# Patient Record
Sex: FEMALE | Race: OTHER | HISPANIC OR LATINO | URBAN - METROPOLITAN AREA
[De-identification: names, ages, dates, MRNs, and addresses within clinical notes are randomized per-mention and may not be internally consistent; named-entity substitution may affect disease eponyms.]

---

## 2023-11-04 ENCOUNTER — EMERGENCY (EMERGENCY)
Facility: HOSPITAL | Age: 78
LOS: 1 days | Discharge: HOME | End: 2023-11-04
Attending: EMERGENCY MEDICINE
Payer: SELF-PAY

## 2023-11-04 VITALS
SYSTOLIC BLOOD PRESSURE: 133 MMHG | OXYGEN SATURATION: 99 % | HEART RATE: 93 BPM | DIASTOLIC BLOOD PRESSURE: 81 MMHG | TEMPERATURE: 98 F | RESPIRATION RATE: 18 BRPM

## 2023-11-04 VITALS
HEIGHT: 67 IN | WEIGHT: 154.1 LBS | HEART RATE: 83 BPM | RESPIRATION RATE: 16 BRPM | DIASTOLIC BLOOD PRESSURE: 74 MMHG | SYSTOLIC BLOOD PRESSURE: 148 MMHG | OXYGEN SATURATION: 96 % | TEMPERATURE: 98 F

## 2023-11-04 LAB
ALBUMIN SERPL ELPH-MCNC: 3.9 G/DL — SIGNIFICANT CHANGE UP (ref 3.3–5.2)
ALBUMIN SERPL ELPH-MCNC: 3.9 G/DL — SIGNIFICANT CHANGE UP (ref 3.3–5.2)
ALP SERPL-CCNC: 104 U/L — SIGNIFICANT CHANGE UP (ref 40–120)
ALP SERPL-CCNC: 104 U/L — SIGNIFICANT CHANGE UP (ref 40–120)
ALT FLD-CCNC: 15 U/L — SIGNIFICANT CHANGE UP
ALT FLD-CCNC: 15 U/L — SIGNIFICANT CHANGE UP
ANION GAP SERPL CALC-SCNC: 13 MMOL/L — SIGNIFICANT CHANGE UP (ref 5–17)
ANION GAP SERPL CALC-SCNC: 13 MMOL/L — SIGNIFICANT CHANGE UP (ref 5–17)
APPEARANCE UR: ABNORMAL
APPEARANCE UR: ABNORMAL
AST SERPL-CCNC: 16 U/L — SIGNIFICANT CHANGE UP
AST SERPL-CCNC: 16 U/L — SIGNIFICANT CHANGE UP
BACTERIA # UR AUTO: ABNORMAL /HPF
BACTERIA # UR AUTO: ABNORMAL /HPF
BASOPHILS # BLD AUTO: 0.05 K/UL — SIGNIFICANT CHANGE UP (ref 0–0.2)
BASOPHILS # BLD AUTO: 0.05 K/UL — SIGNIFICANT CHANGE UP (ref 0–0.2)
BASOPHILS NFR BLD AUTO: 0.7 % — SIGNIFICANT CHANGE UP (ref 0–2)
BASOPHILS NFR BLD AUTO: 0.7 % — SIGNIFICANT CHANGE UP (ref 0–2)
BILIRUB SERPL-MCNC: 0.4 MG/DL — SIGNIFICANT CHANGE UP (ref 0.4–2)
BILIRUB SERPL-MCNC: 0.4 MG/DL — SIGNIFICANT CHANGE UP (ref 0.4–2)
BILIRUB UR-MCNC: NEGATIVE — SIGNIFICANT CHANGE UP
BILIRUB UR-MCNC: NEGATIVE — SIGNIFICANT CHANGE UP
BUN SERPL-MCNC: 27.5 MG/DL — HIGH (ref 8–20)
BUN SERPL-MCNC: 27.5 MG/DL — HIGH (ref 8–20)
CALCIUM SERPL-MCNC: 9.6 MG/DL — SIGNIFICANT CHANGE UP (ref 8.4–10.5)
CALCIUM SERPL-MCNC: 9.6 MG/DL — SIGNIFICANT CHANGE UP (ref 8.4–10.5)
CAST: 2 /LPF — SIGNIFICANT CHANGE UP (ref 0–4)
CAST: 2 /LPF — SIGNIFICANT CHANGE UP (ref 0–4)
CHLORIDE SERPL-SCNC: 98 MMOL/L — SIGNIFICANT CHANGE UP (ref 96–108)
CHLORIDE SERPL-SCNC: 98 MMOL/L — SIGNIFICANT CHANGE UP (ref 96–108)
CO2 SERPL-SCNC: 25 MMOL/L — SIGNIFICANT CHANGE UP (ref 22–29)
CO2 SERPL-SCNC: 25 MMOL/L — SIGNIFICANT CHANGE UP (ref 22–29)
COLOR SPEC: YELLOW — SIGNIFICANT CHANGE UP
COLOR SPEC: YELLOW — SIGNIFICANT CHANGE UP
CREAT SERPL-MCNC: 1.37 MG/DL — HIGH (ref 0.5–1.3)
CREAT SERPL-MCNC: 1.37 MG/DL — HIGH (ref 0.5–1.3)
DIFF PNL FLD: ABNORMAL
DIFF PNL FLD: ABNORMAL
EGFR: 39 ML/MIN/1.73M2 — LOW
EGFR: 39 ML/MIN/1.73M2 — LOW
EOSINOPHIL # BLD AUTO: 0.34 K/UL — SIGNIFICANT CHANGE UP (ref 0–0.5)
EOSINOPHIL # BLD AUTO: 0.34 K/UL — SIGNIFICANT CHANGE UP (ref 0–0.5)
EOSINOPHIL NFR BLD AUTO: 4.6 % — SIGNIFICANT CHANGE UP (ref 0–6)
EOSINOPHIL NFR BLD AUTO: 4.6 % — SIGNIFICANT CHANGE UP (ref 0–6)
GLUCOSE SERPL-MCNC: 424 MG/DL — HIGH (ref 70–99)
GLUCOSE SERPL-MCNC: 424 MG/DL — HIGH (ref 70–99)
GLUCOSE UR QL: >=1000 MG/DL
GLUCOSE UR QL: >=1000 MG/DL
HCT VFR BLD CALC: 42.1 % — SIGNIFICANT CHANGE UP (ref 34.5–45)
HCT VFR BLD CALC: 42.1 % — SIGNIFICANT CHANGE UP (ref 34.5–45)
HGB BLD-MCNC: 14.6 G/DL — SIGNIFICANT CHANGE UP (ref 11.5–15.5)
HGB BLD-MCNC: 14.6 G/DL — SIGNIFICANT CHANGE UP (ref 11.5–15.5)
IMM GRANULOCYTES NFR BLD AUTO: 0.3 % — SIGNIFICANT CHANGE UP (ref 0–0.9)
IMM GRANULOCYTES NFR BLD AUTO: 0.3 % — SIGNIFICANT CHANGE UP (ref 0–0.9)
KETONES UR-MCNC: NEGATIVE MG/DL — SIGNIFICANT CHANGE UP
KETONES UR-MCNC: NEGATIVE MG/DL — SIGNIFICANT CHANGE UP
LEUKOCYTE ESTERASE UR-ACNC: ABNORMAL
LEUKOCYTE ESTERASE UR-ACNC: ABNORMAL
LYMPHOCYTES # BLD AUTO: 3.11 K/UL — SIGNIFICANT CHANGE UP (ref 1–3.3)
LYMPHOCYTES # BLD AUTO: 3.11 K/UL — SIGNIFICANT CHANGE UP (ref 1–3.3)
LYMPHOCYTES # BLD AUTO: 42.2 % — SIGNIFICANT CHANGE UP (ref 13–44)
LYMPHOCYTES # BLD AUTO: 42.2 % — SIGNIFICANT CHANGE UP (ref 13–44)
MCHC RBC-ENTMCNC: 27.2 PG — SIGNIFICANT CHANGE UP (ref 27–34)
MCHC RBC-ENTMCNC: 27.2 PG — SIGNIFICANT CHANGE UP (ref 27–34)
MCHC RBC-ENTMCNC: 34.7 GM/DL — SIGNIFICANT CHANGE UP (ref 32–36)
MCHC RBC-ENTMCNC: 34.7 GM/DL — SIGNIFICANT CHANGE UP (ref 32–36)
MCV RBC AUTO: 78.4 FL — LOW (ref 80–100)
MCV RBC AUTO: 78.4 FL — LOW (ref 80–100)
MONOCYTES # BLD AUTO: 0.53 K/UL — SIGNIFICANT CHANGE UP (ref 0–0.9)
MONOCYTES # BLD AUTO: 0.53 K/UL — SIGNIFICANT CHANGE UP (ref 0–0.9)
MONOCYTES NFR BLD AUTO: 7.2 % — SIGNIFICANT CHANGE UP (ref 2–14)
MONOCYTES NFR BLD AUTO: 7.2 % — SIGNIFICANT CHANGE UP (ref 2–14)
NEUTROPHILS # BLD AUTO: 3.32 K/UL — SIGNIFICANT CHANGE UP (ref 1.8–7.4)
NEUTROPHILS # BLD AUTO: 3.32 K/UL — SIGNIFICANT CHANGE UP (ref 1.8–7.4)
NEUTROPHILS NFR BLD AUTO: 45 % — SIGNIFICANT CHANGE UP (ref 43–77)
NEUTROPHILS NFR BLD AUTO: 45 % — SIGNIFICANT CHANGE UP (ref 43–77)
NITRITE UR-MCNC: POSITIVE
NITRITE UR-MCNC: POSITIVE
PH UR: 6 — SIGNIFICANT CHANGE UP (ref 5–8)
PH UR: 6 — SIGNIFICANT CHANGE UP (ref 5–8)
PLATELET # BLD AUTO: 122 K/UL — LOW (ref 150–400)
PLATELET # BLD AUTO: 122 K/UL — LOW (ref 150–400)
POTASSIUM SERPL-MCNC: 4.1 MMOL/L — SIGNIFICANT CHANGE UP (ref 3.5–5.3)
POTASSIUM SERPL-MCNC: 4.1 MMOL/L — SIGNIFICANT CHANGE UP (ref 3.5–5.3)
POTASSIUM SERPL-SCNC: 4.1 MMOL/L — SIGNIFICANT CHANGE UP (ref 3.5–5.3)
POTASSIUM SERPL-SCNC: 4.1 MMOL/L — SIGNIFICANT CHANGE UP (ref 3.5–5.3)
PROT SERPL-MCNC: 7.6 G/DL — SIGNIFICANT CHANGE UP (ref 6.6–8.7)
PROT SERPL-MCNC: 7.6 G/DL — SIGNIFICANT CHANGE UP (ref 6.6–8.7)
PROT UR-MCNC: SIGNIFICANT CHANGE UP MG/DL
PROT UR-MCNC: SIGNIFICANT CHANGE UP MG/DL
RBC # BLD: 5.37 M/UL — HIGH (ref 3.8–5.2)
RBC # BLD: 5.37 M/UL — HIGH (ref 3.8–5.2)
RBC # FLD: 13 % — SIGNIFICANT CHANGE UP (ref 10.3–14.5)
RBC # FLD: 13 % — SIGNIFICANT CHANGE UP (ref 10.3–14.5)
RBC CASTS # UR COMP ASSIST: 1 /HPF — SIGNIFICANT CHANGE UP (ref 0–4)
RBC CASTS # UR COMP ASSIST: 1 /HPF — SIGNIFICANT CHANGE UP (ref 0–4)
SODIUM SERPL-SCNC: 136 MMOL/L — SIGNIFICANT CHANGE UP (ref 135–145)
SODIUM SERPL-SCNC: 136 MMOL/L — SIGNIFICANT CHANGE UP (ref 135–145)
SP GR SPEC: 1.02 — SIGNIFICANT CHANGE UP (ref 1–1.03)
SP GR SPEC: 1.02 — SIGNIFICANT CHANGE UP (ref 1–1.03)
SQUAMOUS # UR AUTO: 1 /HPF — SIGNIFICANT CHANGE UP (ref 0–5)
SQUAMOUS # UR AUTO: 1 /HPF — SIGNIFICANT CHANGE UP (ref 0–5)
TROPONIN T SERPL-MCNC: <0.01 NG/ML — SIGNIFICANT CHANGE UP (ref 0–0.06)
TROPONIN T SERPL-MCNC: <0.01 NG/ML — SIGNIFICANT CHANGE UP (ref 0–0.06)
UROBILINOGEN FLD QL: 0.2 MG/DL — SIGNIFICANT CHANGE UP (ref 0.2–1)
UROBILINOGEN FLD QL: 0.2 MG/DL — SIGNIFICANT CHANGE UP (ref 0.2–1)
WBC # BLD: 7.37 K/UL — SIGNIFICANT CHANGE UP (ref 3.8–10.5)
WBC # BLD: 7.37 K/UL — SIGNIFICANT CHANGE UP (ref 3.8–10.5)
WBC # FLD AUTO: 7.37 K/UL — SIGNIFICANT CHANGE UP (ref 3.8–10.5)
WBC # FLD AUTO: 7.37 K/UL — SIGNIFICANT CHANGE UP (ref 3.8–10.5)
WBC UR QL: 249 /HPF — HIGH (ref 0–5)
WBC UR QL: 249 /HPF — HIGH (ref 0–5)

## 2023-11-04 PROCEDURE — 87077 CULTURE AEROBIC IDENTIFY: CPT

## 2023-11-04 PROCEDURE — 96375 TX/PRO/DX INJ NEW DRUG ADDON: CPT

## 2023-11-04 PROCEDURE — 84484 ASSAY OF TROPONIN QUANT: CPT

## 2023-11-04 PROCEDURE — 87086 URINE CULTURE/COLONY COUNT: CPT

## 2023-11-04 PROCEDURE — 93005 ELECTROCARDIOGRAM TRACING: CPT

## 2023-11-04 PROCEDURE — 93010 ELECTROCARDIOGRAM REPORT: CPT

## 2023-11-04 PROCEDURE — 36415 COLL VENOUS BLD VENIPUNCTURE: CPT

## 2023-11-04 PROCEDURE — 87186 SC STD MICRODIL/AGAR DIL: CPT

## 2023-11-04 PROCEDURE — 85025 COMPLETE CBC W/AUTO DIFF WBC: CPT

## 2023-11-04 PROCEDURE — 99285 EMERGENCY DEPT VISIT HI MDM: CPT | Mod: 25

## 2023-11-04 PROCEDURE — 71045 X-RAY EXAM CHEST 1 VIEW: CPT

## 2023-11-04 PROCEDURE — 80053 COMPREHEN METABOLIC PANEL: CPT

## 2023-11-04 PROCEDURE — 99285 EMERGENCY DEPT VISIT HI MDM: CPT

## 2023-11-04 PROCEDURE — 71045 X-RAY EXAM CHEST 1 VIEW: CPT | Mod: 26

## 2023-11-04 PROCEDURE — T1013: CPT

## 2023-11-04 PROCEDURE — 96374 THER/PROPH/DIAG INJ IV PUSH: CPT

## 2023-11-04 PROCEDURE — 81001 URINALYSIS AUTO W/SCOPE: CPT

## 2023-11-04 RX ORDER — INSULIN HUMAN 100 [IU]/ML
8 INJECTION, SOLUTION SUBCUTANEOUS ONCE
Refills: 0 | Status: COMPLETED | OUTPATIENT
Start: 2023-11-04 | End: 2023-11-04

## 2023-11-04 RX ORDER — CEFTRIAXONE 500 MG/1
1000 INJECTION, POWDER, FOR SOLUTION INTRAMUSCULAR; INTRAVENOUS ONCE
Refills: 0 | Status: DISCONTINUED | OUTPATIENT
Start: 2023-11-04 | End: 2023-11-04

## 2023-11-04 RX ORDER — HUMAN INSULIN 100 [IU]/ML
30 INJECTION, SUSPENSION SUBCUTANEOUS
Qty: 1 | Refills: 0
Start: 2023-11-04 | End: 2023-11-08

## 2023-11-04 RX ORDER — CEFPODOXIME PROXETIL 100 MG
1 TABLET ORAL
Qty: 14 | Refills: 0
Start: 2023-11-04 | End: 2023-11-10

## 2023-11-04 RX ORDER — ACETAMINOPHEN 500 MG
1000 TABLET ORAL ONCE
Refills: 0 | Status: COMPLETED | OUTPATIENT
Start: 2023-11-04 | End: 2023-11-04

## 2023-11-04 RX ORDER — LIDOCAINE 4 G/100G
1 CREAM TOPICAL ONCE
Refills: 0 | Status: COMPLETED | OUTPATIENT
Start: 2023-11-04 | End: 2023-11-04

## 2023-11-04 RX ORDER — CEFTRIAXONE 500 MG/1
1000 INJECTION, POWDER, FOR SOLUTION INTRAMUSCULAR; INTRAVENOUS ONCE
Refills: 0 | Status: COMPLETED | OUTPATIENT
Start: 2023-11-04 | End: 2023-11-04

## 2023-11-04 RX ORDER — SODIUM CHLORIDE 9 MG/ML
500 INJECTION INTRAMUSCULAR; INTRAVENOUS; SUBCUTANEOUS ONCE
Refills: 0 | Status: COMPLETED | OUTPATIENT
Start: 2023-11-04 | End: 2023-11-04

## 2023-11-04 RX ADMIN — INSULIN HUMAN 8 UNIT(S): 100 INJECTION, SOLUTION SUBCUTANEOUS at 17:52

## 2023-11-04 RX ADMIN — SODIUM CHLORIDE 500 MILLILITER(S): 9 INJECTION INTRAMUSCULAR; INTRAVENOUS; SUBCUTANEOUS at 17:52

## 2023-11-04 RX ADMIN — Medication 1000 MILLIGRAM(S): at 17:37

## 2023-11-04 RX ADMIN — Medication 400 MILLIGRAM(S): at 16:44

## 2023-11-04 RX ADMIN — CEFTRIAXONE 1000 MILLIGRAM(S): 500 INJECTION, POWDER, FOR SOLUTION INTRAMUSCULAR; INTRAVENOUS at 17:12

## 2023-11-04 RX ADMIN — SODIUM CHLORIDE 500 MILLILITER(S): 9 INJECTION INTRAMUSCULAR; INTRAVENOUS; SUBCUTANEOUS at 16:44

## 2023-11-04 RX ADMIN — LIDOCAINE 1 PATCH: 4 CREAM TOPICAL at 17:12

## 2023-11-04 NOTE — ED ADULT NURSE NOTE - OBJECTIVE STATEMENT
pt reports o the ED s/p near syncopal episode. Pt daughter at bedside states she became dizzy while walking PTA and was helped back to bed by daughter.   In ED pt c/o of HA, and chest discomfort. daughter reports she just moved here from [facility] one month ago. Since last week has been more weak, poor PO intake. Denies any other c/o including SOB, CP, abd pain, back pain.

## 2023-11-04 NOTE — ED PROVIDER NOTE - CLINICAL SUMMARY MEDICAL DECISION MAKING FREE TEXT BOX
78y female w/ pmh of DM, HTN, CVA presenting after a near syncope that occurred PTA. patient became dizzy walking to the restroom and was helped to her bed. She did not fall. She states she has felt weak, tired, low appetite for the past week with associated dysuria. She also complains of a headache, cough and chest tightness. Denies fever, abdominal pain, N/V/D, leg pain or swelling, black or bloody stool. Patient recently moved from [facility] one months ago. She has been out of her Irbecard and insulin for the past 3 days, she is currently working on obtaining insurance for health care. 78y female w/ pmh of DM, HTN, CVA presenting after a near syncope and dysuria. Patient well appearing, nontoxic, stable vitals. workup significant for UTI and elevated blood sugar. Patient moved here from [facility] a month ago and has been out of her medications for the past 3 days. EKG, CXR, troponin all negative. Patient not currently in DKA. Ceftriaxone given in ED, prescriptions for cefpodoxime and NPH insulin sent. Clinical care coordinator consulted for expedited PCP follow up. [facility] clinic information provided. return precautions for UTI and DM given. Patient stable for discharge. 78y female w/ pmh of DM, HTN, CVA presenting after a near syncope and dysuria. Patient well appearing, nontoxic, stable vitals. workup significant for UTI and elevated blood sugar. Patient moved here from [facility] [place] a month ago and has been out of her medications for the past 3 days. EKG, CXR, troponin all negative. Patient not currently in DKA. Ceftriaxone given in ED, prescriptions for cefpodoxime and NPH insulin sent. Instructions for insulin use and blood sugar monitoring provided. Clinical care coordinator consulted for expedited PCP follow up. [facility] clinic information provided. return precautions for UTI and DM given. Patient stable for discharge.

## 2023-11-04 NOTE — ED PROVIDER NOTE - PHYSICAL EXAMINATION

## 2023-11-04 NOTE — ED PROVIDER NOTE - PROGRESS NOTE DETAILS
Jer: pt takes 30 units nph intermediate insulin once daily (has picture of meds), ran out 3 days ago and is working on f/u. will send short course of pt's medications and arrange close f/u.

## 2023-11-04 NOTE — ED PROVIDER NOTE - OBJECTIVE STATEMENT
78y female w/ pmh of DM, HTN, CVA presenting after a near syncope that occurred PTA. patient became dizzy walking to the restroom and was helped to her bed. She did not fall. She states she has felt weak, tired, low appetite for the past week with associated dysuria. She also complains of a headache, cough and chest tightness. Denies fever, abdominal pain, N/V/D, leg pain or swelling, black or bloody stool. Patient recently moved from [facility] one months ago. She has been out of her Irbecard and insulin for the past 3 days, she is currently working on obtaining insurance for health care.

## 2023-11-04 NOTE — ED PROVIDER NOTE - NSFOLLOWUPINSTRUCTIONS_ED_ALL_ED_FT
Infección urinaria en los adultos  Urinary Tract Infection, Adult    Shahrzad infección urinaria (IU) puede ocurrir en cualquier lugar de las vías urinarias. Las vías urinarias incluyen a los riñones, los uréteres, la vejiga y la uretra. Estos órganos fabrican, almacenan y eliminan la orina del organismo.    La IU mirtha afecta los uréteres y los riñones. La IU baja afecta la vejiga y la uretra.    ¿Cuáles son las causas?  La mayoría de las infecciones de las vías urinarias es causada por la presencia de bacterias en la alisia genital, alrededor de la uretra, por donde sale la orina del cuerpo. Estas bacterias proliferan y causan inflamación en las vías urinarias.    ¿Qué incrementa el riesgo?  Es más probable que sufra esta afección si:  Tiene colocado un catéter urinario permanente.  No puede controlar cuándo orinar o defecar (incontinencia).  Es sunshine y usted:  Utiliza espermicida o diafragma bay método anticonceptivo.  Tiene niveles bajos de estrógenos.  Está embarazada.  Tiene ciertos genes que aumentan crawford riesgo.  Es sexualmente activa.  Adalgisa antibióticos.  Tiene shahrzad afección que causa que el flujo de orina sea lento, bay:  Próstata agrandada, si usted es hombre.  Obstrucción de la uretra.  Cálculo renal.  Shahrzad afección nerviosa que afecta el control de la vejiga (vejiga neurógena).  No brianna lo suficiente o no orina con frecuencia.  Tiene ciertas enfermedades crónicas, bay:  Diabetes.  Un sistema que combate las enfermedades (sistemainmunitario) debilitado.  Anemia drepanocítica.  Gota.  Lesión en la médula diaz.  ¿Cuáles son los signos o síntomas?  Los síntomas de esta afección incluyen:  Necesidad inmediata (urgencia) de orinar.  Micción frecuente. Trilla puede incluir pequeñas cantidades de orina cada vez que orina.  Ardor o dolor al orinar.  Presencia de michoacano en la orina.  Orina con mal olor u olor atípico.  Dificultad para orinar.  Orina turbia.  Secreción vaginal, si es sunshine.  Dolor en el abdomen o en la parte inferior de la espalda.  Es posible que también tenga:  Vómitos o disminución del apetito.  Confusión.  Irritabilidad o cansancio.  Fiebre o escalofríos.  Diarrea.  El primer síntoma en los adultos mayores puede ser la confusión. En algunos casos, es posible que no tengan síntomas hasta que la infección empeore.    ¿Cómo se diagnostica?  Esta afección se diagnostica en función de benny antecedentes médicos y de un examen físico. También pueden hacerle otras pruebas, incluidas las siguientes:  Análisis de orina.  Análisis de michoacano.  Pruebas de infecciones de transmisión sexual (ITS).  Si ha tenido más de shahrzad infección urinaria (IU), se pueden hacer estudios de diagnóstico por imágenes o shahrzad cistoscopia para determinar la causa de las infecciones.    ¿Cómo se trata?  El tratamiento de esta afección incluye lo siguiente:  Antibióticos.  Medicamentos de venta meli para aliviar las molestias.  Beber shahrzad cantidad suficiente agua para mantenerse hidratado.  Si tiene infecciones con frecuencia o tiene otras afecciones, bay un cálculo renal, es posible que deba clara a un médico especialista en las vías urinarias (urólogo).    En casos poco frecuentes, las infecciones urinarias pueden provocar sepsis. La sepsis es shahrzad afección potencialmente mortal que se produce cuando el cuerpo responde a shahrzad infección. La sepsis se trata en el hospital con antibióticos, líquidos y otros medicamentos que se administran por vía intravenosa.    Siga estas instrucciones en crawford casa:    Medicamentos    Use los medicamentos de venta meli y los recetados solamente bay se lo haya indicado el médico.  Si le recetaron un antibiótico, tómelo bay se lo haya indicado el médico. No deje de usar el antibiótico aunque comience a sentirse mejor.  Instrucciones generales    Asegúrese de hacer lo siguiente:  Vaciar la vejiga con frecuencia y en crawford totalidad. No contener la orina mariella largos períodos.  Vaciar la vejiga después de tener sexo.  Limpiarse de atrás hacia adelante después de orinar o defecar, si es sunshine. Usar cada trozo de papel higiénico solo shahrzad vez cuando se limpie.  Beber suficiente líquido bay para mantener la orina de color amarillo pálido.  Cumpla con todas las visitas de seguimiento. Trilla es importante.  Comuníquese con un médico si:  Los síntomas no mejoran después de 1 o 2 días de tratamiento.  Los síntomas desaparecen y luego vuelven a aparecer.  Solicite ayuda de inmediato si:  Siente dolor intenso en la espalda o en la parte inferior del abdomen.  Tiene fiebre o escalofríos.  Tiene náuseas o vómitos.  Resumen  Shahrzad infección urinaria (IU) es shahrzad infección en cualquier parte de las vías urinarias, que incluyen los riñones, los uréteres, la vejiga y la uretra.  La mayoría de las infecciones de las vías urinarias es causada por bacterias en la alisia genital.  El tratamiento de esta afección suele incluir antibióticos.  Si le recetaron un antibiótico, tómelo bay se lo haya indicado el médico. No deje de usar el antibiótico aunque comience a sentirse mejor.  Cumpla con todas las visitas de seguimiento. Trilla es importante.  Esta información no tiene bay fin reemplazar el consejo del médico. Asegúrese de hacerle al médico cualquier pregunta que tenga. Urinary Tract Infection, Adult    A urinary tract infection (UTI) is an infection of any part of the urinary tract. The urinary tract includes the kidneys, ureters, bladder, and urethra. These organs make, store, and get rid of urine in the body.    An upper UTI affects the ureters and kidneys. A lower UTI affects the bladder and urethra.    What are the causes?  Most urinary tract infections are caused by bacteria in your genital area around your urethra, where urine leaves your body. These bacteria grow and cause inflammation of your urinary tract.    What increases the risk?  You are more likely to develop this condition if:  You have a urinary catheter that stays in place.  You are not able to control when you urinate or have a bowel movement (incontinence).  You are female and you:  Use a spermicide or diaphragm for birth control.  Have low estrogen levels.  Are pregnant.  You have certain genes that increase your risk.  You are sexually active.  You take antibiotic medicines.  You have a condition that causes your flow of urine to slow down, such as:  An enlarged prostate, if you are male.  Blockage in your urethra.  A kidney stone.  A nerve condition that affects your bladder control (neurogenic bladder).  Not getting enough to drink, or not urinating often.  You have certain medical conditions, such as:  Diabetes.  A weak disease-fighting system (immunesystem).  Sickle cell disease.  Gout.  Spinal cord injury.  What are the signs or symptoms?  Symptoms of this condition include:  Needing to urinate right away (urgency).  Frequent urination. This may include small amounts of urine each time you urinate.  Pain or burning with urination.  Blood in the urine.  Urine that smells bad or unusual.  Trouble urinating.  Cloudy urine.  Vaginal discharge, if you are female.  Pain in the abdomen or the lower back.  You may also have:  Vomiting or a decreased appetite.  Confusion.  Irritability or tiredness.  A fever or chills.  Diarrhea.  The first symptom in older adults may be confusion. In some cases, they may not have any symptoms until the infection has worsened.    How is this diagnosed?  This condition is diagnosed based on your medical history and a physical exam. You may also have other tests, including:  Urine tests.  Blood tests.  Tests for STIs (sexually transmitted infections).  If you have had more than one UTI, a cystoscopy or imaging studies may be done to determine the cause of the infections.    How is this treated?  Treatment for this condition includes:  Antibiotic medicine.  Over-the-counter medicines to treat discomfort.  Drinking enough water to stay hydrated.  If you have frequent infections or have other conditions such as a kidney stone, you may need to see a health care provider who specializes in the urinary tract (urologist).    In rare cases, urinary tract infections can cause sepsis. Sepsis is a life-threatening condition that occurs when the body responds to an infection. Sepsis is treated in the hospital with IV antibiotics, fluids, and other medicines.    Follow these instructions at home:    Medicines    Take over-the-counter and prescription medicines only as told by your health care provider.  If you were prescribed an antibiotic medicine, take it as told by your health care provider. Do not stop using the antibiotic even if you start to feel better.  General instructions    Make sure you:  Empty your bladder often and completely. Do not hold urine for long periods of time.  Empty your bladder after sex.  Wipe from front to back after urinating or having a bowel movement if you are female. Use each tissue only one time when you wipe.  Drink enough fluid to keep your urine pale yellow.  Keep all follow-up visits. This is important.  Contact a health care provider if:  Your symptoms do not get better after 1–2 days.  Your symptoms go away and then return.  Get help right away if:  You have severe pain in your back or your lower abdomen.  You have a fever or chills.  You have nausea or vomiting.  Summary  A urinary tract infection (UTI) is an infection of any part of the urinary tract, which includes the kidneys, ureters, bladder, and urethra.  Most urinary tract infections are caused by bacteria in your genital area.  Treatment for this condition often includes antibiotic medicines.  If you were prescribed an antibiotic medicine, take it as told by your health care provider. Do not stop using the antibiotic even if you start to feel better.  Keep all follow-up visits. This is important.  This information is not intended to replace advice given to you by your health care provider. Make sure you discuss any questions you have with your health care provider.

## 2023-11-04 NOTE — ED ADULT NURSE NOTE - CHIEF COMPLAINT QUOTE
witnessed syncope today. came few days ago from [facility]. "hasn't been eating. very weak."

## 2023-11-04 NOTE — ED ADULT TRIAGE NOTE - CHIEF COMPLAINT QUOTE
witnessed syncope today. came few days ago from [facility]. "hasn't been eating. very weak."

## 2023-11-04 NOTE — ED PROVIDER NOTE - NS ED ROS FT
General: Denies fever, chills  HEENT: Denies sore throat  Neck: Denies neck pain  Resp: cough, SOB  Cardiovascular: Denies palpitations, LE edema. CP  GI: Denies nausea, vomiting, abdominal pain, diarrhea, constipation, blood in stool  : dysuria   MSK: Denies back pain. left trapezius pain.  Neuro: Denies HA, numbness, weakness. dizziness  Skin: Denies rashes.

## 2023-11-04 NOTE — ED PROVIDER NOTE - PATIENT PORTAL LINK FT
You can access the FollowMyHealth Patient Portal offered by [facility] by registering at the following website: [email or web address]. By joining [facility]’s FollowMyHealth portal, you will also be able to view your health information using other applications (apps) compatible with our system.

## 2023-11-04 NOTE — ED PROVIDER NOTE - ATTENDING CONTRIBUTION TO CARE
Jer: I performed a face to face evaluation of this patient and performed a full history and physical examination on the patient.  I agree with the resident's history, physical examination, and plan of the patient unless otherwise noted. My brief assessment is as follows: hx htn, dm, recently from [facility] present with 1 week of weakness, decreased po intake, with dysuria and had near syncope today. denies cp/sob/fever/abd pain. no headache or neuro symptoms. has had uti in past. non toxic, dry mucus membranes, ctab, rrr, abd benign, neuro intact. ncat. ekg non ischemic. will check urine, labs, hydrate. observe on monitor. reassess. suspect component of dehydration from possible uti.

## 2023-11-07 LAB
-  AMOXICILLIN/CLAVULANIC ACID: SIGNIFICANT CHANGE UP
-  AMOXICILLIN/CLAVULANIC ACID: SIGNIFICANT CHANGE UP
-  AMPICILLIN/SULBACTAM: SIGNIFICANT CHANGE UP
-  AMPICILLIN/SULBACTAM: SIGNIFICANT CHANGE UP
-  AMPICILLIN: SIGNIFICANT CHANGE UP
-  AMPICILLIN: SIGNIFICANT CHANGE UP
-  AZTREONAM: SIGNIFICANT CHANGE UP
-  AZTREONAM: SIGNIFICANT CHANGE UP
-  CEFAZOLIN: SIGNIFICANT CHANGE UP
-  CEFAZOLIN: SIGNIFICANT CHANGE UP
-  CEFEPIME: SIGNIFICANT CHANGE UP
-  CEFEPIME: SIGNIFICANT CHANGE UP
-  CEFOXITIN: SIGNIFICANT CHANGE UP
-  CEFOXITIN: SIGNIFICANT CHANGE UP
-  CEFTRIAXONE: SIGNIFICANT CHANGE UP
-  CEFTRIAXONE: SIGNIFICANT CHANGE UP
-  CEFUROXIME: SIGNIFICANT CHANGE UP
-  CEFUROXIME: SIGNIFICANT CHANGE UP
-  CIPROFLOXACIN: SIGNIFICANT CHANGE UP
-  CIPROFLOXACIN: SIGNIFICANT CHANGE UP
-  ERTAPENEM: SIGNIFICANT CHANGE UP
-  ERTAPENEM: SIGNIFICANT CHANGE UP
-  GENTAMICIN: SIGNIFICANT CHANGE UP
-  GENTAMICIN: SIGNIFICANT CHANGE UP
-  IMIPENEM: SIGNIFICANT CHANGE UP
-  IMIPENEM: SIGNIFICANT CHANGE UP
-  LEVOFLOXACIN: SIGNIFICANT CHANGE UP
-  LEVOFLOXACIN: SIGNIFICANT CHANGE UP
-  MEROPENEM: SIGNIFICANT CHANGE UP
-  MEROPENEM: SIGNIFICANT CHANGE UP
-  NITROFURANTOIN: SIGNIFICANT CHANGE UP
-  NITROFURANTOIN: SIGNIFICANT CHANGE UP
-  PIPERACILLIN/TAZOBACTAM: SIGNIFICANT CHANGE UP
-  PIPERACILLIN/TAZOBACTAM: SIGNIFICANT CHANGE UP
-  TOBRAMYCIN: SIGNIFICANT CHANGE UP
-  TOBRAMYCIN: SIGNIFICANT CHANGE UP
-  TRIMETHOPRIM/SULFAMETHOXAZOLE: SIGNIFICANT CHANGE UP
-  TRIMETHOPRIM/SULFAMETHOXAZOLE: SIGNIFICANT CHANGE UP
CULTURE RESULTS: ABNORMAL
CULTURE RESULTS: ABNORMAL
METHOD TYPE: SIGNIFICANT CHANGE UP
METHOD TYPE: SIGNIFICANT CHANGE UP
ORGANISM # SPEC MICROSCOPIC CNT: ABNORMAL
ORGANISM # SPEC MICROSCOPIC CNT: ABNORMAL
ORGANISM # SPEC MICROSCOPIC CNT: SIGNIFICANT CHANGE UP
ORGANISM # SPEC MICROSCOPIC CNT: SIGNIFICANT CHANGE UP
SPECIMEN SOURCE: SIGNIFICANT CHANGE UP
SPECIMEN SOURCE: SIGNIFICANT CHANGE UP

## 2023-11-09 DIAGNOSIS — I10 ESSENTIAL (PRIMARY) HYPERTENSION: ICD-10-CM

## 2023-11-09 DIAGNOSIS — N39.0 URINARY TRACT INFECTION, SITE NOT SPECIFIED: ICD-10-CM

## 2023-11-09 DIAGNOSIS — E11.65 TYPE 2 DIABETES MELLITUS WITH HYPERGLYCEMIA: ICD-10-CM

## 2023-11-09 DIAGNOSIS — Z86.73 PERSONAL HISTORY OF TRANSIENT ISCHEMIC ATTACK (TIA), AND CEREBRAL INFARCTION WITHOUT RESIDUAL DEFICITS: ICD-10-CM

## 2024-06-06 ENCOUNTER — OFFICE (OUTPATIENT)
Dept: URBAN - METROPOLITAN AREA CLINIC 94 | Facility: CLINIC | Age: 79
Setting detail: OPHTHALMOLOGY
End: 2024-06-06
Payer: COMMERCIAL

## 2024-06-06 DIAGNOSIS — E11.3533: ICD-10-CM

## 2024-06-06 DIAGNOSIS — H25.11: ICD-10-CM

## 2024-06-06 PROCEDURE — 92134 CPTRZ OPH DX IMG PST SGM RTA: CPT | Performed by: OPHTHALMOLOGY

## 2024-06-06 PROCEDURE — 92014 COMPRE OPH EXAM EST PT 1/>: CPT | Performed by: OPHTHALMOLOGY

## 2024-06-06 ASSESSMENT — CONFRONTATIONAL VISUAL FIELD TEST (CVF)
OD_FINDINGS: FULL
OS_FINDINGS: FULL

## 2024-06-09 ENCOUNTER — EMERGENCY (EMERGENCY)
Facility: HOSPITAL | Age: 79
LOS: 1 days | Discharge: HOME | End: 2024-06-09
Attending: EMERGENCY MEDICINE
Payer: SELF-PAY

## 2024-06-09 VITALS
TEMPERATURE: 98 F | SYSTOLIC BLOOD PRESSURE: 171 MMHG | DIASTOLIC BLOOD PRESSURE: 96 MMHG | HEART RATE: 99 BPM | OXYGEN SATURATION: 99 % | RESPIRATION RATE: 18 BRPM | HEIGHT: 69.69 IN

## 2024-06-09 PROCEDURE — 99284 EMERGENCY DEPT VISIT MOD MDM: CPT | Mod: 25

## 2024-06-09 PROCEDURE — 73590 X-RAY EXAM OF LOWER LEG: CPT

## 2024-06-09 PROCEDURE — T1013: CPT

## 2024-06-09 PROCEDURE — 12001 RPR S/N/AX/GEN/TRNK 2.5CM/<: CPT

## 2024-06-09 PROCEDURE — 73590 X-RAY EXAM OF LOWER LEG: CPT | Mod: 26,LT

## 2024-06-09 PROCEDURE — 90715 TDAP VACCINE 7 YRS/> IM: CPT

## 2024-06-09 PROCEDURE — 90471 IMMUNIZATION ADMIN: CPT

## 2024-06-09 PROCEDURE — 99283 EMERGENCY DEPT VISIT LOW MDM: CPT | Mod: 25

## 2024-06-09 PROCEDURE — 82962 GLUCOSE BLOOD TEST: CPT

## 2024-06-09 RX ORDER — LIDOCAINE HCL 20 MG/ML
10 VIAL (ML) INJECTION ONCE
Refills: 0 | Status: COMPLETED | OUTPATIENT
Start: 2024-06-09 | End: 2024-06-09

## 2024-06-09 RX ORDER — TETANUS TOXOID, REDUCED DIPHTHERIA TOXOID AND ACELLULAR PERTUSSIS VACCINE, ADSORBED 5; 2.5; 8; 8; 2.5 [IU]/.5ML; [IU]/.5ML; UG/.5ML; UG/.5ML; UG/.5ML
0.5 SUSPENSION INTRAMUSCULAR ONCE
Refills: 0 | Status: COMPLETED | OUTPATIENT
Start: 2024-06-09 | End: 2024-06-09

## 2024-06-09 RX ORDER — CEPHALEXIN 500 MG
500 CAPSULE ORAL ONCE
Refills: 0 | Status: COMPLETED | OUTPATIENT
Start: 2024-06-09 | End: 2024-06-09

## 2024-06-09 RX ORDER — ACETAMINOPHEN 500 MG
650 TABLET ORAL ONCE
Refills: 0 | Status: COMPLETED | OUTPATIENT
Start: 2024-06-09 | End: 2024-06-09

## 2024-06-09 RX ADMIN — TETANUS TOXOID, REDUCED DIPHTHERIA TOXOID AND ACELLULAR PERTUSSIS VACCINE, ADSORBED 0.5 MILLILITER(S): 5; 2.5; 8; 8; 2.5 SUSPENSION INTRAMUSCULAR at 18:10

## 2024-06-09 RX ADMIN — Medication 650 MILLIGRAM(S): at 18:08

## 2024-06-09 RX ADMIN — Medication 500 MILLIGRAM(S): at 18:08

## 2024-06-09 NOTE — ED ADULT NURSE NOTE - CHIEF COMPLAINT QUOTE
Pt BIBA from home, broken vase cut back of left ankle, slipped in bathroom, bleeding controlled, denies blood thinners

## 2024-06-09 NOTE — ED PROVIDER NOTE - NSFOLLOWUPINSTRUCTIONS_ED_ALL_ED_FT

## 2024-06-09 NOTE — ED ADULT NURSE NOTE - NSFALLUNIVINTERV_ED_ALL_ED
Bed/Stretcher in lowest position, wheels locked, appropriate side rails in place/Call bell, personal items and telephone in reach/Instruct patient to call for assistance before getting out of bed/chair/stretcher/Non-slip footwear applied when patient is off stretcher/[place] to call system/Physically safe environment - no spills, clutter or unnecessary equipment/Purposeful proactive rounding/Room/bathroom lighting operational, light cord in reach

## 2024-06-09 NOTE — ED PROVIDER NOTE - NS ED ROS FT
No fever/chills, No photophobia/eye pain/changes in vision, No ear pain/sore throat/dysphagia, No chest pain/palpitations, no SOB/cough/wheeze/stridor, No abdominal pain, No N/V/D, no dysuria/frequency/discharge, No neck/back pain, +left leg laceration, no changes in neurological status/function.
(1) Other Diagnosis

## 2024-06-09 NOTE — ED PROVIDER NOTE - OBJECTIVE STATEMENT
This is a 79 year old female with left leg laceration x 1 hour.  As per daughter mother lacerated leg on glass vase.  She is unsure of last tetanus.  She reports h/o DM.  She denies any numbness or tingly sensation.

## 2024-06-09 NOTE — ED PROVIDER NOTE - CARE PROVIDER_API CALL
Bhargavi Montilla [facility]  Plastic Surgery  [address] [place] [id number]  Phone: [phone]  Fax: [phone]  Follow Up Time:

## 2024-06-09 NOTE — ED PROVIDER NOTE - CLINICAL SUMMARY MEDICAL DECISION MAKING FREE TEXT BOX
left leg laceration with glass vase  check XR to r/o FB  update tetanus  treat with abx: Keflex to prevent infection as has h/o DM, check FS  local wound care  lidocaine  suture repair  return in 7-10 days for suture removal

## 2024-06-09 NOTE — ED PROVIDER NOTE - PHYSICAL EXAMINATION
Constitutional - well-developed; well nourished. Head - NCAT. Airway patent. Eyes - PERRL. CV - RRR. no murmur. no edema. Pulm - CTAB. Abd - soft, nt. no rebound. no guarding. Neuro - A&Ox3. strength 5/5 x4. sensation intact x4. normal gait. Skin - +left leg posterior aspect of calf +1cm laceration, no active bleeding, no FB noted, compartments soft NT MSK - normal ROM. Constitutional - well-developed; well nourished. Head - NCAT. Airway patent. Eyes - PERRL. CV - RRR. no murmur. no edema. Pulm - CTAB. Abd - soft, nt. no rebound. no guarding. Neuro - A&Ox3. strength 5/5 x4. sensation intact x4. normal gait. Skin - +left leg posterior aspect of calf +2.5cm laceration, no active bleeding, no FB noted, compartments soft NT MSK - normal ROM. Constitutional - well-developed; well nourished. Head - NCAT. Airway patent.  Neuro - A&Ox3. strength 5/5 x4. sensation intact x4. normal gait. Skin - +left leg posterior aspect of distal calf +2.5cm laceration, no active bleeding, no FB noted, compartments soft NT, matthews test intact. MSK - normal ROM.

## 2024-06-09 NOTE — ED PROVIDER NOTE - WR ORDER STATUS 1
Performed Resulted Consent (Near Eyelid Margin)/Introductory Paragraph: EYELID REGION: The rationale for Mohs was explained to the patient and consent was obtained. The risks and benefits of Mohs surgery were discussed in detail. Specifically, the risks of swelling/bruising, scar, infection, bleeding, cutting through eyelid margin, possible damage to eye/eyelid/surrounding structures, risks of ectropion and/or eyelid deformity, possible damage to lacrimal (drainage) system, prolonged wound healing, incomplete removal/recurrence, allergy to anesthesia, nerve injury, numbness, contour/shape change, black/swollen eye, preplanned repair later today with Dr. Avery Brink at different location, discussed cosmesis but also function of eyelid hence repair set up with Dr. Brink in order to optimize preservation of both function and cosmesis. Prior to the procedure, the treatment site was clearly identified and confirmed by the patient with either a mirror or photograph. All questions answered. All components of Universal Protocol/PAUSE Rule completed.

## 2024-06-09 NOTE — ED ADULT NURSE NOTE - OBJECTIVE STATEMENT
Patient presents to ED c/o left ankle laceration.  Patient states she dropped a glass vase at home which cut the back of her left ankle.  Denies fall, denies blood thinners.  Bleeding controlled.  No further complaints at this time.

## 2024-06-09 NOTE — ED PROVIDER NOTE - ATTENDING APP SHARED VISIT CONTRIBUTION OF CARE
indep eval  lac to posterior distal L leg, cut accidentally on a decorative floor vase  leg from nvi 5-6 cm lac  needs stitches /wound care

## 2024-06-09 NOTE — ED ADULT TRIAGE NOTE - CHIEF COMPLAINT QUOTE
Pt BIBA from home, broken vase cut back of ankle, bleeding controlled, denies blood thinners Pt BIBA from home, broken vase cut back of ankle, slipped in bathroom, bleeding controlled, denies blood thinners Pt BIBA from home, broken vase cut back of left ankle, slipped in bathroom, bleeding controlled, denies blood thinners

## 2024-06-09 NOTE — ED PROVIDER NOTE - PROGRESS NOTE DETAILS
wound cleaned with saline and betadine and peroxide and 1% lido used to anethesize cut cavity, 10 simple interrupted nylon sutures used to close wound, XR reviewed no FB.  ABX sent to pharmacy.  Advised to f/u with PCP, return in 10-14 days for suture removal.  Local wound care given.  Instructed to return if develops fevers, redness, swelling, coldness to extremity or numbness or paresthesia.  Daughter understood and agrees to proceed.  Patient ambulated with walker into wheelchair.  Daughter took her home.

## 2024-06-10 PROBLEM — E11.9 TYPE 2 DIABETES MELLITUS WITHOUT COMPLICATIONS: Chronic | Status: ACTIVE | Noted: 2023-11-04

## 2024-06-10 PROBLEM — I63.9 CEREBRAL INFARCTION, UNSPECIFIED: Chronic | Status: ACTIVE | Noted: 2023-11-04

## 2024-06-10 PROBLEM — I10 ESSENTIAL (PRIMARY) HYPERTENSION: Chronic | Status: ACTIVE | Noted: 2023-11-04

## 2024-06-17 ENCOUNTER — OFFICE (OUTPATIENT)
Dept: URBAN - METROPOLITAN AREA CLINIC 112 | Facility: CLINIC | Age: 79
Setting detail: OPHTHALMOLOGY
End: 2024-06-17
Payer: COMMERCIAL

## 2024-06-17 DIAGNOSIS — H25.11: ICD-10-CM

## 2024-06-17 DIAGNOSIS — H40.031: ICD-10-CM

## 2024-06-17 PROCEDURE — 92020 GONIOSCOPY: CPT | Performed by: OPHTHALMOLOGY

## 2024-06-17 PROCEDURE — 92012 INTRM OPH EXAM EST PATIENT: CPT | Performed by: OPHTHALMOLOGY

## 2024-06-17 ASSESSMENT — CONFRONTATIONAL VISUAL FIELD TEST (CVF)
OS_FINDINGS: FULL
OD_FINDINGS: FULL

## 2024-06-18 ENCOUNTER — OFFICE (OUTPATIENT)
Dept: URBAN - METROPOLITAN AREA CLINIC 94 | Facility: CLINIC | Age: 79
Setting detail: OPHTHALMOLOGY
End: 2024-06-18
Payer: COMMERCIAL

## 2024-06-18 DIAGNOSIS — H25.13: ICD-10-CM

## 2024-06-18 DIAGNOSIS — E11.3413: ICD-10-CM

## 2024-06-18 PROCEDURE — 92014 COMPRE OPH EXAM EST PT 1/>: CPT | Performed by: OPHTHALMOLOGY

## 2024-06-18 PROCEDURE — 92134 CPTRZ OPH DX IMG PST SGM RTA: CPT | Performed by: OPHTHALMOLOGY

## 2024-06-18 PROCEDURE — 92235 FLUORESCEIN ANGRPH MLTIFRAME: CPT | Performed by: OPHTHALMOLOGY

## 2024-06-18 ASSESSMENT — CONFRONTATIONAL VISUAL FIELD TEST (CVF)
OS_FINDINGS: FULL
OD_FINDINGS: FULL

## 2024-07-02 ENCOUNTER — OFFICE (OUTPATIENT)
Dept: URBAN - METROPOLITAN AREA CLINIC 94 | Facility: CLINIC | Age: 79
Setting detail: OPHTHALMOLOGY
End: 2024-07-02
Payer: COMMERCIAL

## 2024-07-02 DIAGNOSIS — E11.3513: ICD-10-CM

## 2024-07-02 PROBLEM — H25.11 AGE RELATED NUCLEAR CATARACT; RIGHT EYE: Status: ACTIVE | Noted: 2024-06-06

## 2024-07-02 PROBLEM — H25.13 CATARACT SENILE NUCLEAR SCLEROSIS; BOTH EYES: Status: ACTIVE | Noted: 2024-06-17

## 2024-07-02 PROBLEM — H25.041 POSTERIOR SUBCAPSULAR CATARACT 366.14; RIGHT EYE: Status: ACTIVE | Noted: 2024-06-17

## 2024-07-02 PROBLEM — H40.031 NARROW ANGLE GLAUCOMA SUSPECT; RIGHT EYE: Status: ACTIVE | Noted: 2024-06-17

## 2024-07-02 PROCEDURE — 67028 INJECTION EYE DRUG: CPT | Mod: 50 | Performed by: OPHTHALMOLOGY

## 2024-07-02 PROCEDURE — 92134 CPTRZ OPH DX IMG PST SGM RTA: CPT | Performed by: OPHTHALMOLOGY

## 2024-07-06 ENCOUNTER — ASC (OUTPATIENT)
Dept: URBAN - METROPOLITAN AREA SURGERY 8 | Facility: SURGERY | Age: 79
Setting detail: OPHTHALMOLOGY
End: 2024-07-06
Payer: COMMERCIAL

## 2024-07-06 DIAGNOSIS — H40.031: ICD-10-CM

## 2024-07-06 PROCEDURE — 66761 REVISION OF IRIS: CPT | Mod: RT | Performed by: OPHTHALMOLOGY

## 2024-07-27 ENCOUNTER — OFFICE (OUTPATIENT)
Dept: URBAN - METROPOLITAN AREA CLINIC 94 | Facility: CLINIC | Age: 79
Setting detail: OPHTHALMOLOGY
End: 2024-07-27
Payer: COMMERCIAL

## 2024-07-27 DIAGNOSIS — H25.11: ICD-10-CM

## 2024-07-27 DIAGNOSIS — H25.13: ICD-10-CM

## 2024-07-27 PROBLEM — E11.3513 DM TYPE 2; BOTH PROLIFERATIVE WITH ME: Status: ACTIVE | Noted: 2024-07-02

## 2024-07-27 PROCEDURE — 92136 OPHTHALMIC BIOMETRY: CPT | Mod: TC | Performed by: OPHTHALMOLOGY

## 2024-07-27 PROCEDURE — 92136 OPHTHALMIC BIOMETRY: CPT | Mod: RT | Performed by: OPHTHALMOLOGY

## 2024-07-27 PROCEDURE — 99214 OFFICE O/P EST MOD 30 MIN: CPT | Performed by: OPHTHALMOLOGY

## 2024-07-27 ASSESSMENT — CONFRONTATIONAL VISUAL FIELD TEST (CVF)
OS_FINDINGS: FULL
OD_FINDINGS: FULL

## 2024-08-13 ENCOUNTER — OFFICE (OUTPATIENT)
Dept: URBAN - METROPOLITAN AREA CLINIC 94 | Facility: CLINIC | Age: 79
Setting detail: OPHTHALMOLOGY
End: 2024-08-13
Payer: COMMERCIAL

## 2024-08-13 DIAGNOSIS — E11.3513: ICD-10-CM

## 2024-08-13 PROCEDURE — 92134 CPTRZ OPH DX IMG PST SGM RTA: CPT | Performed by: OPHTHALMOLOGY

## 2024-08-13 PROCEDURE — 67028 INJECTION EYE DRUG: CPT | Mod: 50 | Performed by: OPHTHALMOLOGY

## 2024-08-17 ENCOUNTER — EMERGENCY (EMERGENCY)
Facility: HOSPITAL | Age: 79
LOS: 1 days | Discharge: HOME | End: 2024-08-17
Attending: STUDENT IN AN ORGANIZED HEALTH CARE EDUCATION/TRAINING PROGRAM
Payer: COMMERCIAL

## 2024-08-17 VITALS
RESPIRATION RATE: 18 BRPM | OXYGEN SATURATION: 98 % | DIASTOLIC BLOOD PRESSURE: 82 MMHG | SYSTOLIC BLOOD PRESSURE: 142 MMHG | HEART RATE: 91 BPM | TEMPERATURE: 98 F

## 2024-08-17 PROCEDURE — 99285 EMERGENCY DEPT VISIT HI MDM: CPT | Mod: 25

## 2024-08-17 RX ADMIN — Medication 1000 MILLILITER(S): at 23:55

## 2024-08-18 VITALS
OXYGEN SATURATION: 98 % | SYSTOLIC BLOOD PRESSURE: 143 MMHG | DIASTOLIC BLOOD PRESSURE: 77 MMHG | RESPIRATION RATE: 18 BRPM | HEART RATE: 93 BPM | TEMPERATURE: 98 F

## 2024-08-18 LAB
ALBUMIN SERPL ELPH-MCNC: 3.6 G/DL — SIGNIFICANT CHANGE UP (ref 3.3–5.2)
ALP SERPL-CCNC: 114 U/L — SIGNIFICANT CHANGE UP (ref 40–120)
ALT FLD-CCNC: 25 U/L — SIGNIFICANT CHANGE UP
ANION GAP SERPL CALC-SCNC: 15 MMOL/L — SIGNIFICANT CHANGE UP (ref 5–17)
APPEARANCE UR: ABNORMAL
AST SERPL-CCNC: 38 U/L — HIGH
BACTERIA # UR AUTO: ABNORMAL /HPF
BASOPHILS # BLD AUTO: 0.07 K/UL — SIGNIFICANT CHANGE UP (ref 0–0.2)
BASOPHILS NFR BLD AUTO: 0.9 % — SIGNIFICANT CHANGE UP (ref 0–2)
BILIRUB SERPL-MCNC: 0.4 MG/DL — SIGNIFICANT CHANGE UP (ref 0.4–2)
BILIRUB UR-MCNC: NEGATIVE — SIGNIFICANT CHANGE UP
BUN SERPL-MCNC: 29.3 MG/DL — HIGH (ref 8–20)
CALCIUM SERPL-MCNC: 9.2 MG/DL — SIGNIFICANT CHANGE UP (ref 8.4–10.5)
CHLORIDE SERPL-SCNC: 99 MMOL/L — SIGNIFICANT CHANGE UP (ref 96–108)
CO2 SERPL-SCNC: 21 MMOL/L — LOW (ref 22–29)
COLOR SPEC: YELLOW — SIGNIFICANT CHANGE UP
CREAT SERPL-MCNC: 1.05 MG/DL — SIGNIFICANT CHANGE UP (ref 0.5–1.3)
DIFF PNL FLD: ABNORMAL
EGFR: 53 ML/MIN/1.73M2 — LOW
EGFR: 53 ML/MIN/1.73M2 — LOW
EOSINOPHIL # BLD AUTO: 0.21 K/UL — SIGNIFICANT CHANGE UP (ref 0–0.5)
EOSINOPHIL NFR BLD AUTO: 2.6 % — SIGNIFICANT CHANGE UP (ref 0–6)
GAS PNL BLDV: SIGNIFICANT CHANGE UP
GIANT PLATELETS BLD QL SMEAR: PRESENT — SIGNIFICANT CHANGE UP
GLUCOSE SERPL-MCNC: 229 MG/DL — HIGH (ref 70–99)
GLUCOSE UR QL: >=1000 MG/DL
HCT VFR BLD CALC: 43.2 % — SIGNIFICANT CHANGE UP (ref 34.5–45)
HGB BLD-MCNC: 14.3 G/DL — SIGNIFICANT CHANGE UP (ref 11.5–15.5)
KETONES UR-MCNC: NEGATIVE MG/DL — SIGNIFICANT CHANGE UP
LEUKOCYTE ESTERASE UR-ACNC: ABNORMAL
LIDOCAIN IGE QN: 74 U/L — HIGH (ref 22–51)
LYMPHOCYTES # BLD AUTO: 2.02 K/UL — SIGNIFICANT CHANGE UP (ref 1–3.3)
LYMPHOCYTES # BLD AUTO: 24.6 % — SIGNIFICANT CHANGE UP (ref 13–44)
MANUAL SMEAR VERIFICATION: SIGNIFICANT CHANGE UP
MCHC RBC-ENTMCNC: 26.4 PG — LOW (ref 27–34)
MCHC RBC-ENTMCNC: 33.1 GM/DL — SIGNIFICANT CHANGE UP (ref 32–36)
MCV RBC AUTO: 79.9 FL — LOW (ref 80–100)
MONOCYTES # BLD AUTO: 0.72 K/UL — SIGNIFICANT CHANGE UP (ref 0–0.9)
MONOCYTES NFR BLD AUTO: 8.8 % — SIGNIFICANT CHANGE UP (ref 2–14)
NEUTROPHILS # BLD AUTO: 5.18 K/UL — SIGNIFICANT CHANGE UP (ref 1.8–7.4)
NEUTROPHILS NFR BLD AUTO: 63.1 % — SIGNIFICANT CHANGE UP (ref 43–77)
NITRITE UR-MCNC: POSITIVE
NRBC # BLD: 2 /100 WBCS — HIGH (ref 0–0)
NRBC BLD-RTO: 2 /100 WBCS — HIGH (ref 0–0)
PH UR: 6 — SIGNIFICANT CHANGE UP (ref 5–8)
PLAT MORPH BLD: NORMAL — SIGNIFICANT CHANGE UP
PLATELET # BLD AUTO: 147 K/UL — LOW (ref 150–400)
POTASSIUM SERPL-MCNC: 5.3 MMOL/L — SIGNIFICANT CHANGE UP (ref 3.5–5.3)
POTASSIUM SERPL-SCNC: 5.3 MMOL/L — SIGNIFICANT CHANGE UP (ref 3.5–5.3)
PROT SERPL-MCNC: 7.8 G/DL — SIGNIFICANT CHANGE UP (ref 6.6–8.7)
PROT UR-MCNC: SIGNIFICANT CHANGE UP MG/DL
RBC # BLD: 5.41 M/UL — HIGH (ref 3.8–5.2)
RBC # FLD: 14.1 % — SIGNIFICANT CHANGE UP (ref 10.3–14.5)
RBC BLD AUTO: NORMAL — SIGNIFICANT CHANGE UP
RBC CASTS # UR COMP ASSIST: 3 /HPF — SIGNIFICANT CHANGE UP (ref 0–4)
SODIUM SERPL-SCNC: 135 MMOL/L — SIGNIFICANT CHANGE UP (ref 135–145)
SP GR SPEC: 1.01 — SIGNIFICANT CHANGE UP (ref 1–1.03)
SQUAMOUS # UR AUTO: 1 /HPF — SIGNIFICANT CHANGE UP (ref 0–5)
UROBILINOGEN FLD QL: 0.2 MG/DL — SIGNIFICANT CHANGE UP (ref 0.2–1)
WBC # BLD: 8.21 K/UL — SIGNIFICANT CHANGE UP (ref 3.8–10.5)
WBC # FLD AUTO: 8.21 K/UL — SIGNIFICANT CHANGE UP (ref 3.8–10.5)
WBC UR QL: 275 /HPF — HIGH (ref 0–5)
YEAST-LIKE CELLS: PRESENT

## 2024-08-18 PROCEDURE — 71045 X-RAY EXAM CHEST 1 VIEW: CPT

## 2024-08-18 PROCEDURE — 82803 BLOOD GASES ANY COMBINATION: CPT

## 2024-08-18 PROCEDURE — 93005 ELECTROCARDIOGRAM TRACING: CPT

## 2024-08-18 PROCEDURE — 36415 COLL VENOUS BLD VENIPUNCTURE: CPT

## 2024-08-18 PROCEDURE — 84132 ASSAY OF SERUM POTASSIUM: CPT

## 2024-08-18 PROCEDURE — T1013: CPT

## 2024-08-18 PROCEDURE — 87186 SC STD MICRODIL/AGAR DIL: CPT

## 2024-08-18 PROCEDURE — 85018 HEMOGLOBIN: CPT

## 2024-08-18 PROCEDURE — 82947 ASSAY GLUCOSE BLOOD QUANT: CPT

## 2024-08-18 PROCEDURE — 71045 X-RAY EXAM CHEST 1 VIEW: CPT | Mod: 26

## 2024-08-18 PROCEDURE — 93010 ELECTROCARDIOGRAM REPORT: CPT

## 2024-08-18 PROCEDURE — 81001 URINALYSIS AUTO W/SCOPE: CPT

## 2024-08-18 PROCEDURE — 82435 ASSAY OF BLOOD CHLORIDE: CPT

## 2024-08-18 PROCEDURE — 85014 HEMATOCRIT: CPT

## 2024-08-18 PROCEDURE — 80053 COMPREHEN METABOLIC PANEL: CPT

## 2024-08-18 PROCEDURE — 87086 URINE CULTURE/COLONY COUNT: CPT

## 2024-08-18 PROCEDURE — 74177 CT ABD & PELVIS W/CONTRAST: CPT | Mod: MC

## 2024-08-18 PROCEDURE — 83690 ASSAY OF LIPASE: CPT

## 2024-08-18 PROCEDURE — 83605 ASSAY OF LACTIC ACID: CPT

## 2024-08-18 PROCEDURE — 82330 ASSAY OF CALCIUM: CPT

## 2024-08-18 PROCEDURE — 84295 ASSAY OF SERUM SODIUM: CPT

## 2024-08-18 PROCEDURE — 99285 EMERGENCY DEPT VISIT HI MDM: CPT | Mod: 25

## 2024-08-18 PROCEDURE — 74177 CT ABD & PELVIS W/CONTRAST: CPT | Mod: 26,MC

## 2024-08-18 PROCEDURE — 85025 COMPLETE CBC W/AUTO DIFF WBC: CPT

## 2024-08-18 RX ORDER — CEFPODOXIME PROXETIL 200 MG/1
200 TABLET, FILM COATED ORAL ONCE
Refills: 0 | Status: COMPLETED | OUTPATIENT
Start: 2024-08-18 | End: 2024-08-18

## 2024-08-18 RX ORDER — CEFPODOXIME PROXETIL 200 MG/1
1 TABLET, FILM COATED ORAL
Qty: 20 | Refills: 0
Start: 2024-08-18 | End: 2024-08-27

## 2024-08-18 RX ADMIN — CEFPODOXIME PROXETIL 200 MILLIGRAM(S): 200 TABLET, FILM COATED ORAL at 08:47

## 2024-08-21 LAB
-  AMPICILLIN/SULBACTAM: SIGNIFICANT CHANGE UP
-  AMPICILLIN: SIGNIFICANT CHANGE UP
-  AZTREONAM: SIGNIFICANT CHANGE UP
-  CEFAZOLIN: SIGNIFICANT CHANGE UP
-  CEFEPIME: SIGNIFICANT CHANGE UP
-  CEFTRIAXONE: SIGNIFICANT CHANGE UP
-  CEFUROXIME: SIGNIFICANT CHANGE UP
-  CIPROFLOXACIN: SIGNIFICANT CHANGE UP
-  ERTAPENEM: SIGNIFICANT CHANGE UP
-  GENTAMICIN: SIGNIFICANT CHANGE UP
-  IMIPENEM: SIGNIFICANT CHANGE UP
-  LEVOFLOXACIN: SIGNIFICANT CHANGE UP
-  MEROPENEM: SIGNIFICANT CHANGE UP
-  NITROFURANTOIN: SIGNIFICANT CHANGE UP
-  PIPERACILLIN/TAZOBACTAM: SIGNIFICANT CHANGE UP
-  TOBRAMYCIN: SIGNIFICANT CHANGE UP
-  TRIMETHOPRIM/SULFAMETHOXAZOLE: SIGNIFICANT CHANGE UP
CULTURE RESULTS: ABNORMAL
METHOD TYPE: SIGNIFICANT CHANGE UP
ORGANISM # SPEC MICROSCOPIC CNT: ABNORMAL
ORGANISM # SPEC MICROSCOPIC CNT: SIGNIFICANT CHANGE UP
SPECIMEN SOURCE: SIGNIFICANT CHANGE UP

## 2024-08-30 ENCOUNTER — RX ONLY (RX ONLY)
Age: 79
End: 2024-08-30

## 2024-08-30 PROBLEM — H25.12 CATARACT SENILE NUCLEAR SCLEROSIS;  , LEFT EYE: Status: ACTIVE | Noted: 2024-08-30

## 2024-08-30 PROBLEM — H40.033 NARROW ANGLE GLAUCOMA SUSPECT; BOTH EYES: Status: ACTIVE | Noted: 2024-08-30

## 2024-08-30 PROBLEM — Z96.1 PSEUDOPHAKIA ; BOTH EYES: Status: ACTIVE | Noted: 2024-08-30

## 2024-09-06 ENCOUNTER — OFFICE (OUTPATIENT)
Dept: URBAN - METROPOLITAN AREA CLINIC 94 | Facility: CLINIC | Age: 79
Setting detail: OPHTHALMOLOGY
End: 2024-09-06
Payer: COMMERCIAL

## 2024-09-06 DIAGNOSIS — Z96.1: ICD-10-CM

## 2024-09-06 PROCEDURE — 99024 POSTOP FOLLOW-UP VISIT: CPT | Performed by: PHYSICIAN ASSISTANT

## 2024-09-06 ASSESSMENT — CONFRONTATIONAL VISUAL FIELD TEST (CVF)
OD_FINDINGS: FULL
OS_FINDINGS: FULL

## 2024-09-27 ENCOUNTER — EMERGENCY (EMERGENCY)
Facility: HOSPITAL | Age: 79
LOS: 1 days | Discharge: HOME | End: 2024-09-27
Attending: EMERGENCY MEDICINE
Payer: COMMERCIAL

## 2024-09-27 VITALS — HEIGHT: 60 IN | WEIGHT: 160.06 LBS

## 2024-09-27 DIAGNOSIS — I48.91 UNSPECIFIED ATRIAL FIBRILLATION: ICD-10-CM

## 2024-09-27 DIAGNOSIS — I10 ESSENTIAL (PRIMARY) HYPERTENSION: ICD-10-CM

## 2024-09-27 LAB
ALBUMIN SERPL ELPH-MCNC: 3.9 G/DL — SIGNIFICANT CHANGE UP (ref 3.3–5.2)
ALP SERPL-CCNC: 120 U/L — SIGNIFICANT CHANGE UP (ref 40–120)
ALT FLD-CCNC: 24 U/L — SIGNIFICANT CHANGE UP
ANION GAP SERPL CALC-SCNC: 12 MMOL/L — SIGNIFICANT CHANGE UP (ref 5–17)
APTT BLD: 25.8 SEC — SIGNIFICANT CHANGE UP (ref 24.5–35.6)
AST SERPL-CCNC: 19 U/L — SIGNIFICANT CHANGE UP
BASOPHILS # BLD AUTO: 0.03 K/UL — SIGNIFICANT CHANGE UP (ref 0–0.2)
BASOPHILS NFR BLD AUTO: 0.5 % — SIGNIFICANT CHANGE UP (ref 0–2)
BILIRUB SERPL-MCNC: 0.4 MG/DL — SIGNIFICANT CHANGE UP (ref 0.4–2)
BUN SERPL-MCNC: 26.3 MG/DL — HIGH (ref 8–20)
CALCIUM SERPL-MCNC: 9.5 MG/DL — SIGNIFICANT CHANGE UP (ref 8.4–10.5)
CHLORIDE SERPL-SCNC: 99 MMOL/L — SIGNIFICANT CHANGE UP (ref 96–108)
CK SERPL-CCNC: 37 U/L — SIGNIFICANT CHANGE UP (ref 25–170)
CO2 SERPL-SCNC: 24 MMOL/L — SIGNIFICANT CHANGE UP (ref 22–29)
CREAT SERPL-MCNC: 1.13 MG/DL — SIGNIFICANT CHANGE UP (ref 0.5–1.3)
EGFR: 49 ML/MIN/1.73M2 — LOW
EOSINOPHIL # BLD AUTO: 0.18 K/UL — SIGNIFICANT CHANGE UP (ref 0–0.5)
EOSINOPHIL NFR BLD AUTO: 3.2 % — SIGNIFICANT CHANGE UP (ref 0–6)
GLUCOSE BLDC GLUCOMTR-MCNC: 407 MG/DL — HIGH (ref 70–99)
GLUCOSE SERPL-MCNC: 444 MG/DL — HIGH (ref 70–99)
HCT VFR BLD CALC: 46.9 % — HIGH (ref 34.5–45)
HGB BLD-MCNC: 15.3 G/DL — SIGNIFICANT CHANGE UP (ref 11.5–15.5)
IMM GRANULOCYTES NFR BLD AUTO: 0.2 % — SIGNIFICANT CHANGE UP (ref 0–0.9)
INR BLD: 0.97 RATIO — SIGNIFICANT CHANGE UP (ref 0.85–1.16)
LYMPHOCYTES # BLD AUTO: 1.96 K/UL — SIGNIFICANT CHANGE UP (ref 1–3.3)
LYMPHOCYTES # BLD AUTO: 34.8 % — SIGNIFICANT CHANGE UP (ref 13–44)
MAGNESIUM SERPL-MCNC: 1.9 MG/DL — SIGNIFICANT CHANGE UP (ref 1.6–2.6)
MCHC RBC-ENTMCNC: 25.8 PG — LOW (ref 27–34)
MCHC RBC-ENTMCNC: 32.6 GM/DL — SIGNIFICANT CHANGE UP (ref 32–36)
MCV RBC AUTO: 79.2 FL — LOW (ref 80–100)
MONOCYTES # BLD AUTO: 0.42 K/UL — SIGNIFICANT CHANGE UP (ref 0–0.9)
MONOCYTES NFR BLD AUTO: 7.5 % — SIGNIFICANT CHANGE UP (ref 2–14)
NEUTROPHILS # BLD AUTO: 3.03 K/UL — SIGNIFICANT CHANGE UP (ref 1.8–7.4)
NEUTROPHILS NFR BLD AUTO: 53.8 % — SIGNIFICANT CHANGE UP (ref 43–77)
NT-PROBNP SERPL-SCNC: 1071 PG/ML — HIGH (ref 0–300)
PLATELET # BLD AUTO: 112 K/UL — LOW (ref 150–400)
POTASSIUM SERPL-MCNC: 4.9 MMOL/L — SIGNIFICANT CHANGE UP (ref 3.5–5.3)
POTASSIUM SERPL-SCNC: 4.9 MMOL/L — SIGNIFICANT CHANGE UP (ref 3.5–5.3)
PROT SERPL-MCNC: 7.5 G/DL — SIGNIFICANT CHANGE UP (ref 6.6–8.7)
PROTHROM AB SERPL-ACNC: 11.2 SEC — SIGNIFICANT CHANGE UP (ref 9.9–13.4)
RBC # BLD: 5.92 M/UL — HIGH (ref 3.8–5.2)
RBC # FLD: 14.2 % — SIGNIFICANT CHANGE UP (ref 10.3–14.5)
SODIUM SERPL-SCNC: 135 MMOL/L — SIGNIFICANT CHANGE UP (ref 135–145)
TROPONIN T, HIGH SENSITIVITY RESULT: 11 NG/L — SIGNIFICANT CHANGE UP (ref 0–51)
TROPONIN T, HIGH SENSITIVITY RESULT: 12 NG/L — SIGNIFICANT CHANGE UP (ref 0–51)
TSH SERPL-MCNC: 1.42 UIU/ML — SIGNIFICANT CHANGE UP (ref 0.27–4.2)
WBC # BLD: 5.63 K/UL — SIGNIFICANT CHANGE UP (ref 3.8–10.5)
WBC # FLD AUTO: 5.63 K/UL — SIGNIFICANT CHANGE UP (ref 3.8–10.5)

## 2024-09-27 PROCEDURE — 71045 X-RAY EXAM CHEST 1 VIEW: CPT | Mod: 26

## 2024-09-27 PROCEDURE — 93010 ELECTROCARDIOGRAM REPORT: CPT

## 2024-09-27 PROCEDURE — 99284 EMERGENCY DEPT VISIT MOD MDM: CPT

## 2024-09-27 PROCEDURE — 93306 TTE W/DOPPLER COMPLETE: CPT | Mod: 26

## 2024-09-27 PROCEDURE — 99223 1ST HOSP IP/OBS HIGH 75: CPT

## 2024-09-27 RX ORDER — LOSARTAN POTASSIUM 50 MG/1
50 TABLET ORAL DAILY
Refills: 0 | Status: ACTIVE | OUTPATIENT
Start: 2024-09-27 | End: 2025-08-26

## 2024-09-27 RX ORDER — GLIPIZIDE 10 MG
5 TABLET ORAL
Refills: 0 | Status: ACTIVE | OUTPATIENT
Start: 2024-09-27 | End: 2025-08-26

## 2024-09-27 RX ORDER — GLUCAGON INJECTION, SOLUTION 1 MG/.2ML
1 INJECTION, SOLUTION SUBCUTANEOUS ONCE
Refills: 0 | Status: ACTIVE | OUTPATIENT
Start: 2024-09-27 | End: 2025-08-26

## 2024-09-27 RX ORDER — SODIUM CHLORIDE 9 MG/ML
250 INJECTION INTRAMUSCULAR; INTRAVENOUS; SUBCUTANEOUS ONCE
Refills: 0 | Status: COMPLETED | OUTPATIENT
Start: 2024-09-27 | End: 2024-09-27

## 2024-09-27 RX ORDER — METOPROLOL TARTRATE 100 MG/1
25 TABLET ORAL DAILY
Refills: 0 | Status: ACTIVE | OUTPATIENT
Start: 2024-09-27 | End: 2025-08-26

## 2024-09-27 RX ORDER — APIXABAN 5 MG/1
5 TABLET, FILM COATED ORAL EVERY 12 HOURS
Refills: 0 | Status: ACTIVE | OUTPATIENT
Start: 2024-09-27 | End: 2025-08-26

## 2024-09-27 RX ORDER — DEXTROSE 15 G/33 G
25 GEL IN PACKET (GRAM) ORAL ONCE
Refills: 0 | Status: ACTIVE | OUTPATIENT
Start: 2024-09-27

## 2024-09-27 RX ORDER — DEXTROSE 15 G/33 G
15 GEL IN PACKET (GRAM) ORAL ONCE
Refills: 0 | Status: ACTIVE | OUTPATIENT
Start: 2024-09-27 | End: 2025-08-26

## 2024-09-27 RX ORDER — ACETAMINOPHEN 325 MG/1
1000 TABLET ORAL ONCE
Refills: 0 | Status: COMPLETED | OUTPATIENT
Start: 2024-09-27 | End: 2024-09-27

## 2024-09-27 RX ORDER — ASPIRIN 81 MG
324 TABLET, DELAYED RELEASE (ENTERIC COATED) ORAL ONCE
Refills: 0 | Status: DISCONTINUED | OUTPATIENT
Start: 2024-09-27 | End: 2024-09-27

## 2024-09-27 RX ORDER — DAPAGLIFLOZIN 10 MG/1
5 TABLET, FILM COATED ORAL DAILY
Refills: 0 | Status: ACTIVE | OUTPATIENT
Start: 2024-09-27 | End: 2025-08-26

## 2024-09-27 RX ORDER — DEXTROSE 15 G/33 G
12.5 GEL IN PACKET (GRAM) ORAL ONCE
Refills: 0 | Status: ACTIVE | OUTPATIENT
Start: 2024-09-27

## 2024-09-27 RX ADMIN — LOSARTAN POTASSIUM 50 MILLIGRAM(S): 50 TABLET ORAL at 18:14

## 2024-09-27 RX ADMIN — METOPROLOL TARTRATE 25 MILLIGRAM(S): 100 TABLET ORAL at 18:16

## 2024-09-27 RX ADMIN — Medication 12: at 18:13

## 2024-09-27 RX ADMIN — APIXABAN 5 MILLIGRAM(S): 5 TABLET, FILM COATED ORAL at 18:14

## 2024-09-27 RX ADMIN — ACETAMINOPHEN 400 MILLIGRAM(S): 325 TABLET ORAL at 13:53

## 2024-09-27 RX ADMIN — Medication 5 MILLIGRAM(S): at 18:14

## 2024-09-27 RX ADMIN — SODIUM CHLORIDE 250 MILLILITER(S): 9 INJECTION INTRAMUSCULAR; INTRAVENOUS; SUBCUTANEOUS at 13:53

## 2024-09-27 RX ADMIN — DAPAGLIFLOZIN 5 MILLIGRAM(S): 10 TABLET, FILM COATED ORAL at 18:14

## 2024-09-27 NOTE — CONSULT NOTE ADULT - PROBLEM SELECTOR RECOMMENDATION 2
Continue macho pro 150mg qd  Low na diet  add metoprolol x. 25mg qd  if b/p improved later and above criteria met she may be discharged home CHF, left ventricular

## 2024-09-27 NOTE — ED PROVIDER NOTE - PROGRESS NOTE DETAILS
Seen by cardiology and recommend echo starting on Eliquis.  Patient placed in observation unit for telemetry monitoring and echocardiogram

## 2024-09-27 NOTE — CONSULT NOTE ADULT - SUBJECTIVE AND OBJECTIVE BOX
[facility]                                              CARDIOLOGY AT [place] [address]                                             Telephone: [phone]. Fax:[phone]                                                         CARDIOLOGY CONSULTATION NOTE                                                                                             Consult requested by:  Dr Ga  History obtained by: Patient and medical record  Community Cardiologist: None   obtained: Yes [x  ] No [  ]  Reason for Consultation:  New onset of atrial fib  Avialable out pt records reviewed: Yes [  ] No [x  ]    This is a 80 y/o female with PMH of HTN, Dm and CVA who went to her PMD office for routine visit and was found to be in atrial fib and referred to hospital.  Heart rate has been in the 90- low 100 since being here  Denies any chest pain sob or palpitations  Lives a sedentary life style.  No falls      PAST MEDICAL HISTORY  DM (diabetes mellitus)  HTN (hypertension)  ? CVA (cerebrovascular accident)    PAST SURGICAL HISTORY  Eye surgery    SOCIAL HISTORY:  Denies smoking/alcohol/drugs  Family History of Cardiovascular Disease:  Yes [  ] No [x  ]  Coronary Artery Disease in first degree relative: Yes [  ] No [  x]  Sudden Cardiac Death in First degree relative: Yes [  ] No [ x ]    HOME MEDICATIONS:  Asa 81mg qd  farxiga 10mg qd  avapro 150mg qd  Glipizide 5mg qd    CURRENT MEDICATIONS:  aspirin  chewable    ALLERGIES: NKDA    REVIEW OF SYMPTOMS:   CONSTITUTIONAL: No fever, no chills, no weight loss, no weight gain, no fatigue   hENMT:  No vertigo; No sinus or throat pain  NECK: No pain or stiffness  CARDIOVASCULAR: No chest pain, no dyspnea, no syncope/presyncope, no palpitations, no dizziness, no Orthopnea, no Paroxsymal nocturnal dyspnea  RESPIRATORY: no Shortness of breath, no cough, no wheezing  : No dysuria, no hematuria   GI: No dark color stool, no nausea, no diarrhea, no constipation, no abdominal pain   NEURO: No headache, no slurred speech   MUSCULOSKELETAL: No joint pain or swelling; No muscle, back, or extremity pain  PSYCH: No agitation, no anxiety.    ALL OTHER REVIEW OF SYSTEMS ARE NEGATIVE.      Vital Signs Last 24 Hrs  T(C): 36.9 (27 Sep 2024 11:49), Max: 36.9 (27 Sep 2024 11:49)  T(F): 98.5 (27 Sep 2024 11:49), Max: 98.5 (27 Sep 2024 11:49)  HR: 86 (27 Sep 2024 11:49) (86 - 86)  BP: 172/96 (27 Sep 2024 11:49) (172/96 - 172/96)  BP(mean): --  RR: 18 (27 Sep 2024 11:49) (18 - 18)  SpO2: 94% (27 Sep 2024 11:49) (94% - 94%)    Parameters below as of 27 Sep 2024 11:49  Patient On (Oxygen Delivery Method): room air    INTAKE AND OUTPUT:   PHYSICAL EXAM:  Constitutional: Comfortable . No acute distress.   HEENT: Atraumatic and normocephalic , neck is supple . no JVD. No carotid bruit.  CNS: A&Ox3. No focal deficits.   Respiratory: CTAB, unlabored   Cardiovascular: RRR normal s1 s2.irregular no murmur  Gastrointestinal: Soft, non-tender. +Bowel sounds.   MSK: full ROM extremities x 4  Extremities: No edema. No cyanosis   Psychiatric: Calm . no agitation.   Skin: Warm and dry, no ulcers on extremities       LABS:                        15.3   5.63  )-----------( 112      ( 27 Sep 2024 14:11 )             46.9     09-27    135  |  99  |  26.3[H]  ----------------------------<  444[H]  4.9   |  24.0  |  1.13    Ca    9.5      27 Sep 2024 14:11  Mg     1.9     09-27    TPro  7.5  /  Alb  3.9  /  TBili  0.4  /  DBili  x   /  AST  19  /  ALT  24  /  AlkPhos  120  09-27      ;p-BNP=  PT/INR - ( 27 Sep 2024 14:11 )   PT: 11.2 sec;   INR: 0.97 ratio         PTT - ( 27 Sep 2024 14:11 )  PTT:25.8 sec  Urinalysis Basic - ( 27 Sep 2024 14:11 )    Color: x / Appearance: x / SG: x / pH: x  Gluc: 444 mg/dL / Ketone: x  / Bili: x / Urobili: x   Blood: x / Protein: x / Nitrite: x   Leuk Esterase: x / RBC: x / WBC x   Sq Epi: x / Non Sq Epi: x / Bacteria: x    INTERPRETATION OF TELEMETRY: ATrial fib    ECG: Atrial fib rate 100  Prwp v1-v4  Prior ECG: Yes [  ] No [  ]      RADIOLOGY & ADDITIONAL STUDIES:    X-ray:  reviewed by me.  NAPD

## 2024-09-27 NOTE — CONSULT NOTE ADULT - NS ATTEND AMEND GEN_ALL_CORE FT
78 y/o female with PMH of HTN, Dm and CVA who went to her PMD office for routine visit and was found to be in atrial fib and referred to hospital.  Heart rate has been in the 90- low 100 since being here.  Denies any chest pain sob or palpitations  Lives a sedentary life style.  No falls  EKG Atrial fib Prwp v1-v4. New onset Afib, agree with AC for elevated CHADs-VASc score 7 (no contraindications to AC). TTE pending, if stable, then no further cardiac workup needed. Agree with ARB unless contraindication exists. Agree with metoprolol. Follow up with your primary care clinician within one week post discharge.  Follow up with our [facility] cardiologist in 7-14 days post discharge. Return to the emergency department for alarm symptoms which have been described to you in detail.

## 2024-09-27 NOTE — ED ADULT NURSE REASSESSMENT NOTE - NSFALLHARMRISKINTERV_ED_ALL_ED

## 2024-09-27 NOTE — ED PROVIDER NOTE - CLINICAL SUMMARY MEDICAL DECISION MAKING FREE TEXT BOX
Patient most likely had an incidental finding of A-fib and needs outpatient cardiology follow-up.  Will check 1 set of labs including troponin proBNP TSH and do a chest x-ray to rule out any acute reasons to see a cardiologist.  Patient is asymptomatic and we will discharge her with baby aspirin with outpatient cardiology follow-up

## 2024-09-27 NOTE — CONSULT NOTE ADULT - ASSESSMENT
78 y/o female with PMH of HTN, Dm and CVA who went to her PMD office for routine visit and was found to be in atrial fib and referred to hospital.  Heart rate has been in the 90- low 100 since being here.  Denies any chest pain sob or palpitations  Lives a sedentary life style.  No falls  EKG Atrial fib Prwp v1-v4

## 2024-09-27 NOTE — ED CDU PROVIDER INITIAL DAY NOTE - ATTENDING APP SHARED VISIT CONTRIBUTION OF CARE
79-year-old female past medical history of CVA, hypertension, diabetes presents with A-fib found on routine EKG. Patient asymptomatic except for headache which began after finding out, patient states she has had similar headaches in the past. ED record reviewed.  Mild thrombocytopenia, troponin 11 and then 12, proBNP elevated at 1000.  Chest x-ray without acute findings or change.  On exam patient still in A-fib but rate controlled in the 80s, lungs clear to auscultation throughout, no lower extremity edema or calf tenderness.  Cardiology recommendations appreciated recommending echo.

## 2024-09-27 NOTE — ED PROVIDER NOTE - IV ALTEPLASE EXCL REL HIDDEN
Nutrition Assessment   Reason for Consult/Assessment: Follow up      Diagnosis and Hx: Reviewed    Pertinent Nutrition History: Patient is a 67-year-old female with past medical history of hypertension, incarcerated ventral hernia status post repair, small bowel obstruction status post ex laparotomy with ostomy, CKD stage III    Pertinent Nutrition Information: (10/24/23) Patient seen up, using the bathroom, stated \" I am walking on my own\". Patient s/p ex lap with ileostomy reversal, small bowel resection and extensive lysis of adhesions  (10/19). Patient now NPO, awaiting return of bowel function. Pt receiving  TPN, at goal rate. Labs and medications reviewed, noted elevated potassium. Patient receiving folic acid and thiamine. Noted non-pitting bilateral upper and lower extremity edema. No new weight.                                 Diet Order: TPN/PPN   TPN/PPN: 300gm dextrose, 100gm amino acid, 250ml lipid daily              Diet tolerance: Tolerating nutrition support-  Food Allergies: Yes (pork)    Demographic/Anthropometrics Information  Gender: female  Patient Age: 67 year old  Height:   Ht Readings from Last 1 Encounters:   10/11/23 5' 9\" (1.753 m)      Weight:   Wt Readings from Last 1 Encounters:   10/16/23 91.4 kg      BMI:   BMI Readings from Last 1 Encounters:   10/16/23 29.76 kg/m²       Usual Weight: 120 kg  % Weight Change: 25% of body weight 3 months  Weight change significant: Yes  Reason for weight change: Decreased intake, Fluid loss     Estimated Needs:  Calculated Energy Needs: 5964-8228  kcal               Calculated protein needs:   g    Calculated Fluid Needs: Per Provider              NFPE  Nutrition Focused Physical Exam  Physical Exam Completed: No  Reason Not Completed: Patient unavailable (pt sitting on the toliet)                      TREATMENT PLAN: Monitoring & Interventions   Intervention: Parenteral nutrition/IV fluids   Coordination of nutrition care: 1. Start and advance  diet per General Surgery. 2. Continue TPN, until able to meet needs with po diet. 3. When able to take oral diet offer oral supplements to aid in meeting needs.     Meals & snacks: If oral diet to resume, follow general surgery recimmendations for initiation and advancement.                                        Access Site: PICC   Lipids Provided by Parenteral Nutrition: 250ml lipid daily  Protein Provided by Parenteral Nutrition: 100gm amino acid (% of protein needs)  Dextrose Provided by Parenteral Nutrition: 300gm dextrose  Calories Provided by Parenteral Nutrition: 1920 kcals (% of kcal needs)             Goal: Meet >/equal 75% estimated needs   Intervention goal status: Goal achieved  Time frame to achieve goal: Ongoing     Dietitian will monitor: Biochemical data, medical tests, procedures, Parenteral nutrition intake, Diet advancement, Food, beverage, and nutrient intake            Nutrition Diagnosis / PES  Nutrition Diagnosis: Malnutrition  Malnutrition in the context of acute illness or injury: Severe  Related to: Altered GI function/GI disorder       Malnutrition diagnosis acute illness/injury; severe: Weight loss of >7.5%/3 months, Reduced functional capacity (Not recommended in ICU), Energy intake of <50% of estimated energy requirement for >/equals 5 days  Primary Nutrition Diagnosis status: Active nutrition diagnosis                  show

## 2024-09-27 NOTE — ED CDU PROVIDER INITIAL DAY NOTE - OBJECTIVE STATEMENT
79 -year-old female with history of CVA, hypertension, diabetes brought in by family from PCP office where they did a routine EKG and patient was found to be in A-fib.  Patient had no complaints before going further evaluation and has no known history of A-fib.  Patient denies any chest pain shortness of breath syncope fall or trauma.  Per granddaughter at the bedside patient got a slight headache after discussion about going to the ED and besides that she has no complaints.    While in ED pt has a rate controlled afib, mild headache while in ED that she notes she developed after being sent to the ED from PCP. Pt seen by cardio team, mild elevated BNP of 1,000, no hx of chf, start eliquis, echo, placed in observation

## 2024-09-27 NOTE — ED PROVIDER NOTE - OBJECTIVE STATEMENT
79 -year-old female with history of hypertension diabetes brought in by family from PCP office where they did a routine EKG and patient was found to be in A-fib.  Patient had no complaints before going further evaluation and has no known history of A-fib.  Patient denies any chest pain shortness of breath syncope fall or trauma.  Per granddaughter at the bedside patient got a slight headache after discussion about going to the ED and besides that she has no complaints.    ED  Ace

## 2024-09-27 NOTE — CONSULT NOTE ADULT - PROBLEM SELECTOR RECOMMENDATION 9
Rate in   would  get Echo  start metoprolol xl 25mg qd and Eliquis 5mg bid if echo ok and b/p has improved patient may be discharged to follow up in our office for NST

## 2024-09-27 NOTE — ED CDU PROVIDER INITIAL DAY NOTE - CROS ED CONS ALL NEG
Scheduled Follow Up Call: Attempt 2 Community Health Worker called and left a message for the patient. If the patient is returning my call, please transfer the patient to Nancy at ext. [phone].   I have called the patient 2 times I the past month and have been unsuccessful in reaching him/her. The patient has not returned any of my messages. I have mailed a letter to the patient requesting a return call and will continue attempting to reach out to the patient in one month. I will also check the patient's chart for upcoming appointments, ER reports that may contain a new phone number, or any other recent activity.    Next outreach due: 11/18/19   
negative...

## 2024-09-27 NOTE — ED CDU PROVIDER INITIAL DAY NOTE - CLINICAL SUMMARY MEDICAL DECISION MAKING FREE TEXT BOX
79 -year-old female with history of CVA, hypertension, diabetes brought in by family from PCP office where they did a routine EKG and patient was found to be in A-fib.     Afib, rate controlled  - [id number] score of 7, start eliquis   - start metoprolol 25 mg  - Echo  - monitor on tele    HTN  - on irbesartan, therapeutic interchange to losartan    DM  - Continue on farxiga and glipize, elevated bs while in ED  - will start on ISS while in ED for control of sugars    DVT ppx- on eliquis

## 2024-09-28 VITALS
OXYGEN SATURATION: 97 % | SYSTOLIC BLOOD PRESSURE: 158 MMHG | DIASTOLIC BLOOD PRESSURE: 87 MMHG | RESPIRATION RATE: 18 BRPM | HEART RATE: 89 BPM

## 2024-09-28 LAB
GLUCOSE BLDC GLUCOMTR-MCNC: 212 MG/DL — HIGH (ref 70–99)
GLUCOSE BLDC GLUCOMTR-MCNC: 292 MG/DL — HIGH (ref 70–99)

## 2024-09-28 PROCEDURE — 99285 EMERGENCY DEPT VISIT HI MDM: CPT | Mod: 25

## 2024-09-28 PROCEDURE — 99239 HOSP IP/OBS DSCHRG MGMT >30: CPT

## 2024-09-28 PROCEDURE — 83735 ASSAY OF MAGNESIUM: CPT

## 2024-09-28 PROCEDURE — 83880 ASSAY OF NATRIURETIC PEPTIDE: CPT

## 2024-09-28 PROCEDURE — 85730 THROMBOPLASTIN TIME PARTIAL: CPT

## 2024-09-28 PROCEDURE — 84484 ASSAY OF TROPONIN QUANT: CPT

## 2024-09-28 PROCEDURE — T1013: CPT

## 2024-09-28 PROCEDURE — 84443 ASSAY THYROID STIM HORMONE: CPT

## 2024-09-28 PROCEDURE — 85610 PROTHROMBIN TIME: CPT

## 2024-09-28 PROCEDURE — 93005 ELECTROCARDIOGRAM TRACING: CPT

## 2024-09-28 PROCEDURE — 85025 COMPLETE CBC W/AUTO DIFF WBC: CPT

## 2024-09-28 PROCEDURE — 36415 COLL VENOUS BLD VENIPUNCTURE: CPT

## 2024-09-28 PROCEDURE — 82962 GLUCOSE BLOOD TEST: CPT

## 2024-09-28 PROCEDURE — 71045 X-RAY EXAM CHEST 1 VIEW: CPT

## 2024-09-28 PROCEDURE — G0378: CPT

## 2024-09-28 PROCEDURE — C8929: CPT

## 2024-09-28 PROCEDURE — 96374 THER/PROPH/DIAG INJ IV PUSH: CPT | Mod: XU

## 2024-09-28 PROCEDURE — 82550 ASSAY OF CK (CPK): CPT

## 2024-09-28 PROCEDURE — 80053 COMPREHEN METABOLIC PANEL: CPT

## 2024-09-28 RX ORDER — APIXABAN 5 MG/1
1 TABLET, FILM COATED ORAL
Qty: 60 | Refills: 0
Start: 2024-09-28 | End: 2024-10-27

## 2024-09-28 RX ORDER — METOPROLOL TARTRATE 100 MG/1
1 TABLET ORAL
Qty: 30 | Refills: 0
Start: 2024-09-28 | End: 2024-10-27

## 2024-09-28 RX ADMIN — APIXABAN 5 MILLIGRAM(S): 5 TABLET, FILM COATED ORAL at 06:25

## 2024-09-28 RX ADMIN — Medication 5 MILLIGRAM(S): at 09:44

## 2024-09-28 RX ADMIN — Medication 4: at 08:50

## 2024-09-28 RX ADMIN — Medication 6: at 12:17

## 2024-09-28 RX ADMIN — DAPAGLIFLOZIN 5 MILLIGRAM(S): 10 TABLET, FILM COATED ORAL at 12:17

## 2024-09-28 RX ADMIN — METOPROLOL TARTRATE 25 MILLIGRAM(S): 100 TABLET ORAL at 06:27

## 2024-09-28 RX ADMIN — LOSARTAN POTASSIUM 50 MILLIGRAM(S): 50 TABLET ORAL at 06:27

## 2024-09-28 NOTE — ED CDU PROVIDER SUBSEQUENT DAY NOTE - ATTENDING APP SHARED VISIT CONTRIBUTION OF CARE
I, Haroldo De Santiago MD, performed the initial face to face bedside interview with this patient regarding history of present illness, review of symptoms and relevant past medical, social and family history.  I completed an independent physical examination.  I was the initial provider who evaluated this patient. I have signed out the follow up of any pending tests (i.e. labs, radiological studies) to the ACP.  I have communicated the patient’s plan of care and disposition with the ACP.

## 2024-09-28 NOTE — ED CDU PROVIDER DISPOSITION NOTE - PATIENT PORTAL LINK FT
You can access the FollowMyHealth Patient Portal offered by [facility] by registering at the following website: [email or web address]. By joining "[employer]"’s FollowMyHealth portal, you will also be able to view your health information using other applications (apps) compatible with our system.

## 2024-09-28 NOTE — ED CDU PROVIDER DISPOSITION NOTE - NSFOLLOWUPCLINICS_GEN_ALL_ED_FT
Cardiology at [place] ([facility])  Cardiology  [address], [address]  [place] [address]  Phone: [phone]  Fax:

## 2024-09-28 NOTE — ED CDU PROVIDER SUBSEQUENT DAY NOTE - CLINICAL SUMMARY MEDICAL DECISION MAKING FREE TEXT BOX
79 year old female PMhx CVA, HTN, DM, sent to ED by PCP after EKG with A-fib. Pt asymptomatic. Started on metoprolol , rate controlled.    Pt seen by cardio team, mild elevated BNP of 1,000, no hx of chf, start eliquis, echo, placed in observation

## 2024-09-28 NOTE — ED CDU PROVIDER DISPOSITION NOTE - CLINICAL COURSE
78yo F PMHx CVA, HTN, DM sent to ED by PCP after EKG showed A-fib. Pt asymptomatic. Rate controlled afib in ED. Evaluated by cardiology who recommended TTE for evaluation of new onset afib and starting eliquis and metoprolol. Pt comfortable throughout ED/obs course. TTE unremarkable, EF 55-60%, no RWMAs. Sent rx for eliquis and metoprolol. provided copy of all results and f/u information for cardiology. return precautions discussed  discharge discussion conducted via ED  Bharati

## 2024-09-28 NOTE — ED CDU PROVIDER DISPOSITION NOTE - NSFOLLOWUPINSTRUCTIONS_ED_ALL_ED_FT
- Return to the ED for any new or worsening symptoms.   - Follow-up primary doctor within 1 week  - Follow-up with cardiologist provided within 1-2 weeks  - Take medications as directed    Atrial Fibrillation    Atrial fibrillation is a type of irregular heartbeat (arrhythmia) where the heart quivers continuously in a chaotic pattern that makes the heart unable to pump blood normally. This can increase the risk for stroke, heart failure, and other heart-related conditions. Atrial fibrillation can be caused by a variety of conditions and may be temporary, intermittent, or permanent. Symptoms include feeling that your heart is beating rapidly or irregularly, chest discomfort, shortness of breath, or dizziness/lightheadedness that may be worse with exertion. Treatment is varied but may involve medication or electrical shock (cardioversion).    SEEK IMMEDIATE MEDICAL CARE IF YOU HAVE ANY OF THE FOLLOWING SYMPTOMS: chest pain, shortness of breath, abdominal pain, sweating, vomiting, blood in vomit/bowel movements/urine, dizziness/lightheadedness, weakness or numbness to face/arm/leg, trouble speaking or understanding, facial droop.    - Regrese al servicio de urgencias ante cualquier síntoma nuevo o que empeore.   - Médico de cabecera de seguimiento dentro de 1 semana.  - Seguimiento con cardiólogo dentro de 1-2 semanas.  - [place] los medicamentos según las indicaciones.    Fibrilación auricular    La fibrilación auricular es un tipo de latido cardíaco irregular (arritmia) en el que el corazón tiembla continuamente en un patrón caótico que le impide bombear michoacano con normalidad. Sanctuary puede aumentar el riesgo de sufrir un derrame cerebral, insuficiencia cardíaca y otras afecciones relacionadas con el corazón. La fibrilación auricular puede ser causada por soha variedad de condiciones y puede ser temporal, intermitente o permanente. Los síntomas incluyen sensación de que el corazón late rápida o irregularmente, malestar en el pecho, dificultad para respirar o mareos/aturdimiento que pueden empeorar con el esfuerzo. El tratamiento es variado oscar puede implicar medicación o descarga eléctrica (cardioversión).    BUSQUE ATENCIÓN MÉDICA INMEDIATA SI TIENE ALGUNO DE LOS SIGUIENTES SÍNTOMAS: dolor en el pecho, dificultad para respirar, dolor abdominal, sudoración, vómitos, michoacano en el vómito/deposiciones/orina, mareos/aturdimiento, debilidad o entumecimiento en la nhung/brazo/pierna, dificultad para hablar o comprender, caída facial.

## 2024-09-28 NOTE — ED ADULT NURSE REASSESSMENT NOTE - NS ED NURSE REASSESS COMMENT FT1
used by COLLIN Olivares, all d/c instructions explained to patient and Son by PA, verbalized understanding. All questions answered. Pt directed to [facility] to  medications.
Assumed care of patient at 18:00 from [facility] [facility]. Charting as noted. Patient AA&Ox4, resp even/unlabored, presents to ED with family from PCP's office after being found to be in new onset AFIB on routine EKG. Pt asymptomatic at this time. At time of assessment, patient denies pain/discomfort, denies CP/SOB. Patient updated on the plan of care, awaiting TTE. Stretcher locked in lowest position, IV site flushed w/ NS. No redness, swelling or pain noted to site. No signs of acute distress noted, safety maintained, family at bed side. Pt remains on CM in AFIB, controlled rate of 88, SpO2 96% on room air.
Pt alert and oriented, no apparent distress noted at this time. Pt handed off to RN in stable condition.
Received patient from zeke RN.  Pt AxO3 VSS.  Pt denies chest pain/SOB at this time.  Cardio NSR on cardiac monitor.   IV insertion site intact -- anxious @ time po fluid encouraged tolerated well Continue on CM with rapid Afib PA informed of pt statue and continue to be asymptomatic Denies  headache dizziness .Safety maintained will continue to monitor closely  Safety measures taken, bed in low position, call bell within reach, side rails up x2.  Plan of care explained.  Pt verbalized understanding.  Will continue to monitor.
Assumed care of patient at 730, alert and oriented x3, confused about year. Resting in bed. AFIB noted to CM at 81. Denies any pain. Assisted with breakfast. ED  at bedside. Currently eating breakfast. Safety maintained.

## 2024-09-28 NOTE — ED CDU PROVIDER DISPOSITION NOTE - ATTENDING CONTRIBUTION TO CARE
patient feels comfortable with discharge and medical plan; PMD or clinic follow up recommended for reassessment. Patient is aware of signs/symptoms to return to the emergency department.

## 2024-10-10 ENCOUNTER — RX ONLY (RX ONLY)
Age: 79
End: 2024-10-10

## 2024-10-10 ENCOUNTER — OFFICE (OUTPATIENT)
Dept: URBAN - METROPOLITAN AREA CLINIC 94 | Facility: CLINIC | Age: 79
Setting detail: OPHTHALMOLOGY
End: 2024-10-10
Payer: COMMERCIAL

## 2024-10-10 DIAGNOSIS — Z96.1: ICD-10-CM

## 2024-10-10 PROBLEM — H52.4 PRESBYOPIA: Status: ACTIVE | Noted: 2024-10-10

## 2024-10-10 PROCEDURE — 99024 POSTOP FOLLOW-UP VISIT: CPT | Performed by: OPTOMETRIST

## 2024-10-10 ASSESSMENT — CONFRONTATIONAL VISUAL FIELD TEST (CVF)
OS_FINDINGS: FULL
OD_FINDINGS: FULL

## 2024-10-10 ASSESSMENT — KERATOMETRY
OD_K1POWER_DIOPTERS: 44.00
OS_K2POWER_DIOPTERS: 45.00
OD_K2POWER_DIOPTERS: 44.25
OS_AXISANGLE_DEGREES: 010
OS_K1POWER_DIOPTERS: 43.75
OD_AXISANGLE_DEGREES: 175

## 2024-10-10 ASSESSMENT — VISUAL ACUITY
OS_BCVA: 20/80
OD_BCVA: LP

## 2024-10-10 ASSESSMENT — REFRACTION_MANIFEST
OD_VA1: 20/60
OD_ADD: +2.00
OS_SPHERE: BALANCE
OS_ADD: +2.00
OS_VA1: 20/LP
OD_SPHERE: PLANO
OD_CYLINDER: -1.00
OD_AXIS: 100

## 2024-10-10 ASSESSMENT — REFRACTION_AUTOREFRACTION
OS_SPHERE: UTP
OD_SPHERE: +0.50
OD_AXIS: 100
OD_CYLINDER: -1.50

## 2024-10-10 ASSESSMENT — TONOMETRY
OD_IOP_MMHG: 20
OS_IOP_MMHG: 20

## 2024-10-10 ASSESSMENT — CORNEAL EDEMA - FOLDS/STRIAE: OD_FOLDSSTRIAE: T

## 2024-12-16 ENCOUNTER — TRANSCRIPTION ENCOUNTER (OUTPATIENT)
Age: 79
End: 2024-12-16

## 2024-12-16 ENCOUNTER — INPATIENT (INPATIENT)
Facility: HOSPITAL | Age: 79
LOS: 9 days | Discharge: NURSING, REHAB OR LONG-TERM CARE | DRG: 270 | End: 2024-12-26
Attending: SURGERY | Admitting: SURGERY
Payer: COMMERCIAL

## 2024-12-16 VITALS
TEMPERATURE: 98 F | SYSTOLIC BLOOD PRESSURE: 114 MMHG | HEART RATE: 75 BPM | WEIGHT: 149.91 LBS | HEIGHT: 65 IN | OXYGEN SATURATION: 98 % | RESPIRATION RATE: 20 BRPM | DIASTOLIC BLOOD PRESSURE: 62 MMHG

## 2024-12-16 DIAGNOSIS — I99.8 OTHER DISORDER OF CIRCULATORY SYSTEM: ICD-10-CM

## 2024-12-16 LAB
ALBUMIN SERPL ELPH-MCNC: 3.7 G/DL — SIGNIFICANT CHANGE UP (ref 3.3–5.2)
ALP SERPL-CCNC: 160 U/L — HIGH (ref 40–120)
ALT FLD-CCNC: 27 U/L — SIGNIFICANT CHANGE UP
ANION GAP SERPL CALC-SCNC: 16 MMOL/L — SIGNIFICANT CHANGE UP (ref 5–17)
APTT BLD: 28.3 SEC — SIGNIFICANT CHANGE UP (ref 24.5–35.6)
AST SERPL-CCNC: 45 U/L — HIGH
BASOPHILS # BLD AUTO: 0.04 K/UL — SIGNIFICANT CHANGE UP (ref 0–0.2)
BASOPHILS NFR BLD AUTO: 0.3 % — SIGNIFICANT CHANGE UP (ref 0–2)
BILIRUB SERPL-MCNC: 0.8 MG/DL — SIGNIFICANT CHANGE UP (ref 0.4–2)
BLD GP AB SCN SERPL QL: SIGNIFICANT CHANGE UP
BUN SERPL-MCNC: 28.9 MG/DL — HIGH (ref 8–20)
CALCIUM SERPL-MCNC: 9.2 MG/DL — SIGNIFICANT CHANGE UP (ref 8.4–10.5)
CHLORIDE SERPL-SCNC: 96 MMOL/L — SIGNIFICANT CHANGE UP (ref 96–108)
CO2 SERPL-SCNC: 21 MMOL/L — LOW (ref 22–29)
CREAT SERPL-MCNC: 1.44 MG/DL — HIGH (ref 0.5–1.3)
EGFR: 37 ML/MIN/1.73M2 — LOW
EOSINOPHIL # BLD AUTO: 0 K/UL — SIGNIFICANT CHANGE UP (ref 0–0.5)
EOSINOPHIL NFR BLD AUTO: 0 % — SIGNIFICANT CHANGE UP (ref 0–6)
FLUAV AG NPH QL: SIGNIFICANT CHANGE UP
FLUBV AG NPH QL: SIGNIFICANT CHANGE UP
GLUCOSE BLDC GLUCOMTR-MCNC: 148 MG/DL — HIGH (ref 70–99)
GLUCOSE BLDC GLUCOMTR-MCNC: 193 MG/DL — HIGH (ref 70–99)
GLUCOSE BLDC GLUCOMTR-MCNC: 232 MG/DL — HIGH (ref 70–99)
GLUCOSE SERPL-MCNC: 501 MG/DL — CRITICAL HIGH (ref 70–99)
HCT VFR BLD CALC: 47 % — HIGH (ref 34.5–45)
HGB BLD-MCNC: 15.5 G/DL — SIGNIFICANT CHANGE UP (ref 11.5–15.5)
IMM GRANULOCYTES NFR BLD AUTO: 0.6 % — SIGNIFICANT CHANGE UP (ref 0–0.9)
INR BLD: 1.14 RATIO — SIGNIFICANT CHANGE UP (ref 0.85–1.16)
LYMPHOCYTES # BLD AUTO: 1.23 K/UL — SIGNIFICANT CHANGE UP (ref 1–3.3)
LYMPHOCYTES # BLD AUTO: 10.1 % — LOW (ref 13–44)
MAGNESIUM SERPL-MCNC: 1.9 MG/DL — SIGNIFICANT CHANGE UP (ref 1.6–2.6)
MCHC RBC-ENTMCNC: 25.9 PG — LOW (ref 27–34)
MCHC RBC-ENTMCNC: 33 G/DL — SIGNIFICANT CHANGE UP (ref 32–36)
MCV RBC AUTO: 78.6 FL — LOW (ref 80–100)
MONOCYTES # BLD AUTO: 0.56 K/UL — SIGNIFICANT CHANGE UP (ref 0–0.9)
MONOCYTES NFR BLD AUTO: 4.6 % — SIGNIFICANT CHANGE UP (ref 2–14)
NEUTROPHILS # BLD AUTO: 10.27 K/UL — HIGH (ref 1.8–7.4)
NEUTROPHILS NFR BLD AUTO: 84.4 % — HIGH (ref 43–77)
NT-PROBNP SERPL-SCNC: 3856 PG/ML — HIGH (ref 0–300)
PLATELET # BLD AUTO: 145 K/UL — LOW (ref 150–400)
POTASSIUM SERPL-MCNC: 4.4 MMOL/L — SIGNIFICANT CHANGE UP (ref 3.5–5.3)
POTASSIUM SERPL-SCNC: 4.4 MMOL/L — SIGNIFICANT CHANGE UP (ref 3.5–5.3)
PROT SERPL-MCNC: 7.5 G/DL — SIGNIFICANT CHANGE UP (ref 6.6–8.7)
PROTHROM AB SERPL-ACNC: 12.9 SEC — SIGNIFICANT CHANGE UP (ref 9.9–13.4)
RBC # BLD: 5.98 M/UL — HIGH (ref 3.8–5.2)
RBC # FLD: 13.8 % — SIGNIFICANT CHANGE UP (ref 10.3–14.5)
RSV RNA NPH QL NAA+NON-PROBE: SIGNIFICANT CHANGE UP
SARS-COV-2 RNA SPEC QL NAA+PROBE: SIGNIFICANT CHANGE UP
SODIUM SERPL-SCNC: 133 MMOL/L — LOW (ref 135–145)
TROPONIN T, HIGH SENSITIVITY RESULT: 31 NG/L — SIGNIFICANT CHANGE UP (ref 0–51)
TROPONIN T, HIGH SENSITIVITY RESULT: 32 NG/L — SIGNIFICANT CHANGE UP (ref 0–51)
WBC # BLD: 12.17 K/UL — HIGH (ref 3.8–10.5)
WBC # FLD AUTO: 12.17 K/UL — HIGH (ref 3.8–10.5)

## 2024-12-16 PROCEDURE — 99285 EMERGENCY DEPT VISIT HI MDM: CPT

## 2024-12-16 PROCEDURE — 88311 DECALCIFY TISSUE: CPT | Mod: 26

## 2024-12-16 PROCEDURE — 88307 TISSUE EXAM BY PATHOLOGIST: CPT | Mod: 26

## 2024-12-16 PROCEDURE — 73630 X-RAY EXAM OF FOOT: CPT | Mod: 26,LT

## 2024-12-16 PROCEDURE — 27590 AMPUTATE LEG AT THIGH: CPT | Mod: GC,LT

## 2024-12-16 PROCEDURE — 88304 TISSUE EXAM BY PATHOLOGIST: CPT | Mod: 26

## 2024-12-16 PROCEDURE — 75635 CT ANGIO ABDOMINAL ARTERIES: CPT | Mod: 26,MC

## 2024-12-16 PROCEDURE — 93010 ELECTROCARDIOGRAM REPORT: CPT

## 2024-12-16 PROCEDURE — 73600 X-RAY EXAM OF ANKLE: CPT | Mod: 26,LT

## 2024-12-16 PROCEDURE — 71045 X-RAY EXAM CHEST 1 VIEW: CPT | Mod: 26

## 2024-12-16 PROCEDURE — 34201 REMOVAL OF ARTERY CLOT: CPT | Mod: 50,GC

## 2024-12-16 PROCEDURE — 73590 X-RAY EXAM OF LOWER LEG: CPT | Mod: 26,LT

## 2024-12-16 DEVICE — CATH FOGARTY 3FR X 80CM: Type: IMPLANTABLE DEVICE | Site: LEFT | Status: FUNCTIONAL

## 2024-12-16 DEVICE — CLIP APPLIER ETHICON LIGACLIP 11.5" MEDIUM: Type: IMPLANTABLE DEVICE | Site: LEFT | Status: FUNCTIONAL

## 2024-12-16 DEVICE — CLIP APPLIER COVIDIEN SURGICLIP III 9" SM: Type: IMPLANTABLE DEVICE | Site: LEFT | Status: FUNCTIONAL

## 2024-12-16 DEVICE — BONE WAX 2.5GM: Type: IMPLANTABLE DEVICE | Site: LEFT | Status: FUNCTIONAL

## 2024-12-16 DEVICE — CATH FOGARTY 4FR X 80CM: Type: IMPLANTABLE DEVICE | Site: LEFT | Status: FUNCTIONAL

## 2024-12-16 DEVICE — FLOSEAL WITH RECOTHROM THROMBIN 10ML: Type: IMPLANTABLE DEVICE | Site: LEFT | Status: FUNCTIONAL

## 2024-12-16 RX ORDER — METOPROLOL TARTRATE 50 MG
25 TABLET ORAL DAILY
Refills: 0 | Status: DISCONTINUED | OUTPATIENT
Start: 2024-12-16 | End: 2024-12-16

## 2024-12-16 RX ORDER — SODIUM CHLORIDE 9 MG/ML
1000 INJECTION, SOLUTION INTRAVENOUS
Refills: 0 | Status: DISCONTINUED | OUTPATIENT
Start: 2024-12-16 | End: 2024-12-16

## 2024-12-16 RX ORDER — ROSUVASTATIN 40 MG/1
1 TABLET, FILM COATED ORAL
Refills: 0 | DISCHARGE

## 2024-12-16 RX ORDER — INSULIN LISPRO 100/ML
VIAL (ML) SUBCUTANEOUS AT BEDTIME
Refills: 0 | Status: DISCONTINUED | OUTPATIENT
Start: 2024-12-16 | End: 2024-12-16

## 2024-12-16 RX ORDER — CEFAZOLIN SODIUM 1 G
2000 VIAL (EA) INJECTION ONCE
Refills: 0 | Status: DISCONTINUED | OUTPATIENT
Start: 2024-12-16 | End: 2024-12-16

## 2024-12-16 RX ORDER — HEPARIN SODIUM 1000 [USP'U]/ML
5500 INJECTION, SOLUTION INTRAVENOUS; SUBCUTANEOUS ONCE
Refills: 0 | Status: DISCONTINUED | OUTPATIENT
Start: 2024-12-16 | End: 2024-12-16

## 2024-12-16 RX ORDER — DEXTROSE MONOHYDRATE 25 G/50ML
25 INJECTION, SOLUTION INTRAVENOUS ONCE
Refills: 0 | Status: DISCONTINUED | OUTPATIENT
Start: 2024-12-16 | End: 2024-12-16

## 2024-12-16 RX ORDER — HEPARIN SODIUM 1000 [USP'U]/ML
INJECTION, SOLUTION INTRAVENOUS; SUBCUTANEOUS
Qty: 25000 | Refills: 0 | Status: DISCONTINUED | OUTPATIENT
Start: 2024-12-16 | End: 2024-12-16

## 2024-12-16 RX ORDER — ACETAMINOPHEN 80 MG/.8ML
1000 SOLUTION/ DROPS ORAL ONCE
Refills: 0 | Status: COMPLETED | OUTPATIENT
Start: 2024-12-16 | End: 2024-12-16

## 2024-12-16 RX ORDER — IRBESARTAN 150 MG/1
1 TABLET ORAL
Refills: 0 | DISCHARGE

## 2024-12-16 RX ORDER — INSULIN LISPRO 100/ML
VIAL (ML) SUBCUTANEOUS
Refills: 0 | Status: DISCONTINUED | OUTPATIENT
Start: 2024-12-16 | End: 2024-12-16

## 2024-12-16 RX ORDER — ONDANSETRON 4 MG/1
4 TABLET ORAL EVERY 6 HOURS
Refills: 0 | Status: DISCONTINUED | OUTPATIENT
Start: 2024-12-16 | End: 2024-12-16

## 2024-12-16 RX ORDER — ACETAMINOPHEN 80 MG/.8ML
650 SOLUTION/ DROPS ORAL EVERY 6 HOURS
Refills: 0 | Status: DISCONTINUED | OUTPATIENT
Start: 2024-12-16 | End: 2024-12-16

## 2024-12-16 RX ORDER — ASPIRIN 81 MG
324 TABLET, DELAYED RELEASE (ENTERIC COATED) ORAL ONCE
Refills: 0 | Status: COMPLETED | OUTPATIENT
Start: 2024-12-16 | End: 2024-12-16

## 2024-12-16 RX ORDER — SODIUM CHLORIDE 9 MG/ML
1000 INJECTION, SOLUTION INTRAVENOUS ONCE
Refills: 0 | Status: COMPLETED | OUTPATIENT
Start: 2024-12-16 | End: 2024-12-16

## 2024-12-16 RX ORDER — INSULIN LISPRO 100/ML
5 VIAL (ML) SUBCUTANEOUS ONCE
Refills: 0 | Status: COMPLETED | OUTPATIENT
Start: 2024-12-16 | End: 2024-12-16

## 2024-12-16 RX ORDER — ERGOCALCIFEROL 1.25 MG/1
1250 CAPSULE ORAL
Refills: 0 | DISCHARGE

## 2024-12-16 RX ORDER — HEPARIN SODIUM 1000 [USP'U]/ML
6500 INJECTION, SOLUTION INTRAVENOUS; SUBCUTANEOUS ONCE
Refills: 0 | Status: COMPLETED | OUTPATIENT
Start: 2024-12-16 | End: 2024-12-16

## 2024-12-16 RX ORDER — FENTANYL 75 UG/H
50 PATCH, EXTENDED RELEASE TRANSDERMAL
Refills: 0 | Status: DISCONTINUED | OUTPATIENT
Start: 2024-12-16 | End: 2024-12-17

## 2024-12-16 RX ORDER — IBUPROFEN 200 MG
600 TABLET ORAL EVERY 6 HOURS
Refills: 0 | Status: DISCONTINUED | OUTPATIENT
Start: 2024-12-16 | End: 2024-12-16

## 2024-12-16 RX ORDER — HEPARIN SODIUM 1000 [USP'U]/ML
5500 INJECTION, SOLUTION INTRAVENOUS; SUBCUTANEOUS EVERY 6 HOURS
Refills: 0 | Status: DISCONTINUED | OUTPATIENT
Start: 2024-12-16 | End: 2024-12-16

## 2024-12-16 RX ORDER — DEXTROSE MONOHYDRATE 25 G/50ML
15 INJECTION, SOLUTION INTRAVENOUS ONCE
Refills: 0 | Status: DISCONTINUED | OUTPATIENT
Start: 2024-12-16 | End: 2024-12-16

## 2024-12-16 RX ORDER — HEPARIN SODIUM 1000 [USP'U]/ML
2500 INJECTION, SOLUTION INTRAVENOUS; SUBCUTANEOUS EVERY 6 HOURS
Refills: 0 | Status: DISCONTINUED | OUTPATIENT
Start: 2024-12-16 | End: 2024-12-16

## 2024-12-16 RX ORDER — SODIUM CHLORIDE 9 MG/ML
500 INJECTION, SOLUTION INTRAMUSCULAR; INTRAVENOUS; SUBCUTANEOUS ONCE
Refills: 0 | Status: COMPLETED | OUTPATIENT
Start: 2024-12-16 | End: 2024-12-16

## 2024-12-16 RX ORDER — HEPARIN SODIUM 1000 [USP'U]/ML
3000 INJECTION, SOLUTION INTRAVENOUS; SUBCUTANEOUS EVERY 6 HOURS
Refills: 0 | Status: DISCONTINUED | OUTPATIENT
Start: 2024-12-16 | End: 2024-12-16

## 2024-12-16 RX ORDER — GLUCAGON INJECTION, SOLUTION 0.5 MG/.1ML
1 INJECTION, SOLUTION SUBCUTANEOUS ONCE
Refills: 0 | Status: DISCONTINUED | OUTPATIENT
Start: 2024-12-16 | End: 2024-12-16

## 2024-12-16 RX ORDER — HEPARIN SODIUM 1000 [USP'U]/ML
6500 INJECTION, SOLUTION INTRAVENOUS; SUBCUTANEOUS EVERY 6 HOURS
Refills: 0 | Status: DISCONTINUED | OUTPATIENT
Start: 2024-12-16 | End: 2024-12-16

## 2024-12-16 RX ORDER — DEXTROSE MONOHYDRATE 25 G/50ML
12.5 INJECTION, SOLUTION INTRAVENOUS ONCE
Refills: 0 | Status: DISCONTINUED | OUTPATIENT
Start: 2024-12-16 | End: 2024-12-16

## 2024-12-16 RX ORDER — SODIUM CHLORIDE 9 MG/ML
1000 INJECTION, SOLUTION INTRAMUSCULAR; INTRAVENOUS; SUBCUTANEOUS
Refills: 0 | Status: DISCONTINUED | OUTPATIENT
Start: 2024-12-16 | End: 2024-12-16

## 2024-12-16 RX ORDER — FENTANYL 75 UG/H
25 PATCH, EXTENDED RELEASE TRANSDERMAL
Refills: 0 | Status: DISCONTINUED | OUTPATIENT
Start: 2024-12-16 | End: 2024-12-17

## 2024-12-16 RX ADMIN — ACETAMINOPHEN 400 MILLIGRAM(S): 80 SOLUTION/ DROPS ORAL at 13:28

## 2024-12-16 RX ADMIN — Medication 324 MILLIGRAM(S): at 13:27

## 2024-12-16 RX ADMIN — SODIUM CHLORIDE 500 MILLILITER(S): 9 INJECTION, SOLUTION INTRAMUSCULAR; INTRAVENOUS; SUBCUTANEOUS at 16:19

## 2024-12-16 RX ADMIN — Medication 5 UNIT(S): at 16:20

## 2024-12-16 RX ADMIN — HEPARIN SODIUM 1400 UNIT(S)/HR: 1000 INJECTION, SOLUTION INTRAVENOUS; SUBCUTANEOUS at 14:51

## 2024-12-16 RX ADMIN — HEPARIN SODIUM 6500 UNIT(S): 1000 INJECTION, SOLUTION INTRAVENOUS; SUBCUTANEOUS at 14:50

## 2024-12-16 NOTE — ASU PREOP CHECKLIST - PATIENT'S PERSONAL PROPERTY GIVEN TO
Detail Level: Zone Quality 226: Preventive Care And Screening: Tobacco Use: Screening And Cessation Intervention: Patient screened for tobacco use and is an ex/non-smoker Quality 111:Pneumonia Vaccination Status For Older Adults: Pneumococcal Vaccination Previously Received Quality 110: Preventive Care And Screening: Influenza Immunization: Influenza Immunization previously received during influenza season on unit

## 2024-12-16 NOTE — ED PROVIDER NOTE - PROGRESS NOTE DETAILS
Rama Gu DO (Attending): On my evaluation patient left lower extremity cold and no palpable pulses, no dopplerable pulses until popliteal region.  Patient's left foot and lower calf appear mottled.  Concern for acute arterial ischemia of the limb. Vascular consulted, currently at bedside evaluated pt. CTA expedited. Will give ASA and obtaining weight for heparin initiation.

## 2024-12-16 NOTE — H&P ADULT - ATTENDING COMMENTS
use of Emergency room  Mayur Geneva 78 y/o female  presents with her daughter and grandson , presents with 1  week to maybe 3 days of complete weakness of the left foot , she has not really walked since thanksgiving according to both family members but previously walking with assist only to bathroom  no complaints on right . diagnosed with atrial fibrillation in september currently not on eliquis . there was some question on her needing to be on eliquis since september but she has not filled any scripts at her pharmacy . on exam she has not distal pulses in either foot , diminished palpable right femoral pulse non palpable left femoral pulse . she has no obvious motor or sensory deficits on the right  and right leg is warm from the ankle up . left foot is cyanotic , cold to the proximal calf ,  she is completely insensate below the knee , I checked this multiple times , she has zero motor function below the  knee on the left and has difficulty even bending at the knee .   CTA shows thrombus in both common femoral arteries on the right there is reconstitution of sfa and profunda  on the left the profunda reconstitutes but that's about it .   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  would hold off on fasciotomy given no symoptoms and + distal reconstitution but this may be necessary as well .  daughter is refusing along with patient to make any decision , has walked away frequently . we are awaiting arrival of other siblings

## 2024-12-16 NOTE — ED ADULT TRIAGE NOTE - CHIEF COMPLAINT QUOTE
Pt brought on by family c/o non traumatic  L leg pain from hip down  started 3 days ago - unable to walk

## 2024-12-16 NOTE — ED PROVIDER NOTE - CLINICAL SUMMARY MEDICAL DECISION MAKING FREE TEXT BOX
79F PMH Hypertension, hyperlipidemia, diabetes presenting to emergency department with a few days of worsening left lower extremity pain starting at the foot radiating up towards the calf, associated back pain.  No chest pain, SOB.  Denies recent fever, chills.  Reports dry cough, no nausea, vomiting, abdominal pain.  No urinary symptoms. On my assessment, pt tachycardic afib -130s, normotensive, on RA. Pt with severe pain. L foot noted to be mottled and cold with decreased sensation and strength, no palpable pulses or dopplerable pulses. No midline spinal ttp. Tachycardic irregular rhythm. Lungs cta. abd soft, nontender, no rebound or guarding. Given hx and physical, ddx includes but is not limited to   Acute arterial occlusion, lumbar radiculopathy, A-fib RVR, viral syndrome, flu, COVID.  Plan for urgent vascular consult, aspirin, heparin, CTA, labs, admission.

## 2024-12-16 NOTE — H&P ADULT - ASSESSMENT
79y Female with PMH of afib on eliquis (stopped last week 2/2 to concerns for frequent falls), HTN, DM, h/o stroke presents to the ED for 1 week history of worsening left leg pain and discoloration. Patient accompanied by family who state that she has never had any leg pain or vascular concerns in the past but began to complain of left hip pain last week that radiated down the left leg. In the ED, provider unable to obtain pulses or doppler signals bilaterally and called emergent vascular surgery consult.     Plan:  - Admit to Vascular surgery service  - Emergent CTA with f/u  - Hep gtt  - Emergent labs with f/u  - Further recs to follow    Patient discussed with Dr. Tubbs 79y Female with PMH of afib on eliquis (stopped last week 2/2 to concerns for frequent falls), HTN, DM, h/o stroke presents to the ED for 1 week history of worsening left leg pain and discoloration. Patient accompanied by family who state that she has never had any leg pain or vascular concerns in the past but began to complain of left hip pain last week that radiated down the left leg. In the ED, provider unable to obtain pulses or doppler signals bilaterally and called emergent vascular surgery consult.     Plan:  - Please obtain medicine and cardiology consultation  - Emergent CTA with f/u  - Hep gtt with bolus  - Emergent labs with f/u  - Patient will likely need amputation with thrombectomy; family discuss to same  - Further recs to follow    Patient discussed with Dr. Tubbs 79y Female with PMH of afib on eliquis (stopped last week 2/2 to concerns for frequent falls), HTN, DM, h/o stroke presents to the ED for 1 week history of worsening left leg pain and discoloration. Patient accompanied by family who state that she has never had any leg pain or vascular concerns in the past but began to complain of left hip pain last week that radiated down the left leg. In the ED, provider unable to obtain pulses or doppler signals bilaterally and called emergent vascular surgery consult.     Plan:  - Please obtain medicine and cardiology consultation  - Emergent CTA with f/u  - Hep gtt with bolus  - Emergent labs with f/u  - Patient will likely need amputation with thrombectomy; family discuss to same  - Further recs to follow    Patient discussed with Dr. Tubbs    Addendum:  - Discussion held with family and decision made to proceed with operative intervention. Patient booked for bilateral mechanical thrombectomy and left AKA  - Further recs to follow post operatively

## 2024-12-16 NOTE — H&P ADULT - HISTORY OF PRESENT ILLNESS
HPI: 79y Female with PMH of afib on eliquis (stopped last week 2/2 to concerns for frequent falls), HTN, DM, h/o stroke presents to the ED for 1 week history of worsening left leg pain and discoloration. Patient accompanied by family who state that she has never had any leg pain or vascular concerns in the past but began to complain of left hip pain last week that radiated down the left leg. In the ED, provider unable to obtain pulses or doppler signals bilaterally and called emergent vascular surgery consult.       PAST MEDICAL & SURGICAL HISTORY:  DM (diabetes mellitus)      HTN (hypertension)      CVA (cerebrovascular accident)        Home Meds: Home Medications:    Allergies: Allergies    No Known Allergies    Intolerances      Soc:   Advanced Directives: Presumed Full Code     CURRENT MEDICATIONS:   --------------------------------------------------------------------------------------  Neurologic Medications  acetaminophen   IVPB .. 1000 milliGRAM(s) IV Intermittent Once    Respiratory Medications    Cardiovascular Medications    Gastrointestinal Medications    Genitourinary Medications    Hematologic/Oncologic Medications  aspirin  chewable 324 milliGRAM(s) Oral once    Antimicrobial/Immunologic Medications    Endocrine/Metabolic Medications    Topical/Other Medications    --------------------------------------------------------------------------------------    VITAL SIGNS, INS/OUTS (last 24 hours):  --------------------------------------------------------------------------------------  ICU Vital Signs Last 24 Hrs  T(C): 36.7 (16 Dec 2024 11:39), Max: 36.7 (16 Dec 2024 11:39)  T(F): 98 (16 Dec 2024 11:39), Max: 98 (16 Dec 2024 11:39)  HR: 130 (16 Dec 2024 12:45) (75 - 130)  BP: 114/62 (16 Dec 2024 11:39) (114/62 - 114/62)  BP(mean): --  ABP: --  ABP(mean): --  RR: 22 (16 Dec 2024 12:45) (20 - 22)  SpO2: 97% (16 Dec 2024 12:45) (97% - 98%)    O2 Parameters below as of 16 Dec 2024 12:45  Patient On (Oxygen Delivery Method): room air          I&O's Summary    --------------------------------------------------------------------------------------    EXAM:    Constitutional: patient appears comfortable resting in bed  HEENT: head normocephalic and atraumatic  Respiratory: respirations are unlabored, no accessory muscle use, no conversational dyspnea  Gastrointestinal: abdomen is soft & non-distended, non-tender, no rebound tenderness / guarding  Neurological: GCS15, A&O x 3  Vascular: cold legs bilaterally L>R, mottled appearing to left foot up to level of mid shin. not sensory intact bilaterally, decreased motor function to left. Doppler signals of left femoral only, palpable right femoral pulse. No obvious wounds or ulceration.  Skin: mucous membranes moist, no diaphoresis, pallor, cyanosis or jaundice             HPI: 79y Female with PMH of afib on eliquis (stopped last week 2/2 to concerns for frequent falls), HTN, DM, h/o stroke presents to the ED for 1 week history of worsening left leg pain and discoloration. Patient accompanied by family who state that she has never had any leg pain or vascular concerns in the past but began to complain of left hip pain last week that radiated down the left leg. The pain was constant and there were no aggrevating or alleviating factors with the pain. Family attempted to relieve pain with topical creams but when no relief they brought her in for further management. In the ED, provider unable to obtain pulses or doppler signals bilaterally and called emergent vascular surgery consult.       PAST MEDICAL & SURGICAL HISTORY:  DM (diabetes mellitus)      HTN (hypertension)      CVA (cerebrovascular accident)        Home Meds: Home Medications:    Allergies: Allergies    No Known Allergies    Intolerances      Soc:   Advanced Directives: Presumed Full Code     CURRENT MEDICATIONS:   --------------------------------------------------------------------------------------  Neurologic Medications  acetaminophen   IVPB .. 1000 milliGRAM(s) IV Intermittent Once    Respiratory Medications    Cardiovascular Medications    Gastrointestinal Medications    Genitourinary Medications    Hematologic/Oncologic Medications  aspirin  chewable 324 milliGRAM(s) Oral once    Antimicrobial/Immunologic Medications    Endocrine/Metabolic Medications    Topical/Other Medications    --------------------------------------------------------------------------------------    VITAL SIGNS, INS/OUTS (last 24 hours):  --------------------------------------------------------------------------------------  ICU Vital Signs Last 24 Hrs  T(C): 36.7 (16 Dec 2024 11:39), Max: 36.7 (16 Dec 2024 11:39)  T(F): 98 (16 Dec 2024 11:39), Max: 98 (16 Dec 2024 11:39)  HR: 130 (16 Dec 2024 12:45) (75 - 130)  BP: 114/62 (16 Dec 2024 11:39) (114/62 - 114/62)  BP(mean): --  ABP: --  ABP(mean): --  RR: 22 (16 Dec 2024 12:45) (20 - 22)  SpO2: 97% (16 Dec 2024 12:45) (97% - 98%)    O2 Parameters below as of 16 Dec 2024 12:45  Patient On (Oxygen Delivery Method): room air          I&O's Summary    --------------------------------------------------------------------------------------    EXAM:    Constitutional: patient appears comfortable resting in bed  HEENT: head normocephalic and atraumatic  Respiratory: respirations are unlabored, no accessory muscle use, no conversational dyspnea  Gastrointestinal: abdomen is soft & non-distended, non-tender, no rebound tenderness / guarding  Neurological: GCS15, A&O x 3  Vascular: cold legs bilaterally L>R, mottled appearing to left foot up to level of mid shin. not sensory intact bilaterally, decreased motor function to left. Doppler signals of left femoral only, palpable right femoral pulse. No obvious wounds or ulceration.  Skin: mucous membranes moist, no diaphoresis, pallor, cyanosis or jaundice             HPI: 79y Female with PMH of afib on eliquis (patient and family unable to recall when she last took it or if she even takes it at all), HTN, DM, h/o stroke presents to the ED for 1 week history of worsening left leg pain and discoloration. Patient accompanied by family who state that she has never had any leg pain or vascular concerns in the past but began to complain of left hip pain last week that radiated down the left leg. The pain was constant and there were no aggrevating or alleviating factors with the pain. Family attempted to relieve pain with topical creams but when no relief they brought her in for further management. Of note, patient is unable to ambulate on her own and can only ambulate as far as to the bathroom with assistance. In the ED, provider unable to obtain pulses or doppler signals bilaterally and called emergent vascular surgery consult.       PAST MEDICAL & SURGICAL HISTORY:  DM (diabetes mellitus)      HTN (hypertension)      CVA (cerebrovascular accident)        Home Meds: Home Medications:    Allergies: Allergies    No Known Allergies    Intolerances      Soc:   Advanced Directives: Presumed Full Code     CURRENT MEDICATIONS:   --------------------------------------------------------------------------------------  Neurologic Medications  acetaminophen   IVPB .. 1000 milliGRAM(s) IV Intermittent Once    Respiratory Medications    Cardiovascular Medications    Gastrointestinal Medications    Genitourinary Medications    Hematologic/Oncologic Medications  aspirin  chewable 324 milliGRAM(s) Oral once    Antimicrobial/Immunologic Medications    Endocrine/Metabolic Medications    Topical/Other Medications    --------------------------------------------------------------------------------------    VITAL SIGNS, INS/OUTS (last 24 hours):  --------------------------------------------------------------------------------------  ICU Vital Signs Last 24 Hrs  T(C): 36.7 (16 Dec 2024 11:39), Max: 36.7 (16 Dec 2024 11:39)  T(F): 98 (16 Dec 2024 11:39), Max: 98 (16 Dec 2024 11:39)  HR: 130 (16 Dec 2024 12:45) (75 - 130)  BP: 114/62 (16 Dec 2024 11:39) (114/62 - 114/62)  BP(mean): --  ABP: --  ABP(mean): --  RR: 22 (16 Dec 2024 12:45) (20 - 22)  SpO2: 97% (16 Dec 2024 12:45) (97% - 98%)    O2 Parameters below as of 16 Dec 2024 12:45  Patient On (Oxygen Delivery Method): room air          I&O's Summary    --------------------------------------------------------------------------------------    EXAM:    Constitutional: patient appears comfortable resting in bed  HEENT: head normocephalic and atraumatic  Respiratory: respirations are unlabored, no accessory muscle use, no conversational dyspnea  Gastrointestinal: abdomen is soft & non-distended, non-tender, no rebound tenderness / guarding  Neurological: GCS15, A&O x 3  Vascular: cold legs bilaterally L>R, mottled appearing to left foot up to level of mid shin. not sensory intact bilaterally, decreased motor function to left. Doppler signals of left femoral only, palpable right femoral pulse. No obvious wounds or ulceration.  Skin: mucous membranes moist, no diaphoresis, pallor, cyanosis or jaundice

## 2024-12-16 NOTE — H&P ADULT - NSICDXPASTMEDICALHX_GEN_ALL_CORE_FT
PAST MEDICAL HISTORY:  Afib     CVA (cerebrovascular accident)     DM (diabetes mellitus)     HTN (hypertension)

## 2024-12-16 NOTE — BRIEF OPERATIVE NOTE - NSICDXBRIEFPROCEDURE_GEN_ALL_CORE_FT
PROCEDURES:  Thromboendarterectomy, common femoral, bilateral 17-Dec-2024 06:48:26  Lida Reis  Thromboendarterectomy, profunda femoral, bilateral 17-Dec-2024 06:48:35  Lida Reis  Left above knee amputation 17-Dec-2024 06:49:22  Lida Reis

## 2024-12-16 NOTE — BRIEF OPERATIVE NOTE - OPERATION/FINDINGS
bilateral femoral cutdowns with renu balloon thrombectomy with thrombus removed from common femoral arteries and profunda arteries b/l. No thrombus in SFA with good backbleeding b/l. Closure of arteriotomies with 6-0 prolene, closure of groins in layers.  L AKA performed

## 2024-12-16 NOTE — CHART NOTE - NSCHARTNOTEFT_GEN_A_CORE
spoke with patient , and a multitude of her family members with same  from before , Again , her left foot is sadly not salvageable, she doesn't move her foot , no sensation to proximal calf , she is extremely tender when you squeeze her calf , and she was barely ambulatory prior to this event . patient requires left CFA profunda thrombectomy to allow for AKA healing , right femoral cutdown and thrombectomy  given right leg is threatened as previously mentioned . other option is to do nothing , after long conversation patient wishes to proceed . she is high risk for complications including bleeding at the stump given she requires AC , pulmonary issues given she has active bronchitis or pneumonia and is on abx. but if she is left ischemic she will get sicker over time . patient and family understand all of this . awaiting OR , patient is on heparin

## 2024-12-16 NOTE — CHART NOTE - NSCHARTNOTEFT_GEN_A_CORE
Attempted to confirm patient's home medications - both patient and daughter unsure of medication names or dosages.  States they fill prescriptions at [facility] - called pharmacy and they stated no eliquis script sent to that pharmacy  Unclear if patient had been taking eliquis or sent to another pharmacy, however both patient/daughter and pharmacy stating patient has been using [facility] as their only pharmacy for many years

## 2024-12-17 ENCOUNTER — RESULT REVIEW (OUTPATIENT)
Age: 79
End: 2024-12-17

## 2024-12-17 DIAGNOSIS — I48.91 UNSPECIFIED ATRIAL FIBRILLATION: ICD-10-CM

## 2024-12-17 LAB
A1C WITH ESTIMATED AVERAGE GLUCOSE RESULT: 11.5 % — HIGH (ref 4–5.6)
ACETONE SERPL-MCNC: NEGATIVE — SIGNIFICANT CHANGE UP
ALBUMIN SERPL ELPH-MCNC: 2.6 G/DL — LOW (ref 3.3–5.2)
ALBUMIN SERPL ELPH-MCNC: 2.7 G/DL — LOW (ref 3.3–5.2)
ALBUMIN SERPL ELPH-MCNC: 3 G/DL — LOW (ref 3.3–5.2)
ALP SERPL-CCNC: 107 U/L — SIGNIFICANT CHANGE UP (ref 40–120)
ALP SERPL-CCNC: 117 U/L — SIGNIFICANT CHANGE UP (ref 40–120)
ALP SERPL-CCNC: 96 U/L — SIGNIFICANT CHANGE UP (ref 40–120)
ALT FLD-CCNC: 13 U/L — SIGNIFICANT CHANGE UP
ALT FLD-CCNC: 16 U/L — SIGNIFICANT CHANGE UP
ALT FLD-CCNC: 22 U/L — SIGNIFICANT CHANGE UP
ANION GAP SERPL CALC-SCNC: 11 MMOL/L — SIGNIFICANT CHANGE UP (ref 5–17)
ANION GAP SERPL CALC-SCNC: 13 MMOL/L — SIGNIFICANT CHANGE UP (ref 5–17)
ANION GAP SERPL CALC-SCNC: 15 MMOL/L — SIGNIFICANT CHANGE UP (ref 5–17)
APTT BLD: 106.3 SEC — HIGH (ref 24.5–35.6)
APTT BLD: 77.1 SEC — HIGH (ref 24.5–35.6)
APTT BLD: 87.1 SEC — HIGH (ref 24.5–35.6)
APTT BLD: > 200 (ref 24.5–35.6)
AST SERPL-CCNC: 24 U/L — SIGNIFICANT CHANGE UP
AST SERPL-CCNC: 27 U/L — SIGNIFICANT CHANGE UP
AST SERPL-CCNC: 35 U/L — HIGH
BASOPHILS # BLD AUTO: 0.04 K/UL — SIGNIFICANT CHANGE UP (ref 0–0.2)
BASOPHILS # BLD AUTO: 0.04 K/UL — SIGNIFICANT CHANGE UP (ref 0–0.2)
BASOPHILS # BLD AUTO: 0.05 K/UL — SIGNIFICANT CHANGE UP (ref 0–0.2)
BASOPHILS NFR BLD AUTO: 0.3 % — SIGNIFICANT CHANGE UP (ref 0–2)
BASOPHILS NFR BLD AUTO: 0.3 % — SIGNIFICANT CHANGE UP (ref 0–2)
BASOPHILS NFR BLD AUTO: 0.4 % — SIGNIFICANT CHANGE UP (ref 0–2)
BILIRUB DIRECT SERPL-MCNC: 0.2 MG/DL — SIGNIFICANT CHANGE UP (ref 0–0.3)
BILIRUB INDIRECT FLD-MCNC: 0.3 MG/DL — SIGNIFICANT CHANGE UP (ref 0.2–1)
BILIRUB SERPL-MCNC: 0.3 MG/DL — LOW (ref 0.4–2)
BILIRUB SERPL-MCNC: 0.5 MG/DL — SIGNIFICANT CHANGE UP (ref 0.4–2)
BILIRUB SERPL-MCNC: 0.5 MG/DL — SIGNIFICANT CHANGE UP (ref 0.4–2)
BUN SERPL-MCNC: 23.1 MG/DL — HIGH (ref 8–20)
BUN SERPL-MCNC: 24 MG/DL — HIGH (ref 8–20)
BUN SERPL-MCNC: 30.6 MG/DL — HIGH (ref 8–20)
CALCIUM SERPL-MCNC: 7.9 MG/DL — LOW (ref 8.4–10.5)
CALCIUM SERPL-MCNC: 8.1 MG/DL — LOW (ref 8.4–10.5)
CALCIUM SERPL-MCNC: 8.2 MG/DL — LOW (ref 8.4–10.5)
CHLORIDE SERPL-SCNC: 102 MMOL/L — SIGNIFICANT CHANGE UP (ref 96–108)
CHLORIDE SERPL-SCNC: 103 MMOL/L — SIGNIFICANT CHANGE UP (ref 96–108)
CHLORIDE SERPL-SCNC: 96 MMOL/L — SIGNIFICANT CHANGE UP (ref 96–108)
CO2 SERPL-SCNC: 19 MMOL/L — LOW (ref 22–29)
CO2 SERPL-SCNC: 20 MMOL/L — LOW (ref 22–29)
CO2 SERPL-SCNC: 21 MMOL/L — LOW (ref 22–29)
CREAT SERPL-MCNC: 1.02 MG/DL — SIGNIFICANT CHANGE UP (ref 0.5–1.3)
CREAT SERPL-MCNC: 1.09 MG/DL — SIGNIFICANT CHANGE UP (ref 0.5–1.3)
CREAT SERPL-MCNC: 1.45 MG/DL — HIGH (ref 0.5–1.3)
EGFR: 36 ML/MIN/1.73M2 — LOW
EGFR: 51 ML/MIN/1.73M2 — LOW
EGFR: 55 ML/MIN/1.73M2 — LOW
EOSINOPHIL # BLD AUTO: 0 K/UL — SIGNIFICANT CHANGE UP (ref 0–0.5)
EOSINOPHIL # BLD AUTO: 0 K/UL — SIGNIFICANT CHANGE UP (ref 0–0.5)
EOSINOPHIL # BLD AUTO: 0.02 K/UL — SIGNIFICANT CHANGE UP (ref 0–0.5)
EOSINOPHIL NFR BLD AUTO: 0 % — SIGNIFICANT CHANGE UP (ref 0–6)
EOSINOPHIL NFR BLD AUTO: 0 % — SIGNIFICANT CHANGE UP (ref 0–6)
EOSINOPHIL NFR BLD AUTO: 0.2 % — SIGNIFICANT CHANGE UP (ref 0–6)
ESTIMATED AVERAGE GLUCOSE: 283 MG/DL — HIGH (ref 68–114)
GLUCOSE BLDC GLUCOMTR-MCNC: 151 MG/DL — HIGH (ref 70–99)
GLUCOSE BLDC GLUCOMTR-MCNC: 212 MG/DL — HIGH (ref 70–99)
GLUCOSE BLDC GLUCOMTR-MCNC: 225 MG/DL — HIGH (ref 70–99)
GLUCOSE BLDC GLUCOMTR-MCNC: 294 MG/DL — HIGH (ref 70–99)
GLUCOSE BLDC GLUCOMTR-MCNC: 351 MG/DL — HIGH (ref 70–99)
GLUCOSE BLDC GLUCOMTR-MCNC: 365 MG/DL — HIGH (ref 70–99)
GLUCOSE BLDC GLUCOMTR-MCNC: 383 MG/DL — HIGH (ref 70–99)
GLUCOSE BLDC GLUCOMTR-MCNC: 399 MG/DL — HIGH (ref 70–99)
GLUCOSE BLDC GLUCOMTR-MCNC: 414 MG/DL — HIGH (ref 70–99)
GLUCOSE SERPL-MCNC: 175 MG/DL — HIGH (ref 70–99)
GLUCOSE SERPL-MCNC: 241 MG/DL — HIGH (ref 70–99)
GLUCOSE SERPL-MCNC: 346 MG/DL — HIGH (ref 70–99)
HCT VFR BLD CALC: 27.9 % — LOW (ref 34.5–45)
HCT VFR BLD CALC: 32.6 % — LOW (ref 34.5–45)
HCT VFR BLD CALC: 34.8 % — SIGNIFICANT CHANGE UP (ref 34.5–45)
HGB BLD-MCNC: 11.3 G/DL — LOW (ref 11.5–15.5)
HGB BLD-MCNC: 11.8 G/DL — SIGNIFICANT CHANGE UP (ref 11.5–15.5)
HGB BLD-MCNC: 9.4 G/DL — LOW (ref 11.5–15.5)
IMM GRANULOCYTES NFR BLD AUTO: 0.6 % — SIGNIFICANT CHANGE UP (ref 0–0.9)
IMM GRANULOCYTES NFR BLD AUTO: 0.7 % — SIGNIFICANT CHANGE UP (ref 0–0.9)
IMM GRANULOCYTES NFR BLD AUTO: 0.8 % — SIGNIFICANT CHANGE UP (ref 0–0.9)
INR BLD: 1.15 RATIO — SIGNIFICANT CHANGE UP (ref 0.85–1.16)
LACTATE SERPL-SCNC: 1.8 MMOL/L — SIGNIFICANT CHANGE UP (ref 0.5–2)
LYMPHOCYTES # BLD AUTO: 0.92 K/UL — LOW (ref 1–3.3)
LYMPHOCYTES # BLD AUTO: 1.75 K/UL — SIGNIFICANT CHANGE UP (ref 1–3.3)
LYMPHOCYTES # BLD AUTO: 11.8 % — LOW (ref 13–44)
LYMPHOCYTES # BLD AUTO: 18.8 % — SIGNIFICANT CHANGE UP (ref 13–44)
LYMPHOCYTES # BLD AUTO: 2.06 K/UL — SIGNIFICANT CHANGE UP (ref 1–3.3)
LYMPHOCYTES # BLD AUTO: 6.2 % — LOW (ref 13–44)
MAGNESIUM SERPL-MCNC: 1.5 MG/DL — LOW (ref 1.6–2.6)
MAGNESIUM SERPL-MCNC: 1.7 MG/DL — LOW (ref 1.8–2.6)
MAGNESIUM SERPL-MCNC: 1.7 MG/DL — SIGNIFICANT CHANGE UP (ref 1.6–2.6)
MCHC RBC-ENTMCNC: 26 PG — LOW (ref 27–34)
MCHC RBC-ENTMCNC: 26.2 PG — LOW (ref 27–34)
MCHC RBC-ENTMCNC: 26.3 PG — LOW (ref 27–34)
MCHC RBC-ENTMCNC: 33.7 G/DL — SIGNIFICANT CHANGE UP (ref 32–36)
MCHC RBC-ENTMCNC: 33.9 G/DL — SIGNIFICANT CHANGE UP (ref 32–36)
MCHC RBC-ENTMCNC: 34.7 G/DL — SIGNIFICANT CHANGE UP (ref 32–36)
MCV RBC AUTO: 76 FL — LOW (ref 80–100)
MCV RBC AUTO: 77.3 FL — LOW (ref 80–100)
MCV RBC AUTO: 77.3 FL — LOW (ref 80–100)
MONOCYTES # BLD AUTO: 0.65 K/UL — SIGNIFICANT CHANGE UP (ref 0–0.9)
MONOCYTES # BLD AUTO: 0.65 K/UL — SIGNIFICANT CHANGE UP (ref 0–0.9)
MONOCYTES # BLD AUTO: 1.01 K/UL — HIGH (ref 0–0.9)
MONOCYTES NFR BLD AUTO: 4.4 % — SIGNIFICANT CHANGE UP (ref 2–14)
MONOCYTES NFR BLD AUTO: 5.9 % — SIGNIFICANT CHANGE UP (ref 2–14)
MONOCYTES NFR BLD AUTO: 6.8 % — SIGNIFICANT CHANGE UP (ref 2–14)
NEUTROPHILS # BLD AUTO: 11.9 K/UL — HIGH (ref 1.8–7.4)
NEUTROPHILS # BLD AUTO: 13.16 K/UL — HIGH (ref 1.8–7.4)
NEUTROPHILS # BLD AUTO: 8.1 K/UL — HIGH (ref 1.8–7.4)
NEUTROPHILS NFR BLD AUTO: 74.1 % — SIGNIFICANT CHANGE UP (ref 43–77)
NEUTROPHILS NFR BLD AUTO: 80.3 % — HIGH (ref 43–77)
NEUTROPHILS NFR BLD AUTO: 88.4 % — HIGH (ref 43–77)
NT-PROBNP SERPL-SCNC: 3030 PG/ML — HIGH (ref 0–300)
PHOSPHATE SERPL-MCNC: 3.4 MG/DL — SIGNIFICANT CHANGE UP (ref 2.4–4.7)
PHOSPHATE SERPL-MCNC: 3.6 MG/DL — SIGNIFICANT CHANGE UP (ref 2.4–4.7)
PHOSPHATE SERPL-MCNC: 4.1 MG/DL — SIGNIFICANT CHANGE UP (ref 2.4–4.7)
PLATELET # BLD AUTO: 116 K/UL — LOW (ref 150–400)
PLATELET # BLD AUTO: 131 K/UL — LOW (ref 150–400)
PLATELET # BLD AUTO: 135 K/UL — LOW (ref 150–400)
POTASSIUM SERPL-MCNC: 3.9 MMOL/L — SIGNIFICANT CHANGE UP (ref 3.5–5.3)
POTASSIUM SERPL-MCNC: 4.4 MMOL/L — SIGNIFICANT CHANGE UP (ref 3.5–5.3)
POTASSIUM SERPL-MCNC: 4.5 MMOL/L — SIGNIFICANT CHANGE UP (ref 3.5–5.3)
POTASSIUM SERPL-SCNC: 3.9 MMOL/L — SIGNIFICANT CHANGE UP (ref 3.5–5.3)
POTASSIUM SERPL-SCNC: 4.4 MMOL/L — SIGNIFICANT CHANGE UP (ref 3.5–5.3)
POTASSIUM SERPL-SCNC: 4.5 MMOL/L — SIGNIFICANT CHANGE UP (ref 3.5–5.3)
PROT SERPL-MCNC: 5.5 G/DL — LOW (ref 6.6–8.7)
PROT SERPL-MCNC: 5.6 G/DL — LOW (ref 6.6–8.7)
PROT SERPL-MCNC: 5.9 G/DL — LOW (ref 6.6–8.7)
PROTHROM AB SERPL-ACNC: 13.3 SEC — SIGNIFICANT CHANGE UP (ref 9.9–13.4)
RBC # BLD: 3.61 M/UL — LOW (ref 3.8–5.2)
RBC # BLD: 4.29 M/UL — SIGNIFICANT CHANGE UP (ref 3.8–5.2)
RBC # BLD: 4.5 M/UL — SIGNIFICANT CHANGE UP (ref 3.8–5.2)
RBC # FLD: 13.9 % — SIGNIFICANT CHANGE UP (ref 10.3–14.5)
RBC # FLD: 14 % — SIGNIFICANT CHANGE UP (ref 10.3–14.5)
RBC # FLD: 14 % — SIGNIFICANT CHANGE UP (ref 10.3–14.5)
SODIUM SERPL-SCNC: 129 MMOL/L — LOW (ref 135–145)
SODIUM SERPL-SCNC: 135 MMOL/L — SIGNIFICANT CHANGE UP (ref 135–145)
SODIUM SERPL-SCNC: 136 MMOL/L — SIGNIFICANT CHANGE UP (ref 135–145)
TROPONIN T, HIGH SENSITIVITY RESULT: 29 NG/L — SIGNIFICANT CHANGE UP (ref 0–51)
WBC # BLD: 10.94 K/UL — HIGH (ref 3.8–10.5)
WBC # BLD: 14.82 K/UL — HIGH (ref 3.8–10.5)
WBC # BLD: 14.88 K/UL — HIGH (ref 3.8–10.5)
WBC # FLD AUTO: 10.94 K/UL — HIGH (ref 3.8–10.5)
WBC # FLD AUTO: 14.82 K/UL — HIGH (ref 3.8–10.5)
WBC # FLD AUTO: 14.88 K/UL — HIGH (ref 3.8–10.5)

## 2024-12-17 PROCEDURE — 99222 1ST HOSP IP/OBS MODERATE 55: CPT

## 2024-12-17 PROCEDURE — 93321 DOPPLER ECHO F-UP/LMTD STD: CPT | Mod: 26

## 2024-12-17 PROCEDURE — 93325 DOPPLER ECHO COLOR FLOW MAPG: CPT | Mod: 26

## 2024-12-17 PROCEDURE — 93308 TTE F-UP OR LMTD: CPT | Mod: 26

## 2024-12-17 RX ORDER — DEXTROSE MONOHYDRATE 25 G/50ML
12.5 INJECTION, SOLUTION INTRAVENOUS ONCE
Refills: 0 | Status: DISCONTINUED | OUTPATIENT
Start: 2024-12-17 | End: 2024-12-17

## 2024-12-17 RX ORDER — ONDANSETRON 4 MG/1
4 TABLET ORAL EVERY 4 HOURS
Refills: 0 | Status: DISCONTINUED | OUTPATIENT
Start: 2024-12-17 | End: 2024-12-23

## 2024-12-17 RX ORDER — DEXTROSE MONOHYDRATE 25 G/50ML
25 INJECTION, SOLUTION INTRAVENOUS ONCE
Refills: 0 | Status: DISCONTINUED | OUTPATIENT
Start: 2024-12-17 | End: 2024-12-17

## 2024-12-17 RX ORDER — DEXTROSE MONOHYDRATE 25 G/50ML
15 INJECTION, SOLUTION INTRAVENOUS ONCE
Refills: 0 | Status: DISCONTINUED | OUTPATIENT
Start: 2024-12-17 | End: 2024-12-17

## 2024-12-17 RX ORDER — HEPARIN SODIUM 1000 [USP'U]/ML
3000 INJECTION, SOLUTION INTRAVENOUS; SUBCUTANEOUS EVERY 6 HOURS
Refills: 0 | Status: DISCONTINUED | OUTPATIENT
Start: 2024-12-17 | End: 2024-12-18

## 2024-12-17 RX ORDER — INSULIN LISPRO 100/ML
VIAL (ML) SUBCUTANEOUS AT BEDTIME
Refills: 0 | Status: DISCONTINUED | OUTPATIENT
Start: 2024-12-17 | End: 2024-12-17

## 2024-12-17 RX ORDER — TRAMADOL HYDROCHLORIDE 50 MG/1
25 TABLET ORAL EVERY 4 HOURS
Refills: 0 | Status: DISCONTINUED | OUTPATIENT
Start: 2024-12-17 | End: 2024-12-17

## 2024-12-17 RX ORDER — METOPROLOL TARTRATE 50 MG
5 TABLET ORAL ONCE
Refills: 0 | Status: COMPLETED | OUTPATIENT
Start: 2024-12-17 | End: 2024-12-17

## 2024-12-17 RX ORDER — ACETAMINOPHEN 80 MG/.8ML
650 SOLUTION/ DROPS ORAL EVERY 6 HOURS
Refills: 0 | Status: DISCONTINUED | OUTPATIENT
Start: 2024-12-17 | End: 2024-12-17

## 2024-12-17 RX ORDER — METOPROLOL TARTRATE 50 MG
5 TABLET ORAL EVERY 6 HOURS
Refills: 0 | Status: DISCONTINUED | OUTPATIENT
Start: 2024-12-17 | End: 2024-12-17

## 2024-12-17 RX ORDER — SODIUM CHLORIDE 9 MG/ML
1000 INJECTION, SOLUTION INTRAVENOUS ONCE
Refills: 0 | Status: COMPLETED | OUTPATIENT
Start: 2024-12-17 | End: 2024-12-17

## 2024-12-17 RX ORDER — SODIUM CHLORIDE 9 MG/ML
1000 INJECTION, SOLUTION INTRAVENOUS
Refills: 0 | Status: DISCONTINUED | OUTPATIENT
Start: 2024-12-17 | End: 2024-12-17

## 2024-12-17 RX ORDER — DEXTROSE MONOHYDRATE 25 G/50ML
25 INJECTION, SOLUTION INTRAVENOUS
Refills: 0 | Status: DISCONTINUED | OUTPATIENT
Start: 2024-12-17 | End: 2024-12-17

## 2024-12-17 RX ORDER — ACETAMINOPHEN 80 MG/.8ML
975 SOLUTION/ DROPS ORAL EVERY 6 HOURS
Refills: 0 | Status: DISCONTINUED | OUTPATIENT
Start: 2024-12-17 | End: 2024-12-26

## 2024-12-17 RX ORDER — INSULIN LISPRO 100/ML
6 VIAL (ML) SUBCUTANEOUS
Refills: 0 | Status: DISCONTINUED | OUTPATIENT
Start: 2024-12-17 | End: 2024-12-17

## 2024-12-17 RX ORDER — METOPROLOL TARTRATE 50 MG
25 TABLET ORAL
Refills: 0 | Status: DISCONTINUED | OUTPATIENT
Start: 2024-12-17 | End: 2024-12-26

## 2024-12-17 RX ORDER — HYDROMORPHONE HCL 4 MG
0.5 TABLET ORAL ONCE
Refills: 0 | Status: DISCONTINUED | OUTPATIENT
Start: 2024-12-17 | End: 2024-12-17

## 2024-12-17 RX ORDER — TRAMADOL HYDROCHLORIDE 50 MG/1
50 TABLET ORAL EVERY 6 HOURS
Refills: 0 | Status: DISCONTINUED | OUTPATIENT
Start: 2024-12-17 | End: 2024-12-17

## 2024-12-17 RX ORDER — OXYCODONE HCL 15 MG
5 TABLET ORAL EVERY 6 HOURS
Refills: 0 | Status: DISCONTINUED | OUTPATIENT
Start: 2024-12-17 | End: 2024-12-22

## 2024-12-17 RX ORDER — INSULIN GLARGINE-YFGN 100 [IU]/ML
25 INJECTION, SOLUTION SUBCUTANEOUS AT BEDTIME
Refills: 0 | Status: DISCONTINUED | OUTPATIENT
Start: 2024-12-17 | End: 2024-12-17

## 2024-12-17 RX ORDER — CHLORHEXIDINE GLUCONATE 1.2 MG/ML
1 RINSE ORAL DAILY
Refills: 0 | Status: DISCONTINUED | OUTPATIENT
Start: 2024-12-17 | End: 2024-12-26

## 2024-12-17 RX ORDER — SENNOSIDES 8.6 MG/1
2 TABLET, FILM COATED ORAL AT BEDTIME
Refills: 0 | Status: DISCONTINUED | OUTPATIENT
Start: 2024-12-17 | End: 2024-12-26

## 2024-12-17 RX ORDER — HYDROMORPHONE HCL 4 MG
0.5 TABLET ORAL EVERY 6 HOURS
Refills: 0 | Status: DISCONTINUED | OUTPATIENT
Start: 2024-12-17 | End: 2024-12-17

## 2024-12-17 RX ORDER — METOPROLOL TARTRATE 50 MG
25 TABLET ORAL
Refills: 0 | Status: DISCONTINUED | OUTPATIENT
Start: 2024-12-17 | End: 2024-12-17

## 2024-12-17 RX ORDER — SODIUM CHLORIDE 9 MG/ML
1000 INJECTION, SOLUTION INTRAVENOUS
Refills: 0 | Status: DISCONTINUED | OUTPATIENT
Start: 2024-12-17 | End: 2024-12-20

## 2024-12-17 RX ORDER — INSULIN HUMAN 100 [IU]/ML
2 INJECTION, SOLUTION SUBCUTANEOUS
Qty: 100 | Refills: 0 | Status: DISCONTINUED | OUTPATIENT
Start: 2024-12-17 | End: 2024-12-18

## 2024-12-17 RX ORDER — OXYCODONE HCL 15 MG
10 TABLET ORAL EVERY 6 HOURS
Refills: 0 | Status: DISCONTINUED | OUTPATIENT
Start: 2024-12-17 | End: 2024-12-24

## 2024-12-17 RX ORDER — HYDROMORPHONE HCL 4 MG
0.5 TABLET ORAL EVERY 4 HOURS
Refills: 0 | Status: DISCONTINUED | OUTPATIENT
Start: 2024-12-17 | End: 2024-12-23

## 2024-12-17 RX ORDER — ALBUTEROL SULFATE 90 UG/1
2 INHALANT RESPIRATORY (INHALATION) EVERY 6 HOURS
Refills: 0 | Status: DISCONTINUED | OUTPATIENT
Start: 2024-12-17 | End: 2024-12-26

## 2024-12-17 RX ORDER — POLYETHYLENE GLYCOL 3350 17 G/DOSE
17 POWDER (GRAM) ORAL DAILY
Refills: 0 | Status: DISCONTINUED | OUTPATIENT
Start: 2024-12-17 | End: 2024-12-26

## 2024-12-17 RX ORDER — INSULIN LISPRO 100/ML
VIAL (ML) SUBCUTANEOUS
Refills: 0 | Status: DISCONTINUED | OUTPATIENT
Start: 2024-12-17 | End: 2024-12-17

## 2024-12-17 RX ORDER — METOPROLOL TARTRATE 50 MG
5 TABLET ORAL EVERY 6 HOURS
Refills: 0 | Status: DISCONTINUED | OUTPATIENT
Start: 2024-12-17 | End: 2024-12-26

## 2024-12-17 RX ORDER — HEPARIN SODIUM 1000 [USP'U]/ML
1100 INJECTION, SOLUTION INTRAVENOUS; SUBCUTANEOUS
Qty: 25000 | Refills: 0 | Status: DISCONTINUED | OUTPATIENT
Start: 2024-12-17 | End: 2024-12-18

## 2024-12-17 RX ORDER — DEXTROSE MONOHYDRATE 25 G/50ML
50 INJECTION, SOLUTION INTRAVENOUS
Refills: 0 | Status: DISCONTINUED | OUTPATIENT
Start: 2024-12-17 | End: 2024-12-17

## 2024-12-17 RX ORDER — HEPARIN SODIUM 1000 [USP'U]/ML
6500 INJECTION, SOLUTION INTRAVENOUS; SUBCUTANEOUS EVERY 6 HOURS
Refills: 0 | Status: DISCONTINUED | OUTPATIENT
Start: 2024-12-17 | End: 2024-12-18

## 2024-12-17 RX ADMIN — SODIUM CHLORIDE 1000 MILLILITER(S): 9 INJECTION, SOLUTION INTRAVENOUS at 18:04

## 2024-12-17 RX ADMIN — FENTANYL 50 MICROGRAM(S): 75 PATCH, EXTENDED RELEASE TRANSDERMAL at 03:43

## 2024-12-17 RX ADMIN — FENTANYL 25 MICROGRAM(S): 75 PATCH, EXTENDED RELEASE TRANSDERMAL at 06:00

## 2024-12-17 RX ADMIN — FENTANYL 50 MICROGRAM(S): 75 PATCH, EXTENDED RELEASE TRANSDERMAL at 09:22

## 2024-12-17 RX ADMIN — INSULIN HUMAN 4 UNIT(S)/HR: 100 INJECTION, SOLUTION SUBCUTANEOUS at 18:04

## 2024-12-17 RX ADMIN — FENTANYL 25 MICROGRAM(S): 75 PATCH, EXTENDED RELEASE TRANSDERMAL at 05:15

## 2024-12-17 RX ADMIN — FENTANYL 25 MICROGRAM(S): 75 PATCH, EXTENDED RELEASE TRANSDERMAL at 00:46

## 2024-12-17 RX ADMIN — Medication 6 UNIT(S): at 13:23

## 2024-12-17 RX ADMIN — ACETAMINOPHEN 975 MILLIGRAM(S): 80 SOLUTION/ DROPS ORAL at 16:44

## 2024-12-17 RX ADMIN — Medication 5 MILLIGRAM(S): at 07:48

## 2024-12-17 RX ADMIN — Medication 5 MILLIGRAM(S): at 09:23

## 2024-12-17 RX ADMIN — Medication 0.5 MILLIGRAM(S): at 20:43

## 2024-12-17 RX ADMIN — Medication 5 MILLIGRAM(S): at 08:35

## 2024-12-17 RX ADMIN — FENTANYL 25 MICROGRAM(S): 75 PATCH, EXTENDED RELEASE TRANSDERMAL at 00:13

## 2024-12-17 RX ADMIN — FENTANYL 50 MICROGRAM(S): 75 PATCH, EXTENDED RELEASE TRANSDERMAL at 07:38

## 2024-12-17 RX ADMIN — Medication 25 MILLIGRAM(S): at 21:53

## 2024-12-17 RX ADMIN — SODIUM CHLORIDE 1000 MILLILITER(S): 9 INJECTION, SOLUTION INTRAVENOUS at 20:58

## 2024-12-17 RX ADMIN — FENTANYL 25 MICROGRAM(S): 75 PATCH, EXTENDED RELEASE TRANSDERMAL at 02:21

## 2024-12-17 RX ADMIN — ACETAMINOPHEN 975 MILLIGRAM(S): 80 SOLUTION/ DROPS ORAL at 13:32

## 2024-12-17 RX ADMIN — FENTANYL 25 MICROGRAM(S): 75 PATCH, EXTENDED RELEASE TRANSDERMAL at 02:55

## 2024-12-17 RX ADMIN — FENTANYL 25 MICROGRAM(S): 75 PATCH, EXTENDED RELEASE TRANSDERMAL at 01:12

## 2024-12-17 RX ADMIN — SODIUM CHLORIDE 1000 MILLILITER(S): 9 INJECTION, SOLUTION INTRAVENOUS at 00:13

## 2024-12-17 RX ADMIN — SENNOSIDES 2 TABLET(S): 8.6 TABLET, FILM COATED ORAL at 21:53

## 2024-12-17 RX ADMIN — Medication 6 UNIT(S): at 15:20

## 2024-12-17 RX ADMIN — Medication 0.5 MILLIGRAM(S): at 21:09

## 2024-12-17 RX ADMIN — Medication 5 MILLIGRAM(S): at 18:04

## 2024-12-17 NOTE — CONSULT NOTE ADULT - SUBJECTIVE AND OBJECTIVE BOX
HPI:  HPI: 79y Female with PMH of afib on eliquis (patient and family unable to recall when she last took it or if she even takes it at all), HTN, DM, h/o stroke presents to the ED for 1 week history of worsening left leg pain and discoloration. Patient accompanied by family who state that she has never had any leg pain or vascular concerns in the past but began to complain of left hip pain last week that radiated down the left leg. The pain was constant and there were no aggrevating or alleviating factors with the pain. Family attempted to relieve pain with topical creams but when no relief they brought her in for further management. Of note, patient is unable to ambulate on her own and can only ambulate as far as to the bathroom with assistance. In the ED, provider unable to obtain pulses or doppler signals bilaterally and called emergent vascular surgery consult.     seen in PACU in am,  in afib  states she is sick when speaking via bedside   ros unable to obtain      PAST MEDICAL & SURGICAL HISTORY:  DM (diabetes mellitus  HTN (hypertension)  CVA (cerebrovascular accident)    Soc:   Advanced Directives: Presumed Full Code       VITAL SIGNS, INS/OUTS (last 24 hours):  --------------------------------------------------------------------------------------  ICU Vital Signs Last 24 Hrs  T(C): 36.7 (16 Dec 2024 11:39), Max: 36.7 (16 Dec 2024 11:39)  T(F): 98 (16 Dec 2024 11:39), Max: 98 (16 Dec 2024 11:39)  HR: 130 (16 Dec 2024 12:45) (75 - 130)  BP: 114/62 (16 Dec 2024 11:39) (114/62 - 114/62)  BP(mean): --  ABP: --  ABP(mean): --  RR: 22 (16 Dec 2024 12:45) (20 - 22)  SpO2: 97% (16 Dec 2024 12:45) (97% - 98%)    O2 Parameters below as of 16 Dec 2024 12:45  Patient On (Oxygen Delivery Method): room air                      (16 Dec 2024 12:30)      PAST MEDICAL & SURGICAL HISTORY:  DM (diabetes mellitus)      HTN (hypertension)      CVA (cerebrovascular accident)      Afib          REVIEW OF SYSTEMS:  unable to obtain    MEDICATIONS  (STANDING):  acetaminophen     Tablet .. 975 milliGRAM(s) Oral every 6 hours  dextrose 5%. 1000 milliLiter(s) (50 mL/Hr) IV Continuous <Continuous>  dextrose 5%. 1000 milliLiter(s) (100 mL/Hr) IV Continuous <Continuous>  dextrose 50% Injectable 25 Gram(s) IV Push once  dextrose 50% Injectable 12.5 Gram(s) IV Push once  dextrose 50% Injectable 25 Gram(s) IV Push once  heparin  Infusion. 1100 Unit(s)/Hr (11 mL/Hr) IV Continuous <Continuous>  insulin lispro (ADMELOG) corrective regimen sliding scale   SubCutaneous at bedtime    MEDICATIONS  (PRN):  dextrose Oral Gel 15 Gram(s) Oral once PRN Blood Glucose LESS THAN 70 milliGRAM(s)/deciliter  fentaNYL    Injectable 50 MICROGram(s) IV Push every 5 minutes PRN Severe Pain (7 - 10)  heparin   Injectable 6500 Unit(s) IV Push every 6 hours PRN For aPTT less than 40  heparin   Injectable 3000 Unit(s) IV Push every 6 hours PRN For aPTT between 40 - 57  HYDROmorphone  Injectable 0.5 milliGRAM(s) IV Push every 6 hours PRN breakthrough pain  metoprolol tartrate Injectable 5 milliGRAM(s) IV Push every 6 hours PRN HR > 110 afib RVR  oxyCODONE    IR 5 milliGRAM(s) Oral every 6 hours PRN Moderate Pain (4 - 6)  oxyCODONE    IR 10 milliGRAM(s) Oral every 6 hours PRN Severe Pain (7 - 10)      Allergies    No Known Allergies        Vital Signs Last 24 Hrs  T(C): 36.8 (17 Dec 2024 12:18), Max: 37.3 (17 Dec 2024 04:45)  T(F): 98.3 (17 Dec 2024 12:18), Max: 99.2 (17 Dec 2024 04:45)  HR: 114 (17 Dec 2024 12:18) (83 - 144)  BP: 88/76 (17 Dec 2024 12:18) (84/66 - 158/90)  BP(mean): 81 (17 Dec 2024 12:18) (67 - 109)  RR: 22 (17 Dec 2024 12:18) (13 - 22)  SpO2: 99% (17 Dec 2024 12:18) (95% - 100%)    Parameters below as of 17 Dec 2024 09:10  Patient On (Oxygen Delivery Method): room air        PHYSICAL EXAM:    GENERAL: NAD  ENM Moist mucous membrane  NECK: Supple  NERVOUS SYSTEM:  does not follow commands  CHEST/LUNG: Clear to percussion bilaterally;  HEART: irreg rate and rhythm;   ABDOMEN: Soft,  Nondistended; Bowel sounds present  EXTREMITIES: left aka bandaged       LABS:                        11.3   14.88 )-----------( 116      ( 17 Dec 2024 06:50 )             32.6     12-17    136  |  102  |  23.1[H]  ----------------------------<  241[H]  4.4   |  19.0[L]  |  1.09    Ca    8.1[L]      17 Dec 2024 06:50  Phos  3.4     12-17  Mg     1.5     12-17    TPro  5.6[L]  /  Alb  2.6[L]  /  TBili  0.5  /  DBili  x   /  AST  27  /  ALT  16  /  AlkPhos  107  12-17    PT/INR - ( 16 Dec 2024 13:05 )   PT: 12.9 sec;   INR: 1.14 ratio         PTT - ( 17 Dec 2024 06:50 )  PTT:106.3 sec  Urinalysis Basic - ( 17 Dec 2024 06:50 )    Color: x / Appearance: x / SG: x / pH: x  Gluc: 241 mg/dL / Ketone: x  / Bili: x / Urobili: x   Blood: x / Protein: x / Nitrite: x   Leuk Esterase: x / RBC: x / WBC x   Sq Epi: x / Non Sq Epi: x / Bacteria: x        RADIOLOGY & ADDITIONAL STUDIES:

## 2024-12-17 NOTE — CONSULT NOTE ADULT - SUBJECTIVE AND OBJECTIVE BOX
[facility]                                              CARDIOLOGY AT [place] [address]                                             Telephone: [phone]. Fax:[phone]                                                       CARDIOLOGY CONSULTATION NOTE                                                                                             History obtained by: Patient and medical record   Community Cardiologist:    obtained: Yes [  ] No [  ]  Reason for Consultation:   Available out pt records reviewed: Yes [  ] No [  ]    Chief complaint:    Patient is a 79y old  Female who presents with a chief complaint of Acute limb ischemia (17 Dec 2024 13:05)      HPI:  HPI: 79y Female with PMH of afib on eliquis (patient and family unable to recall when she last took it or if she even takes it at all), HTN, DM, h/o stroke presents to the ED for 1 week history of worsening left leg pain and discoloration. Patient accompanied by family who state that she has never had any leg pain or vascular concerns in the past but began to complain of left hip pain last week that radiated down the left leg. The pain was constant and there were no aggrevating or alleviating factors with the pain. Family attempted to relieve pain with topical creams but when no relief they brought her in for further management. Of note, patient is unable to ambulate on her own and can only ambulate as far as to the bathroom with assistance. In the ED, provider unable to obtain pulses or doppler signals bilaterally and called emergent vascular surgery consult.       PAST MEDICAL & SURGICAL HISTORY:  DM (diabetes mellitus)      HTN (hypertension)      CVA (cerebrovascular accident)        Home Meds: Home Medications:    Allergies: Allergies    No Known Allergies    Intolerances      Soc:   Advanced Directives: Presumed Full Code     CURRENT MEDICATIONS:   --------------------------------------------------------------------------------------  Neurologic Medications  acetaminophen   IVPB .. 1000 milliGRAM(s) IV Intermittent Once    Respiratory Medications    Cardiovascular Medications    Gastrointestinal Medications    Genitourinary Medications    Hematologic/Oncologic Medications  aspirin  chewable 324 milliGRAM(s) Oral once    Antimicrobial/Immunologic Medications    Endocrine/Metabolic Medications    Topical/Other Medications    --------------------------------------------------------------------------------------    VITAL SIGNS, INS/OUTS (last 24 hours):  --------------------------------------------------------------------------------------  ICU Vital Signs Last 24 Hrs  T(C): 36.7 (16 Dec 2024 11:39), Max: 36.7 (16 Dec 2024 11:39)  T(F): 98 (16 Dec 2024 11:39), Max: 98 (16 Dec 2024 11:39)  HR: 130 (16 Dec 2024 12:45) (75 - 130)  BP: 114/62 (16 Dec 2024 11:39) (114/62 - 114/62)  BP(mean): --  ABP: --  ABP(mean): --  RR: 22 (16 Dec 2024 12:45) (20 - 22)  SpO2: 97% (16 Dec 2024 12:45) (97% - 98%)    O2 Parameters below as of 16 Dec 2024 12:45  Patient On (Oxygen Delivery Method): room air          I&O's Summary    --------------------------------------------------------------------------------------    EXAM:    Constitutional: patient appears comfortable resting in bed  HEENT: head normocephalic and atraumatic  Respiratory: respirations are unlabored, no accessory muscle use, no conversational dyspnea  Gastrointestinal: abdomen is soft & non-distended, non-tender, no rebound tenderness / guarding  Neurological: GCS15, A&O x 3  Vascular: cold legs bilaterally L>R, mottled appearing to left foot up to level of mid shin. not sensory intact bilaterally, decreased motor function to left. Doppler signals of left femoral only, palpable right femoral pulse. No obvious wounds or ulceration.  Skin: mucous membranes moist, no diaphoresis, pallor, cyanosis or jaundice             (16 Dec 2024 12:30)      Review of symptoms:   Cardiac:  No chest pain. No dyspnea. No palpitations.  Respiratory: no cough. No dyspnea  Gastrointestinal: No diarrhea. No abdominal pain. No bleeding.   Neuro: No focal neuro complaints.  All other ROS negative unless otherwise listed above    PHYSICAL EXAM:  Appearance: Comfortable. No acute distress  HEENT:  Atraumatic. Normocephalic.  Normal oral mucosa  Neurologic: A & O x 3, no gross focal deficits.  Cardiovascular: RRR S1 S2, No murmur, no rubs/gallops. No JVD  Respiratory: Lungs clear to auscultation, unlabored   Gastrointestinal:  Soft, Non-tender, + BS  Lower Extremities: 2+ Peripheral Pulses, No clubbing, cyanosis, or edema  Psychiatry: Patient is calm. No agitation.   Skin: warm and dry.    PAST MEDICAL HISTORY  DM (diabetes mellitus)    HTN (hypertension)    CVA (cerebrovascular accident)    Afib        PAST SURGICAL HISTORY      SUBSTANCE USE HISTORY  Denies current and previous substance use [  ]   CIGARETTES -   ALCOHOL -   DRUGS -     FAMILY HISTORY:      CARDIAC SPECIFIC FAMILY HX   No KNOWN family history of Cardiovascular disease, CAD, or sudden death in first degree relatives unless specified below  Family History of Cardiovascular Disease:  [  ]   Coronary Artery Disease in first degree relative:  [  ]   Sudden Cardiac Death in First degree relative: [  ]    HOME MEDICATIONS:  Albuterol (Eqv-Proventil HFA) 90 mcg/inh inhalation aerosol: 2 puff(s) inhaled every 4 hours (16 Dec 2024 15:59)  ergocalciferol: 1,250 microgram(s) orally once a day (16 Dec 2024 15:59)  Farxiga 5 mg oral tablet: 1 tab(s) orally once a day (16 Dec 2024 15:59)  glipiZIDE 5 mg oral tablet: 1 tab(s) orally 2 times a day with meals (16 Dec 2024 15:59)  irbesartan 150 mg oral tablet: 1 tab(s) orally once a day (16 Dec 2024 15:59)  rosuvastatin 5 mg oral capsule: 1 cap(s) orally once a day (at bedtime) (16 Dec 2024 15:59)      CURRENT CARDIAC MEDICATIONS:  metoprolol tartrate Injectable 5 milliGRAM(s) IV Push every 6 hours PRN HR > 110 afib RVR      CURRENT OTHER MEDICATIONS:  acetaminophen     Tablet .. 975 milliGRAM(s) Oral every 6 hours  fentaNYL    Injectable 50 MICROGram(s) IV Push every 5 minutes, Stop order after: 4 Doses PRN Severe Pain (7 - 10)  HYDROmorphone  Injectable 0.5 milliGRAM(s) IV Push every 6 hours PRN breakthrough pain  oxyCODONE    IR 5 milliGRAM(s) Oral every 6 hours PRN Moderate Pain (4 - 6)  oxyCODONE    IR 10 milliGRAM(s) Oral every 6 hours PRN Severe Pain (7 - 10)  dextrose 5%. 1000 milliLiter(s) (50 mL/Hr) IV Continuous <Continuous>  dextrose 5%. 1000 milliLiter(s) (100 mL/Hr) IV Continuous <Continuous>  dextrose 50% Injectable 25 Gram(s) IV Push once, Stop order after: 1 Doses  dextrose 50% Injectable 12.5 Gram(s) IV Push once, Stop order after: 1 Doses  dextrose 50% Injectable 25 Gram(s) IV Push once, Stop order after: 1 Doses  dextrose Oral Gel 15 Gram(s) Oral once, Stop order after: 1 Doses PRN Blood Glucose LESS THAN 70 milliGRAM(s)/deciliter  dextrose Oral Gel 15 Gram(s) Oral once, Stop order after: 1 Doses PRN Blood Glucose LESS THAN 70 milliGRAM(s)/deciliter  heparin   Injectable 6500 Unit(s) IV Push every 6 hours PRN For aPTT less than 40  heparin   Injectable 3000 Unit(s) IV Push every 6 hours PRN For aPTT between 40 - 57  heparin  Infusion. 1100 Unit(s)/Hr (11 mL/Hr) IV Continuous <Continuous>  insulin glargine Injectable (LANTUS) 25 Unit(s) SubCutaneous at bedtime  insulin lispro (ADMELOG) corrective regimen sliding scale   SubCutaneous three times a day before meals  insulin lispro Injectable (ADMELOG) 6 Unit(s) SubCutaneous before breakfast  insulin lispro Injectable (ADMELOG) 6 Unit(s) SubCutaneous before lunch  insulin lispro Injectable (ADMELOG) 6 Unit(s) SubCutaneous before dinner      ALLERGIES:   No Known Allergies      VITAL SIGNS:  T(C): 36.8 (12-17-24 @ 12:18), Max: 37.3 (12-17-24 @ 04:45)  T(F): 98.3 (12-17-24 @ 12:18), Max: 99.2 (12-17-24 @ 04:45)  HR: 115 (12-17-24 @ 13:26) (83 - 144)  BP: 98/69 (12-17-24 @ 13:26) (84/66 - 158/90)  RR: 14 (12-17-24 @ 13:26) (13 - 22)  SpO2: 98% (12-17-24 @ 13:26) (95% - 100%)    INTAKE AND OUTPUT:     12-16 @ 07:01  -  12-17 @ 07:00  --------------------------------------------------------  IN: 1072 mL / OUT: 475 mL / NET: 597 mL    12-17 @ 07:01  -  12-17 @ 14:08  --------------------------------------------------------  IN: 47 mL / OUT: 0 mL / NET: 47 mL        LABS:                            11.3   14.88 )-----------( 116      ( 17 Dec 2024 06:50 )             32.6     12-17    136  |  102  |  23.1[H]  ----------------------------<  241[H]  4.4   |  19.0[L]  |  1.09    Ca    8.1[L]      17 Dec 2024 06:50  Phos  3.4     12-17  Mg     1.5     12-17    TPro  5.6[L]  /  Alb  2.6[L]  /  TBili  0.5  /  DBili  x   /  AST  27  /  ALT  16  /  AlkPhos  107  12-17    PT/INR - ( 16 Dec 2024 13:05 )   PT: 12.9 sec;   INR: 1.14 ratio         PTT - ( 17 Dec 2024 06:50 )  PTT:106.3 sec  Urinalysis Basic - ( 17 Dec 2024 06:50 )    Color: x / Appearance: x / SG: x / pH: x  Gluc: 241 mg/dL / Ketone: x  / Bili: x / Urobili: x   Blood: x / Protein: x / Nitrite: x   Leuk Esterase: x / RBC: x / WBC x   Sq Epi: x / Non Sq Epi: x / Bacteria: x              RADIOLOGY IMAGING:                                                           [facility]                                              CARDIOLOGY AT [place] [address]                                             Telephone: [phone]. Fax:[phone]                                                       CARDIOLOGY CONSULTATION NOTE                                                                                             History obtained by: Patient and medical record   Community Cardiologist: unknown   obtained: Yes [x] No [  ] --->  ID# [id number]  Reason for Consultation: new afib/afib RVR  Available out pt records reviewed: Yes [x] No [  ]    Chief complaint:    Patient is a 79y old  Female who presents with a chief complaint of Acute limb ischemia (17 Dec 2024 13:05)    History Obtained by Consultant:   79F with PMH afib (apparently family aware of afib dx, but patient reportedly never took AC/Eliquis), HTN, DM, h/o stroke (unclear what/if deficits); presented with acute limb ischemia of LLE now s/p L AKA and thrombectomy of common femoral artery; cardiology consulted for Afib with RVR. Attempted to obtain additional hx from patient with  but she appears confused/possible dementia? Thus is poor historian and with bizarre speech pattern at this time. Denies pain beyond that of LLL.       HPI:  HPI: 79y Female with PMH of afib on eliquis (patient and family unable to recall when she last took it or if she even takes it at all), HTN, DM, h/o stroke presents to the ED for 1 week history of worsening left leg pain and discoloration. Patient accompanied by family who state that she has never had any leg pain or vascular concerns in the past but began to complain of left hip pain last week that radiated down the left leg. The pain was constant and there were no aggrevating or alleviating factors with the pain. Family attempted to relieve pain with topical creams but when no relief they brought her in for further management. Of note, patient is unable to ambulate on her own and can only ambulate as far as to the bathroom with assistance. In the ED, provider unable to obtain pulses or doppler signals bilaterally and called emergent vascular surgery consult.       PAST MEDICAL & SURGICAL HISTORY:  DM (diabetes mellitus)      HTN (hypertension)      CVA (cerebrovascular accident)        Home Meds: Home Medications:    Allergies: Allergies    No Known Allergies    Intolerances      Soc:   Advanced Directives: Presumed Full Code     CURRENT MEDICATIONS:   --------------------------------------------------------------------------------------  Neurologic Medications  acetaminophen   IVPB .. 1000 milliGRAM(s) IV Intermittent Once    Respiratory Medications    Cardiovascular Medications    Gastrointestinal Medications    Genitourinary Medications    Hematologic/Oncologic Medications  aspirin  chewable 324 milliGRAM(s) Oral once    Antimicrobial/Immunologic Medications    Endocrine/Metabolic Medications    Topical/Other Medications    --------------------------------------------------------------------------------------    VITAL SIGNS, INS/OUTS (last 24 hours):  --------------------------------------------------------------------------------------  ICU Vital Signs Last 24 Hrs  T(C): 36.7 (16 Dec 2024 11:39), Max: 36.7 (16 Dec 2024 11:39)  T(F): 98 (16 Dec 2024 11:39), Max: 98 (16 Dec 2024 11:39)  HR: 130 (16 Dec 2024 12:45) (75 - 130)  BP: 114/62 (16 Dec 2024 11:39) (114/62 - 114/62)  BP(mean): --  ABP: --  ABP(mean): --  RR: 22 (16 Dec 2024 12:45) (20 - 22)  SpO2: 97% (16 Dec 2024 12:45) (97% - 98%)    O2 Parameters below as of 16 Dec 2024 12:45  Patient On (Oxygen Delivery Method): room air          I&O's Summary    --------------------------------------------------------------------------------------    EXAM:    Constitutional: patient appears comfortable resting in bed  HEENT: head normocephalic and atraumatic  Respiratory: respirations are unlabored, no accessory muscle use, no conversational dyspnea  Gastrointestinal: abdomen is soft & non-distended, non-tender, no rebound tenderness / guarding  Neurological: GCS15, A&O x 3  Vascular: cold legs bilaterally L>R, mottled appearing to left foot up to level of mid shin. not sensory intact bilaterally, decreased motor function to left. Doppler signals of left femoral only, palpable right femoral pulse. No obvious wounds or ulceration.  Skin: mucous membranes moist, no diaphoresis, pallor, cyanosis or jaundice             (16 Dec 2024 12:30)      Review of symptoms:   Cardiac:  No chest pain. No dyspnea. No palpitations.  Respiratory: no cough. No dyspnea  Gastrointestinal: No diarrhea. No abdominal pain. No bleeding.   Neuro: No focal neuro complaints.  All other ROS negative unless otherwise listed above    PHYSICAL EXAM:  Appearance: Comfortable. No acute distress  HEENT:  Atraumatic. Normocephalic. Normal oral mucosa  Neurologic: A&Ox1 (oriented to self only), appears confused, no facial droop, spontaneous mvmt x 4 extremities  Cardiovascular: irregular and tachycardic, +S1, +S2, No murmur, no rubs/gallops. No JVD  Respiratory: Lungs clear to auscultation, unlabored   Gastrointestinal:  Soft, Non-tender, + BS  Lower Extremities: Pedal pulse palpable in RLE, L AKA warm and appears clean  Psychiatry: Patient is calm. No agitation but with bizarre speech (talking about eyelashes and some inappropriate laughter)  Skin: warm and dry.    PAST MEDICAL HISTORY  DM (diabetes mellitus)    HTN (hypertension)    CVA (cerebrovascular accident)    Afib        PAST SURGICAL HISTORY      SUBSTANCE USE HISTORY  Denies current and previous substance use [  ]   CIGARETTES -   ALCOHOL -   DRUGS -     FAMILY HISTORY:      CARDIAC SPECIFIC FAMILY HX   No KNOWN family history of Cardiovascular disease, CAD, or sudden death in first degree relatives unless specified below  Family History of Cardiovascular Disease:  [  ]   Coronary Artery Disease in first degree relative:  [  ]   Sudden Cardiac Death in First degree relative: [  ]    HOME MEDICATIONS:  Albuterol (Eqv-Proventil HFA) 90 mcg/inh inhalation aerosol: 2 puff(s) inhaled every 4 hours (16 Dec 2024 15:59)  ergocalciferol: 1,250 microgram(s) orally once a day (16 Dec 2024 15:59)  Farxiga 5 mg oral tablet: 1 tab(s) orally once a day (16 Dec 2024 15:59)  glipiZIDE 5 mg oral tablet: 1 tab(s) orally 2 times a day with meals (16 Dec 2024 15:59)  irbesartan 150 mg oral tablet: 1 tab(s) orally once a day (16 Dec 2024 15:59)  rosuvastatin 5 mg oral capsule: 1 cap(s) orally once a day (at bedtime) (16 Dec 2024 15:59)      CURRENT CARDIAC MEDICATIONS:  metoprolol tartrate Injectable 5 milliGRAM(s) IV Push every 6 hours PRN HR > 110 afib RVR      CURRENT OTHER MEDICATIONS:  acetaminophen     Tablet .. 975 milliGRAM(s) Oral every 6 hours  fentaNYL    Injectable 50 MICROGram(s) IV Push every 5 minutes, Stop order after: 4 Doses PRN Severe Pain (7 - 10)  HYDROmorphone  Injectable 0.5 milliGRAM(s) IV Push every 6 hours PRN breakthrough pain  oxyCODONE    IR 5 milliGRAM(s) Oral every 6 hours PRN Moderate Pain (4 - 6)  oxyCODONE    IR 10 milliGRAM(s) Oral every 6 hours PRN Severe Pain (7 - 10)  dextrose 5%. 1000 milliLiter(s) (50 mL/Hr) IV Continuous <Continuous>  dextrose 5%. 1000 milliLiter(s) (100 mL/Hr) IV Continuous <Continuous>  dextrose 50% Injectable 25 Gram(s) IV Push once, Stop order after: 1 Doses  dextrose 50% Injectable 12.5 Gram(s) IV Push once, Stop order after: 1 Doses  dextrose 50% Injectable 25 Gram(s) IV Push once, Stop order after: 1 Doses  dextrose Oral Gel 15 Gram(s) Oral once, Stop order after: 1 Doses PRN Blood Glucose LESS THAN 70 milliGRAM(s)/deciliter  dextrose Oral Gel 15 Gram(s) Oral once, Stop order after: 1 Doses PRN Blood Glucose LESS THAN 70 milliGRAM(s)/deciliter  heparin   Injectable 6500 Unit(s) IV Push every 6 hours PRN For aPTT less than 40  heparin   Injectable 3000 Unit(s) IV Push every 6 hours PRN For aPTT between 40 - 57  heparin  Infusion. 1100 Unit(s)/Hr (11 mL/Hr) IV Continuous <Continuous>  insulin glargine Injectable (LANTUS) 25 Unit(s) SubCutaneous at bedtime  insulin lispro (ADMELOG) corrective regimen sliding scale   SubCutaneous three times a day before meals  insulin lispro Injectable (ADMELOG) 6 Unit(s) SubCutaneous before breakfast  insulin lispro Injectable (ADMELOG) 6 Unit(s) SubCutaneous before lunch  insulin lispro Injectable (ADMELOG) 6 Unit(s) SubCutaneous before dinner      ALLERGIES:   No Known Allergies      VITAL SIGNS:  T(C): 36.8 (12-17-24 @ 12:18), Max: 37.3 (12-17-24 @ 04:45)  T(F): 98.3 (12-17-24 @ 12:18), Max: 99.2 (12-17-24 @ 04:45)  HR: 115 (12-17-24 @ 13:26) (83 - 144)  BP: 98/69 (12-17-24 @ 13:26) (84/66 - 158/90)  RR: 14 (12-17-24 @ 13:26) (13 - 22)  SpO2: 98% (12-17-24 @ 13:26) (95% - 100%)    INTAKE AND OUTPUT:     12-16 @ 07:01  -  12-17 @ 07:00  --------------------------------------------------------  IN: 1072 mL / OUT: 475 mL / NET: 597 mL    12-17 @ 07:01  -  12-17 @ 14:08  --------------------------------------------------------  IN: 47 mL / OUT: 0 mL / NET: 47 mL        LABS:                            11.3   14.88 )-----------( 116      ( 17 Dec 2024 06:50 )             32.6     12-17    136  |  102  |  23.1[H]  ----------------------------<  241[H]  4.4   |  19.0[L]  |  1.09    Ca    8.1[L]      17 Dec 2024 06:50  Phos  3.4     12-17  Mg     1.5     12-17    TPro  5.6[L]  /  Alb  2.6[L]  /  TBili  0.5  /  DBili  x   /  AST  27  /  ALT  16  /  AlkPhos  107  12-17    PT/INR - ( 16 Dec 2024 13:05 )   PT: 12.9 sec;   INR: 1.14 ratio         PTT - ( 17 Dec 2024 06:50 )  PTT:106.3 sec  Urinalysis Basic - ( 17 Dec 2024 06:50 )    Color: x / Appearance: x / SG: x / pH: x  Gluc: 241 mg/dL / Ketone: x  / Bili: x / Urobili: x   Blood: x / Protein: x / Nitrite: x   Leuk Esterase: x / RBC: x / WBC x   Sq Epi: x / Non Sq Epi: x / Bacteria: x              RADIOLOGY IMAGING:

## 2024-12-17 NOTE — CONSULT NOTE ADULT - SUBJECTIVE AND OBJECTIVE BOX
SICU Consultation Note  =====================================================  HPI:  79y Female with PMH of afib on eliquis (patient and family unable to recall when she last took it or if she even takes it at all), HTN, DM, h/o stroke presents to the ED for 1 week history of worsening left leg pain and discoloration. Patient accompanied by family who state that she has never had any leg pain or vascular concerns in the past but began to complain of left hip pain last week that radiated down the left leg. The pain was constant and there were no aggrevating or alleviating factors with the pain. Family attempted to relieve pain with topical creams but when no relief they brought her in for further management. Of note, patient is unable to ambulate on her own and can only ambulate as far as to the bathroom with assistance. In the ED, provider unable to obtain pulses or doppler signals bilaterally and called emergent vascular surgery consult.    Now s/p b/l LE thrombectomy with left AKA. Course c/b by refractory hyperglycemia as well as afib with RVR.   SICU Consultation Note  =====================================================  HPI:  79y Female with PMH of afib on eliquis (patient and family unable to recall when she last took it or if she even takes it at all), HTN, DM, h/o stroke presents to the ED for 1 week history of worsening left leg pain and discoloration. Patient accompanied by family who state that she has never had any leg pain or vascular concerns in the past but began to complain of left hip pain last week that radiated down the left leg. The pain was constant and there were no aggrevating or alleviating factors with the pain. Family attempted to relieve pain with topical creams but when no relief they brought her in for further management. Of note, patient is unable to ambulate on her own and can only ambulate as far as to the bathroom with assistance. In the ED, provider unable to obtain pulses or doppler signals bilaterally and called emergent vascular surgery consult.    Now s/p b/l LE thrombectomy with left AKA. Course c/b by refractory hyperglycemia as well as afib with RVR. Patient started on IVF, given one time dose of lopressor and now with HR in 100-110's. Started on insulin gtt as well. Reports that her pain is controlled and otherwise has no acute complaints.     MEDICATIONS  (STANDING):  acetaminophen     Tablet .. 975 milliGRAM(s) Oral every 6 hours  chlorhexidine 2% Cloths 1 Application(s) Topical daily  dextrose 5%. 1000 milliLiter(s) (50 mL/Hr) IV Continuous <Continuous>  dextrose 5%. 1000 milliLiter(s) (100 mL/Hr) IV Continuous <Continuous>  dextrose 50% Injectable 25 milliLiter(s) IV Push every 15 minutes  dextrose 50% Injectable 25 Gram(s) IV Push once  dextrose 50% Injectable 12.5 Gram(s) IV Push once  dextrose 50% Injectable 25 Gram(s) IV Push once  heparin  Infusion. 1100 Unit(s)/Hr (11 mL/Hr) IV Continuous <Continuous>  insulin regular Infusion 4 Unit(s)/Hr (4 mL/Hr) IV Continuous <Continuous>  metoprolol tartrate 25 milliGRAM(s) Oral two times a day  metoprolol tartrate Injectable 5 milliGRAM(s) IV Push once  multiple electrolytes Injection Type 1 1000 milliLiter(s) (42 mL/Hr) IV Continuous <Continuous>  multiple electrolytes Injection Type 1 Bolus 1000 milliLiter(s) IV Bolus once  polyethylene glycol 3350 17 Gram(s) Oral daily  senna 2 Tablet(s) Oral at bedtime    MEDICATIONS  (PRN):  dextrose Oral Gel 15 Gram(s) Oral once PRN Blood Glucose LESS THAN 70 milliGRAM(s)/deciliter  dextrose Oral Gel 15 Gram(s) Oral once PRN Blood Glucose LESS THAN 70 milliGRAM(s)/deciliter  heparin   Injectable 6500 Unit(s) IV Push every 6 hours PRN For aPTT less than 40  heparin   Injectable 3000 Unit(s) IV Push every 6 hours PRN For aPTT between 40 - 57  HYDROmorphone  Injectable 0.5 milliGRAM(s) IV Push every 6 hours PRN breakthrough pain  metoprolol tartrate Injectable 5 milliGRAM(s) IV Push every 6 hours PRN HR >100  ondansetron Injectable 4 milliGRAM(s) IV Push every 4 hours PRN Nausea and/or Vomiting  oxyCODONE    IR 5 milliGRAM(s) Oral every 6 hours PRN Moderate Pain (4 - 6)  oxyCODONE    IR 10 milliGRAM(s) Oral every 6 hours PRN Severe Pain (7 - 10)    I&O's Detail    16 Dec 2024 07:01  -  17 Dec 2024 07:00  --------------------------------------------------------  IN:    Heparin Infusion: 72 mL    Lactated Ringers Bolus: 1000 mL  Total IN: 1072 mL    OUT:    Indwelling Catheter - Urethral (mL): 475 mL  Total OUT: 475 mL    Total NET: 597 mL      17 Dec 2024 07:01  -  17 Dec 2024 17:31  --------------------------------------------------------  IN:    Heparin Infusion: 47 mL  Total IN: 47 mL    OUT:  Total OUT: 0 mL    Total NET: 47 mL                            11.3   14.88 )-----------( 116      ( 17 Dec 2024 06:50 )             32.6   12-17    136  |  102  |  23.1[H]  ----------------------------<  241[H]  4.4   |  19.0[L]  |  1.09    Ca    8.1[L]      17 Dec 2024 06:50  Phos  3.4     12-17  Mg     1.5     12-17    TPro  5.6[L]  /  Alb  2.6[L]  /  TBili  0.5  /  DBili  x   /  AST  27  /  ALT  16  /  AlkPhos  107  12-17      VITALS:   T(C): 36.8 (12-17-24 @ 12:18), Max: 37.3 (12-17-24 @ 04:45)  HR: 110 (12-17-24 @ 14:54) (83 - 144)  BP: 98/66 (12-17-24 @ 14:54) (84/66 - 158/90)  RR: 16 (12-17-24 @ 14:54) (13 - 22)  SpO2: 99% (12-17-24 @ 14:54) (95% - 100%)    GENERAL: NAD, lying in bed comfortably  HEAD:  Atraumatic, normocephalic  EYES: EOMI, PERRLA, conjunctiva and sclera clear  ENT: Moist mucous membranes  NECK: Supple, no JVD  HEART: irregularly irregular   LUNGS: Unlabored respirations.   ABDOMEN: Soft, nontender, nondistended  EXTREMITIES: LLE AKA, RLE with peroneal signal only   NERVOUS SYSTEM:  A&Ox3, no focal deficits   SKIN: No rashes or lesions

## 2024-12-17 NOTE — PROGRESS NOTE ADULT - SUBJECTIVE AND OBJECTIVE BOX
[facility]    [id number]    History:     Seen and examined this AM  s/p bilateral Lower ext thrombectomies (femoral access) and left AKA  patient reports feeling well  rhythm exhibits Afib with rapid ventricular response   On active A/c  reports no chest pain or SOB    Vital Signs Last 24 Hrs  T(C): 36.7 (17 Dec 2024 07:50), Max: 37.3 (17 Dec 2024 04:45)  T(F): 98 (17 Dec 2024 07:50), Max: 99.2 (17 Dec 2024 04:45)  HR: 118 (17 Dec 2024 09:10) (75 - 144)  BP: 122/65 (17 Dec 2024 09:10) (84/66 - 158/90)  BP(mean): 80 (17 Dec 2024 09:10) (67 - 109)  RR: 20 (17 Dec 2024 09:10) (13 - 22)  SpO2: 98% (17 Dec 2024 09:10) (95% - 100%)    Parameters below as of 17 Dec 2024 09:10  Patient On (Oxygen Delivery Method): room air      I&O's Summary    16 Dec 2024 07:01  -  17 Dec 2024 07:00  --------------------------------------------------------  IN: 1072 mL / OUT: 475 mL / NET: 597 mL    17 Dec 2024 07:01  -  17 Dec 2024 10:50  --------------------------------------------------------  IN: 20 mL / OUT: 0 mL / NET: 20 mL                              11.3   14.88 )-----------( 116      ( 17 Dec 2024 06:50 )             32.6     12-17    136  |  102  |  23.1[H]  ----------------------------<  241[H]  4.4   |  19.0[L]  |  1.09    Ca    8.1[L]      17 Dec 2024 06:50  Phos  3.4     12-17  Mg     1.5     12-17    TPro  5.6[L]  /  Alb  2.6[L]  /  TBili  0.5  /  DBili  x   /  AST  27  /  ALT  16  /  AlkPhos  107  12-17      MEDICATIONS  (STANDING):  acetaminophen     Tablet .. 975 milliGRAM(s) Oral every 6 hours  dextrose 5%. 1000 milliLiter(s) (50 mL/Hr) IV Continuous <Continuous>  dextrose 5%. 1000 milliLiter(s) (100 mL/Hr) IV Continuous <Continuous>  dextrose 50% Injectable 25 Gram(s) IV Push once  dextrose 50% Injectable 12.5 Gram(s) IV Push once  dextrose 50% Injectable 25 Gram(s) IV Push once  heparin  Infusion. 1100 Unit(s)/Hr (11 mL/Hr) IV Continuous <Continuous>  insulin lispro (ADMELOG) corrective regimen sliding scale   SubCutaneous at bedtime    MEDICATIONS  (PRN):  dextrose Oral Gel 15 Gram(s) Oral once PRN Blood Glucose LESS THAN 70 milliGRAM(s)/deciliter  fentaNYL    Injectable 50 MICROGram(s) IV Push every 5 minutes PRN Severe Pain (7 - 10)  heparin   Injectable 6500 Unit(s) IV Push every 6 hours PRN For aPTT less than 40  heparin   Injectable 3000 Unit(s) IV Push every 6 hours PRN For aPTT between 40 - 57  HYDROmorphone  Injectable 0.5 milliGRAM(s) IV Push every 6 hours PRN breakthrough pain  metoprolol tartrate Injectable 5 milliGRAM(s) IV Push every 6 hours PRN HR > 110 afib RVR  oxyCODONE    IR 5 milliGRAM(s) Oral every 6 hours PRN Moderate Pain (4 - 6)  oxyCODONE    IR 10 milliGRAM(s) Oral every 6 hours PRN Severe Pain (7 - 10)      Physical exam:     no distress  alert, oriented to self, with some confusion   non labored breathing  abdomen is soft, non tender  bilateral groin surgical sites with clean dressing  clean left aka stump dressing     Primary Assessment:    • irreversible left lower ext arterial insufficiency  - right lower ext arterial insufficiency  pod 1 s/p bilateral femoral cutdown and thrombectomies, left AKA  patient with persistent Afib with rapid ventricular response     Plan:   • need cardiology eval   - rate control with betablocker with the interm   pain control as needed

## 2024-12-17 NOTE — CONSULT NOTE ADULT - PROBLEM SELECTOR RECOMMENDATION 9
Admitted with acute limb ischemia, potentially embolic in origin, with possibly new-onset Afib (although family reportedly aware of dx, but patient never started on rate control or AC); and HR up to 110s and irregular.   CHADSVASc 7.    - would do heparin drip for AC for now  - start rate control with metoprolol tartrate 25 PO BID with hold parameters for HR and BP  - obtain TTE complete

## 2024-12-17 NOTE — CONSULT NOTE ADULT - ASSESSMENT
ASSESSMENT: 79y female s/p b/l femoral thrombectomy and L AKA     Admitting Dx: B/L occluded femoral arterties   Complicated By: medical cormorbidities   PMHx: DM, HTN    PLAN: admit to floor ; patient without hemodynamic instability on both A-Line and NIBP - Neurovascular checks Q4    Neuro:    - Multimodal pain regimen  - Delirium precautions  - Optimize sleep/wake cycle     CV:   - Maintain MAP > 65  - Continue hemodynamic monitoring      Pulm:   - Maintain sPO2 >92%  - Pulmonary toilet, IS, OOBTC    GI/Nutrition:   - Advance diet as tolerated per primary team  - Bowel regimen  - Monitor bowel movements    /Renal:   - Monitor kidney fxn  - Monitor UOP  - Replete electrolytes PRN (Mg >2, Phos >3, K >4)  - Silver for strict I&O    ID:  - Monitor fever curve  - Monitor for leukocytosis    Endo:  - Monitor blood glucose  - Maintain euglycemia, 140-180    Heme/DVT Prophylaxis:  - SCDs  - Monitor H/H  - Chemical prophylaxis with heparin gtt    Skin:  - Repositioning for DTI prevention while in bed    Vascular:  - Neurovascular checks Q4  - R Peroneal Signal Only - No Dp/PT     Lines:  - Peripheral IV's  - Arterial Line -> D/C prior to transfer to the floor     Dispo:  - Care per primary team  - please reconsult SICU if patient becomes hemodynamically abnormal or decompensates while on the floor.   - If concern for RLE ischemia s/p thrombectomy and advancement of Neurovascular checks is required, please reconsult SICU for either stepdown or ICU level of care.     ASSESSMENT: 79y female s/p b/l femoral thrombectomy and L AKA. c/b afib with RVR and refractory hyperglycemia.     PLAN:    Neuro:    - Multimodal pain regimen  - Delirium precautions  - Optimize sleep/wake cycle     CV:   - Maintain MAP > 65  - Continue hemodynamic monitoring  -Afib with RVR: HDS, continue metop 25 BID. Stat dose of 5 IV lopressor with 5 mg q6 PRN ordered  -Appreciate cardiology recs  -f/u TTE      Pulm:   - Maintain sPO2 >92%  - Pulmonary toilet, IS, OOBTC    GI/Nutrition:   - Advance diet as tolerated per primary team  - Bowel regimen  - Monitor bowel movements    /Renal:   - Monitor kidney fxn  - Monitor UOP  - Replete electrolytes PRN (Mg >2, Phos >3, K >4)  - Silver for strict I&O    ID:  - Monitor fever curve  - Monitor for leukocytosis    Endo:  - Monitor blood glucose  - Maintain euglycemia, 140-180  -Insulin gtt at 4  -f/u medicine/endo recs     Heme/DVT Prophylaxis:  - SCDs  - Monitor H/H  - Heparin gtt    Skin:  - Repositioning for DTI prevention while in bed    Vascular:  - Neurovascular checks Q1  - R Peroneal Signal Only - No Dp/PT     Lines:  - Peripheral IV's  - Maintain a line for now    Dispo:  -Care per SICU

## 2024-12-17 NOTE — CONSULT NOTE ADULT - ASSESSMENT
79y Female with PMH of afib on eliquis (patient and family unable to recall when she last took it or if she even takes it at all), HTN, DM, h/o stroke presents to the ED for 1 week history of worsening left leg pain and discoloration. Patient accompanied by family who state that she has never had any leg pain or vascular concerns in the past but began to complain of left hip pain last week that radiated down the left leg. The pain was constant and there were no aggrevating or alleviating factors with the pain. Family attempted to relieve pain with topical creams but when no relief they brought her in for further management. Of note, patient is unable to ambulate on her own and can only ambulate as far as to the bathroom with assistance. In the ED, provider unable to obtain pulses or doppler signals bilaterally and called emergent vascular surgery consult. s/p left AKA and b/l femoral thrombectomy 12/16    limb ischemia  s/p left AKA  s/p b/l femoral thrombectomy  PAD    c/w heparin drip    vascular surgery is primary service    pain control    bowel regimen    afib with rvr    lopressor 25mg QID if taking po     on heparin drip    on eliquis at home     cardiology eval    uncontrolled dm2 with hyperglycemia    a1c 11.5    endocrine eval    start lantus 25u qhs, premeal 6u and moderate sliding scale    adjust per fingersticks    spoke to vascular surgery pa

## 2024-12-17 NOTE — CONSULT NOTE ADULT - ASSESSMENT
Cardiac Studies:  EKG 12/16/24: Afib with RVR of 103, QTc 455  TTE 09/27/24: LV EF 55-60%. No regional WMA seen. Normal RV size and fxn. No sig valve disease.    79F with PMH afib (apparently family aware of afib dx, but patient reportedly never took AC/Eliquis), HTN, DM, h/o stroke (unclear what/if deficits); presented with acute limb ischemia of LLE now s/p L AKA and thrombectomy of common femoral artery; cardiology consulted for Afib with RVR.    Cardiac Studies:  EKG 12/16/24: Afib with RVR of 103, QTc 455  TTE 09/27/24: LV EF 55-60%. No regional WMA seen. Normal RV size and fxn. No sig valve disease.

## 2024-12-17 NOTE — CONSULT NOTE ADULT - SUBJECTIVE AND OBJECTIVE BOX
SICU Consultation Note  =====================================================  HPI:  79y Female with PMH of afib on eliquis (patient and family unable to recall when she last took it or if she even takes it at all), HTN, DM, h/o stroke presents to the ED for 1 week history of worsening left leg pain and discoloration. Patient accompanied by family who state that she has never had any leg pain or vascular concerns in the past but began to complain of left hip pain last week that radiated down the left leg. The pain was constant and there were no aggrevating or alleviating factors with the pain. Family attempted to relieve pain with topical creams but when no relief they brought her in for further management. Of note, patient is unable to ambulate on her own and can only ambulate as far as to the bathroom with assistance. In the ED, provider unable to obtain pulses or doppler signals bilaterally and called emergent vascular surgery consult.    SICU Consult: Hemodynamic monitoring     Surgery Information  OR Date: 12/16  Procedure: B/L Femoral Thrombectomy w/ L AKA   EBL:  75     UOP:   600           IV Fluids:  1700     Blood Products:       none      PAST MEDICAL & SURGICAL HISTORY:  DM (diabetes mellitus)      HTN (hypertension)      CVA (cerebrovascular accident)      Afib        Home Meds:   Allergies: No Known Allergies    Soc:   Advanced Directives: Presumed Full Code     CURRENT MEDICATIONS:   --------------------------------------------------------------------------------------  MEDICATIONS  (STANDING):    MEDICATIONS  (PRN):  fentaNYL    Injectable 25 MICROGram(s) IV Push every 5 minutes PRN Moderate Pain (4 - 6)  fentaNYL    Injectable 50 MICROGram(s) IV Push every 5 minutes PRN Severe Pain (7 - 10)    --------------------------------------------------------------------------------------    VITAL SIGNS, INS/OUTS     ICU Vital Signs Last 24 Hrs  T(C): 36.8 (16 Dec 2024 23:00), Max: 36.8 (16 Dec 2024 23:00)  T(F): 98.2 (16 Dec 2024 23:00), Max: 98.2 (16 Dec 2024 23:00)  HR: 92 (17 Dec 2024 00:30) (75 - 130)  BP: 138/73 (17 Dec 2024 00:30) (84/66 - 140/64)  BP(mean): 93 (17 Dec 2024 00:30) (67 - 109)  ABP: 157/73 (17 Dec 2024 00:30) (92/51 - 157/73)  ABP(mean): 104 (17 Dec 2024 00:30) (67 - 104)  RR: 22 (17 Dec 2024 00:30) (13 - 22)  SpO2: 98% (17 Dec 2024 00:30) (95% - 99%)    O2 Parameters below as of 16 Dec 2024 23:05  Patient On (Oxygen Delivery Method): room air            I&O's Detail    16 Dec 2024 07:01  -  17 Dec 2024 00:42  --------------------------------------------------------  IN:    Heparin Infusion: 28 mL    Lactated Ringers Bolus: 1000 mL  Total IN: 1028 mL    OUT:    Indwelling Catheter - Urethral (mL): 105 mL  Total OUT: 105 mL    Total NET: 923 mL              --------------------------------------------------------------------------------------    Physical Exam:    Gen: Resting comfortably in bed    HEENT: PERRL, EOMI    Neurological: Alert and oriented x3 without focal deficit    Neck: Trachea midline, no evidence of JVD    Pulmonary: CTA B/L,  equal rise and fall of the chest, no increased WOB    Cardiovascular: regular rate and rhythm    Gastrointestinal: Soft, non-tender, non-distended    : Silver in place draining clear yellow urine    Extremities: Without clubbing/cyanosis/edema    Musculoskeletal: FROM without pain, no deformity or areas of tenderness    Skin: Intact    LABS:  CBC Full  -  ( 16 Dec 2024 23:38 )  WBC Count : 10.94 K/uL  RBC Count : 4.50 M/uL  Hemoglobin : 11.8 g/dL  Hematocrit : 34.8 %  Platelet Count - Automated : 131 K/uL  Mean Cell Volume : 77.3 fl  Mean Cell Hemoglobin : 26.2 pg  Mean Cell Hemoglobin Concentration : 33.9 g/dL  Auto Neutrophil # : 8.10 K/uL  Auto Lymphocyte # : 2.06 K/uL  Auto Monocyte # : 0.65 K/uL  Auto Eosinophil # : 0.02 K/uL  Auto Basophil # : 0.04 K/uL  Auto Neutrophil % : 74.1 %  Auto Lymphocyte % : 18.8 %  Auto Monocyte % : 5.9 %  Auto Eosinophil % : 0.2 %  Auto Basophil % : 0.4 %    12-16    133[L]  |  96  |  28.9[H]  ----------------------------<  501[HH]  4.4   |  21.0[L]  |  1.44[H]    Ca    9.2      16 Dec 2024 13:05  Mg     1.9     12-16    TPro  7.5  /  Alb  3.7  /  TBili  0.8  /  DBili  x   /  AST  45[H]  /  ALT  27  /  AlkPhos  160[H]  12-16    PT/INR - ( 16 Dec 2024 13:05 )   PT: 12.9 sec;   INR: 1.14 ratio         PTT - ( 16 Dec 2024 13:05 )  PTT:28.3 sec  Urinalysis Basic - ( 16 Dec 2024 13:05 )    Color: x / Appearance: x / SG: x / pH: x  Gluc: 501 mg/dL / Ketone: x  / Bili: x / Urobili: x   Blood: x / Protein: x / Nitrite: x   Leuk Esterase: x / RBC: x / WBC x   Sq Epi: x / Non Sq Epi: x / Bacteria: x      RECENT CULTURES:      LIVER FUNCTIONS - ( 16 Dec 2024 13:05 )  Alb: 3.7 g/dL / Pro: 7.5 g/dL / ALK PHOS: 160 U/L / ALT: 27 U/L / AST: 45 U/L / GGT: x                       SICU Consultation Note  =====================================================  HPI:  79y Female with PMH of afib on eliquis (patient and family unable to recall when she last took it or if she even takes it at all), HTN, DM, h/o stroke presents to the ED for 1 week history of worsening left leg pain and discoloration. Patient accompanied by family who state that she has never had any leg pain or vascular concerns in the past but began to complain of left hip pain last week that radiated down the left leg. The pain was constant and there were no aggrevating or alleviating factors with the pain. Family attempted to relieve pain with topical creams but when no relief they brought her in for further management. Of note, patient is unable to ambulate on her own and can only ambulate as far as to the bathroom with assistance. In the ED, provider unable to obtain pulses or doppler signals bilaterally and called emergent vascular surgery consult.    SICU Consult: Hemodynamic monitoring     Surgery Information  OR Date: 12/16  Procedure: B/L Femoral Thrombectomy w/ L AKA   EBL:  75     UOP:   600           IV Fluids:  1700     Blood Products:       none      PAST MEDICAL & SURGICAL HISTORY:  DM (diabetes mellitus)      HTN (hypertension)      CVA (cerebrovascular accident)      Afib        Home Meds:   Allergies: No Known Allergies    Soc:   Advanced Directives: Presumed Full Code     CURRENT MEDICATIONS:   --------------------------------------------------------------------------------------  MEDICATIONS  (STANDING):    MEDICATIONS  (PRN):  fentaNYL    Injectable 25 MICROGram(s) IV Push every 5 minutes PRN Moderate Pain (4 - 6)  fentaNYL    Injectable 50 MICROGram(s) IV Push every 5 minutes PRN Severe Pain (7 - 10)    --------------------------------------------------------------------------------------    VITAL SIGNS, INS/OUTS     ICU Vital Signs Last 24 Hrs  T(C): 36.8 (16 Dec 2024 23:00), Max: 36.8 (16 Dec 2024 23:00)  T(F): 98.2 (16 Dec 2024 23:00), Max: 98.2 (16 Dec 2024 23:00)  HR: 92 (17 Dec 2024 00:30) (75 - 130)  BP: 138/73 (17 Dec 2024 00:30) (84/66 - 140/64)  BP(mean): 93 (17 Dec 2024 00:30) (67 - 109)  ABP: 157/73 (17 Dec 2024 00:30) (92/51 - 157/73)  ABP(mean): 104 (17 Dec 2024 00:30) (67 - 104)  RR: 22 (17 Dec 2024 00:30) (13 - 22)  SpO2: 98% (17 Dec 2024 00:30) (95% - 99%)    O2 Parameters below as of 16 Dec 2024 23:05  Patient On (Oxygen Delivery Method): room air            I&O's Detail    16 Dec 2024 07:01  -  17 Dec 2024 00:42  --------------------------------------------------------  IN:    Heparin Infusion: 28 mL    Lactated Ringers Bolus: 1000 mL  Total IN: 1028 mL    OUT:    Indwelling Catheter - Urethral (mL): 105 mL  Total OUT: 105 mL    Total NET: 923 mL              --------------------------------------------------------------------------------------    Physical Exam:    Gen: Resting comfortably in bed    HEENT: PERRL, EOMI    Neurological: Alert and oriented x3 without focal deficit    Neck: Trachea midline, no evidence of JVD    Pulmonary: CTA B/L,  equal rise and fall of the chest, no increased WOB    Cardiovascular: regular rate and rhythm    Gastrointestinal: Soft, non-tender, non-distended    : Silver in place draining clear yellow urine    Extremities: Without clubbing/cyanosis/edema     Musculoskeletal: FROM without pain, no deformity or areas of tenderness    Skin: Intact ; L AKA     LABS:  CBC Full  -  ( 16 Dec 2024 23:38 )  WBC Count : 10.94 K/uL  RBC Count : 4.50 M/uL  Hemoglobin : 11.8 g/dL  Hematocrit : 34.8 %  Platelet Count - Automated : 131 K/uL  Mean Cell Volume : 77.3 fl  Mean Cell Hemoglobin : 26.2 pg  Mean Cell Hemoglobin Concentration : 33.9 g/dL  Auto Neutrophil # : 8.10 K/uL  Auto Lymphocyte # : 2.06 K/uL  Auto Monocyte # : 0.65 K/uL  Auto Eosinophil # : 0.02 K/uL  Auto Basophil # : 0.04 K/uL  Auto Neutrophil % : 74.1 %  Auto Lymphocyte % : 18.8 %  Auto Monocyte % : 5.9 %  Auto Eosinophil % : 0.2 %  Auto Basophil % : 0.4 %    12-16    133[L]  |  96  |  28.9[H]  ----------------------------<  501[HH]  4.4   |  21.0[L]  |  1.44[H]    Ca    9.2      16 Dec 2024 13:05  Mg     1.9     12-16    TPro  7.5  /  Alb  3.7  /  TBili  0.8  /  DBili  x   /  AST  45[H]  /  ALT  27  /  AlkPhos  160[H]  12-16    PT/INR - ( 16 Dec 2024 13:05 )   PT: 12.9 sec;   INR: 1.14 ratio         PTT - ( 16 Dec 2024 13:05 )  PTT:28.3 sec  Urinalysis Basic - ( 16 Dec 2024 13:05 )    Color: x / Appearance: x / SG: x / pH: x  Gluc: 501 mg/dL / Ketone: x  / Bili: x / Urobili: x   Blood: x / Protein: x / Nitrite: x   Leuk Esterase: x / RBC: x / WBC x   Sq Epi: x / Non Sq Epi: x / Bacteria: x      RECENT CULTURES:      LIVER FUNCTIONS - ( 16 Dec 2024 13:05 )  Alb: 3.7 g/dL / Pro: 7.5 g/dL / ALK PHOS: 160 U/L / ALT: 27 U/L / AST: 45 U/L / GGT: x

## 2024-12-17 NOTE — CONSULT NOTE ADULT - ASSESSMENT
ASSESSMENT: 79y female s/p b/l femoral thrombectomy and L AKA     Admitting Dx: B/L occluded femoral arterties   Complicated By: medical cormorbidities   PMHx: DM, HTN    PLAN: admit to floor ; patient without hemodynamic instability on both A-Line and NIBP - Neurovascular checks Q4    Neuro:    - Multimodal pain regimen  - Delirium precautions  - Optimize sleep/wake cycle     CV:   - Maintain MAP > 65  - Continue hemodynamic monitoring      Pulm:   - Maintain sPO2 >92%  - Pulmonary toilet, IS, OOBTC    GI/Nutrition:   - Advance diet as tolerated per primary team  - Bowel regimen  - Monitor bowel movements    /Renal:   - Monitor kidney fxn  - Monitor UOP  - Replete electrolytes PRN (Mg >2, Phos >3, K >4)  - Silver for strict I&O    ID:  - Monitor fever curve  - Monitor for leukocytosis    Endo:  - Monitor blood glucose  - Maintain euglycemia, 140-180    Heme/DVT Prophylaxis:  - SCDs  - Monitor H/H  - Chemical prophylaxis with heparin gtt    Skin:  - Repositioning for DTI prevention while in bed    Vascular:  - Neurovascular checks Q4    Lines:  - Peripheral IV's  - Arterial Line -> D/C prior to transfer to the floor     Dispo:  - Care per primary team  - please reconsult SICU if patient becomes hemodynamically abnormal or decompensates while on the floor.   - If concern for RLE ischemia s/p thrombectomy and advancement of Neurovascular checks is required, please reconsult SICU for either stepdown or ICU level of care.    CODE STATUS: full code ASSESSMENT: 79y female s/p b/l femoral thrombectomy and L AKA     Admitting Dx: B/L occluded femoral arterties   Complicated By: medical cormorbidities   PMHx: DM, HTN    PLAN: admit to floor ; patient without hemodynamic instability on both A-Line and NIBP - Neurovascular checks Q4    Neuro:    - Multimodal pain regimen  - Delirium precautions  - Optimize sleep/wake cycle     CV:   - Maintain MAP > 65  - Continue hemodynamic monitoring      Pulm:   - Maintain sPO2 >92%  - Pulmonary toilet, IS, OOBTC    GI/Nutrition:   - Advance diet as tolerated per primary team  - Bowel regimen  - Monitor bowel movements    /Renal:   - Monitor kidney fxn  - Monitor UOP  - Replete electrolytes PRN (Mg >2, Phos >3, K >4)  - Silver for strict I&O    ID:  - Monitor fever curve  - Monitor for leukocytosis    Endo:  - Monitor blood glucose  - Maintain euglycemia, 140-180    Heme/DVT Prophylaxis:  - SCDs  - Monitor H/H  - Chemical prophylaxis with heparin gtt    Skin:  - Repositioning for DTI prevention while in bed    Vascular:  - Neurovascular checks Q4  - R Peroneal Signal Only - No Dp/PT     Lines:  - Peripheral IV's  - Arterial Line -> D/C prior to transfer to the floor     Dispo:  - Care per primary team  - please reconsult SICU if patient becomes hemodynamically abnormal or decompensates while on the floor.   - If concern for RLE ischemia s/p thrombectomy and advancement of Neurovascular checks is required, please reconsult SICU for either stepdown or ICU level of care.    CODE STATUS: full code

## 2024-12-17 NOTE — CONSULT NOTE ADULT - PROBLEM/RECOMMENDATION-1
Mother called lactation help line requesting OP consult related to latch difficulties.  OP arranged for Tuesday, 6/20 at 1400.  
DISPLAY PLAN FREE TEXT

## 2024-12-17 NOTE — CONSULT NOTE ADULT - NS ATTEND AMEND GEN_ALL_CORE FT
Patient seen in PACU    ischemic leg now s/p BKA  PAF history, currently AFib rates 120's on heparin gtt  uncontrolled hba1c, diabetes mellitus type 2, hba1c 11.5%  ?medication non adherence      recommend repeat limited echo, last in september with preserved EF  recommend rate control with lopressor 25mg BID and uptitrate to keep HR <110  heparin infusion to apixiban when able given elevated [id number]  diabetic control  needs lifestyle modifications  outpatient follow up with cardiology    we will follow.

## 2024-12-18 LAB
ANION GAP SERPL CALC-SCNC: 12 MMOL/L — SIGNIFICANT CHANGE UP (ref 5–17)
ANION GAP SERPL CALC-SCNC: 12 MMOL/L — SIGNIFICANT CHANGE UP (ref 5–17)
ANION GAP SERPL CALC-SCNC: 14 MMOL/L — SIGNIFICANT CHANGE UP (ref 5–17)
APPEARANCE UR: ABNORMAL
APTT BLD: 76 SEC — HIGH (ref 24.5–35.6)
BACTERIA # UR AUTO: ABNORMAL /HPF
BASOPHILS # BLD AUTO: 0.03 K/UL — SIGNIFICANT CHANGE UP (ref 0–0.2)
BASOPHILS # BLD AUTO: 0.04 K/UL — SIGNIFICANT CHANGE UP (ref 0–0.2)
BASOPHILS NFR BLD AUTO: 0.2 % — SIGNIFICANT CHANGE UP (ref 0–2)
BASOPHILS NFR BLD AUTO: 0.2 % — SIGNIFICANT CHANGE UP (ref 0–2)
BILIRUB UR-MCNC: NEGATIVE — SIGNIFICANT CHANGE UP
BLD GP AB SCN SERPL QL: SIGNIFICANT CHANGE UP
BUN SERPL-MCNC: 27.2 MG/DL — HIGH (ref 8–20)
BUN SERPL-MCNC: 27.5 MG/DL — HIGH (ref 8–20)
BUN SERPL-MCNC: 28.3 MG/DL — HIGH (ref 8–20)
CALCIUM SERPL-MCNC: 7.1 MG/DL — LOW (ref 8.4–10.5)
CALCIUM SERPL-MCNC: 7.3 MG/DL — LOW (ref 8.4–10.5)
CALCIUM SERPL-MCNC: 7.6 MG/DL — LOW (ref 8.4–10.5)
CAST: 2 /LPF — SIGNIFICANT CHANGE UP (ref 0–4)
CHLORIDE SERPL-SCNC: 100 MMOL/L — SIGNIFICANT CHANGE UP (ref 96–108)
CHLORIDE SERPL-SCNC: 98 MMOL/L — SIGNIFICANT CHANGE UP (ref 96–108)
CHLORIDE SERPL-SCNC: 99 MMOL/L — SIGNIFICANT CHANGE UP (ref 96–108)
CO2 SERPL-SCNC: 21 MMOL/L — LOW (ref 22–29)
COLOR SPEC: YELLOW — SIGNIFICANT CHANGE UP
CREAT SERPL-MCNC: 1.22 MG/DL — SIGNIFICANT CHANGE UP (ref 0.5–1.3)
CREAT SERPL-MCNC: 1.22 MG/DL — SIGNIFICANT CHANGE UP (ref 0.5–1.3)
CREAT SERPL-MCNC: 1.29 MG/DL — SIGNIFICANT CHANGE UP (ref 0.5–1.3)
DIFF PNL FLD: ABNORMAL
EGFR: 42 ML/MIN/1.73M2 — LOW
EGFR: 45 ML/MIN/1.73M2 — LOW
EGFR: 45 ML/MIN/1.73M2 — LOW
EOSINOPHIL # BLD AUTO: 0.02 K/UL — SIGNIFICANT CHANGE UP (ref 0–0.5)
EOSINOPHIL # BLD AUTO: 0.05 K/UL — SIGNIFICANT CHANGE UP (ref 0–0.5)
EOSINOPHIL NFR BLD AUTO: 0.1 % — SIGNIFICANT CHANGE UP (ref 0–6)
EOSINOPHIL NFR BLD AUTO: 0.3 % — SIGNIFICANT CHANGE UP (ref 0–6)
GLUCOSE BLDC GLUCOMTR-MCNC: 120 MG/DL — HIGH (ref 70–99)
GLUCOSE BLDC GLUCOMTR-MCNC: 161 MG/DL — HIGH (ref 70–99)
GLUCOSE BLDC GLUCOMTR-MCNC: 171 MG/DL — HIGH (ref 70–99)
GLUCOSE BLDC GLUCOMTR-MCNC: 185 MG/DL — HIGH (ref 70–99)
GLUCOSE BLDC GLUCOMTR-MCNC: 193 MG/DL — HIGH (ref 70–99)
GLUCOSE BLDC GLUCOMTR-MCNC: 197 MG/DL — HIGH (ref 70–99)
GLUCOSE SERPL-MCNC: 122 MG/DL — HIGH (ref 70–99)
GLUCOSE SERPL-MCNC: 165 MG/DL — HIGH (ref 70–99)
GLUCOSE SERPL-MCNC: 166 MG/DL — HIGH (ref 70–99)
GLUCOSE UR QL: >=1000 MG/DL
HCT VFR BLD CALC: 23.4 % — LOW (ref 34.5–45)
HCT VFR BLD CALC: 24.2 % — LOW (ref 34.5–45)
HCT VFR BLD CALC: 29.1 % — LOW (ref 34.5–45)
HGB BLD-MCNC: 7.9 G/DL — LOW (ref 11.5–15.5)
HGB BLD-MCNC: 7.9 G/DL — LOW (ref 11.5–15.5)
HGB BLD-MCNC: 9.5 G/DL — LOW (ref 11.5–15.5)
IMM GRANULOCYTES NFR BLD AUTO: 0.5 % — SIGNIFICANT CHANGE UP (ref 0–0.9)
IMM GRANULOCYTES NFR BLD AUTO: 0.7 % — SIGNIFICANT CHANGE UP (ref 0–0.9)
KETONES UR-MCNC: ABNORMAL MG/DL
LEUKOCYTE ESTERASE UR-ACNC: ABNORMAL
LYMPHOCYTES # BLD AUTO: 13.7 % — SIGNIFICANT CHANGE UP (ref 13–44)
LYMPHOCYTES # BLD AUTO: 16.2 % — SIGNIFICANT CHANGE UP (ref 13–44)
LYMPHOCYTES # BLD AUTO: 2.2 K/UL — SIGNIFICANT CHANGE UP (ref 1–3.3)
LYMPHOCYTES # BLD AUTO: 2.3 K/UL — SIGNIFICANT CHANGE UP (ref 1–3.3)
MAGNESIUM SERPL-MCNC: 1.8 MG/DL — SIGNIFICANT CHANGE UP (ref 1.6–2.6)
MCHC RBC-ENTMCNC: 25.5 PG — LOW (ref 27–34)
MCHC RBC-ENTMCNC: 26.4 PG — LOW (ref 27–34)
MCHC RBC-ENTMCNC: 26.4 PG — LOW (ref 27–34)
MCHC RBC-ENTMCNC: 32.6 G/DL — SIGNIFICANT CHANGE UP (ref 32–36)
MCHC RBC-ENTMCNC: 32.6 G/DL — SIGNIFICANT CHANGE UP (ref 32–36)
MCHC RBC-ENTMCNC: 33.8 G/DL — SIGNIFICANT CHANGE UP (ref 32–36)
MCV RBC AUTO: 78.1 FL — LOW (ref 80–100)
MCV RBC AUTO: 78.3 FL — LOW (ref 80–100)
MCV RBC AUTO: 80.8 FL — SIGNIFICANT CHANGE UP (ref 80–100)
MONOCYTES # BLD AUTO: 0.84 K/UL — SIGNIFICANT CHANGE UP (ref 0–0.9)
MONOCYTES # BLD AUTO: 0.99 K/UL — HIGH (ref 0–0.9)
MONOCYTES NFR BLD AUTO: 5.9 % — SIGNIFICANT CHANGE UP (ref 2–14)
MONOCYTES NFR BLD AUTO: 6.2 % — SIGNIFICANT CHANGE UP (ref 2–14)
MRSA PCR RESULT.: SIGNIFICANT CHANGE UP
NEUTROPHILS # BLD AUTO: 10.88 K/UL — HIGH (ref 1.8–7.4)
NEUTROPHILS # BLD AUTO: 12.73 K/UL — HIGH (ref 1.8–7.4)
NEUTROPHILS NFR BLD AUTO: 76.9 % — SIGNIFICANT CHANGE UP (ref 43–77)
NEUTROPHILS NFR BLD AUTO: 79.1 % — HIGH (ref 43–77)
NITRITE UR-MCNC: NEGATIVE — SIGNIFICANT CHANGE UP
PH UR: 5.5 — SIGNIFICANT CHANGE UP (ref 5–8)
PHOSPHATE SERPL-MCNC: 3.3 MG/DL — SIGNIFICANT CHANGE UP (ref 2.4–4.7)
PLATELET # BLD AUTO: 100 K/UL — LOW (ref 150–400)
PLATELET # BLD AUTO: 113 K/UL — LOW (ref 150–400)
PLATELET # BLD AUTO: 114 K/UL — LOW (ref 150–400)
POTASSIUM SERPL-MCNC: 3.9 MMOL/L — SIGNIFICANT CHANGE UP (ref 3.5–5.3)
POTASSIUM SERPL-MCNC: 3.9 MMOL/L — SIGNIFICANT CHANGE UP (ref 3.5–5.3)
POTASSIUM SERPL-MCNC: 4.1 MMOL/L — SIGNIFICANT CHANGE UP (ref 3.5–5.3)
POTASSIUM SERPL-SCNC: 3.9 MMOL/L — SIGNIFICANT CHANGE UP (ref 3.5–5.3)
POTASSIUM SERPL-SCNC: 3.9 MMOL/L — SIGNIFICANT CHANGE UP (ref 3.5–5.3)
POTASSIUM SERPL-SCNC: 4.1 MMOL/L — SIGNIFICANT CHANGE UP (ref 3.5–5.3)
PROT UR-MCNC: SIGNIFICANT CHANGE UP MG/DL
RBC # BLD: 2.99 M/UL — LOW (ref 3.8–5.2)
RBC # BLD: 3.1 M/UL — LOW (ref 3.8–5.2)
RBC # BLD: 3.6 M/UL — LOW (ref 3.8–5.2)
RBC # FLD: 13.8 % — SIGNIFICANT CHANGE UP (ref 10.3–14.5)
RBC # FLD: 14.3 % — SIGNIFICANT CHANGE UP (ref 10.3–14.5)
RBC # FLD: 14.5 % — SIGNIFICANT CHANGE UP (ref 10.3–14.5)
RBC CASTS # UR COMP ASSIST: 0 /HPF — SIGNIFICANT CHANGE UP (ref 0–4)
S AUREUS DNA NOSE QL NAA+PROBE: SIGNIFICANT CHANGE UP
SODIUM SERPL-SCNC: 131 MMOL/L — LOW (ref 135–145)
SODIUM SERPL-SCNC: 133 MMOL/L — LOW (ref 135–145)
SODIUM SERPL-SCNC: 134 MMOL/L — LOW (ref 135–145)
SP GR SPEC: 1.02 — SIGNIFICANT CHANGE UP (ref 1–1.03)
SQUAMOUS # UR AUTO: 6 /HPF — HIGH (ref 0–5)
UROBILINOGEN FLD QL: 1 MG/DL — SIGNIFICANT CHANGE UP (ref 0.2–1)
WBC # BLD: 14.17 K/UL — HIGH (ref 3.8–10.5)
WBC # BLD: 16.09 K/UL — HIGH (ref 3.8–10.5)
WBC # BLD: 16.49 K/UL — HIGH (ref 3.8–10.5)
WBC # FLD AUTO: 14.17 K/UL — HIGH (ref 3.8–10.5)
WBC # FLD AUTO: 16.09 K/UL — HIGH (ref 3.8–10.5)
WBC # FLD AUTO: 16.49 K/UL — HIGH (ref 3.8–10.5)
WBC UR QL: 52 /HPF — HIGH (ref 0–5)

## 2024-12-18 PROCEDURE — 93010 ELECTROCARDIOGRAM REPORT: CPT

## 2024-12-18 PROCEDURE — 99222 1ST HOSP IP/OBS MODERATE 55: CPT

## 2024-12-18 PROCEDURE — 99291 CRITICAL CARE FIRST HOUR: CPT

## 2024-12-18 RX ORDER — CEFTRIAXONE SODIUM 1 G/1
1000 INJECTION, POWDER, FOR SOLUTION INTRAMUSCULAR; INTRAVENOUS EVERY 24 HOURS
Refills: 0 | Status: COMPLETED | OUTPATIENT
Start: 2024-12-18 | End: 2024-12-20

## 2024-12-18 RX ORDER — HEPARIN SODIUM 1000 [USP'U]/ML
3000 INJECTION, SOLUTION INTRAVENOUS; SUBCUTANEOUS EVERY 6 HOURS
Refills: 0 | Status: DISCONTINUED | OUTPATIENT
Start: 2024-12-18 | End: 2024-12-20

## 2024-12-18 RX ORDER — INSULIN LISPRO 100/ML
5 VIAL (ML) SUBCUTANEOUS
Refills: 0 | Status: DISCONTINUED | OUTPATIENT
Start: 2024-12-18 | End: 2024-12-26

## 2024-12-18 RX ORDER — MAGNESIUM SULFATE 500 MG/ML
2 INJECTION, SOLUTION INTRAMUSCULAR; INTRAVENOUS ONCE
Refills: 0 | Status: COMPLETED | OUTPATIENT
Start: 2024-12-18 | End: 2024-12-18

## 2024-12-18 RX ORDER — HEPARIN SODIUM 1000 [USP'U]/ML
900 INJECTION, SOLUTION INTRAVENOUS; SUBCUTANEOUS
Qty: 25000 | Refills: 0 | Status: DISCONTINUED | OUTPATIENT
Start: 2024-12-18 | End: 2024-12-20

## 2024-12-18 RX ORDER — INSULIN LISPRO 100/ML
VIAL (ML) SUBCUTANEOUS
Refills: 0 | Status: DISCONTINUED | OUTPATIENT
Start: 2024-12-18 | End: 2024-12-26

## 2024-12-18 RX ORDER — HEPARIN SODIUM 1000 [USP'U]/ML
6500 INJECTION, SOLUTION INTRAVENOUS; SUBCUTANEOUS EVERY 6 HOURS
Refills: 0 | Status: DISCONTINUED | OUTPATIENT
Start: 2024-12-18 | End: 2024-12-20

## 2024-12-18 RX ORDER — SODIUM CHLORIDE 9 MG/ML
500 INJECTION, SOLUTION INTRAMUSCULAR; INTRAVENOUS; SUBCUTANEOUS ONCE
Refills: 0 | Status: COMPLETED | OUTPATIENT
Start: 2024-12-18 | End: 2024-12-18

## 2024-12-18 RX ORDER — INSULIN GLARGINE-YFGN 100 [IU]/ML
15 INJECTION, SOLUTION SUBCUTANEOUS AT BEDTIME
Refills: 0 | Status: DISCONTINUED | OUTPATIENT
Start: 2024-12-18 | End: 2024-12-26

## 2024-12-18 RX ORDER — CEFTRIAXONE SODIUM 1 G/1
1000 INJECTION, POWDER, FOR SOLUTION INTRAMUSCULAR; INTRAVENOUS EVERY 24 HOURS
Refills: 0 | Status: DISCONTINUED | OUTPATIENT
Start: 2024-12-18 | End: 2024-12-18

## 2024-12-18 RX ADMIN — MAGNESIUM SULFATE 25 GRAM(S): 500 INJECTION, SOLUTION INTRAMUSCULAR; INTRAVENOUS at 08:23

## 2024-12-18 RX ADMIN — Medication 2: at 08:13

## 2024-12-18 RX ADMIN — ACETAMINOPHEN 975 MILLIGRAM(S): 80 SOLUTION/ DROPS ORAL at 01:00

## 2024-12-18 RX ADMIN — Medication 17 GRAM(S): at 11:26

## 2024-12-18 RX ADMIN — Medication 5 UNIT(S): at 11:42

## 2024-12-18 RX ADMIN — SENNOSIDES 2 TABLET(S): 8.6 TABLET, FILM COATED ORAL at 22:08

## 2024-12-18 RX ADMIN — ACETAMINOPHEN 975 MILLIGRAM(S): 80 SOLUTION/ DROPS ORAL at 05:45

## 2024-12-18 RX ADMIN — ACETAMINOPHEN 975 MILLIGRAM(S): 80 SOLUTION/ DROPS ORAL at 17:21

## 2024-12-18 RX ADMIN — INSULIN GLARGINE-YFGN 15 UNIT(S): 100 INJECTION, SOLUTION SUBCUTANEOUS at 22:08

## 2024-12-18 RX ADMIN — Medication 2: at 17:22

## 2024-12-18 RX ADMIN — Medication 5 MILLIGRAM(S): at 15:55

## 2024-12-18 RX ADMIN — Medication 2: at 11:42

## 2024-12-18 RX ADMIN — Medication 5 UNIT(S): at 08:12

## 2024-12-18 RX ADMIN — ACETAMINOPHEN 975 MILLIGRAM(S): 80 SOLUTION/ DROPS ORAL at 18:21

## 2024-12-18 RX ADMIN — ACETAMINOPHEN 975 MILLIGRAM(S): 80 SOLUTION/ DROPS ORAL at 00:12

## 2024-12-18 RX ADMIN — Medication 25 MILLIGRAM(S): at 17:21

## 2024-12-18 RX ADMIN — Medication 25 MILLIGRAM(S): at 05:46

## 2024-12-18 RX ADMIN — Medication 5 UNIT(S): at 17:21

## 2024-12-18 RX ADMIN — CHLORHEXIDINE GLUCONATE 1 APPLICATION(S): 1.2 RINSE ORAL at 11:32

## 2024-12-18 RX ADMIN — SODIUM CHLORIDE 84 MILLILITER(S): 9 INJECTION, SOLUTION INTRAVENOUS at 00:13

## 2024-12-18 RX ADMIN — ACETAMINOPHEN 975 MILLIGRAM(S): 80 SOLUTION/ DROPS ORAL at 06:51

## 2024-12-18 RX ADMIN — HEPARIN SODIUM 900 UNIT(S)/HR: 1000 INJECTION, SOLUTION INTRAVENOUS; SUBCUTANEOUS at 19:46

## 2024-12-18 RX ADMIN — SODIUM CHLORIDE 1000 MILLILITER(S): 9 INJECTION, SOLUTION INTRAMUSCULAR; INTRAVENOUS; SUBCUTANEOUS at 20:11

## 2024-12-18 RX ADMIN — INSULIN HUMAN 2 UNIT(S)/HR: 100 INJECTION, SOLUTION SUBCUTANEOUS at 00:12

## 2024-12-18 RX ADMIN — SODIUM CHLORIDE 84 MILLILITER(S): 9 INJECTION, SOLUTION INTRAVENOUS at 12:28

## 2024-12-18 RX ADMIN — Medication 5 MILLIGRAM(S): at 14:56

## 2024-12-18 RX ADMIN — HEPARIN SODIUM 900 UNIT(S)/HR: 1000 INJECTION, SOLUTION INTRAVENOUS; SUBCUTANEOUS at 00:00

## 2024-12-18 RX ADMIN — HEPARIN SODIUM 900 UNIT(S)/HR: 1000 INJECTION, SOLUTION INTRAVENOUS; SUBCUTANEOUS at 11:26

## 2024-12-18 NOTE — CHART NOTE - NSCHARTNOTEFT_GEN_A_CORE
SICU TRANSFER NOTE  -----------------------------  ICU Admission Date: 12/17  Transfer Date: 12-18-24 @ 11:28    Admission Diagnosis: CLI  Active Problems/injuries: CLI    Procedures: b/l fem thrombectomy with L AKA 12/17    Consultants:  [ ] Cardiology  [ ] Endocrine  [ ] Infectious Disease  [ ] Medicine  [ ]Neurosurgery  [ ] Ortho       [ ] Weight Bearing Status:  [ ] Palliative       [ ] Advanced Directives:    [ ] Physical Medicine and Rehab       [ ] Disposition :   [ ] Plastics  [ ] Pulmonary    Medications  acetaminophen     Tablet .. 975 milliGRAM(s) Oral every 6 hours  albuterol    90 MICROgram(s) HFA Inhaler 2 Puff(s) Inhalation every 6 hours PRN  chlorhexidine 2% Cloths 1 Application(s) Topical daily  heparin   Injectable 6500 Unit(s) IV Push every 6 hours PRN  heparin   Injectable 3000 Unit(s) IV Push every 6 hours PRN  heparin  Infusion. 900 Unit(s)/Hr IV Continuous <Continuous>  HYDROmorphone  Injectable 0.5 milliGRAM(s) IV Push every 4 hours PRN  insulin glargine Injectable (LANTUS) 15 Unit(s) SubCutaneous at bedtime  insulin lispro (ADMELOG) corrective regimen sliding scale   SubCutaneous three times a day before meals  insulin lispro Injectable (ADMELOG) 5 Unit(s) SubCutaneous three times a day before meals  metoprolol tartrate 25 milliGRAM(s) Oral two times a day  metoprolol tartrate Injectable 5 milliGRAM(s) IV Push every 6 hours PRN  multiple electrolytes Injection Type 1 1000 milliLiter(s) IV Continuous <Continuous>  ondansetron Injectable 4 milliGRAM(s) IV Push every 4 hours PRN  oxyCODONE    IR 5 milliGRAM(s) Oral every 6 hours PRN  oxyCODONE    IR 10 milliGRAM(s) Oral every 6 hours PRN  polyethylene glycol 3350 17 Gram(s) Oral daily  senna 2 Tablet(s) Oral at bedtime      [ ] I attest I have reviewed and reconciled all medications prior to transfer    IV Fluids  lactated ringers Bolus:   1000 milliLiter(s), IV Bolus, once, infuse over 60 Minute(s), Stop After 1 Doses  sodium chloride 0.9%.: Solution, 1000 milliLiter(s) infuse at 100 mL/Hr  sodium chloride 0.9% Bolus:   500 milliLiter(s), IV Bolus, once, infuse over 60 Minute(s), Stop After 1 Doses      ASSESSMENT: 79y female s/p b/l femoral thrombectomy and L AKA. c/b afib with RVR and refractory hyperglycemia.     PLAN:    Neuro:    - Multimodal pain regimen  - Delirium precautions  - Optimize sleep/wake cycle     CV:   - Maintain MAP > 65  - Continue hemodynamic monitoring  -Afib with RVR: HDS, continue metop 25 BID. Stat dose of 5 IV lopressor with 5 mg q6 PRN ordered  -Appreciate cardiology recs  - EF 60% on TTE 12/17      Pulm:   - Maintain sPO2 >92%  - Pulmonary toilet, IS, OOBTC    GI/Nutrition:   - Advance diet as tolerated per primary team  - Bowel regimen  - Monitor bowel movements    /Renal:   - Monitor kidney fxn  - Monitor UOP  - Replete electrolytes PRN (Mg >2, Phos >3, K >4)  - Silver for strict I&O    ID:  - Monitor fever curve  - Monitor for leukocytosis    Endo:  - Monitor blood glucose  - Maintain euglycemia, 140-180  -Insulin weaned off, now on ISS, premeal and lantus  -A1C 11.5  -f/u medicine/endo recs     Heme/DVT Prophylaxis:  - SCDs  - Monitor H/H, hb 7.9x2. One unit PRBC this AM. f/u repeat labs.   - Heparin gtt    Skin:  - Repositioning for DTI prevention while in bed    Vascular:  - Neurovascular checks Q1  - R Peroneal Signal Only - No Dp/PT     Lines:  - Peripheral IV's  - Maintain a line for now    Dispo:  -tx to floor     Lines:  - Peripheral IV's  - Arterial Line to be dc'd    Dispo:  - Care per SICU  - PT recs?      CODE STATUS: Full    I have discussed this case with vascular floor team upon transfer and all questions regarding ICU course were answered.  The following items are to be followed up:  1) SICU TRANSFER NOTE  -----------------------------  ICU Admission Date: 12/17  Transfer Date: 12-18-24 @ 11:28    Admission Diagnosis: CLI  Active Problems/injuries: CLI    Procedures: b/l fem thrombectomy with L AKA 12/17    Consultants:  [ ] Cardiology  [ ] Endocrine  [ ] Infectious Disease  [ ] Medicine  [ ]Neurosurgery  [ ] Ortho       [ ] Weight Bearing Status:  [ ] Palliative       [ ] Advanced Directives:    [ ] Physical Medicine and Rehab       [ ] Disposition :   [ ] Plastics  [ ] Pulmonary    Medications  acetaminophen     Tablet .. 975 milliGRAM(s) Oral every 6 hours  albuterol    90 MICROgram(s) HFA Inhaler 2 Puff(s) Inhalation every 6 hours PRN  chlorhexidine 2% Cloths 1 Application(s) Topical daily  heparin   Injectable 6500 Unit(s) IV Push every 6 hours PRN  heparin   Injectable 3000 Unit(s) IV Push every 6 hours PRN  heparin  Infusion. 900 Unit(s)/Hr IV Continuous <Continuous>  HYDROmorphone  Injectable 0.5 milliGRAM(s) IV Push every 4 hours PRN  insulin glargine Injectable (LANTUS) 15 Unit(s) SubCutaneous at bedtime  insulin lispro (ADMELOG) corrective regimen sliding scale   SubCutaneous three times a day before meals  insulin lispro Injectable (ADMELOG) 5 Unit(s) SubCutaneous three times a day before meals  metoprolol tartrate 25 milliGRAM(s) Oral two times a day  metoprolol tartrate Injectable 5 milliGRAM(s) IV Push every 6 hours PRN  multiple electrolytes Injection Type 1 1000 milliLiter(s) IV Continuous <Continuous>  ondansetron Injectable 4 milliGRAM(s) IV Push every 4 hours PRN  oxyCODONE    IR 5 milliGRAM(s) Oral every 6 hours PRN  oxyCODONE    IR 10 milliGRAM(s) Oral every 6 hours PRN  polyethylene glycol 3350 17 Gram(s) Oral daily  senna 2 Tablet(s) Oral at bedtime      [ ] I attest I have reviewed and reconciled all medications prior to transfer    IV Fluids  lactated ringers Bolus:   1000 milliLiter(s), IV Bolus, once, infuse over 60 Minute(s), Stop After 1 Doses  sodium chloride 0.9%.: Solution, 1000 milliLiter(s) infuse at 100 mL/Hr  sodium chloride 0.9% Bolus:   500 milliLiter(s), IV Bolus, once, infuse over 60 Minute(s), Stop After 1 Doses      ASSESSMENT: 79y female s/p b/l femoral thrombectomy and L AKA. c/b afib with RVR and refractory hyperglycemia.     PLAN:    Neuro:    - Multimodal pain regimen  - Delirium precautions  - Optimize sleep/wake cycle     CV:   - Maintain MAP > 65  - Continue hemodynamic monitoring  -Afib with RVR: HDS, continue metop 25 BID. Stat dose of 5 IV lopressor with 5 mg q6 PRN ordered  -Appreciate cardiology recs  - EF 60% on TTE 12/17      Pulm:   - Maintain sPO2 >92%  - Pulmonary toilet, IS, OOBTC    GI/Nutrition:   - Advance diet as tolerated per primary team  - Bowel regimen  - Monitor bowel movements    /Renal:   - Monitor kidney fxn  - Monitor UOP  - Replete electrolytes PRN (Mg >2, Phos >3, K >4)  - DC muñoz, f/u TOV    ID:  - Monitor fever curve  - Monitor for leukocytosis    Endo:  - Monitor blood glucose  - Maintain euglycemia, 140-180  -Insulin weaned off, now on ISS, premeal and lantus  -A1C 11.5  -f/u medicine/endo recs     Heme/DVT Prophylaxis:  - SCDs  - Monitor H/H, hb 7.9x2. One unit PRBC this AM. f/u repeat labs.   - Heparin gtt    Skin:  - Repositioning for DTI prevention while in bed    Vascular:  - Neurovascular checks Q1  - R Peroneal Signal Only - No Dp/PT     Lines:  - Peripheral IV's  - Maintain a line for now    Dispo:  -tx to floor     Lines:  - Peripheral IV's  - Arterial Line to be dc'd    Dispo:  - Care per SICU  - PT recs?      CODE STATUS: Full    I have discussed this case with vascular floor team upon transfer and all questions regarding ICU course were answered.  The following items are to be followed up:  1)

## 2024-12-18 NOTE — PROGRESS NOTE ADULT - TIME BILLING
As above.  Briefly 79 year old woman w/hx of atrial fibrillation (on eliquis), DM (AIC 11.5), HTN, HLD, and prior CVA who was admitted 12/16/24 w/one week hx of progressive left leg pain and discoloration found to have vascular occlusion requiring emergent b/l lower extremity thrombectomy on 12/17 w/L AKA.  Post operative course complicated by atrial fibrillartion w/RVR and refractory hyperglycemia requiring initiation of insulin drip.  HR improved w/lopressor x1.  Now off insulin after home regimen initiated for hyperglycemia.  Remaisn HD stable w/rate controlled now on home metoprolol.  Breathing comfortably on RA.  H/H noted to be 7.9/23.4 - agree w/transfusion of 1UPRBC in setting of AKA. Pain appears controlled on current regimen although patient w/mild agitation when touched.  Diet as tolerated.  Will d/c muñoz - Cr improved -appears to be at baseline.  Afebrile and off antibiotics.  Heparin drip in place.  PIV.  Overall improved.      Patient w/o acute critical care issues at this time.  Will plan for transfer from critical care setting.  Primary team in agreement.

## 2024-12-18 NOTE — PROGRESS NOTE ADULT - PROBLEM SELECTOR PLAN 1
.  - [id number]  - Admitted with acute limb ischemia, potentially embolic in origin, with possibly new-onset Afib (although family reportedly aware of pAF dx, but patient never started on rate control or AC)  - now s/p L AKA  - currently rate controlled in the 80s  - currently on heparin GTT, eventual transition to DOAC if no further procedures planned ( Eliquis 5mg PO BID indefinitely)  - TTE EF 60% on 12/17  - No further inpatient cardiac work up at this time. Patient can follow up with Dr. Coronel as OP within 2 weeks of DC    Will sign off.  Please reconsult if needed.

## 2024-12-18 NOTE — PHYSICAL THERAPY INITIAL EVALUATION ADULT - ADDITIONAL COMMENTS
Pt lives in a private home with her spouse. 4 steps to enter with handrails, 4 steps inside with handrails. Pt was ambulatory PTA with a SAC. Pt owns SAC only.

## 2024-12-18 NOTE — PROVIDER CONTACT NOTE (OTHER) - SITUATION
PT desatting to 86%, RT called and cuff leak checked, pt has bilateral breath sounds with inspiratory and expiratory wheezes on the left side. error

## 2024-12-18 NOTE — PROGRESS NOTE ADULT - SUBJECTIVE AND OBJECTIVE BOX
Patient seen and examined at bedside. No acute events overnight, having some incisional pain but improving    Vitals:  Vital Signs Last 24 Hrs  T(C): 36.8 (18 Dec 2024 03:53), Max: 37.6 (17 Dec 2024 23:22)  T(F): 98.3 (18 Dec 2024 03:53), Max: 99.6 (17 Dec 2024 23:22)  HR: 83 (18 Dec 2024 08:00) (83 - 122)  BP: 96/60 (18 Dec 2024 08:00) (88/76 - 122/65)  BP(mean): 71 (18 Dec 2024 08:00) (69 - 89)  RR: 17 (18 Dec 2024 08:00) (14 - 23)  SpO2: 99% (18 Dec 2024 08:00) (96% - 100%)    Parameters below as of 18 Dec 2024 08:00  Patient On (Oxygen Delivery Method): room air        Labs:  12-18    134[L]  |  99  |  28.3[H]  ----------------------------<  165[H]  4.1   |  21.0[L]  |  1.22    Ca    7.3[L]      18 Dec 2024 05:00  Phos  3.3     12-18  Mg     1.8     12-18    TPro  5.5[L]  /  Alb  2.7[L]  /  TBili  0.5  /  DBili  0.2  /  AST  24  /  ALT  13  /  AlkPhos  96  12-17                            7.9    14.17 )-----------( 113      ( 18 Dec 2024 05:00 )             23.4       Exam:  Gen: pt lying in bed, alert, in NAD  Resp: unlabored  CVS: RRR  Abd: soft, NT, ND  Ext: moving all extremities spontaneously, sensation intact, L AKA dressing clean and dry, L groin site with some ecchymosis but soft, R groin site soft with ecchymosis, some staining on mepilex, R AT/peroneal signals

## 2024-12-18 NOTE — CONSULT NOTE ADULT - SUBJECTIVE AND OBJECTIVE BOX
HPI:  HPI: 79y Female with PMH of afib on eliquis (patient and family unable to recall when she last took it or if she even takes it at all), HTN, DM, h/o stroke presents to the ED for 1 week history of worsening left leg pain and discoloration. Patient accompanied by family who state that she has never had any leg pain or vascular concerns in the past but began to complain of left hip pain last week that radiated down the left leg. The pain was constant and there were no aggrevating or alleviating factors with the pain. Family attempted to relieve pain with topical creams but when no relief they brought her in for further management. Of note, patient is unable to ambulate on her own and can only ambulate as far as to the bathroom with assistance. In the ED, provider unable to obtain pulses or doppler signals bilaterally and called emergent vascular surgery consult.     Pt. is seen s/p surgery. consult requested due to uncontrolled diabetes  Past h/o DM type 2, insulin treated, but over the past months inwsulin treatemnt was converted to oral therapy, with glipizide and farxiga. A1C on admission 11.5.      PAST MEDICAL & SURGICAL HISTORY:  DM (diabetes mellitus)      HTN (hypertension)      CVA (cerebrovascular accident)        Home Meds: Home Medications:    Allergies: Allergies    No Known Allergies    Intolerances      Soc:   Advanced Directives: Presumed Full Code     CURRENT MEDICATIONS:   --------------------------------------------------------------------------------------  Neurologic Medications  acetaminophen   IVPB .. 1000 milliGRAM(s) IV Intermittent Once    Respiratory Medications    Cardiovascular Medications    Gastrointestinal Medications    Genitourinary Medications    Hematologic/Oncologic Medications  aspirin  chewable 324 milliGRAM(s) Oral once    Antimicrobial/Immunologic Medications    Endocrine/Metabolic Medications    Topical/Other Medications    --------------------------------------------------------------------------------------    VITAL SIGNS, INS/OUTS (last 24 hours):  --------------------------------------------------------------------------------------  ICU Vital Signs Last 24 Hrs  T(C): 36.7 (16 Dec 2024 11:39), Max: 36.7 (16 Dec 2024 11:39)  T(F): 98 (16 Dec 2024 11:39), Max: 98 (16 Dec 2024 11:39)  HR: 130 (16 Dec 2024 12:45) (75 - 130)  BP: 114/62 (16 Dec 2024 11:39) (114/62 - 114/62)  BP(mean): --  ABP: --  ABP(mean): --  RR: 22 (16 Dec 2024 12:45) (20 - 22)  SpO2: 97% (16 Dec 2024 12:45) (97% - 98%)    O2 Parameters below as of 16 Dec 2024 12:45  Patient On (Oxygen Delivery Method): room air          I&O's Summary    --------------------------------------------------------------------------------------      HTN (hypertension)      CVA (cerebrovascular accident)      Afib              MEDICATIONS  (STANDING):  acetaminophen     Tablet .. 975 milliGRAM(s) Oral every 6 hours  chlorhexidine 2% Cloths 1 Application(s) Topical daily  heparin  Infusion. 900 Unit(s)/Hr (9 mL/Hr) IV Continuous <Continuous>  insulin glargine Injectable (LANTUS) 15 Unit(s) SubCutaneous at bedtime  insulin lispro (ADMELOG) corrective regimen sliding scale   SubCutaneous three times a day before meals  insulin lispro Injectable (ADMELOG) 5 Unit(s) SubCutaneous three times a day before meals  metoprolol tartrate 25 milliGRAM(s) Oral two times a day  multiple electrolytes Injection Type 1 1000 milliLiter(s) (84 mL/Hr) IV Continuous <Continuous>  polyethylene glycol 3350 17 Gram(s) Oral daily  senna 2 Tablet(s) Oral at bedtime    MEDICATIONS  (PRN):  albuterol    90 MICROgram(s) HFA Inhaler 2 Puff(s) Inhalation every 6 hours PRN Shortness of Breath  heparin   Injectable 6500 Unit(s) IV Push every 6 hours PRN For aPTT less than 40  heparin   Injectable 3000 Unit(s) IV Push every 6 hours PRN For aPTT between 40 - 57  HYDROmorphone  Injectable 0.5 milliGRAM(s) IV Push every 4 hours PRN Pain  metoprolol tartrate Injectable 5 milliGRAM(s) IV Push every 6 hours PRN HR >100  ondansetron Injectable 4 milliGRAM(s) IV Push every 4 hours PRN Nausea and/or Vomiting  oxyCODONE    IR 5 milliGRAM(s) Oral every 6 hours PRN Moderate Pain (4 - 6)  oxyCODONE    IR 10 milliGRAM(s) Oral every 6 hours PRN Severe Pain (7 - 10)      Allergies    No Known Allergies    Intolerances          PHYSICAL EXAM:    Vital Signs Last 24 Hrs  T(C): 37.2 (18 Dec 2024 13:15), Max: 37.6 (17 Dec 2024 23:22)  T(F): 99 (18 Dec 2024 13:15), Max: 99.6 (17 Dec 2024 23:22)  HR: 90 (18 Dec 2024 13:15) (82 - 108)  BP: 94/51 (18 Dec 2024 13:15) (91/56 - 121/73)  BP(mean): 65 (18 Dec 2024 13:15) (65 - 89)  RR: 18 (18 Dec 2024 13:15) (16 - 25)  SpO2: 99% (18 Dec 2024 13:15) (96% - 100%)    Parameters below as of 18 Dec 2024 13:15  Patient On (Oxygen Delivery Method): room air        General appearance: slim, chronically ill appearing    Eyes: Pupils equal    Neck: Trachea midline. No thyroid enlargement.    Lungs: Normal respiratory excursion. Lungs clear.    CV: Regular cardiac rate    Abdomen: Soft, non tender, no organomegaly or mass.    LABS:                        7.9    16.09 )-----------( 114      ( 18 Dec 2024 10:36 )             24.2     12-18    131[L]  |  98  |  27.5[H]  ----------------------------<  166[H]  3.9   |  21.0[L]  |  1.22    Ca    7.6[L]      18 Dec 2024 10:36  Phos  3.3     12-18  Mg     1.8     12-18    TPro  5.5[L]  /  Alb  2.7[L]  /  TBili  0.5  /  DBili  0.2  /  AST  24  /  ALT  13  /  AlkPhos  96  12-17    Urinalysis Basic - ( 18 Dec 2024 10:36 )    Color: x / Appearance: x / SG: x / pH: x  Gluc: 166 mg/dL / Ketone: x  / Bili: x / Urobili: x   Blood: x / Protein: x / Nitrite: x   Leuk Esterase: x / RBC: x / WBC x   Sq Epi: x / Non Sq Epi: x / Bacteria: x      LIVER FUNCTIONS - ( 17 Dec 2024 17:02 )  Alb: 2.7 g/dL / Pro: 5.5 g/dL / ALK PHOS: 96 U/L / ALT: 13 U/L / AST: 24 U/L / GGT: x                 POCT Blood Glucose.: 193 mg/dL (12-18-24 @ 13:03)  POCT Blood Glucose.: 197 mg/dL (12-18-24 @ 11:39)  POCT Blood Glucose.: 185 mg/dL (12-18-24 @ 07:51)  POCT Blood Glucose.: 120 mg/dL (12-18-24 @ 00:21)  POCT Blood Glucose.: 151 mg/dL (12-17-24 @ 23:29)  POCT Blood Glucose.: 212 mg/dL (12-17-24 @ 22:52)  POCT Blood Glucose.: 225 mg/dL (12-17-24 @ 21:42)  POCT Blood Glucose.: 294 mg/dL (12-17-24 @ 20:31)  POCT Blood Glucose.: 351 mg/dL (12-17-24 @ 18:19)  POCT Blood Glucose.: 383 mg/dL (12-17-24 @ 17:37)  POCT Blood Glucose.: 365 mg/dL (12-17-24 @ 16:48)  POCT Blood Glucose.: 399 mg/dL (12-17-24 @ 15:08)  POCT Blood Glucose.: 414 mg/dL (12-17-24 @ 12:21)  POCT Blood Glucose.: 148 mg/dL (12-16-24 @ 23:00)  POCT Blood Glucose.: 193 mg/dL (12-16-24 @ 19:33)  POCT Blood Glucose.: 232 mg/dL (12-16-24 @ 18:49)

## 2024-12-18 NOTE — PHYSICAL THERAPY INITIAL EVALUATION ADULT - GENERAL OBSERVATIONS, REHAB EVAL
Pt received supine in bed + telemetry//BP + IV + muñoz + nadir. Pt c/o 0/10 pain at rest, pt agreeable to PT

## 2024-12-18 NOTE — CONSULT NOTE ADULT - ASSESSMENT
DM type 2, chronically uncontrolled, now improved on a basal/bolus insulin regimen.  No changes needed at present, will follow

## 2024-12-18 NOTE — PROGRESS NOTE ADULT - SUBJECTIVE AND OBJECTIVE BOX
MEDICATIONS  (STANDING):  acetaminophen     Tablet .. 975 milliGRAM(s) Oral every 6 hours  chlorhexidine 2% Cloths 1 Application(s) Topical daily  heparin  Infusion. 900 Unit(s)/Hr (9 mL/Hr) IV Continuous <Continuous>  insulin glargine Injectable (LANTUS) 15 Unit(s) SubCutaneous at bedtime  insulin lispro (ADMELOG) corrective regimen sliding scale   SubCutaneous three times a day before meals  insulin lispro Injectable (ADMELOG) 5 Unit(s) SubCutaneous three times a day before meals  metoprolol tartrate 25 milliGRAM(s) Oral two times a day  multiple electrolytes Injection Type 1 1000 milliLiter(s) (84 mL/Hr) IV Continuous <Continuous>  polyethylene glycol 3350 17 Gram(s) Oral daily  senna 2 Tablet(s) Oral at bedtime    MEDICATIONS  (PRN):  albuterol    90 MICROgram(s) HFA Inhaler 2 Puff(s) Inhalation every 6 hours PRN Shortness of Breath  heparin   Injectable 6500 Unit(s) IV Push every 6 hours PRN For aPTT less than 40  Vital Signs Last 24 Hrs  T(C): 36.5 (18 Dec 2024 07:25), Max: 37.6 (17 Dec 2024 23:22)  T(F): 97.7 (18 Dec 2024 07:25), Max: 99.6 (17 Dec 2024 23:22)  HR: 87 (18 Dec 2024 10:00) (83 - 115)  BP: 101/57 (18 Dec 2024 10:00) (88/76 - 121/73)  BP(mean): 71 (18 Dec 2024 10:00) (68 - 89)  RR: 25 (18 Dec 2024 10:00) (14 - 25)  SpO2: 99% (18 Dec 2024 10:00) (96% - 100%)    Parameters below as of 18 Dec 2024 08:00  Patient On (Oxygen Delivery Method): room air    heparin   Injectable 3000 Unit(s) IV Push every 6 hours PRN For aPTT between 40 - 57  HYDROmorphone  Injectable 0.5 milliGRAM(s) IV Push every 4 hours PRN Pain  metoprolol tartrate Injectable 5 milliGRAM(s) IV Push every 6 hours PRN HR >100  ondansetron Injectable 4 milliGRAM(s) IV Push every 4 hours PRN Nausea and/or Vomiting  oxyCODONE    IR 5 milliGRAM(s) Oral every 6 hours PRN Moderate Pain (4 - 6)  oxyCODONE    IR 10 milliGRAM(s) Oral every 6 hours PRN Severe Pain (7 - 10)                          7.9    16.09 )-----------( 114      ( 18 Dec 2024 10:36 )             24.2   12-18    131[L]  |  98  |  27.5[H]  ----------------------------<  166[H]  3.9   |  21.0[L]  |  1.22    Ca    7.6[L]      18 Dec 2024 10:36  Phos  3.3     12-18  Mg     1.8     12-18    TPro  5.5[L]  /  Alb  2.7[L]  /  TBili  0.5  /  DBili  0.2  /  AST  24  /  ALT  13  /  AlkPhos  96  12-17        GENERAL: NAD, lying in bed comfortably  HEAD:  Atraumatic, normocephalic  EYES: EOMI, PERRLA, conjunctiva and sclera clear  ENT: Moist mucous membranes  NECK: Supple, no JVD  HEART: irregularly irregular   LUNGS: Unlabored respirations.   ABDOMEN: Soft, nontender, nondistended  EXTREMITIES: LLE AKA, RLE with peroneal signal only   NERVOUS SYSTEM:  A&Ox3, no focal deficits   SKIN: No rashes or lesions       Patient seen at the bedside and evaluated. Off insulin gtt, labs improving, pain controlled. No acute events overnight.    MEDICATIONS  (STANDING):  acetaminophen     Tablet .. 975 milliGRAM(s) Oral every 6 hours  chlorhexidine 2% Cloths 1 Application(s) Topical daily  heparin  Infusion. 900 Unit(s)/Hr (9 mL/Hr) IV Continuous <Continuous>  insulin glargine Injectable (LANTUS) 15 Unit(s) SubCutaneous at bedtime  insulin lispro (ADMELOG) corrective regimen sliding scale   SubCutaneous three times a day before meals  insulin lispro Injectable (ADMELOG) 5 Unit(s) SubCutaneous three times a day before meals  metoprolol tartrate 25 milliGRAM(s) Oral two times a day  multiple electrolytes Injection Type 1 1000 milliLiter(s) (84 mL/Hr) IV Continuous <Continuous>  polyethylene glycol 3350 17 Gram(s) Oral daily  senna 2 Tablet(s) Oral at bedtime    MEDICATIONS  (PRN):  albuterol    90 MICROgram(s) HFA Inhaler 2 Puff(s) Inhalation every 6 hours PRN Shortness of Breath  heparin   Injectable 6500 Unit(s) IV Push every 6 hours PRN For aPTT less than 40  Vital Signs Last 24 Hrs  T(C): 36.5 (18 Dec 2024 07:25), Max: 37.6 (17 Dec 2024 23:22)  T(F): 97.7 (18 Dec 2024 07:25), Max: 99.6 (17 Dec 2024 23:22)  HR: 87 (18 Dec 2024 10:00) (83 - 115)  BP: 101/57 (18 Dec 2024 10:00) (88/76 - 121/73)  BP(mean): 71 (18 Dec 2024 10:00) (68 - 89)  RR: 25 (18 Dec 2024 10:00) (14 - 25)  SpO2: 99% (18 Dec 2024 10:00) (96% - 100%)    Parameters below as of 18 Dec 2024 08:00  Patient On (Oxygen Delivery Method): room air    heparin   Injectable 3000 Unit(s) IV Push every 6 hours PRN For aPTT between 40 - 57  HYDROmorphone  Injectable 0.5 milliGRAM(s) IV Push every 4 hours PRN Pain  metoprolol tartrate Injectable 5 milliGRAM(s) IV Push every 6 hours PRN HR >100  ondansetron Injectable 4 milliGRAM(s) IV Push every 4 hours PRN Nausea and/or Vomiting  oxyCODONE    IR 5 milliGRAM(s) Oral every 6 hours PRN Moderate Pain (4 - 6)  oxyCODONE    IR 10 milliGRAM(s) Oral every 6 hours PRN Severe Pain (7 - 10)                          7.9    16.09 )-----------( 114      ( 18 Dec 2024 10:36 )             24.2   12-18    131[L]  |  98  |  27.5[H]  ----------------------------<  166[H]  3.9   |  21.0[L]  |  1.22    Ca    7.6[L]      18 Dec 2024 10:36  Phos  3.3     12-18  Mg     1.8     12-18    TPro  5.5[L]  /  Alb  2.7[L]  /  TBili  0.5  /  DBili  0.2  /  AST  24  /  ALT  13  /  AlkPhos  96  12-17        GENERAL: NAD, lying in bed comfortably  HEAD:  Atraumatic, normocephalic  EYES: EOMI, PERRLA, conjunctiva and sclera clear  ENT: Moist mucous membranes  NECK: Supple, no JVD  HEART: irregularly irregular   LUNGS: Unlabored respirations.   ABDOMEN: Soft, nontender, nondistended  EXTREMITIES: LLE AKA, RLE with peroneal signal only   NERVOUS SYSTEM:  A&Ox3, no focal deficits   SKIN: No rashes or lesions

## 2024-12-18 NOTE — CHART NOTE - NSCHARTNOTEFT_GEN_A_CORE
Nutrition Note:     Nutrition Note: Aware pt downgraded from ICU. Chart reviewed; RD to follow up with full nutrition assessment per medical floor protocol.

## 2024-12-18 NOTE — PROGRESS NOTE ADULT - ASSESSMENT
79F with PMH afib (apparently family aware of afib dx, but patient reportedly never took AC/Eliquis), HTN, DM, h/o stroke (unclear what/if deficits); presented with acute limb ischemia of LLE now s/p L AKA and thrombectomy of common femoral artery; cardiology consulted for Afib with RVR.    Cardiac Studies:  EKG 12/16/24: Afib with RVR of 103, QTc 455  TTE 09/27/24: LV EF 55-60%. No regional WMA seen. Normal RV size and fxn. No sig valve disease.

## 2024-12-18 NOTE — PROGRESS NOTE ADULT - ASSESSMENT
79y Female with PMH of afib on eliquis (stopped last week 2/2 to concerns for frequent falls), HTN, DM, h/o stroke admitted with ALI,  found to have thrombus in b/l femoral arteries. Taken to OR on 12/16 for b/l femoral thrombectomy and L AKA. Went into Afib postop requiring multiple pushes of lopressor, increasing insulin requirements requiring insulin drip. Insulin drip weaned off overnight, Afib now rate controlled but rhythm still in afib    Plan:   -appreciate cards reccs - on metoprolol, continuing hep gtt and plan to transition to eliquis for discharge  -f/u endocrine reccs (called yesterday)  -dressing change to L AKA tomorrow 12/19  -R mepilex dressing down 12/21  -appreciate sicu care  -pain control  -strict Is/Os  -continue home meds  -trend labs, replete electrolytes as needed  -encourage OOB  -incentive spirometry  -DVT ppx: SCDs, hep gtt

## 2024-12-18 NOTE — PROGRESS NOTE ADULT - ASSESSMENT
ASSESSMENT: 79y female s/p b/l femoral thrombectomy and L AKA. c/b afib with RVR and refractory hyperglycemia.     PLAN:    Neuro:    - Multimodal pain regimen  - Delirium precautions  - Optimize sleep/wake cycle     CV:   - Maintain MAP > 65  - Continue hemodynamic monitoring  -Afib with RVR: HDS, continue metop 25 BID. Stat dose of 5 IV lopressor with 5 mg q6 PRN ordered  -Appreciate cardiology recs  - EF 60% on TTE 12/17      Pulm:   - Maintain sPO2 >92%  - Pulmonary toilet, IS, OOBTC    GI/Nutrition:   - Advance diet as tolerated per primary team  - Bowel regimen  - Monitor bowel movements    /Renal:   - Monitor kidney fxn  - Monitor UOP  - Replete electrolytes PRN (Mg >2, Phos >3, K >4)  - Silver for strict I&O    ID:  - Monitor fever curve  - Monitor for leukocytosis    Endo:  - Monitor blood glucose  - Maintain euglycemia, 140-180  -Insulin weaned off, now on ISS, premeal and lantus  -A1C 11.5  -f/u medicine/endo recs     Heme/DVT Prophylaxis:  - SCDs  - Monitor H/H, hb 7.9x2. One unit PRBC this AM. f/u repeat labs.   - Heparin gtt    Skin:  - Repositioning for DTI prevention while in bed    Vascular:  - Neurovascular checks Q1  - R Peroneal Signal Only - No Dp/PT     Lines:  - Peripheral IV's  - Maintain a line for now    Dispo:  -tx to floor        ASSESSMENT: 79y female s/p b/l femoral thrombectomy and L AKA. c/b afib with RVR and refractory hyperglycemia.     PLAN:    Neuro:    - Multimodal pain regimen  - Delirium precautions  - Optimize sleep/wake cycle     CV:   - Maintain MAP > 65  - Continue hemodynamic monitoring  -Afib with RVR: HDS, continue metop 25 BID. Stat dose of 5 IV lopressor with 5 mg q6 PRN ordered  -Appreciate cardiology recs  - EF 60% on TTE 12/17      Pulm:   - Maintain sPO2 >92%  - Pulmonary toilet, IS, OOBTC    GI/Nutrition:   - Advance diet as tolerated per primary team  - Bowel regimen  - Monitor bowel movements    /Renal:   - Monitor kidney fxn  - Monitor UOP  - Replete electrolytes PRN (Mg >2, Phos >3, K >4)  - f/u TOV    ID:  - Monitor fever curve  - Monitor for leukocytosis    Endo:  - Monitor blood glucose  - Maintain euglycemia, 140-180  -Insulin weaned off, now on ISS, premeal and lantus  -A1C 11.5  -f/u medicine/endo recs     Heme/DVT Prophylaxis:  - SCDs  - Monitor H/H, hb 7.9x2. One unit PRBC this AM. f/u repeat labs.   - Heparin gtt    Skin:  - Repositioning for DTI prevention while in bed    Vascular:  - Neurovascular checks Q1  - R Peroneal Signal Only - No Dp/PT     Lines:  - Peripheral IV's  - Maintain a line for now    Dispo:  -tx to floor

## 2024-12-18 NOTE — CHART NOTE - NSCHARTNOTEFT_GEN_A_CORE
Called to floor to assess patient with a blood pressure systolic of 88 after receiving metoprolol.      Her BP trends are low at baseline, 90s-110s.      She is mentating well. Some pain at incision sites.      Groin soft bilaterally, ecchymoses on R, L AKA site dressing with strikethrough on lateral aspects, no hematoma, soft.    Hgb appropriately responded to 9 from 7 after transfusion.     Bolus 500 x1, monitor hemodynamics.

## 2024-12-18 NOTE — PHYSICAL THERAPY INITIAL EVALUATION ADULT - PERTINENT HX OF CURRENT PROBLEM, REHAB EVAL
79y Female with PMH of afib on eliquis (patient and family unable to recall when she last took it or if she even takes it at all), HTN, DM, h/o stroke presents to the ED for 1 week history of worsening left leg pain and discoloration. In the ED, provider unable to obtain pulses or doppler signals bilaterally and called emergent vascular surgery consult. Now s/p b/l LE thrombectomy with left AKA. Course c/b by refractory hyperglycemia as well as afib with RVR

## 2024-12-18 NOTE — PROGRESS NOTE ADULT - SUBJECTIVE AND OBJECTIVE BOX
[facility]                                                         CARDIOLOGY AT [place] [address]                                                         Telephone: [phone]. Fax:[phone]                                                                             PROGRESS NOTE    Reason for follow up: Afib with RVR  Update: currently Afib, rate controlled. Sitting in bed eating breakfast, no acute complaints at this time.       Review of symptoms:   Cardiac:  No chest pain. No dyspnea. No palpitations.  Respiratory: no cough. No dyspnea  Gastrointestinal: No diarrhea. No abdominal pain. No bleeding.   Neuro: No focal neuro complaints.    Vitals:  T(C): 36.5 (12-18-24 @ 07:25), Max: 37.6 (12-17-24 @ 23:22)  HR: 89 (12-18-24 @ 09:00) (83 - 122)  BP: 94/59 (12-18-24 @ 09:00) (88/76 - 121/73)  RR: 16 (12-18-24 @ 09:00) (14 - 23)  SpO2: 96% (12-18-24 @ 09:00) (96% - 100%)  Wt(kg): --  I&O's Summary    17 Dec 2024 07:01  -  18 Dec 2024 07:00  --------------------------------------------------------  IN: 2469 mL / OUT: 495 mL / NET: 1974 mL    18 Dec 2024 07:01  -  18 Dec 2024 10:04  --------------------------------------------------------  IN: 329 mL / OUT: 300 mL / NET: 29 mL      Weight (kg): 76.4 (12-16 @ 18:48)    PHYSICAL EXAM:  Appearance: Comfortable. No acute distress  HEENT:  Atraumatic. Normocephalic.  Normal oral mucosa  Neurologic: A & O x 3, no gross focal deficits.  Cardiovascular: RRR S1 S2, No murmur, no rubs/gallops. No JVD  Respiratory: Lungs clear to auscultation, unlabored   Gastrointestinal:  Soft, Non-tender, + BS  Lower Extremities: 2+ Peripheral Pulses, No clubbing, cyanosis, or edema  Psychiatry: Patient is calm. No agitation.   Skin: warm and dry.    CURRENT CARDIAC MEDICATIONS:  metoprolol tartrate 25 milliGRAM(s) Oral two times a day  metoprolol tartrate Injectable 5 milliGRAM(s) IV Push every 6 hours PRN      CURRENT OTHER MEDICATIONS:  albuterol    90 MICROgram(s) HFA Inhaler 2 Puff(s) Inhalation every 6 hours PRN Shortness of Breath  acetaminophen     Tablet .. 975 milliGRAM(s) Oral every 6 hours  HYDROmorphone  Injectable 0.5 milliGRAM(s) IV Push every 4 hours PRN Pain  ondansetron Injectable 4 milliGRAM(s) IV Push every 4 hours PRN Nausea and/or Vomiting  oxyCODONE    IR 5 milliGRAM(s) Oral every 6 hours PRN Moderate Pain (4 - 6)  oxyCODONE    IR 10 milliGRAM(s) Oral every 6 hours PRN Severe Pain (7 - 10)  polyethylene glycol 3350 17 Gram(s) Oral daily  senna 2 Tablet(s) Oral at bedtime  insulin glargine Injectable (LANTUS) 15 Unit(s) SubCutaneous at bedtime  insulin lispro (ADMELOG) corrective regimen sliding scale   SubCutaneous three times a day before meals  insulin lispro Injectable (ADMELOG) 5 Unit(s) SubCutaneous three times a day before meals  chlorhexidine 2% Cloths 1 Application(s) Topical daily  heparin   Injectable 6500 Unit(s) IV Push every 6 hours PRN For aPTT less than 40  heparin   Injectable 3000 Unit(s) IV Push every 6 hours PRN For aPTT between 40 - 57  heparin  Infusion. 900 Unit(s)/Hr (9 mL/Hr) IV Continuous <Continuous>  multiple electrolytes Injection Type 1 1000 milliLiter(s) (84 mL/Hr) IV Continuous <Continuous>      LABS:	 	                            7.9    14.17 )-----------( 113      ( 18 Dec 2024 05:00 )             23.4     12-18    134[L]  |  99  |  28.3[H]  ----------------------------<  165[H]  4.1   |  21.0[L]  |  1.22    Ca    7.3[L]      18 Dec 2024 05:00  Phos  3.3     12-18  Mg     1.8     12-18    TPro  5.5[L]  /  Alb  2.7[L]  /  TBili  0.5  /  DBili  0.2  /  AST  24  /  ALT  13  /  AlkPhos  96  12-17    PT/INR/PTT ( 18 Dec 2024 05:00 )                       :                       :      X            :       76.0                  .        .                   .              .           .       X           .                                       Lipid Profile:   HgA1c:   TSH:     TELEMETRY: Afib rate control   ECG:    DIAGNOSTIC TESTING:  [ ] Echocardiogram:   < from: TTE Limited W or WO Ultrasound Enhancing Agent (12.17.24 @ 20:10) >  TRANSTHORACIC ECHOCARDIOGRAM REPORT  ________________________________________________________________________________                                      _______       Pt. Name:       DANNI LEAHY Study Date:    12/17/2024  MRN:     [id number]                YOB: 1945  Accession #:    [id number]                 Age:           79 years  Account#:       [id number]                Gender:        F  Heart Rate:                               Height:        65.00 in (165.10 cm)  Rhythm:                                   Weight:        168.00 lb (76.20 kg)  Blood Pressure: 97/60 mmHg                BSA/BMI:       1.84 m² / 27.96 kg/m²  ________________________________________________________________________________________  Referring Physician: [id number] Gulshan Tubbs  Primary Sonographer: Shabnam Acuna    CPT:                LIMITED SPECTRAL - 51202.m;DOPPL ECHO COLOR FLOW -                      20151.m;ECHO TTE W CON FU LTD - .m;DEFINITY ECHO                  CONTRAST PER ML - .m  Indication(s):      Unspecified atrial fibrillation - I48.91  Procedure:          Limited transthoracic echocardiogram. Color Doppler                      performed. Spectral Doppler performed.  Ordering Location:  PACU  Admission Status:   Inpatient  Contrast Injection: Verbal consent was obtained for injection of Ultrasonic                      Enhancing Agent following a discussion of risks and                      benefits.                      Endocardial visualization enhanced with 2 ml of Definity                      Ultrasound enhancing agent (Lot#:[id number] Exp.Date:06/2025).  UEA Reaction:       Patient had no adverse reaction after injection of                      Ultrasound Enhancing Agent.  Study Information:  Image quality for this study is technically difficult.    _______________________________________________________________________________________     CONCLUSIONS:      1. Technically difficult image quality.   2. Left ventricular systolic function is normal with an ejection fraction of 60 % by Monterroso's method of disks.   3. Analysis of left ventricular diastolic function and filling pressure is made challenging by the presence of atrial fibrillation.   4. Normal right ventricular cavity size and normal right ventricular systolic function.   5. Trace mitral regurgitation.   6. No pericardial effusion seen.   7. Compared to the transthoracic echocardiogram performed on 9/27/2024, there have been no significant interval changes.    < end of copied text >  [ ]  Catheterization:  [ ] Stress Test:    OTHER:

## 2024-12-19 LAB
ANION GAP SERPL CALC-SCNC: 13 MMOL/L — SIGNIFICANT CHANGE UP (ref 5–17)
APTT BLD: 49.9 SEC — HIGH (ref 24.5–35.6)
APTT BLD: 77.6 SEC — HIGH (ref 24.5–35.6)
APTT BLD: 83.8 SEC — HIGH (ref 24.5–35.6)
BASOPHILS # BLD AUTO: 0.02 K/UL — SIGNIFICANT CHANGE UP (ref 0–0.2)
BASOPHILS NFR BLD AUTO: 0.2 % — SIGNIFICANT CHANGE UP (ref 0–2)
BUN SERPL-MCNC: 21.4 MG/DL — HIGH (ref 8–20)
CALCIUM SERPL-MCNC: 7.2 MG/DL — LOW (ref 8.4–10.5)
CHLORIDE SERPL-SCNC: 102 MMOL/L — SIGNIFICANT CHANGE UP (ref 96–108)
CO2 SERPL-SCNC: 20 MMOL/L — LOW (ref 22–29)
CREAT SERPL-MCNC: 0.92 MG/DL — SIGNIFICANT CHANGE UP (ref 0.5–1.3)
EGFR: 63 ML/MIN/1.73M2 — SIGNIFICANT CHANGE UP
EOSINOPHIL # BLD AUTO: 0.01 K/UL — SIGNIFICANT CHANGE UP (ref 0–0.5)
EOSINOPHIL NFR BLD AUTO: 0.1 % — SIGNIFICANT CHANGE UP (ref 0–6)
GLUCOSE BLDC GLUCOMTR-MCNC: 105 MG/DL — HIGH (ref 70–99)
GLUCOSE BLDC GLUCOMTR-MCNC: 113 MG/DL — HIGH (ref 70–99)
GLUCOSE BLDC GLUCOMTR-MCNC: 137 MG/DL — HIGH (ref 70–99)
GLUCOSE BLDC GLUCOMTR-MCNC: 157 MG/DL — HIGH (ref 70–99)
GLUCOSE SERPL-MCNC: 131 MG/DL — HIGH (ref 70–99)
HCT VFR BLD CALC: 26.8 % — LOW (ref 34.5–45)
HGB BLD-MCNC: 8.9 G/DL — LOW (ref 11.5–15.5)
IMM GRANULOCYTES NFR BLD AUTO: 0.7 % — SIGNIFICANT CHANGE UP (ref 0–0.9)
LYMPHOCYTES # BLD AUTO: 0.88 K/UL — LOW (ref 1–3.3)
LYMPHOCYTES # BLD AUTO: 7.7 % — LOW (ref 13–44)
MAGNESIUM SERPL-MCNC: 2.2 MG/DL — SIGNIFICANT CHANGE UP (ref 1.6–2.6)
MCHC RBC-ENTMCNC: 26.4 PG — LOW (ref 27–34)
MCHC RBC-ENTMCNC: 33.2 G/DL — SIGNIFICANT CHANGE UP (ref 32–36)
MCV RBC AUTO: 79.5 FL — LOW (ref 80–100)
MONOCYTES # BLD AUTO: 0.72 K/UL — SIGNIFICANT CHANGE UP (ref 0–0.9)
MONOCYTES NFR BLD AUTO: 6.3 % — SIGNIFICANT CHANGE UP (ref 2–14)
NEUTROPHILS # BLD AUTO: 9.71 K/UL — HIGH (ref 1.8–7.4)
NEUTROPHILS NFR BLD AUTO: 85 % — HIGH (ref 43–77)
PHOSPHATE SERPL-MCNC: 2.4 MG/DL — SIGNIFICANT CHANGE UP (ref 2.4–4.7)
PLATELET # BLD AUTO: 118 K/UL — LOW (ref 150–400)
POTASSIUM SERPL-MCNC: 3.9 MMOL/L — SIGNIFICANT CHANGE UP (ref 3.5–5.3)
POTASSIUM SERPL-SCNC: 3.9 MMOL/L — SIGNIFICANT CHANGE UP (ref 3.5–5.3)
RBC # BLD: 3.37 M/UL — LOW (ref 3.8–5.2)
RBC # FLD: 14.2 % — SIGNIFICANT CHANGE UP (ref 10.3–14.5)
SODIUM SERPL-SCNC: 135 MMOL/L — SIGNIFICANT CHANGE UP (ref 135–145)
WBC # BLD: 11.42 K/UL — HIGH (ref 3.8–10.5)
WBC # FLD AUTO: 11.42 K/UL — HIGH (ref 3.8–10.5)

## 2024-12-19 PROCEDURE — 70450 CT HEAD/BRAIN W/O DYE: CPT | Mod: 26

## 2024-12-19 PROCEDURE — 99233 SBSQ HOSP IP/OBS HIGH 50: CPT

## 2024-12-19 RX ADMIN — Medication 10 MILLIGRAM(S): at 22:30

## 2024-12-19 RX ADMIN — SODIUM CHLORIDE 84 MILLILITER(S): 9 INJECTION, SOLUTION INTRAVENOUS at 09:48

## 2024-12-19 RX ADMIN — CEFTRIAXONE SODIUM 1000 MILLIGRAM(S): 1 INJECTION, POWDER, FOR SOLUTION INTRAMUSCULAR; INTRAVENOUS at 00:56

## 2024-12-19 RX ADMIN — HEPARIN SODIUM 1100 UNIT(S)/HR: 1000 INJECTION, SOLUTION INTRAVENOUS; SUBCUTANEOUS at 05:33

## 2024-12-19 RX ADMIN — ACETAMINOPHEN 975 MILLIGRAM(S): 80 SOLUTION/ DROPS ORAL at 03:15

## 2024-12-19 RX ADMIN — Medication 5 UNIT(S): at 09:20

## 2024-12-19 RX ADMIN — SODIUM CHLORIDE 84 MILLILITER(S): 9 INJECTION, SOLUTION INTRAVENOUS at 22:45

## 2024-12-19 RX ADMIN — Medication 17 GRAM(S): at 12:14

## 2024-12-19 RX ADMIN — ACETAMINOPHEN 975 MILLIGRAM(S): 80 SOLUTION/ DROPS ORAL at 12:14

## 2024-12-19 RX ADMIN — CHLORHEXIDINE GLUCONATE 1 APPLICATION(S): 1.2 RINSE ORAL at 12:14

## 2024-12-19 RX ADMIN — Medication 5 UNIT(S): at 18:24

## 2024-12-19 RX ADMIN — Medication 25 MILLIGRAM(S): at 05:32

## 2024-12-19 RX ADMIN — ACETAMINOPHEN 975 MILLIGRAM(S): 80 SOLUTION/ DROPS ORAL at 04:36

## 2024-12-19 RX ADMIN — INSULIN GLARGINE-YFGN 15 UNIT(S): 100 INJECTION, SOLUTION SUBCUTANEOUS at 22:19

## 2024-12-19 RX ADMIN — HEPARIN SODIUM 1100 UNIT(S)/HR: 1000 INJECTION, SOLUTION INTRAVENOUS; SUBCUTANEOUS at 12:47

## 2024-12-19 RX ADMIN — ACETAMINOPHEN 975 MILLIGRAM(S): 80 SOLUTION/ DROPS ORAL at 18:24

## 2024-12-19 RX ADMIN — HEPARIN SODIUM 1100 UNIT(S)/HR: 1000 INJECTION, SOLUTION INTRAVENOUS; SUBCUTANEOUS at 07:37

## 2024-12-19 RX ADMIN — Medication 2: at 12:15

## 2024-12-19 RX ADMIN — Medication 25 MILLIGRAM(S): at 18:24

## 2024-12-19 RX ADMIN — ACETAMINOPHEN 975 MILLIGRAM(S): 80 SOLUTION/ DROPS ORAL at 20:00

## 2024-12-19 RX ADMIN — SENNOSIDES 2 TABLET(S): 8.6 TABLET, FILM COATED ORAL at 21:30

## 2024-12-19 RX ADMIN — HEPARIN SODIUM 1100 UNIT(S)/HR: 1000 INJECTION, SOLUTION INTRAVENOUS; SUBCUTANEOUS at 20:25

## 2024-12-19 RX ADMIN — HEPARIN SODIUM 1100 UNIT(S)/HR: 1000 INJECTION, SOLUTION INTRAVENOUS; SUBCUTANEOUS at 21:31

## 2024-12-19 RX ADMIN — Medication 5 UNIT(S): at 12:15

## 2024-12-19 RX ADMIN — HEPARIN SODIUM 3000 UNIT(S): 1000 INJECTION, SOLUTION INTRAVENOUS; SUBCUTANEOUS at 05:32

## 2024-12-19 RX ADMIN — Medication 10 MILLIGRAM(S): at 21:30

## 2024-12-19 NOTE — PROGRESS NOTE ADULT - SUBJECTIVE AND OBJECTIVE BOX
seen for pad, aka    ams overnight, received muñoz for retention and place on rocephin for suspected uti    more awake/ alert per daughter at bedside. seems at baseline  patient w/o acute complaints  ros negative   utilized at bedside     MEDICATIONS  (STANDING):  acetaminophen     Tablet .. 975 milliGRAM(s) Oral every 6 hours  cefTRIAXone Injectable. 1000 milliGRAM(s) IV Push every 24 hours  chlorhexidine 2% Cloths 1 Application(s) Topical daily  heparin  Infusion. 900 Unit(s)/Hr (9 mL/Hr) IV Continuous <Continuous>  insulin glargine Injectable (LANTUS) 15 Unit(s) SubCutaneous at bedtime  insulin lispro (ADMELOG) corrective regimen sliding scale   SubCutaneous three times a day before meals  insulin lispro Injectable (ADMELOG) 5 Unit(s) SubCutaneous three times a day before meals  metoprolol tartrate 25 milliGRAM(s) Oral two times a day  multiple electrolytes Injection Type 1 1000 milliLiter(s) (84 mL/Hr) IV Continuous <Continuous>  polyethylene glycol 3350 17 Gram(s) Oral daily  senna 2 Tablet(s) Oral at bedtime    MEDICATIONS  (PRN):  albuterol    90 MICROgram(s) HFA Inhaler 2 Puff(s) Inhalation every 6 hours PRN Shortness of Breath  heparin   Injectable 6500 Unit(s) IV Push every 6 hours PRN For aPTT less than 40  heparin   Injectable 3000 Unit(s) IV Push every 6 hours PRN For aPTT between 40 - 57  HYDROmorphone  Injectable 0.5 milliGRAM(s) IV Push every 4 hours PRN Pain  metoprolol tartrate Injectable 5 milliGRAM(s) IV Push every 6 hours PRN HR >100  ondansetron Injectable 4 milliGRAM(s) IV Push every 4 hours PRN Nausea and/or Vomiting  oxyCODONE    IR 5 milliGRAM(s) Oral every 6 hours PRN Moderate Pain (4 - 6)  oxyCODONE    IR 10 milliGRAM(s) Oral every 6 hours PRN Severe Pain (7 - 10)      Allergies    No Known Allergies       Vital Signs Last 24 Hrs  T(C): 37.3 (19 Dec 2024 08:24), Max: 37.6 (18 Dec 2024 15:41)  T(F): 99.1 (19 Dec 2024 08:24), Max: 99.6 (18 Dec 2024 15:41)  HR: 95 (19 Dec 2024 08:24) (86 - 101)  BP: 100/62 (19 Dec 2024 08:24) (88/58 - 123/72)  BP(mean): 78 (18 Dec 2024 15:41) (65 - 78)  RR: 18 (19 Dec 2024 08:24) (18 - 19)  SpO2: 97% (19 Dec 2024 08:24) (97% - 99%)    Parameters below as of 19 Dec 2024 08:24  Patient On (Oxygen Delivery Method): room air        PHYSICAL EXAM:    GENERAL: NAD   CHEST/LUNG: Clear to ausculation bilaterally   HEART: Regular rate and rhythm; S1 S2;   ABDOMEN: Soft, Nontender,  ; Bowel sounds present  EXTREMITIES: left aka bandaged   NERVOUS SYSTEM:  Alert & Oriented X2 moves UE's on command.     LABS:                        8.9    11.42 )-----------( 118      ( 19 Dec 2024 04:02 )             26.8     12-19    135  |  102  |  21.4[H]  ----------------------------<  131[H]  3.9   |  20.0[L]  |  0.92    Ca    7.2[L]      19 Dec 2024 04:02  Phos  2.4     12-19  Mg     2.2     12-19    TPro  5.5[L]  /  Alb  2.7[L]  /  TBili  0.5  /  DBili  0.2  /  AST  24  /  ALT  13  /  AlkPhos  96  12-17    PT/INR - ( 17 Dec 2024 17:02 )   PT: 13.3 sec;   INR: 1.15 ratio         PTT - ( 19 Dec 2024 12:03 )  PTT:77.6 sec  Urinalysis Basic - ( 19 Dec 2024 04:02 )    Color: x / Appearance: x / SG: x / pH: x  Gluc: 131 mg/dL / Ketone: x  / Bili: x / Urobili: x   Blood: x / Protein: x / Nitrite: x   Leuk Esterase: x / RBC: x / WBC x   Sq Epi: x / Non Sq Epi: x / Bacteria: x        CAPILLARY BLOOD GLUCOSE      POCT Blood Glucose.: 157 mg/dL (19 Dec 2024 11:59)  POCT Blood Glucose.: 137 mg/dL (19 Dec 2024 08:31)  POCT Blood Glucose.: 161 mg/dL (18 Dec 2024 22:07)  POCT Blood Glucose.: 171 mg/dL (18 Dec 2024 17:19)  POCT Blood Glucose.: 193 mg/dL (18 Dec 2024 13:03)        RADIOLOGY & ADDITIONAL TESTS:

## 2024-12-19 NOTE — PROGRESS NOTE ADULT - ASSESSMENT
79y Female with PMH of afib on eliquis (patient and family unable to recall when she last took it or if she even takes it at all), HTN, DM, h/o stroke presents to the ED for 1 week history of worsening left leg pain and discoloration. Patient accompanied by family who state that she has never had any leg pain or vascular concerns in the past but began to complain of left hip pain last week that radiated down the left leg. The pain was constant and there were no aggrevating or alleviating factors with the pain. Family attempted to relieve pain with topical creams but when no relief they brought her in for further management. Of note, patient is unable to ambulate on her own and can only ambulate as far as to the bathroom with assistance. In the ED, provider unable to obtain pulses or doppler signals bilaterally and called emergent vascular surgery consult. s/p left AKA and b/l femoral thrombectomy 12/16. brief SICU stay for insulin drip. transferred to floors on 12/18    limb ischemia  s/p left AKA  s/p b/l femoral thrombectomy  PAD    c/w heparin drip    vascular surgery is primary service    pain control    bowel regimen    afib with rvr    lopressor 25mg bid     on heparin drip    on eliquis at home     cardiology following    uncontrolled dm2 with hyperglycemia    a1c 11.5    endocrine following, c/w insulin regimen    urinary retention  suspected UTI  acute metabolic encephalopathy    delirium precautions    c/w rocephin pending cultures     trial of void when able       spoke to vascular surgery pa

## 2024-12-19 NOTE — PATIENT PROFILE ADULT - FALL HARM RISK - HARM RISK INTERVENTIONS
Assistance with ambulation/Assistance OOB with selected safe patient handling equipment/Communicate Risk of Fall with Harm to all staff/Discuss with provider need for PT consult/Monitor gait and stability/Provide patient with walking aids - walker, cane, crutches/Reinforce activity limits and safety measures with patient and family/Sit up slowly, dangle for a short time, stand at bedside before walking/Tailored Fall Risk Interventions/Use of alarms - bed, chair and/or voice tab/Visual Cue: Yellow wristband and red socks/Bed in lowest position, wheels locked, appropriate side rails in place/Call bell, personal items and telephone in reach/Instruct patient to call for assistance before getting out of bed or chair/Non-slip footwear when patient is out of bed/[place] to call system/Physically safe environment - no spills, clutter or unnecessary equipment/Purposeful Proactive Rounding/Room/bathroom lighting operational, light cord in reach

## 2024-12-19 NOTE — PATIENT PROFILE ADULT - FUNCTIONAL ASSESSMENT - DAILY ACTIVITY SCORE.
Returned phone call to patient and her . Patient reports having concerns regarding her recent increase in her creatinine. Reports taking antibiotics recently due to flu like symptoms. Denies any s/s of swelling, fatigue, or increase in weight. Patient encourage to follow up with her primary nephrologist regarding her current concerns. Denies other questions or concerns at this time.       Regarding: Pt Advice  Pt called stating that she would like for Andreia to give her  a call back in regards to a medical question he have.      Contact Shorty  @ [phone]    
24

## 2024-12-19 NOTE — CHART NOTE - NSCHARTNOTEFT_GEN_A_CORE
Called to assess patient who was confused for the nursing AOx1.  On my assessment she is AOx2.  She is C/O a headache (mild), epigastric pain, no nausea or vomiting.  She had a rectal temp of 100.4.  She is alert and orientated to person and place.  She is not orientated to time.  Denies vision changes.  She is following commands, alert, no evidence of a stroke, no loss of nasolabial folds, moving extremities.  Dressings removed, R groin with ecchymoses, L groin soft, L AKA with a little ooze, no overt bleeding.  Abdomen soft, NT, ND.      This could be related to delirium. Will re-assess, if mentation further declines, will consider cross sectional imaging.  This was discussed with Dr. Guthrie. Called to assess patient who was confused for the nursing AOx1.  On my assessment she is AOx2.  She is C/O a headache (mild), epigastric pain, no nausea or vomiting.  She had a rectal temp of 100.4.  She is alert and orientated to person and place.  She is not orientated to time.  Denies vision changes.  She is following commands, alert, no evidence of a stroke, no loss of nasolabial folds, moving extremities.  Dressings removed, R groin with ecchymoses, L groin soft, L AKA with a little ooze, no overt bleeding.  Abdomen soft, NT, ND.      This could be related to delirium. Will re-assess, if mentation further declines, will consider cross sectional imaging.  This was discussed with Dr. Guthrie.    Earlier in the night, she retained urine, her urine was foul smelling and a UA was sent, c/f a UTI.  She was started on rocephin.  Will monitor white count and check 4AM labs.

## 2024-12-19 NOTE — PROGRESS NOTE ADULT - SUBJECTIVE AND OBJECTIVE BOX
[id number]    History:      seen and examined  overnight events noted  workup for CVA negative   family at bedside, no current reports of nausea, vomiting, chest pain, shortness of breath, abdominal pain or fever    Vital Signs Last 24 Hrs  T(C): 37.3 (19 Dec 2024 08:24), Max: 37.6 (18 Dec 2024 15:41)  T(F): 99.1 (19 Dec 2024 08:24), Max: 99.6 (18 Dec 2024 15:41)  HR: 95 (19 Dec 2024 08:24) (82 - 101)  BP: 100/62 (19 Dec 2024 08:24) (88/58 - 123/72)  BP(mean): 78 (18 Dec 2024 15:41) (65 - 78)  RR: 18 (19 Dec 2024 08:24) (16 - 25)  SpO2: 97% (19 Dec 2024 08:24) (97% - 99%)    Parameters below as of 19 Dec 2024 08:24  Patient On (Oxygen Delivery Method): room air      I&O's Summary    18 Dec 2024 07:01  -  19 Dec 2024 07:00  --------------------------------------------------------  IN: 1449 mL / OUT: 1700 mL / NET: -251 mL                              8.9    11.42 )-----------( 118      ( 19 Dec 2024 04:02 )             26.8     12-19    135  |  102  |  21.4[H]  ----------------------------<  131[H]  3.9   |  20.0[L]  |  0.92    Ca    7.2[L]      19 Dec 2024 04:02  Phos  2.4     12-19  Mg     2.2     12-19    TPro  5.5[L]  /  Alb  2.7[L]  /  TBili  0.5  /  DBili  0.2  /  AST  24  /  ALT  13  /  AlkPhos  96  12-17      MEDICATIONS  (STANDING):  acetaminophen     Tablet .. 975 milliGRAM(s) Oral every 6 hours  cefTRIAXone Injectable. 1000 milliGRAM(s) IV Push every 24 hours  chlorhexidine 2% Cloths 1 Application(s) Topical daily  heparin  Infusion. 900 Unit(s)/Hr (9 mL/Hr) IV Continuous <Continuous>  insulin glargine Injectable (LANTUS) 15 Unit(s) SubCutaneous at bedtime  insulin lispro (ADMELOG) corrective regimen sliding scale   SubCutaneous three times a day before meals  insulin lispro Injectable (ADMELOG) 5 Unit(s) SubCutaneous three times a day before meals  metoprolol tartrate 25 milliGRAM(s) Oral two times a day  multiple electrolytes Injection Type 1 1000 milliLiter(s) (84 mL/Hr) IV Continuous <Continuous>  polyethylene glycol 3350 17 Gram(s) Oral daily  senna 2 Tablet(s) Oral at bedtime    MEDICATIONS  (PRN):  albuterol    90 MICROgram(s) HFA Inhaler 2 Puff(s) Inhalation every 6 hours PRN Shortness of Breath  heparin   Injectable 6500 Unit(s) IV Push every 6 hours PRN For aPTT less than 40  heparin   Injectable 3000 Unit(s) IV Push every 6 hours PRN For aPTT between 40 - 57  HYDROmorphone  Injectable 0.5 milliGRAM(s) IV Push every 4 hours PRN Pain  metoprolol tartrate Injectable 5 milliGRAM(s) IV Push every 6 hours PRN HR >100  ondansetron Injectable 4 milliGRAM(s) IV Push every 4 hours PRN Nausea and/or Vomiting  oxyCODONE    IR 5 milliGRAM(s) Oral every 6 hours PRN Moderate Pain (4 - 6)  oxyCODONE    IR 10 milliGRAM(s) Oral every 6 hours PRN Severe Pain (7 - 10)      Physical exam:     no current distress  alert, oriented to self  non labored breathing   Abdomen is soft, non tender  bilateral groin access sites are clean, intact  right le is warm, signals present, AT/peroneal  Left AKA sump is intact; bullae at the anteromedial aspect      Primary  Assessment:  • s/p bilateral femoral artery thrombectomy and left AKA due to unsalvageable left distal LE     •   Plan:   • Need to start the discharge process  - plan to transition to oral anticoagulant tomorrow

## 2024-12-19 NOTE — PROVIDER CONTACT NOTE (OTHER) - ACTION/TREATMENT ORDERED:
Provider notified of aptt value 49.9; will follow Heparin Nomogram and give 3000 unit bolus and increase rate by 2cc/hr.

## 2024-12-19 NOTE — PATIENT PROFILE ADULT - FUNCTIONAL ASSESSMENT - DAILY ACTIVITY 1.
Patient denies taking any prescription or over the counter medications.   4 = No assist / stand by assistance

## 2024-12-20 LAB
ANION GAP SERPL CALC-SCNC: 9 MMOL/L — SIGNIFICANT CHANGE UP (ref 5–17)
APTT BLD: 76 SEC — HIGH (ref 24.5–35.6)
BASOPHILS # BLD AUTO: 0.03 K/UL — SIGNIFICANT CHANGE UP (ref 0–0.2)
BASOPHILS NFR BLD AUTO: 0.4 % — SIGNIFICANT CHANGE UP (ref 0–2)
BUN SERPL-MCNC: 16.5 MG/DL — SIGNIFICANT CHANGE UP (ref 8–20)
CALCIUM SERPL-MCNC: 7.3 MG/DL — LOW (ref 8.4–10.5)
CHLORIDE SERPL-SCNC: 104 MMOL/L — SIGNIFICANT CHANGE UP (ref 96–108)
CO2 SERPL-SCNC: 25 MMOL/L — SIGNIFICANT CHANGE UP (ref 22–29)
CREAT SERPL-MCNC: 0.82 MG/DL — SIGNIFICANT CHANGE UP (ref 0.5–1.3)
EGFR: 72 ML/MIN/1.73M2 — SIGNIFICANT CHANGE UP
EOSINOPHIL # BLD AUTO: 0.11 K/UL — SIGNIFICANT CHANGE UP (ref 0–0.5)
EOSINOPHIL NFR BLD AUTO: 1.3 % — SIGNIFICANT CHANGE UP (ref 0–6)
GLUCOSE BLDC GLUCOMTR-MCNC: 111 MG/DL — HIGH (ref 70–99)
GLUCOSE BLDC GLUCOMTR-MCNC: 113 MG/DL — HIGH (ref 70–99)
GLUCOSE BLDC GLUCOMTR-MCNC: 84 MG/DL — SIGNIFICANT CHANGE UP (ref 70–99)
GLUCOSE BLDC GLUCOMTR-MCNC: 89 MG/DL — SIGNIFICANT CHANGE UP (ref 70–99)
GLUCOSE SERPL-MCNC: 104 MG/DL — HIGH (ref 70–99)
HCT VFR BLD CALC: 24.2 % — LOW (ref 34.5–45)
HCT VFR BLD CALC: 25.2 % — LOW (ref 34.5–45)
HGB BLD-MCNC: 8.2 G/DL — LOW (ref 11.5–15.5)
HGB BLD-MCNC: 8.4 G/DL — LOW (ref 11.5–15.5)
IMM GRANULOCYTES NFR BLD AUTO: 0.5 % — SIGNIFICANT CHANGE UP (ref 0–0.9)
LYMPHOCYTES # BLD AUTO: 1.4 K/UL — SIGNIFICANT CHANGE UP (ref 1–3.3)
LYMPHOCYTES # BLD AUTO: 16.9 % — SIGNIFICANT CHANGE UP (ref 13–44)
MAGNESIUM SERPL-MCNC: 2.1 MG/DL — SIGNIFICANT CHANGE UP (ref 1.6–2.6)
MCHC RBC-ENTMCNC: 26.6 PG — LOW (ref 27–34)
MCHC RBC-ENTMCNC: 26.7 PG — LOW (ref 27–34)
MCHC RBC-ENTMCNC: 33.3 G/DL — SIGNIFICANT CHANGE UP (ref 32–36)
MCHC RBC-ENTMCNC: 33.9 G/DL — SIGNIFICANT CHANGE UP (ref 32–36)
MCV RBC AUTO: 78.6 FL — LOW (ref 80–100)
MCV RBC AUTO: 80 FL — SIGNIFICANT CHANGE UP (ref 80–100)
MONOCYTES # BLD AUTO: 0.77 K/UL — SIGNIFICANT CHANGE UP (ref 0–0.9)
MONOCYTES NFR BLD AUTO: 9.3 % — SIGNIFICANT CHANGE UP (ref 2–14)
NEUTROPHILS # BLD AUTO: 5.94 K/UL — SIGNIFICANT CHANGE UP (ref 1.8–7.4)
NEUTROPHILS NFR BLD AUTO: 71.6 % — SIGNIFICANT CHANGE UP (ref 43–77)
PHOSPHATE SERPL-MCNC: 2.6 MG/DL — SIGNIFICANT CHANGE UP (ref 2.4–4.7)
PLATELET # BLD AUTO: 125 K/UL — LOW (ref 150–400)
PLATELET # BLD AUTO: 146 K/UL — LOW (ref 150–400)
POTASSIUM SERPL-MCNC: 3.9 MMOL/L — SIGNIFICANT CHANGE UP (ref 3.5–5.3)
POTASSIUM SERPL-SCNC: 3.9 MMOL/L — SIGNIFICANT CHANGE UP (ref 3.5–5.3)
RBC # BLD: 3.08 M/UL — LOW (ref 3.8–5.2)
RBC # BLD: 3.15 M/UL — LOW (ref 3.8–5.2)
RBC # FLD: 14.4 % — SIGNIFICANT CHANGE UP (ref 10.3–14.5)
RBC # FLD: 14.6 % — HIGH (ref 10.3–14.5)
SODIUM SERPL-SCNC: 138 MMOL/L — SIGNIFICANT CHANGE UP (ref 135–145)
WBC # BLD: 8.29 K/UL — SIGNIFICANT CHANGE UP (ref 3.8–10.5)
WBC # BLD: 8.49 K/UL — SIGNIFICANT CHANGE UP (ref 3.8–10.5)
WBC # FLD AUTO: 8.29 K/UL — SIGNIFICANT CHANGE UP (ref 3.8–10.5)
WBC # FLD AUTO: 8.49 K/UL — SIGNIFICANT CHANGE UP (ref 3.8–10.5)

## 2024-12-20 PROCEDURE — 74176 CT ABD & PELVIS W/O CONTRAST: CPT | Mod: 26

## 2024-12-20 PROCEDURE — 99231 SBSQ HOSP IP/OBS SF/LOW 25: CPT

## 2024-12-20 RX ORDER — SOD PHOS DI, MONO/K PHOS MONO 250 MG
1 TABLET ORAL
Refills: 0 | Status: COMPLETED | OUTPATIENT
Start: 2024-12-20 | End: 2024-12-21

## 2024-12-20 RX ORDER — HEPARIN SODIUM 1000 [USP'U]/ML
3000 INJECTION, SOLUTION INTRAVENOUS; SUBCUTANEOUS EVERY 6 HOURS
Refills: 0 | Status: DISCONTINUED | OUTPATIENT
Start: 2024-12-20 | End: 2024-12-22

## 2024-12-20 RX ORDER — HEPARIN SODIUM 1000 [USP'U]/ML
900 INJECTION, SOLUTION INTRAVENOUS; SUBCUTANEOUS
Qty: 25000 | Refills: 0 | Status: DISCONTINUED | OUTPATIENT
Start: 2024-12-20 | End: 2024-12-22

## 2024-12-20 RX ORDER — HEPARIN SODIUM 1000 [USP'U]/ML
6500 INJECTION, SOLUTION INTRAVENOUS; SUBCUTANEOUS EVERY 6 HOURS
Refills: 0 | Status: DISCONTINUED | OUTPATIENT
Start: 2024-12-20 | End: 2024-12-22

## 2024-12-20 RX ADMIN — CEFTRIAXONE SODIUM 1000 MILLIGRAM(S): 1 INJECTION, POWDER, FOR SOLUTION INTRAMUSCULAR; INTRAVENOUS at 01:45

## 2024-12-20 RX ADMIN — ACETAMINOPHEN 975 MILLIGRAM(S): 80 SOLUTION/ DROPS ORAL at 23:29

## 2024-12-20 RX ADMIN — HEPARIN SODIUM 900 UNIT(S)/HR: 1000 INJECTION, SOLUTION INTRAVENOUS; SUBCUTANEOUS at 18:31

## 2024-12-20 RX ADMIN — ACETAMINOPHEN 975 MILLIGRAM(S): 80 SOLUTION/ DROPS ORAL at 05:52

## 2024-12-20 RX ADMIN — ACETAMINOPHEN 975 MILLIGRAM(S): 80 SOLUTION/ DROPS ORAL at 06:31

## 2024-12-20 RX ADMIN — ACETAMINOPHEN 975 MILLIGRAM(S): 80 SOLUTION/ DROPS ORAL at 18:30

## 2024-12-20 RX ADMIN — ACETAMINOPHEN 975 MILLIGRAM(S): 80 SOLUTION/ DROPS ORAL at 01:45

## 2024-12-20 RX ADMIN — INSULIN GLARGINE-YFGN 15 UNIT(S): 100 INJECTION, SOLUTION SUBCUTANEOUS at 21:21

## 2024-12-20 RX ADMIN — ACETAMINOPHEN 975 MILLIGRAM(S): 80 SOLUTION/ DROPS ORAL at 14:16

## 2024-12-20 RX ADMIN — SENNOSIDES 2 TABLET(S): 8.6 TABLET, FILM COATED ORAL at 21:20

## 2024-12-20 RX ADMIN — Medication 1 TABLET(S): at 18:31

## 2024-12-20 RX ADMIN — Medication 5 UNIT(S): at 13:41

## 2024-12-20 RX ADMIN — Medication 25 MILLIGRAM(S): at 05:50

## 2024-12-20 RX ADMIN — Medication 5 UNIT(S): at 09:32

## 2024-12-20 RX ADMIN — Medication 17 GRAM(S): at 14:24

## 2024-12-20 RX ADMIN — HEPARIN SODIUM 900 UNIT(S)/HR: 1000 INJECTION, SOLUTION INTRAVENOUS; SUBCUTANEOUS at 19:26

## 2024-12-20 RX ADMIN — Medication 25 MILLIGRAM(S): at 18:31

## 2024-12-20 RX ADMIN — CEFTRIAXONE SODIUM 1000 MILLIGRAM(S): 1 INJECTION, POWDER, FOR SOLUTION INTRAMUSCULAR; INTRAVENOUS at 23:35

## 2024-12-20 NOTE — DIETITIAN INITIAL EVALUATION ADULT - OTHER INFO
79y Female with PMH DM, h/o stroke admitted with ALI,  found to have thrombus in b/l femoral arteries. Taken to OR on 12/16 for b/l femoral thrombectomy and L AKA.  s/p left AKA and b/l femoral thrombectomy 12/16.

## 2024-12-20 NOTE — DIETITIAN INITIAL EVALUATION ADULT - ADD RECOMMEND
- Monitor weights daily for trend/accuracy  - Continue diet as tolerated.   - Rx MVI daily, vit C 500mg

## 2024-12-20 NOTE — DIETITIAN INITIAL EVALUATION ADULT - ORAL INTAKE PTA/DIET HISTORY
Spoke with pt and granddaughter at bedside this afternoon. Consuming lunch tray at time of visit with good appetite today, and PTA as per family member. UBW: 170 lbs. 168.4 lbs on this admission. Will add glucerna BID for additional protein/calories. Nutrition/diet education not appropriate at this time. NKFA. a1c 11.5%. RD to remain available.

## 2024-12-20 NOTE — PROGRESS NOTE ADULT - ASSESSMENT
79y Female with PMH of afib on eliquis (stopped last week 2/2 to concerns for frequent falls), HTN, DM, h/o stroke admitted with ALI,  found to have thrombus in b/l femoral arteries. Taken to OR on 12/16 for b/l femoral thrombectomy and L AKA. Went into Afib postop requiring multiple pushes of lopressor, increasing insulin requirements requiring insulin drip. Insulin drip weaned off overnight, Afib now rate controlled but rhythm still in afib    Plan:   -drifting HB, will hold hep gtt and obtain noncon CT A/P  -will repeat CBC at 4pm  -appreciate cards reccs - on metoprolol, continuing hep gtt and plan to transition to eliquis for discharge  -pain control  -strict Is/Os  -continue home meds  -trend labs, replete electrolytes as needed  -encourage OOB  -incentive spirometry  -DVT ppx: SCDs

## 2024-12-20 NOTE — PROGRESS NOTE ADULT - ASSESSMENT
79F with PMH of afib, HTN, DM, h/o stroke presents to the ED for 1 week history of worsening left leg pain and discoloration. In the ED, provider unable to obtain pulses or doppler signals bilaterally. S/p Left above knee amputation.    Consulted for diabetes management  Home regimen: glipizide, farxiga  Current a1c: 11.5%    1. Uncontrolled DM2- glucoses stable  - Continue lantus 15 units daily  - Continue admelog 5 units TID  - Correction scale  - Will need insulin on discharge, please provide education and teaching    2. Left AKA  - Pain control, care per vascular surgery    3. Atrial fibrillation  - On metoprolol, heparin gtt

## 2024-12-20 NOTE — PROGRESS NOTE ADULT - SUBJECTIVE AND OBJECTIVE BOX
Patient seen and examined at bedside. No acute events overnight, having some pain at L AKA stump but not worsening    Vitals:  Vital Signs Last 24 Hrs  T(C): 36.6 (20 Dec 2024 04:25), Max: 37.4 (19 Dec 2024 15:28)  T(F): 97.9 (20 Dec 2024 04:25), Max: 99.4 (19 Dec 2024 15:28)  HR: 76 (20 Dec 2024 04:25) (76 - 95)  BP: 107/62 (20 Dec 2024 04:25) (99/61 - 129/77)  BP(mean): 75 (19 Dec 2024 19:21) (75 - 75)  RR: 18 (20 Dec 2024 04:25) (18 - 19)  SpO2: 95% (20 Dec 2024 04:25) (94% - 99%)    Parameters below as of 20 Dec 2024 04:25  Patient On (Oxygen Delivery Method): room air        Labs:  12-20    138  |  104  |  16.5  ----------------------------<  104[H]  3.9   |  25.0  |  0.82    Ca    7.3[L]      20 Dec 2024 06:14  Phos  2.6     12-20  Mg     2.1     12-20                              8.2    8.49  )-----------( 125      ( 20 Dec 2024 06:14 )             24.2       Exam:  Gen: pt lying in bed, alert, in NAD  Resp: unlabored  CVS: RRR  Abd: soft, NT, ND  Ext: moving all extremities spontaneously, sensation intact, L AKA stump with blisters to anteromedial aspect and some edema, some ecchymosis posteriorly, L groin site with some ecchymosis but soft, R groin site soft with ecchymosis, R AT/peroneal signals

## 2024-12-20 NOTE — DIETITIAN INITIAL EVALUATION ADULT - PERTINENT MEDS FT
MEDICATIONS  (STANDING):  insulin lispro (ADMELOG) corrective regimen sliding scale   SubCutaneous three times a day before meals  polyethylene glycol 3350 17 Gram(s) Oral daily  potassium phosphate / sodium phosphate Tablet (K-PHOS No. 2) 1 Tablet(s) Oral two times a day

## 2024-12-20 NOTE — DIETITIAN INITIAL EVALUATION ADULT - PERTINENT LABORATORY DATA
12-20    138  |  104  |  16.5  ----------------------------<  104[H]  3.9   |  25.0  |  0.82    Ca    7.3[L]      20 Dec 2024 06:14  Phos  2.6     12-20  Mg     2.1     12-20    POCT Blood Glucose.: 111 mg/dL (12-20-24 @ 13:19)  A1C with Estimated Average Glucose Result: 11.5 % (12-17-24 @ 06:50)

## 2024-12-20 NOTE — PROGRESS NOTE ADULT - SUBJECTIVE AND OBJECTIVE BOX
INTERVAL EVENTS:  Follow up diabetes management.  Family at bedside to assist with translation, pt denies acute complaints    MEDICATIONS  (STANDING):  acetaminophen     Tablet .. 975 milliGRAM(s) Oral every 6 hours  cefTRIAXone Injectable. 1000 milliGRAM(s) IV Push every 24 hours  chlorhexidine 2% Cloths 1 Application(s) Topical daily  insulin glargine Injectable (LANTUS) 15 Unit(s) SubCutaneous at bedtime  insulin lispro (ADMELOG) corrective regimen sliding scale   SubCutaneous three times a day before meals  insulin lispro Injectable (ADMELOG) 5 Unit(s) SubCutaneous three times a day before meals  metoprolol tartrate 25 milliGRAM(s) Oral two times a day  polyethylene glycol 3350 17 Gram(s) Oral daily  potassium phosphate / sodium phosphate Tablet (K-PHOS No. 2) 1 Tablet(s) Oral two times a day  senna 2 Tablet(s) Oral at bedtime    MEDICATIONS  (PRN):  albuterol    90 MICROgram(s) HFA Inhaler 2 Puff(s) Inhalation every 6 hours PRN Shortness of Breath  HYDROmorphone  Injectable 0.5 milliGRAM(s) IV Push every 4 hours PRN Pain  metoprolol tartrate Injectable 5 milliGRAM(s) IV Push every 6 hours PRN HR >100  ondansetron Injectable 4 milliGRAM(s) IV Push every 4 hours PRN Nausea and/or Vomiting  oxyCODONE    IR 5 milliGRAM(s) Oral every 6 hours PRN Moderate Pain (4 - 6)  oxyCODONE    IR 10 milliGRAM(s) Oral every 6 hours PRN Severe Pain (7 - 10)    Allergies  No Known Allergies    Vital Signs Last 24 Hrs  T(C): 37 (20 Dec 2024 12:13), Max: 37.4 (19 Dec 2024 15:28)  T(F): 98.6 (20 Dec 2024 12:13), Max: 99.4 (19 Dec 2024 15:28)  HR: 77 (20 Dec 2024 12:13) (76 - 94)  BP: 108/53 (20 Dec 2024 12:13) (99/61 - 129/77)  BP(mean): 75 (19 Dec 2024 19:21) (75 - 75)  RR: 18 (20 Dec 2024 12:13) (18 - 19)  SpO2: 97% (20 Dec 2024 12:13) (94% - 99%)    Parameters below as of 20 Dec 2024 12:13  Patient On (Oxygen Delivery Method): room air    PHYSICAL EXAM:  General: No apparent distress  Respiratory: Lungs clear bilaterally  Cardiac: +S1, S2, no m/r/g  GI: +BS, soft, non tender, non distended  Extremities: Left AKA  Neuro: A+O X3    LABS:                        8.2    8.49  )-----------( 125      ( 20 Dec 2024 06:14 )             24.2     12-20    138  |  104  |  16.5  ----------------------------<  104[H]  3.9   |  25.0  |  0.82    Ca    7.3[L]      20 Dec 2024 06:14  Phos  2.6     12-20  Mg     2.1     12-20      Urinalysis Basic - ( 20 Dec 2024 06:14 )    Color: x / Appearance: x / SG: x / pH: x  Gluc: 104 mg/dL / Ketone: x  / Bili: x / Urobili: x   Blood: x / Protein: x / Nitrite: x   Leuk Esterase: x / RBC: x / WBC x   Sq Epi: x / Non Sq Epi: x / Bacteria: x    POCT Blood Glucose.: 111 mg/dL (12-20-24 @ 13:19)  POCT Blood Glucose.: 113 mg/dL (12-20-24 @ 08:56)  POCT Blood Glucose.: 105 mg/dL (12-19-24 @ 22:18)  POCT Blood Glucose.: 113 mg/dL (12-19-24 @ 17:54)

## 2024-12-21 LAB
-  AMOXICILLIN/CLAVULANIC ACID: SIGNIFICANT CHANGE UP
-  AMPICILLIN/SULBACTAM: SIGNIFICANT CHANGE UP
-  AMPICILLIN: SIGNIFICANT CHANGE UP
-  AZTREONAM: SIGNIFICANT CHANGE UP
-  CEFAZOLIN: SIGNIFICANT CHANGE UP
-  CEFEPIME: SIGNIFICANT CHANGE UP
-  CEFOTAXIME: SIGNIFICANT CHANGE UP
-  CEFTAZIDIME: SIGNIFICANT CHANGE UP
-  CEFTRIAXONE: SIGNIFICANT CHANGE UP
-  CEFUROXIME: SIGNIFICANT CHANGE UP
-  CIPROFLOXACIN: SIGNIFICANT CHANGE UP
-  ERTAPENEM: SIGNIFICANT CHANGE UP
-  GENTAMICIN: SIGNIFICANT CHANGE UP
-  IMIPENEM: SIGNIFICANT CHANGE UP
-  LEVOFLOXACIN: SIGNIFICANT CHANGE UP
-  MEROPENEM: SIGNIFICANT CHANGE UP
-  MINOCYCLINE: SIGNIFICANT CHANGE UP
-  NITROFURANTOIN: SIGNIFICANT CHANGE UP
-  PIPERACILLIN/TAZOBACTAM: SIGNIFICANT CHANGE UP
-  TIGECYCLINE: SIGNIFICANT CHANGE UP
-  TOBRAMYCIN: SIGNIFICANT CHANGE UP
-  TRIMETHOPRIM/SULFAMETHOXAZOLE: SIGNIFICANT CHANGE UP
ANION GAP SERPL CALC-SCNC: 9 MMOL/L — SIGNIFICANT CHANGE UP (ref 5–17)
APTT BLD: 56.3 SEC — HIGH (ref 24.5–35.6)
APTT BLD: 81.4 SEC — HIGH (ref 24.5–35.6)
APTT BLD: 97.7 SEC — HIGH (ref 24.5–35.6)
BUN SERPL-MCNC: 15.4 MG/DL — SIGNIFICANT CHANGE UP (ref 8–20)
CALCIUM SERPL-MCNC: 7.6 MG/DL — LOW (ref 8.4–10.5)
CHLORIDE SERPL-SCNC: 108 MMOL/L — SIGNIFICANT CHANGE UP (ref 96–108)
CO2 SERPL-SCNC: 23 MMOL/L — SIGNIFICANT CHANGE UP (ref 22–29)
CREAT SERPL-MCNC: 0.88 MG/DL — SIGNIFICANT CHANGE UP (ref 0.5–1.3)
CULTURE RESULTS: ABNORMAL
EGFR: 66 ML/MIN/1.73M2 — SIGNIFICANT CHANGE UP
GLUCOSE BLDC GLUCOMTR-MCNC: 119 MG/DL — HIGH (ref 70–99)
GLUCOSE BLDC GLUCOMTR-MCNC: 142 MG/DL — HIGH (ref 70–99)
GLUCOSE BLDC GLUCOMTR-MCNC: 168 MG/DL — HIGH (ref 70–99)
GLUCOSE BLDC GLUCOMTR-MCNC: 98 MG/DL — SIGNIFICANT CHANGE UP (ref 70–99)
GLUCOSE SERPL-MCNC: 130 MG/DL — HIGH (ref 70–99)
HCT VFR BLD CALC: 24.8 % — LOW (ref 34.5–45)
HCT VFR BLD CALC: 26.2 % — LOW (ref 34.5–45)
HGB BLD-MCNC: 8.1 G/DL — LOW (ref 11.5–15.5)
HGB BLD-MCNC: 8.8 G/DL — LOW (ref 11.5–15.5)
MAGNESIUM SERPL-MCNC: 2 MG/DL — SIGNIFICANT CHANGE UP (ref 1.6–2.6)
MCHC RBC-ENTMCNC: 26.3 PG — LOW (ref 27–34)
MCHC RBC-ENTMCNC: 26.6 PG — LOW (ref 27–34)
MCHC RBC-ENTMCNC: 32.7 G/DL — SIGNIFICANT CHANGE UP (ref 32–36)
MCHC RBC-ENTMCNC: 33.6 G/DL — SIGNIFICANT CHANGE UP (ref 32–36)
MCV RBC AUTO: 79.2 FL — LOW (ref 80–100)
MCV RBC AUTO: 80.5 FL — SIGNIFICANT CHANGE UP (ref 80–100)
METHOD TYPE: SIGNIFICANT CHANGE UP
ORGANISM # SPEC MICROSCOPIC CNT: SIGNIFICANT CHANGE UP
PHOSPHATE SERPL-MCNC: 3.4 MG/DL — SIGNIFICANT CHANGE UP (ref 2.4–4.7)
PLATELET # BLD AUTO: 153 K/UL — SIGNIFICANT CHANGE UP (ref 150–400)
PLATELET # BLD AUTO: 179 K/UL — SIGNIFICANT CHANGE UP (ref 150–400)
POTASSIUM SERPL-MCNC: 4.3 MMOL/L — SIGNIFICANT CHANGE UP (ref 3.5–5.3)
POTASSIUM SERPL-SCNC: 4.3 MMOL/L — SIGNIFICANT CHANGE UP (ref 3.5–5.3)
RBC # BLD: 3.08 M/UL — LOW (ref 3.8–5.2)
RBC # BLD: 3.31 M/UL — LOW (ref 3.8–5.2)
RBC # FLD: 14.4 % — SIGNIFICANT CHANGE UP (ref 10.3–14.5)
RBC # FLD: 14.6 % — HIGH (ref 10.3–14.5)
SODIUM SERPL-SCNC: 140 MMOL/L — SIGNIFICANT CHANGE UP (ref 135–145)
SPECIMEN SOURCE: SIGNIFICANT CHANGE UP
WBC # BLD: 6.35 K/UL — SIGNIFICANT CHANGE UP (ref 3.8–10.5)
WBC # BLD: 6.52 K/UL — SIGNIFICANT CHANGE UP (ref 3.8–10.5)
WBC # FLD AUTO: 6.35 K/UL — SIGNIFICANT CHANGE UP (ref 3.8–10.5)
WBC # FLD AUTO: 6.52 K/UL — SIGNIFICANT CHANGE UP (ref 3.8–10.5)

## 2024-12-21 RX ADMIN — CHLORHEXIDINE GLUCONATE 1 APPLICATION(S): 1.2 RINSE ORAL at 14:20

## 2024-12-21 RX ADMIN — Medication 25 MILLIGRAM(S): at 05:27

## 2024-12-21 RX ADMIN — Medication 17 GRAM(S): at 14:20

## 2024-12-21 RX ADMIN — Medication 10 MILLIGRAM(S): at 09:50

## 2024-12-21 RX ADMIN — Medication 5 UNIT(S): at 18:19

## 2024-12-21 RX ADMIN — HEPARIN SODIUM 1100 UNIT(S)/HR: 1000 INJECTION, SOLUTION INTRAVENOUS; SUBCUTANEOUS at 15:44

## 2024-12-21 RX ADMIN — HEPARIN SODIUM 3000 UNIT(S): 1000 INJECTION, SOLUTION INTRAVENOUS; SUBCUTANEOUS at 02:29

## 2024-12-21 RX ADMIN — Medication 5 UNIT(S): at 14:19

## 2024-12-21 RX ADMIN — INSULIN GLARGINE-YFGN 15 UNIT(S): 100 INJECTION, SOLUTION SUBCUTANEOUS at 21:54

## 2024-12-21 RX ADMIN — Medication 1 TABLET(S): at 05:27

## 2024-12-21 RX ADMIN — HEPARIN SODIUM 1100 UNIT(S)/HR: 1000 INJECTION, SOLUTION INTRAVENOUS; SUBCUTANEOUS at 08:32

## 2024-12-21 RX ADMIN — HEPARIN SODIUM 1100 UNIT(S)/HR: 1000 INJECTION, SOLUTION INTRAVENOUS; SUBCUTANEOUS at 07:47

## 2024-12-21 RX ADMIN — Medication 10 MILLIGRAM(S): at 08:50

## 2024-12-21 RX ADMIN — HEPARIN SODIUM 1100 UNIT(S)/HR: 1000 INJECTION, SOLUTION INTRAVENOUS; SUBCUTANEOUS at 02:20

## 2024-12-21 RX ADMIN — ACETAMINOPHEN 975 MILLIGRAM(S): 80 SOLUTION/ DROPS ORAL at 18:19

## 2024-12-21 RX ADMIN — ACETAMINOPHEN 975 MILLIGRAM(S): 80 SOLUTION/ DROPS ORAL at 19:19

## 2024-12-21 RX ADMIN — SENNOSIDES 2 TABLET(S): 8.6 TABLET, FILM COATED ORAL at 23:31

## 2024-12-21 RX ADMIN — Medication 5 UNIT(S): at 09:41

## 2024-12-21 RX ADMIN — ACETAMINOPHEN 975 MILLIGRAM(S): 80 SOLUTION/ DROPS ORAL at 23:31

## 2024-12-21 RX ADMIN — Medication 25 MILLIGRAM(S): at 18:19

## 2024-12-21 RX ADMIN — Medication 2: at 14:20

## 2024-12-21 RX ADMIN — HEPARIN SODIUM 1100 UNIT(S)/HR: 1000 INJECTION, SOLUTION INTRAVENOUS; SUBCUTANEOUS at 17:46

## 2024-12-21 RX ADMIN — ACETAMINOPHEN 975 MILLIGRAM(S): 80 SOLUTION/ DROPS ORAL at 05:27

## 2024-12-21 RX ADMIN — HEPARIN SODIUM 1100 UNIT(S)/HR: 1000 INJECTION, SOLUTION INTRAVENOUS; SUBCUTANEOUS at 19:37

## 2024-12-21 NOTE — PROGRESS NOTE ADULT - SUBJECTIVE AND OBJECTIVE BOX
HPI/Overnight Events:   Patient seen and examined at bedside this AM. No overnight events. Complains of some pain on LE stump but otherwise doing well.     Vital Signs Last 24 Hrs  T(C): 37.2 (21 Dec 2024 15:42), Max: 37.3 (21 Dec 2024 04:07)  T(F): 98.9 (21 Dec 2024 15:42), Max: 99.1 (21 Dec 2024 04:07)  HR: 86 (21 Dec 2024 15:42) (80 - 98)  BP: 110/69 (21 Dec 2024 15:42) (92/59 - 146/78)  BP(mean): --  RR: 19 (21 Dec 2024 15:42) (18 - 19)  SpO2: 96% (21 Dec 2024 15:42) (96% - 100%)    Parameters below as of 21 Dec 2024 15:42  Patient On (Oxygen Delivery Method): room air        I&O's Detail    20 Dec 2024 07:01  -  21 Dec 2024 07:00  --------------------------------------------------------  IN:  Total IN: 0 mL    OUT:    Indwelling Catheter - Urethral (mL): 2900 mL  Total OUT: 2900 mL    Total NET: -2900 mL      21 Dec 2024 07:01  -  21 Dec 2024 18:04  --------------------------------------------------------  IN:  Total IN: 0 mL    OUT:    Indwelling Catheter - Urethral (mL): 425 mL  Total OUT: 425 mL    Total NET: -425 mL          LABS:                        8.8    6.52  )-----------( 179      ( 21 Dec 2024 07:23 )             26.2     12-21    140  |  108  |  15.4  ----------------------------<  130[H]  4.3   |  23.0  |  0.88    Ca    7.6[L]      21 Dec 2024 07:23  Phos  3.4     12-21  Mg     2.0     12-21      PTT - ( 21 Dec 2024 14:31 )  PTT:81.4 sec  Urinalysis Basic - ( 21 Dec 2024 07:23 )    Color: x / Appearance: x / SG: x / pH: x  Gluc: 130 mg/dL / Ketone: x  / Bili: x / Urobili: x   Blood: x / Protein: x / Nitrite: x   Leuk Esterase: x / RBC: x / WBC x   Sq Epi: x / Non Sq Epi: x / Bacteria: x          Physical Exam  Constitutional: patient resting comfortably in bed, in no acute distress  Respiratory: non-labored breathing on RA  Cardiovascular: RRR  Gastrointestinal: abdomen soft, non-tender, non-distended, no rebound tenderness/guarding  : bilateral groin access sites saturated but inact   Vascular:  right LE is warm, signals present, AT/peroneal  Left AKA stump is intact; bullae at the anteromedial region    MEDICATIONS  (STANDING):  acetaminophen     Tablet .. 975 milliGRAM(s) Oral every 6 hours  chlorhexidine 2% Cloths 1 Application(s) Topical daily  heparin  Infusion. 900 Unit(s)/Hr (9 mL/Hr) IV Continuous <Continuous>  insulin glargine Injectable (LANTUS) 15 Unit(s) SubCutaneous at bedtime  insulin lispro (ADMELOG) corrective regimen sliding scale   SubCutaneous three times a day before meals  insulin lispro Injectable (ADMELOG) 5 Unit(s) SubCutaneous three times a day before meals  metoprolol tartrate 25 milliGRAM(s) Oral two times a day  polyethylene glycol 3350 17 Gram(s) Oral daily  senna 2 Tablet(s) Oral at bedtime    MEDICATIONS  (PRN):  albuterol    90 MICROgram(s) HFA Inhaler 2 Puff(s) Inhalation every 6 hours PRN Shortness of Breath  heparin   Injectable 6500 Unit(s) IV Push every 6 hours PRN For aPTT less than 40  heparin   Injectable 3000 Unit(s) IV Push every 6 hours PRN For aPTT between 40 - 57  HYDROmorphone  Injectable 0.5 milliGRAM(s) IV Push every 4 hours PRN Pain  metoprolol tartrate Injectable 5 milliGRAM(s) IV Push every 6 hours PRN HR >100  ondansetron Injectable 4 milliGRAM(s) IV Push every 4 hours PRN Nausea and/or Vomiting  oxyCODONE    IR 5 milliGRAM(s) Oral every 6 hours PRN Moderate Pain (4 - 6)  oxyCODONE    IR 10 milliGRAM(s) Oral every 6 hours PRN Severe Pain (7 - 10)        STUDIES:   EKG, CXR, U/S, CT, MRI     MICRO:   Cultures

## 2024-12-21 NOTE — PROGRESS NOTE ADULT - ASSESSMENT
A/P    79 year old female s/p bilateral femoral artery thrombectomy and left AKA. Doing well overall and progressing as expected.     Plan:  - Heparin gtt and therapeutic   - Dressing changed to bl groin and Left aka stump ( xeroform Kerlix , ace)   - Oral AC tomorrow   - Endo recs appreciated   - Dispo planning

## 2024-12-22 LAB
ANION GAP SERPL CALC-SCNC: 12 MMOL/L — SIGNIFICANT CHANGE UP (ref 5–17)
APTT BLD: 109.1 SEC — HIGH (ref 24.5–35.6)
APTT BLD: 39.2 SEC — HIGH (ref 24.5–35.6)
APTT BLD: 95.6 SEC — HIGH (ref 24.5–35.6)
BUN SERPL-MCNC: 14 MG/DL — SIGNIFICANT CHANGE UP (ref 8–20)
CALCIUM SERPL-MCNC: 7.8 MG/DL — LOW (ref 8.4–10.5)
CHLORIDE SERPL-SCNC: 106 MMOL/L — SIGNIFICANT CHANGE UP (ref 96–108)
CO2 SERPL-SCNC: 22 MMOL/L — SIGNIFICANT CHANGE UP (ref 22–29)
CREAT SERPL-MCNC: 0.83 MG/DL — SIGNIFICANT CHANGE UP (ref 0.5–1.3)
EGFR: 71 ML/MIN/1.73M2 — SIGNIFICANT CHANGE UP
GLUCOSE BLDC GLUCOMTR-MCNC: 116 MG/DL — HIGH (ref 70–99)
GLUCOSE BLDC GLUCOMTR-MCNC: 137 MG/DL — HIGH (ref 70–99)
GLUCOSE BLDC GLUCOMTR-MCNC: 165 MG/DL — HIGH (ref 70–99)
GLUCOSE BLDC GLUCOMTR-MCNC: 179 MG/DL — HIGH (ref 70–99)
GLUCOSE SERPL-MCNC: 125 MG/DL — HIGH (ref 70–99)
HCT VFR BLD CALC: 27.4 % — LOW (ref 34.5–45)
HGB BLD-MCNC: 8.8 G/DL — LOW (ref 11.5–15.5)
MAGNESIUM SERPL-MCNC: 1.8 MG/DL — SIGNIFICANT CHANGE UP (ref 1.6–2.6)
MCHC RBC-ENTMCNC: 26.2 PG — LOW (ref 27–34)
MCHC RBC-ENTMCNC: 32.1 G/DL — SIGNIFICANT CHANGE UP (ref 32–36)
MCV RBC AUTO: 81.5 FL — SIGNIFICANT CHANGE UP (ref 80–100)
PHOSPHATE SERPL-MCNC: 3.2 MG/DL — SIGNIFICANT CHANGE UP (ref 2.4–4.7)
PLATELET # BLD AUTO: 213 K/UL — SIGNIFICANT CHANGE UP (ref 150–400)
POTASSIUM SERPL-MCNC: 4.3 MMOL/L — SIGNIFICANT CHANGE UP (ref 3.5–5.3)
POTASSIUM SERPL-SCNC: 4.3 MMOL/L — SIGNIFICANT CHANGE UP (ref 3.5–5.3)
RBC # BLD: 3.36 M/UL — LOW (ref 3.8–5.2)
RBC # FLD: 15 % — HIGH (ref 10.3–14.5)
SODIUM SERPL-SCNC: 139 MMOL/L — SIGNIFICANT CHANGE UP (ref 135–145)
WBC # BLD: 6.08 K/UL — SIGNIFICANT CHANGE UP (ref 3.8–10.5)
WBC # FLD AUTO: 6.08 K/UL — SIGNIFICANT CHANGE UP (ref 3.8–10.5)

## 2024-12-22 RX ORDER — APIXABAN 5 MG/1
5 TABLET, FILM COATED ORAL
Refills: 0 | Status: DISCONTINUED | OUTPATIENT
Start: 2024-12-22 | End: 2024-12-26

## 2024-12-22 RX ORDER — ERTAPENEM SODIUM 1 G/1
1000 INJECTION, POWDER, LYOPHILIZED, FOR SOLUTION INTRAMUSCULAR; INTRAVENOUS EVERY 24 HOURS
Refills: 0 | Status: COMPLETED | OUTPATIENT
Start: 2024-12-22 | End: 2024-12-24

## 2024-12-22 RX ADMIN — ACETAMINOPHEN 975 MILLIGRAM(S): 80 SOLUTION/ DROPS ORAL at 05:46

## 2024-12-22 RX ADMIN — HEPARIN SODIUM 900 UNIT(S)/HR: 1000 INJECTION, SOLUTION INTRAVENOUS; SUBCUTANEOUS at 15:56

## 2024-12-22 RX ADMIN — Medication 17 GRAM(S): at 17:32

## 2024-12-22 RX ADMIN — INSULIN GLARGINE-YFGN 15 UNIT(S): 100 INJECTION, SOLUTION SUBCUTANEOUS at 22:09

## 2024-12-22 RX ADMIN — Medication 5 MILLIGRAM(S): at 11:30

## 2024-12-22 RX ADMIN — SENNOSIDES 2 TABLET(S): 8.6 TABLET, FILM COATED ORAL at 22:08

## 2024-12-22 RX ADMIN — ACETAMINOPHEN 975 MILLIGRAM(S): 80 SOLUTION/ DROPS ORAL at 17:25

## 2024-12-22 RX ADMIN — Medication 2: at 12:22

## 2024-12-22 RX ADMIN — Medication 25 MILLIGRAM(S): at 05:47

## 2024-12-22 RX ADMIN — ACETAMINOPHEN 975 MILLIGRAM(S): 80 SOLUTION/ DROPS ORAL at 12:21

## 2024-12-22 RX ADMIN — Medication 5 UNIT(S): at 08:21

## 2024-12-22 RX ADMIN — Medication 2: at 18:30

## 2024-12-22 RX ADMIN — Medication 5 MILLIGRAM(S): at 10:22

## 2024-12-22 RX ADMIN — APIXABAN 5 MILLIGRAM(S): 5 TABLET, FILM COATED ORAL at 17:27

## 2024-12-22 RX ADMIN — Medication 5 UNIT(S): at 12:22

## 2024-12-22 RX ADMIN — Medication 25 MILLIGRAM(S): at 17:26

## 2024-12-22 RX ADMIN — ERTAPENEM SODIUM 120 MILLIGRAM(S): 1 INJECTION, POWDER, LYOPHILIZED, FOR SOLUTION INTRAMUSCULAR; INTRAVENOUS at 17:26

## 2024-12-22 RX ADMIN — Medication 5 UNIT(S): at 18:30

## 2024-12-22 RX ADMIN — HEPARIN SODIUM 900 UNIT(S)/HR: 1000 INJECTION, SOLUTION INTRAVENOUS; SUBCUTANEOUS at 07:44

## 2024-12-22 RX ADMIN — HEPARIN SODIUM 900 UNIT(S)/HR: 1000 INJECTION, SOLUTION INTRAVENOUS; SUBCUTANEOUS at 07:46

## 2024-12-22 NOTE — PROGRESS NOTE ADULT - SUBJECTIVE AND OBJECTIVE BOX
Subjective: Patient seen and examined at bedside, no current complaints, no overnight events.       MEDICATIONS  (STANDING):  acetaminophen     Tablet .. 975 milliGRAM(s) Oral every 6 hours  apixaban 5 milliGRAM(s) Oral two times a day  chlorhexidine 2% Cloths 1 Application(s) Topical daily  ertapenem  IVPB 1000 milliGRAM(s) IV Intermittent every 24 hours  heparin  Infusion. 900 Unit(s)/Hr (9 mL/Hr) IV Continuous <Continuous>  insulin glargine Injectable (LANTUS) 15 Unit(s) SubCutaneous at bedtime  insulin lispro (ADMELOG) corrective regimen sliding scale   SubCutaneous three times a day before meals  insulin lispro Injectable (ADMELOG) 5 Unit(s) SubCutaneous three times a day before meals  metoprolol tartrate 25 milliGRAM(s) Oral two times a day  polyethylene glycol 3350 17 Gram(s) Oral daily  senna 2 Tablet(s) Oral at bedtime    MEDICATIONS  (PRN):  albuterol    90 MICROgram(s) HFA Inhaler 2 Puff(s) Inhalation every 6 hours PRN Shortness of Breath  heparin   Injectable 6500 Unit(s) IV Push every 6 hours PRN For aPTT less than 40  heparin   Injectable 3000 Unit(s) IV Push every 6 hours PRN For aPTT between 40 - 57  HYDROmorphone  Injectable 0.5 milliGRAM(s) IV Push every 4 hours PRN Pain  metoprolol tartrate Injectable 5 milliGRAM(s) IV Push every 6 hours PRN HR >100  ondansetron Injectable 4 milliGRAM(s) IV Push every 4 hours PRN Nausea and/or Vomiting  oxyCODONE    IR 5 milliGRAM(s) Oral every 6 hours PRN Moderate Pain (4 - 6)  oxyCODONE    IR 10 milliGRAM(s) Oral every 6 hours PRN Severe Pain (7 - 10)      Vital Signs Last 24 Hrs  T(C): 37.2 (22 Dec 2024 12:59), Max: 37.5 (22 Dec 2024 04:19)  T(F): 99 (22 Dec 2024 12:59), Max: 99.5 (22 Dec 2024 04:19)  HR: 82 (22 Dec 2024 12:59) (82 - 99)  BP: 130/78 (22 Dec 2024 12:59) (90/58 - 135/82)  BP(mean): --  RR: 18 (22 Dec 2024 12:59) (18 - 19)  SpO2: 96% (22 Dec 2024 12:59) (94% - 98%)    Parameters below as of 22 Dec 2024 12:59  Patient On (Oxygen Delivery Method): room air        Physical Exam:    Constitutional: NAD  HEENT: EOMI  Respiratory: Respirations non-labored, no accessory muscle use  Gastrointestinal: non-distended  Neurological: A&O x 3  Vascular: right LE is warm, Left AKA stump is intact; bullae at the anteromedial region      LABS:                        8.8    6.08  )-----------( 213      ( 22 Dec 2024 05:28 )             27.4     12-22    139  |  106  |  14.0  ----------------------------<  125[H]  4.3   |  22.0  |  0.83    Ca    7.8[L]      22 Dec 2024 05:28  Phos  3.2     12-22  Mg     1.8     12-22      PTT - ( 22 Dec 2024 14:45 )  PTT:95.6 sec  Urinalysis Basic - ( 22 Dec 2024 05:28 )    Color: x / Appearance: x / SG: x / pH: x  Gluc: 125 mg/dL / Ketone: x  / Bili: x / Urobili: x   Blood: x / Protein: x / Nitrite: x   Leuk Esterase: x / RBC: x / WBC x   Sq Epi: x / Non Sq Epi: x / Bacteria: x        A: 79 year old female s/p bilateral femoral artery thrombectomy and left AKA on 12/16. Doing well overall and progressing as expected.     Plan:  - Heparin gtt and therapeutic   - Dressing changed to bl groin and Left aka stump ( xeroform Kerlix , ace)   - Oral AC today (Eliquis 5mg BID)  - Endo recs appreciated   - Dispo planning: ISA

## 2024-12-23 PROBLEM — I48.91 UNSPECIFIED ATRIAL FIBRILLATION: Chronic | Status: ACTIVE | Noted: 2024-12-16

## 2024-12-23 LAB
ANION GAP SERPL CALC-SCNC: 10 MMOL/L — SIGNIFICANT CHANGE UP (ref 5–17)
BUN SERPL-MCNC: 13.1 MG/DL — SIGNIFICANT CHANGE UP (ref 8–20)
CALCIUM SERPL-MCNC: 8.1 MG/DL — LOW (ref 8.4–10.5)
CHLORIDE SERPL-SCNC: 105 MMOL/L — SIGNIFICANT CHANGE UP (ref 96–108)
CO2 SERPL-SCNC: 22 MMOL/L — SIGNIFICANT CHANGE UP (ref 22–29)
CREAT SERPL-MCNC: 0.74 MG/DL — SIGNIFICANT CHANGE UP (ref 0.5–1.3)
EGFR: 81 ML/MIN/1.73M2 — SIGNIFICANT CHANGE UP
GLUCOSE BLDC GLUCOMTR-MCNC: 123 MG/DL — HIGH (ref 70–99)
GLUCOSE BLDC GLUCOMTR-MCNC: 128 MG/DL — HIGH (ref 70–99)
GLUCOSE BLDC GLUCOMTR-MCNC: 128 MG/DL — HIGH (ref 70–99)
GLUCOSE BLDC GLUCOMTR-MCNC: 135 MG/DL — HIGH (ref 70–99)
GLUCOSE SERPL-MCNC: 115 MG/DL — HIGH (ref 70–99)
HCT VFR BLD CALC: 29.6 % — LOW (ref 34.5–45)
HGB BLD-MCNC: 9.2 G/DL — LOW (ref 11.5–15.5)
MAGNESIUM SERPL-MCNC: 2 MG/DL — SIGNIFICANT CHANGE UP (ref 1.6–2.6)
MCHC RBC-ENTMCNC: 25.8 PG — LOW (ref 27–34)
MCHC RBC-ENTMCNC: 31.1 G/DL — LOW (ref 32–36)
MCV RBC AUTO: 83.1 FL — SIGNIFICANT CHANGE UP (ref 80–100)
PHOSPHATE SERPL-MCNC: 3.2 MG/DL — SIGNIFICANT CHANGE UP (ref 2.4–4.7)
PLATELET # BLD AUTO: 235 K/UL — SIGNIFICANT CHANGE UP (ref 150–400)
POTASSIUM SERPL-MCNC: 4.7 MMOL/L — SIGNIFICANT CHANGE UP (ref 3.5–5.3)
POTASSIUM SERPL-SCNC: 4.7 MMOL/L — SIGNIFICANT CHANGE UP (ref 3.5–5.3)
RBC # BLD: 3.56 M/UL — LOW (ref 3.8–5.2)
RBC # FLD: 15 % — HIGH (ref 10.3–14.5)
SODIUM SERPL-SCNC: 137 MMOL/L — SIGNIFICANT CHANGE UP (ref 135–145)
WBC # BLD: 7.35 K/UL — SIGNIFICANT CHANGE UP (ref 3.8–10.5)
WBC # FLD AUTO: 7.35 K/UL — SIGNIFICANT CHANGE UP (ref 3.8–10.5)

## 2024-12-23 PROCEDURE — 99232 SBSQ HOSP IP/OBS MODERATE 35: CPT

## 2024-12-23 RX ORDER — ROSUVASTATIN 40 MG/1
5 TABLET, FILM COATED ORAL AT BEDTIME
Refills: 0 | Status: DISCONTINUED | OUTPATIENT
Start: 2024-12-23 | End: 2024-12-26

## 2024-12-23 RX ADMIN — APIXABAN 5 MILLIGRAM(S): 5 TABLET, FILM COATED ORAL at 06:26

## 2024-12-23 RX ADMIN — ACETAMINOPHEN 975 MILLIGRAM(S): 80 SOLUTION/ DROPS ORAL at 17:22

## 2024-12-23 RX ADMIN — APIXABAN 5 MILLIGRAM(S): 5 TABLET, FILM COATED ORAL at 17:22

## 2024-12-23 RX ADMIN — Medication 17 GRAM(S): at 11:57

## 2024-12-23 RX ADMIN — SENNOSIDES 2 TABLET(S): 8.6 TABLET, FILM COATED ORAL at 21:31

## 2024-12-23 RX ADMIN — Medication 25 MILLIGRAM(S): at 06:26

## 2024-12-23 RX ADMIN — ERTAPENEM SODIUM 120 MILLIGRAM(S): 1 INJECTION, POWDER, LYOPHILIZED, FOR SOLUTION INTRAMUSCULAR; INTRAVENOUS at 17:22

## 2024-12-23 RX ADMIN — CHLORHEXIDINE GLUCONATE 1 APPLICATION(S): 1.2 RINSE ORAL at 17:21

## 2024-12-23 RX ADMIN — Medication 5 UNIT(S): at 18:47

## 2024-12-23 RX ADMIN — ROSUVASTATIN 5 MILLIGRAM(S): 40 TABLET, FILM COATED ORAL at 21:31

## 2024-12-23 RX ADMIN — INSULIN GLARGINE-YFGN 15 UNIT(S): 100 INJECTION, SOLUTION SUBCUTANEOUS at 21:31

## 2024-12-23 RX ADMIN — Medication 25 MILLIGRAM(S): at 17:21

## 2024-12-23 RX ADMIN — ACETAMINOPHEN 975 MILLIGRAM(S): 80 SOLUTION/ DROPS ORAL at 11:58

## 2024-12-23 RX ADMIN — Medication 5 UNIT(S): at 12:33

## 2024-12-23 RX ADMIN — Medication 5 UNIT(S): at 09:29

## 2024-12-23 NOTE — PROGRESS NOTE ADULT - SUBJECTIVE AND OBJECTIVE BOX
[facility]    [id number]    History:  The patient is status post XX on XX, POD # XX. Patient is doing well. The patient's pain is controlled using the prescribed pain medications. The patient is participating in physical therapy. Denies nausea, vomiting, chest pain, shortness of breath, abdominal pain or fever. No new complaints. No acute motor or sensory changes are reported.    Vital Signs Last 24 Hrs  T(C): 37.3 (23 Dec 2024 07:56), Max: 37.3 (23 Dec 2024 07:56)  T(F): 99.2 (23 Dec 2024 07:56), Max: 99.2 (23 Dec 2024 07:56)  HR: 84 (23 Dec 2024 07:56) (74 - 112)  BP: 131/76 (23 Dec 2024 07:56) (129/73 - 144/79)  BP(mean): --  RR: 16 (23 Dec 2024 07:56) (16 - 19)  SpO2: 93% (23 Dec 2024 07:56) (93% - 100%)    Parameters below as of 23 Dec 2024 07:56  Patient On (Oxygen Delivery Method): room air      I&O's Summary    22 Dec 2024 07:01  -  23 Dec 2024 07:00  --------------------------------------------------------  IN: 750 mL / OUT: 945 mL / NET: -195 mL                              9.2    7.35  )-----------( 235      ( 23 Dec 2024 05:47 )             29.6     12-23    137  |  105  |  13.1  ----------------------------<  115[H]  4.7   |  22.0  |  0.74    Ca    8.1[L]      23 Dec 2024 05:47  Phos  3.2     12-23  Mg     2.0     12-23        MEDICATIONS  (STANDING):  acetaminophen     Tablet .. 975 milliGRAM(s) Oral every 6 hours  apixaban 5 milliGRAM(s) Oral two times a day  chlorhexidine 2% Cloths 1 Application(s) Topical daily  ertapenem  IVPB 1000 milliGRAM(s) IV Intermittent every 24 hours  insulin glargine Injectable (LANTUS) 15 Unit(s) SubCutaneous at bedtime  insulin lispro (ADMELOG) corrective regimen sliding scale   SubCutaneous three times a day before meals  insulin lispro Injectable (ADMELOG) 5 Unit(s) SubCutaneous three times a day before meals  metoprolol tartrate 25 milliGRAM(s) Oral two times a day  polyethylene glycol 3350 17 Gram(s) Oral daily  senna 2 Tablet(s) Oral at bedtime    MEDICATIONS  (PRN):  albuterol    90 MICROgram(s) HFA Inhaler 2 Puff(s) Inhalation every 6 hours PRN Shortness of Breath  HYDROmorphone  Injectable 0.5 milliGRAM(s) IV Push every 4 hours PRN Pain  metoprolol tartrate Injectable 5 milliGRAM(s) IV Push every 6 hours PRN HR >100  ondansetron Injectable 4 milliGRAM(s) IV Push every 4 hours PRN Nausea and/or Vomiting  oxyCODONE    IR 5 milliGRAM(s) Oral every 6 hours PRN Moderate Pain (4 - 6)  oxyCODONE    IR 10 milliGRAM(s) Oral every 6 hours PRN Severe Pain (7 - 10)      Physical exam: Well padded splint is in good position. The dressing is clean, dry and intact. No wound erythema, discharge, drainage is noted. Sensation to light touch is grossly intact without focal deficit and is symmetric bilaterally. No calf tenderness. Sensation to light touch is grossly intact distally. Motor function distally is 5/5. No foot drop. 2+ dorsalis pedis pulse. Capillary refill is less than 2 seconds. No cyanosis.    Primary Orthopedic Assessment:  •   Secondary  Orthopedic Assessment(s):   •   Secondary  Medical Assessment(s):   •   Plan:   • DVT prophylaxis as prescribed, including use of compression devices and ankle pumps  • Continue physical therapy  • Non-weight bearing of the splinted extremity  • Continue splint as applied  • Elevation of the splinted extremity  • Pain control as clinically indicated  • Incentive spirometry encouraged  • Discharge planning – anticipated discharge is Home / subacute rehabilitation / acute rehabilitation    [facility]    [id number]    History:  The patient is status post bilateral femoral thrombectomies, left AKA  Reported confusion with some bouts of combativeness  retaining urine on bladder scan since muñoz removal  patient preventing re-straight cath.    Vital Signs Last 24 Hrs  T(C): 37.3 (23 Dec 2024 07:56), Max: 37.3 (23 Dec 2024 07:56)  T(F): 99.2 (23 Dec 2024 07:56), Max: 99.2 (23 Dec 2024 07:56)  HR: 84 (23 Dec 2024 07:56) (74 - 112)  BP: 131/76 (23 Dec 2024 07:56) (129/73 - 144/79)  BP(mean): --  RR: 16 (23 Dec 2024 07:56) (16 - 19)  SpO2: 93% (23 Dec 2024 07:56) (93% - 100%)    Parameters below as of 23 Dec 2024 07:56  Patient On (Oxygen Delivery Method): room air      I&O's Summary    22 Dec 2024 07:01  -  23 Dec 2024 07:00  --------------------------------------------------------  IN: 750 mL / OUT: 945 mL / NET: -195 mL                              9.2    7.35  )-----------( 235      ( 23 Dec 2024 05:47 )             29.6     12-23    137  |  105  |  13.1  ----------------------------<  115[H]  4.7   |  22.0  |  0.74    Ca    8.1[L]      23 Dec 2024 05:47  Phos  3.2     12-23  Mg     2.0     12-23        MEDICATIONS  (STANDING):  acetaminophen     Tablet .. 975 milliGRAM(s) Oral every 6 hours  apixaban 5 milliGRAM(s) Oral two times a day  chlorhexidine 2% Cloths 1 Application(s) Topical daily  ertapenem  IVPB 1000 milliGRAM(s) IV Intermittent every 24 hours  insulin glargine Injectable (LANTUS) 15 Unit(s) SubCutaneous at bedtime  insulin lispro (ADMELOG) corrective regimen sliding scale   SubCutaneous three times a day before meals  insulin lispro Injectable (ADMELOG) 5 Unit(s) SubCutaneous three times a day before meals  metoprolol tartrate 25 milliGRAM(s) Oral two times a day  polyethylene glycol 3350 17 Gram(s) Oral daily  senna 2 Tablet(s) Oral at bedtime    MEDICATIONS  (PRN):  albuterol    90 MICROgram(s) HFA Inhaler 2 Puff(s) Inhalation every 6 hours PRN Shortness of Breath  HYDROmorphone  Injectable 0.5 milliGRAM(s) IV Push every 4 hours PRN Pain  metoprolol tartrate Injectable 5 milliGRAM(s) IV Push every 6 hours PRN HR >100  ondansetron Injectable 4 milliGRAM(s) IV Push every 4 hours PRN Nausea and/or Vomiting  oxyCODONE    IR 5 milliGRAM(s) Oral every 6 hours PRN Moderate Pain (4 - 6)  oxyCODONE    IR 10 milliGRAM(s) Oral every 6 hours PRN Severe Pain (7 - 10)      Physical exam:     no distress  alert oriented to self/place  bed soaked with urine, including dressings   family at bedside  left AKA with bullae still present, staples present, no erythema or drainage  bilateral groin wounds intact with staples  all wound sites wiped doun with betadine and redressed  signals remain to the right Foot  non labored breathing  abdomen is soft/non tender     Primary  Assessment:    s/p left AKA, bilateral femoral artery thrombectomies  urine retention     Plan:   • requires replacement of muñoz  - will discuss UTI management with ID (final culture/sensitivity)  - plan for discharge, hopefully today

## 2024-12-23 NOTE — PROGRESS NOTE ADULT - SUBJECTIVE AND OBJECTIVE BOX
INTERVAL EVENTS:  Follow up diabetes management, glucoses stable. Endorses poor appetite.    MEDICATIONS  (STANDING):  acetaminophen     Tablet .. 975 milliGRAM(s) Oral every 6 hours  apixaban 5 milliGRAM(s) Oral two times a day  chlorhexidine 2% Cloths 1 Application(s) Topical daily  ertapenem  IVPB 1000 milliGRAM(s) IV Intermittent every 24 hours  insulin glargine Injectable (LANTUS) 15 Unit(s) SubCutaneous at bedtime  insulin lispro (ADMELOG) corrective regimen sliding scale   SubCutaneous three times a day before meals  insulin lispro Injectable (ADMELOG) 5 Unit(s) SubCutaneous three times a day before meals  metoprolol tartrate 25 milliGRAM(s) Oral two times a day  polyethylene glycol 3350 17 Gram(s) Oral daily  senna 2 Tablet(s) Oral at bedtime    MEDICATIONS  (PRN):  albuterol    90 MICROgram(s) HFA Inhaler 2 Puff(s) Inhalation every 6 hours PRN Shortness of Breath  HYDROmorphone  Injectable 0.5 milliGRAM(s) IV Push every 4 hours PRN Pain  metoprolol tartrate Injectable 5 milliGRAM(s) IV Push every 6 hours PRN HR >100  ondansetron Injectable 4 milliGRAM(s) IV Push every 4 hours PRN Nausea and/or Vomiting  oxyCODONE    IR 5 milliGRAM(s) Oral every 6 hours PRN Moderate Pain (4 - 6)  oxyCODONE    IR 10 milliGRAM(s) Oral every 6 hours PRN Severe Pain (7 - 10)    Allergies  No Known Allergies    Vital Signs Last 24 Hrs  T(C): 37.3 (23 Dec 2024 07:56), Max: 37.3 (23 Dec 2024 07:56)  T(F): 99.2 (23 Dec 2024 07:56), Max: 99.2 (23 Dec 2024 07:56)  HR: 84 (23 Dec 2024 07:56) (74 - 112)  BP: 131/76 (23 Dec 2024 07:56) (129/73 - 144/79)  BP(mean): --  RR: 16 (23 Dec 2024 07:56) (16 - 19)  SpO2: 93% (23 Dec 2024 07:56) (93% - 100%)    Parameters below as of 23 Dec 2024 07:56  Patient On (Oxygen Delivery Method): room air    PHYSICAL EXAM:  General: No apparent distress  Respiratory: Lungs clear bilaterally  Cardiac: +S1, S2, no m/r/g  GI: +BS, soft, non tender, non distended  Extremities: Left AKA  Neuro: Arouses to voice    LABS:                        9.2    7.35  )-----------( 235      ( 23 Dec 2024 05:47 )             29.6     12-23    137  |  105  |  13.1  ----------------------------<  115[H]  4.7   |  22.0  |  0.74    Ca    8.1[L]      23 Dec 2024 05:47  Phos  3.2     12-23  Mg     2.0     12-23      Urinalysis Basic - ( 23 Dec 2024 05:47 )    Color: x / Appearance: x / SG: x / pH: x  Gluc: 115 mg/dL / Ketone: x  / Bili: x / Urobili: x   Blood: x / Protein: x / Nitrite: x   Leuk Esterase: x / RBC: x / WBC x   Sq Epi: x / Non Sq Epi: x / Bacteria: x    POCT Blood Glucose.: 135 mg/dL (12-23-24 @ 08:49)  POCT Blood Glucose.: 116 mg/dL (12-22-24 @ 21:26)  POCT Blood Glucose.: 165 mg/dL (12-22-24 @ 18:28)  POCT Blood Glucose.: 179 mg/dL (12-22-24 @ 12:06)         INTERVAL EVENTS:  Follow up diabetes management, glucoses stable. Endorses poor appetite.    MEDICATIONS  (STANDING):  acetaminophen     Tablet .. 975 milliGRAM(s) Oral every 6 hours  apixaban 5 milliGRAM(s) Oral two times a day  chlorhexidine 2% Cloths 1 Application(s) Topical daily  ertapenem  IVPB 1000 milliGRAM(s) IV Intermittent every 24 hours  insulin glargine Injectable (LANTUS) 15 Unit(s) SubCutaneous at bedtime  insulin lispro (ADMELOG) corrective regimen sliding scale   SubCutaneous three times a day before meals  insulin lispro Injectable (ADMELOG) 5 Unit(s) SubCutaneous three times a day before meals  metoprolol tartrate 25 milliGRAM(s) Oral two times a day  polyethylene glycol 3350 17 Gram(s) Oral daily  senna 2 Tablet(s) Oral at bedtime    MEDICATIONS  (PRN):  albuterol    90 MICROgram(s) HFA Inhaler 2 Puff(s) Inhalation every 6 hours PRN Shortness of Breath  HYDROmorphone  Injectable 0.5 milliGRAM(s) IV Push every 4 hours PRN Pain  metoprolol tartrate Injectable 5 milliGRAM(s) IV Push every 6 hours PRN HR >100  ondansetron Injectable 4 milliGRAM(s) IV Push every 4 hours PRN Nausea and/or Vomiting  oxyCODONE    IR 5 milliGRAM(s) Oral every 6 hours PRN Moderate Pain (4 - 6)  oxyCODONE    IR 10 milliGRAM(s) Oral every 6 hours PRN Severe Pain (7 - 10)    Allergies  No Known Allergies    Vital Signs Last 24 Hrs  T(C): 37.3 (23 Dec 2024 07:56), Max: 37.3 (23 Dec 2024 07:56)  T(F): 99.2 (23 Dec 2024 07:56), Max: 99.2 (23 Dec 2024 07:56)  HR: 84 (23 Dec 2024 07:56) (74 - 112)  BP: 131/76 (23 Dec 2024 07:56) (129/73 - 144/79)  BP(mean): --  RR: 16 (23 Dec 2024 07:56) (16 - 19)  SpO2: 93% (23 Dec 2024 07:56) (93% - 100%)    Parameters below as of 23 Dec 2024 07:56  Patient On (Oxygen Delivery Method): room air    PHYSICAL EXAM:  General: No apparent distress  Respiratory: Lungs clear bilaterally  Cardiac: +S1, S2, no m/r/g  GI: +BS, soft, non tender, non distended  Extremities: Left AKA  Neuro: Arouses to voice    LABS:                        9.2    7.35  )-----------( 235      ( 23 Dec 2024 05:47 )             29.6     12-23    137  |  105  |  13.1  ----------------------------<  115[H]  4.7   |  22.0  |  0.74    Ca    8.1[L]      23 Dec 2024 05:47  Phos  3.2     12-23  Mg     2.0     12-23      POCT Blood Glucose.: 135 mg/dL (12-23-24 @ 08:49)  POCT Blood Glucose.: 116 mg/dL (12-22-24 @ 21:26)  POCT Blood Glucose.: 165 mg/dL (12-22-24 @ 18:28)  POCT Blood Glucose.: 179 mg/dL (12-22-24 @ 12:06)

## 2024-12-23 NOTE — PROVIDER CONTACT NOTE (CHANGE IN STATUS NOTIFICATION) - ASSESSMENT
pt still retaining on bladder scan 665 mL. pt also noted to have white discharge on labia, foul smelling, c/o burning.  PA advised RN to proceed to straight cath bc pt on abx for UTI. pt became agitated and hostile towards staff while attempting to straight cath, pt swinging fists at staff, pushing staff away and refusing.
MEDICINE

## 2024-12-24 LAB
ANION GAP SERPL CALC-SCNC: 12 MMOL/L — SIGNIFICANT CHANGE UP (ref 5–17)
BUN SERPL-MCNC: 14.5 MG/DL — SIGNIFICANT CHANGE UP (ref 8–20)
CALCIUM SERPL-MCNC: 8.1 MG/DL — LOW (ref 8.4–10.5)
CHLORIDE SERPL-SCNC: 104 MMOL/L — SIGNIFICANT CHANGE UP (ref 96–108)
CO2 SERPL-SCNC: 20 MMOL/L — LOW (ref 22–29)
CREAT SERPL-MCNC: 0.75 MG/DL — SIGNIFICANT CHANGE UP (ref 0.5–1.3)
EGFR: 80 ML/MIN/1.73M2 — SIGNIFICANT CHANGE UP
GLUCOSE BLDC GLUCOMTR-MCNC: 124 MG/DL — HIGH (ref 70–99)
GLUCOSE BLDC GLUCOMTR-MCNC: 126 MG/DL — HIGH (ref 70–99)
GLUCOSE BLDC GLUCOMTR-MCNC: 173 MG/DL — HIGH (ref 70–99)
GLUCOSE BLDC GLUCOMTR-MCNC: 186 MG/DL — HIGH (ref 70–99)
GLUCOSE SERPL-MCNC: 111 MG/DL — HIGH (ref 70–99)
HCT VFR BLD CALC: 30.4 % — LOW (ref 34.5–45)
HGB BLD-MCNC: 9.7 G/DL — LOW (ref 11.5–15.5)
MAGNESIUM SERPL-MCNC: 1.7 MG/DL — SIGNIFICANT CHANGE UP (ref 1.6–2.6)
MCHC RBC-ENTMCNC: 26.8 PG — LOW (ref 27–34)
MCHC RBC-ENTMCNC: 31.9 G/DL — LOW (ref 32–36)
MCV RBC AUTO: 84 FL — SIGNIFICANT CHANGE UP (ref 80–100)
ORGANISM # SPEC MICROSCOPIC CNT: ABNORMAL
PHOSPHATE SERPL-MCNC: 3.3 MG/DL — SIGNIFICANT CHANGE UP (ref 2.4–4.7)
PLATELET # BLD AUTO: 259 K/UL — SIGNIFICANT CHANGE UP (ref 150–400)
POTASSIUM SERPL-MCNC: 4.5 MMOL/L — SIGNIFICANT CHANGE UP (ref 3.5–5.3)
POTASSIUM SERPL-SCNC: 4.5 MMOL/L — SIGNIFICANT CHANGE UP (ref 3.5–5.3)
RBC # BLD: 3.62 M/UL — LOW (ref 3.8–5.2)
RBC # FLD: 16.3 % — HIGH (ref 10.3–14.5)
SODIUM SERPL-SCNC: 136 MMOL/L — SIGNIFICANT CHANGE UP (ref 135–145)
WBC # BLD: 6.79 K/UL — SIGNIFICANT CHANGE UP (ref 3.8–10.5)
WBC # FLD AUTO: 6.79 K/UL — SIGNIFICANT CHANGE UP (ref 3.8–10.5)

## 2024-12-24 PROCEDURE — 99232 SBSQ HOSP IP/OBS MODERATE 35: CPT

## 2024-12-24 RX ORDER — MAGNESIUM SULFATE 500 MG/ML
2 INJECTION, SOLUTION INTRAMUSCULAR; INTRAVENOUS ONCE
Refills: 0 | Status: COMPLETED | OUTPATIENT
Start: 2024-12-24 | End: 2024-12-24

## 2024-12-24 RX ADMIN — ACETAMINOPHEN 975 MILLIGRAM(S): 80 SOLUTION/ DROPS ORAL at 06:07

## 2024-12-24 RX ADMIN — Medication 5 UNIT(S): at 17:54

## 2024-12-24 RX ADMIN — SENNOSIDES 2 TABLET(S): 8.6 TABLET, FILM COATED ORAL at 21:18

## 2024-12-24 RX ADMIN — ACETAMINOPHEN 975 MILLIGRAM(S): 80 SOLUTION/ DROPS ORAL at 17:53

## 2024-12-24 RX ADMIN — ERTAPENEM SODIUM 120 MILLIGRAM(S): 1 INJECTION, POWDER, LYOPHILIZED, FOR SOLUTION INTRAMUSCULAR; INTRAVENOUS at 17:53

## 2024-12-24 RX ADMIN — Medication 17 GRAM(S): at 11:51

## 2024-12-24 RX ADMIN — Medication 10 MILLIGRAM(S): at 20:50

## 2024-12-24 RX ADMIN — Medication 2: at 13:22

## 2024-12-24 RX ADMIN — Medication 5 UNIT(S): at 13:22

## 2024-12-24 RX ADMIN — Medication 5 UNIT(S): at 08:52

## 2024-12-24 RX ADMIN — CHLORHEXIDINE GLUCONATE 1 APPLICATION(S): 1.2 RINSE ORAL at 11:52

## 2024-12-24 RX ADMIN — ACETAMINOPHEN 975 MILLIGRAM(S): 80 SOLUTION/ DROPS ORAL at 05:37

## 2024-12-24 RX ADMIN — Medication 25 MILLIGRAM(S): at 17:53

## 2024-12-24 RX ADMIN — INSULIN GLARGINE-YFGN 15 UNIT(S): 100 INJECTION, SOLUTION SUBCUTANEOUS at 21:17

## 2024-12-24 RX ADMIN — ROSUVASTATIN 5 MILLIGRAM(S): 40 TABLET, FILM COATED ORAL at 21:18

## 2024-12-24 RX ADMIN — Medication 25 MILLIGRAM(S): at 05:37

## 2024-12-24 RX ADMIN — MAGNESIUM SULFATE 25 GRAM(S): 500 INJECTION, SOLUTION INTRAMUSCULAR; INTRAVENOUS at 08:52

## 2024-12-24 RX ADMIN — ACETAMINOPHEN 975 MILLIGRAM(S): 80 SOLUTION/ DROPS ORAL at 11:52

## 2024-12-24 RX ADMIN — APIXABAN 5 MILLIGRAM(S): 5 TABLET, FILM COATED ORAL at 17:54

## 2024-12-24 RX ADMIN — APIXABAN 5 MILLIGRAM(S): 5 TABLET, FILM COATED ORAL at 05:37

## 2024-12-24 NOTE — PROGRESS NOTE ADULT - ASSESSMENT
79F with PMH of afib, HTN, DM, h/o stroke presents to the ED for 1 week history of worsening left leg pain and discoloration. In the ED, provider unable to obtain pulses or doppler signals bilaterally. S/p Left above knee amputation.    Consulted for diabetes management  Home regimen: glipizide, farxiga  Current a1c: 11.5%    1. Uncontrolled DM2 complicated by PAD- glucoses stable  - Continue lantus 15 units daily  - Continue admelog 5 units TID  - Correction scale  - Will need insulin on discharge, please provide diabetes education and insulin teaching  - Follow up appt: 1/31 @ 10:30am BRADY Boucher ([facility])    2. PAD  - S/p Left AKA, bilateral femoral artery thrombectomies  - Pain control, care per vascular surgery    3. Atrial fibrillation  - On metoprolol, eliquis

## 2024-12-24 NOTE — PROGRESS NOTE ADULT - SUBJECTIVE AND OBJECTIVE BOX
INTERVAL EVENTS:  Follow up diabetes management, glucoses stable. Endorses good appetite, denies acute pain.    #[id number]    MEDICATIONS  (STANDING):  acetaminophen     Tablet .. 975 milliGRAM(s) Oral every 6 hours  apixaban 5 milliGRAM(s) Oral two times a day  chlorhexidine 2% Cloths 1 Application(s) Topical daily  ertapenem  IVPB 1000 milliGRAM(s) IV Intermittent every 24 hours  insulin glargine Injectable (LANTUS) 15 Unit(s) SubCutaneous at bedtime  insulin lispro (ADMELOG) corrective regimen sliding scale   SubCutaneous three times a day before meals  insulin lispro Injectable (ADMELOG) 5 Unit(s) SubCutaneous three times a day before meals  metoprolol tartrate 25 milliGRAM(s) Oral two times a day  polyethylene glycol 3350 17 Gram(s) Oral daily  rosuvastatin 5 milliGRAM(s) Oral at bedtime  senna 2 Tablet(s) Oral at bedtime    MEDICATIONS  (PRN):  albuterol    90 MICROgram(s) HFA Inhaler 2 Puff(s) Inhalation every 6 hours PRN Shortness of Breath  metoprolol tartrate Injectable 5 milliGRAM(s) IV Push every 6 hours PRN HR >100  oxyCODONE    IR 5 milliGRAM(s) Oral every 6 hours PRN Moderate Pain (4 - 6)  oxyCODONE    IR 10 milliGRAM(s) Oral every 6 hours PRN Severe Pain (7 - 10)    Allergies  No Known Allergies    Vital Signs Last 24 Hrs  T(C): 37 (24 Dec 2024 08:03), Max: 37.4 (24 Dec 2024 00:04)  T(F): 98.6 (24 Dec 2024 08:03), Max: 99.4 (24 Dec 2024 00:04)  HR: 84 (24 Dec 2024 08:03) (76 - 112)  BP: 110/65 (24 Dec 2024 08:03) (101/66 - 127/76)  BP(mean): 88 (23 Dec 2024 17:18) (78 - 88)  RR: 20 (24 Dec 2024 08:03) (18 - 20)  SpO2: 97% (24 Dec 2024 08:03) (97% - 100%)    Parameters below as of 24 Dec 2024 08:03  Patient On (Oxygen Delivery Method): room air    PHYSICAL EXAM:  General: No apparent distress  Respiratory: Lungs clear bilaterally  Cardiac: +S1, S2, no m/r/g  GI: +BS, soft, non tender, non distended  Extremities: Left AKA  Neuro: A+O X3    LABS:                        9.7    6.79  )-----------( 259      ( 24 Dec 2024 03:59 )             30.4     12-24    136  |  104  |  14.5  ----------------------------<  111[H]  4.5   |  20.0[L]  |  0.75    Ca    8.1[L]      24 Dec 2024 03:59  Phos  3.3     12-24  Mg     1.7     12-24      Urinalysis Basic - ( 24 Dec 2024 03:59 )    Color: x / Appearance: x / SG: x / pH: x  Gluc: 111 mg/dL / Ketone: x  / Bili: x / Urobili: x   Blood: x / Protein: x / Nitrite: x   Leuk Esterase: x / RBC: x / WBC x   Sq Epi: x / Non Sq Epi: x / Bacteria: x    POCT Blood Glucose.: 126 mg/dL (12-24-24 @ 08:51)  POCT Blood Glucose.: 123 mg/dL (12-23-24 @ 21:29)  POCT Blood Glucose.: 128 mg/dL (12-23-24 @ 18:29)  POCT Blood Glucose.: 128 mg/dL (12-23-24 @ 12:23)   INTERVAL EVENTS:  Follow up diabetes management, glucoses stable. Endorses good appetite, denies acute pain.    #[id number]    MEDICATIONS  (STANDING):  acetaminophen     Tablet .. 975 milliGRAM(s) Oral every 6 hours  apixaban 5 milliGRAM(s) Oral two times a day  chlorhexidine 2% Cloths 1 Application(s) Topical daily  ertapenem  IVPB 1000 milliGRAM(s) IV Intermittent every 24 hours  insulin glargine Injectable (LANTUS) 15 Unit(s) SubCutaneous at bedtime  insulin lispro (ADMELOG) corrective regimen sliding scale   SubCutaneous three times a day before meals  insulin lispro Injectable (ADMELOG) 5 Unit(s) SubCutaneous three times a day before meals  metoprolol tartrate 25 milliGRAM(s) Oral two times a day  polyethylene glycol 3350 17 Gram(s) Oral daily  rosuvastatin 5 milliGRAM(s) Oral at bedtime  senna 2 Tablet(s) Oral at bedtime    MEDICATIONS  (PRN):  albuterol    90 MICROgram(s) HFA Inhaler 2 Puff(s) Inhalation every 6 hours PRN Shortness of Breath  metoprolol tartrate Injectable 5 milliGRAM(s) IV Push every 6 hours PRN HR >100  oxyCODONE    IR 5 milliGRAM(s) Oral every 6 hours PRN Moderate Pain (4 - 6)  oxyCODONE    IR 10 milliGRAM(s) Oral every 6 hours PRN Severe Pain (7 - 10)    Allergies  No Known Allergies    Vital Signs Last 24 Hrs  T(C): 37 (24 Dec 2024 08:03), Max: 37.4 (24 Dec 2024 00:04)  T(F): 98.6 (24 Dec 2024 08:03), Max: 99.4 (24 Dec 2024 00:04)  HR: 84 (24 Dec 2024 08:03) (76 - 112)  BP: 110/65 (24 Dec 2024 08:03) (101/66 - 127/76)  BP(mean): 88 (23 Dec 2024 17:18) (78 - 88)  RR: 20 (24 Dec 2024 08:03) (18 - 20)  SpO2: 97% (24 Dec 2024 08:03) (97% - 100%)    Parameters below as of 24 Dec 2024 08:03  Patient On (Oxygen Delivery Method): room air    PHYSICAL EXAM:  General: No apparent distress  Respiratory: Lungs clear bilaterally  Cardiac: +S1, S2, no m/r/g  GI: +BS, soft, non tender, non distended  Extremities: Left AKA  Neuro: A+O X3    LABS:                        9.7    6.79  )-----------( 259      ( 24 Dec 2024 03:59 )             30.4     12-24    136  |  104  |  14.5  ----------------------------<  111[H]  4.5   |  20.0[L]  |  0.75    Ca    8.1[L]      24 Dec 2024 03:59  Phos  3.3     12-24  Mg     1.7     12-24      POCT Blood Glucose.: 126 mg/dL (12-24-24 @ 08:51)  POCT Blood Glucose.: 123 mg/dL (12-23-24 @ 21:29)  POCT Blood Glucose.: 128 mg/dL (12-23-24 @ 18:29)  POCT Blood Glucose.: 128 mg/dL (12-23-24 @ 12:23)

## 2024-12-24 NOTE — PROGRESS NOTE ADULT - NS ATTEND AMEND GEN_ALL_CORE FT
glycemic control stable on the current regimen
right foot warm full rom + peronal and AT signal , left AKA dressing clean , HR with Afib not rate controlled , lopressor given on rounds, awaiting cards consult . hold on fluids medicine consult pt diabetes poorly controlled A1c 11 don't believe patient was getting medications at home
79F with PMH of afib, HTN, DM, h/o stroke presents to the ED for 1 week history of worsening left leg pain and discoloration. Found to have PAD s/p Left AKA and bilateral femoral artery thrombectomies.  Poorly controlled T2DM (A1c 11.5%) - glucoses improved with current basal/bolus insulin regimen. Agree with above plan.
Patient seen and examined at bedside and was consulted for Afib with RVR and recommendations for rate control   Hgb 7.9    79F with PMH afib (apparently family aware of afib dx, but patient reportedly never took AC/Eliquis), HTN, DM, h/o stroke (unclear what/if deficits); presented with acute limb ischemia of LLE now s/p L AKA and thrombectomy of common femoral artery; cardiology consulted for Afib with RVR.      1. Acute Limb ischemic secondary to Afib (embolic source)   2. Afib with RVR, CHADS-VASc 7    - patient is up in bed with no acute complaints; telemetry reviewed shows Afib rate controlled   -  currently rate controlled in the 80s  - currently on heparin GTT, eventual transition to DOAC if no further procedures planned ( Eliquis 5mg PO BID indefinitely)  - TTE EF 60% on 12/17  - No further inpatient cardiac work up at this time. Patient can follow up with Dr. Coronel as OP within 2 weeks of DC    Will sign off.  Please reconsult if needed.      Juliette Coronel D.O. [facility]  Cardiology/Vascular Cardiology -[facility] Cardiology   Telephone # [phone]
79F with PMH of afib, HTN, DM, h/o stroke presents to the ED for 1 week history of worsening left leg pain and discoloration. Found to have PAD s/p Left AKA and bilateral femoral artery thrombectomies.  Poorly controlled T2DM (A1c 11.5%) - glucoses improved with current basal/bolus insulin regimen. Agree with above plan.

## 2024-12-24 NOTE — PROGRESS NOTE ADULT - SUBJECTIVE AND OBJECTIVE BOX
Subjective: Patient seen and examined at bedside, no current complaints, no overnight events. Patient oriented this AM.     MEDICATIONS  (STANDING):  acetaminophen     Tablet .. 975 milliGRAM(s) Oral every 6 hours  apixaban 5 milliGRAM(s) Oral two times a day  chlorhexidine 2% Cloths 1 Application(s) Topical daily  ertapenem  IVPB 1000 milliGRAM(s) IV Intermittent every 24 hours  insulin glargine Injectable (LANTUS) 15 Unit(s) SubCutaneous at bedtime  insulin lispro (ADMELOG) corrective regimen sliding scale   SubCutaneous three times a day before meals  insulin lispro Injectable (ADMELOG) 5 Unit(s) SubCutaneous three times a day before meals  metoprolol tartrate 25 milliGRAM(s) Oral two times a day  polyethylene glycol 3350 17 Gram(s) Oral daily  rosuvastatin 5 milliGRAM(s) Oral at bedtime  senna 2 Tablet(s) Oral at bedtime    MEDICATIONS  (PRN):  albuterol    90 MICROgram(s) HFA Inhaler 2 Puff(s) Inhalation every 6 hours PRN Shortness of Breath  metoprolol tartrate Injectable 5 milliGRAM(s) IV Push every 6 hours PRN HR >100  oxyCODONE    IR 5 milliGRAM(s) Oral every 6 hours PRN Moderate Pain (4 - 6)  oxyCODONE    IR 10 milliGRAM(s) Oral every 6 hours PRN Severe Pain (7 - 10)      Vital Signs Last 24 Hrs  T(C): 36.5 (24 Dec 2024 04:06), Max: 37.4 (24 Dec 2024 00:04)  T(F): 97.7 (24 Dec 2024 04:06), Max: 99.4 (24 Dec 2024 00:04)  HR: 78 (24 Dec 2024 04:06) (76 - 112)  BP: 127/76 (24 Dec 2024 04:06) (101/66 - 131/76)  BP(mean): 88 (23 Dec 2024 17:18) (78 - 88)  RR: 18 (24 Dec 2024 04:06) (16 - 20)  SpO2: 97% (24 Dec 2024 04:06) (93% - 100%)    Parameters below as of 24 Dec 2024 04:06  Patient On (Oxygen Delivery Method): room air        Physical Exam:    Constitutional: NAD  HEENT: EOMI  Respiratory: Respirations non-labored, no accessory muscle use  Gastrointestinal: non-distended  Neurological: A&O x 3  Vascular: L AKA site healing appropriately, dressings changed at bedside, bullae still present, R DP signal on RLE      LABS:                        9.7    6.79  )-----------( 259      ( 24 Dec 2024 03:59 )             30.4     12-24    136  |  104  |  14.5  ----------------------------<  111[H]  4.5   |  20.0[L]  |  0.75    Ca    8.1[L]      24 Dec 2024 03:59  Phos  3.3     12-24  Mg     1.7     12-24      PTT - ( 22 Dec 2024 22:00 )  PTT:39.2 sec  Urinalysis Basic - ( 24 Dec 2024 03:59 )    Color: x / Appearance: x / SG: x / pH: x  Gluc: 111 mg/dL / Ketone: x  / Bili: x / Urobili: x   Blood: x / Protein: x / Nitrite: x   Leuk Esterase: x / RBC: x / WBC x   Sq Epi: x / Non Sq Epi: x / Bacteria: x        A: 79 year old female s/p bilateral femoral artery thrombectomy and left AKA on 12/16. Doing well overall and progressing as expected.     Plan:  - Dressing changed to bl groin and Left aka stump ( xeroform Kerlix , ace)   - Oral AC today (Eliquis 5mg BID)  - Endo recs appreciated   - ID recs apprec  - Plan to DC with muñoz  - Dispo planning: ISA

## 2024-12-25 LAB
ANION GAP SERPL CALC-SCNC: 11 MMOL/L — SIGNIFICANT CHANGE UP (ref 5–17)
BASOPHILS # BLD AUTO: 0.03 K/UL — SIGNIFICANT CHANGE UP (ref 0–0.2)
BASOPHILS NFR BLD AUTO: 0.4 % — SIGNIFICANT CHANGE UP (ref 0–2)
BUN SERPL-MCNC: 14.6 MG/DL — SIGNIFICANT CHANGE UP (ref 8–20)
CALCIUM SERPL-MCNC: 7.9 MG/DL — LOW (ref 8.4–10.5)
CHLORIDE SERPL-SCNC: 107 MMOL/L — SIGNIFICANT CHANGE UP (ref 96–108)
CO2 SERPL-SCNC: 20 MMOL/L — LOW (ref 22–29)
CREAT SERPL-MCNC: 0.87 MG/DL — SIGNIFICANT CHANGE UP (ref 0.5–1.3)
EGFR: 67 ML/MIN/1.73M2 — SIGNIFICANT CHANGE UP
EOSINOPHIL # BLD AUTO: 0.14 K/UL — SIGNIFICANT CHANGE UP (ref 0–0.5)
EOSINOPHIL NFR BLD AUTO: 2 % — SIGNIFICANT CHANGE UP (ref 0–6)
GLUCOSE BLDC GLUCOMTR-MCNC: 102 MG/DL — HIGH (ref 70–99)
GLUCOSE BLDC GLUCOMTR-MCNC: 111 MG/DL — HIGH (ref 70–99)
GLUCOSE BLDC GLUCOMTR-MCNC: 166 MG/DL — HIGH (ref 70–99)
GLUCOSE BLDC GLUCOMTR-MCNC: 223 MG/DL — HIGH (ref 70–99)
GLUCOSE SERPL-MCNC: 101 MG/DL — HIGH (ref 70–99)
HCT VFR BLD CALC: 29.1 % — LOW (ref 34.5–45)
HGB BLD-MCNC: 9.1 G/DL — LOW (ref 11.5–15.5)
IMM GRANULOCYTES NFR BLD AUTO: 0.9 % — SIGNIFICANT CHANGE UP (ref 0–0.9)
LYMPHOCYTES # BLD AUTO: 2.65 K/UL — SIGNIFICANT CHANGE UP (ref 1–3.3)
LYMPHOCYTES # BLD AUTO: 38.6 % — SIGNIFICANT CHANGE UP (ref 13–44)
MAGNESIUM SERPL-MCNC: 1.9 MG/DL — SIGNIFICANT CHANGE UP (ref 1.6–2.6)
MCHC RBC-ENTMCNC: 26.3 PG — LOW (ref 27–34)
MCHC RBC-ENTMCNC: 31.3 G/DL — LOW (ref 32–36)
MCV RBC AUTO: 84.1 FL — SIGNIFICANT CHANGE UP (ref 80–100)
MONOCYTES # BLD AUTO: 0.54 K/UL — SIGNIFICANT CHANGE UP (ref 0–0.9)
MONOCYTES NFR BLD AUTO: 7.9 % — SIGNIFICANT CHANGE UP (ref 2–14)
NEUTROPHILS # BLD AUTO: 3.44 K/UL — SIGNIFICANT CHANGE UP (ref 1.8–7.4)
NEUTROPHILS NFR BLD AUTO: 50.2 % — SIGNIFICANT CHANGE UP (ref 43–77)
PHOSPHATE SERPL-MCNC: 3.7 MG/DL — SIGNIFICANT CHANGE UP (ref 2.4–4.7)
PLATELET # BLD AUTO: 259 K/UL — SIGNIFICANT CHANGE UP (ref 150–400)
POTASSIUM SERPL-MCNC: 4.4 MMOL/L — SIGNIFICANT CHANGE UP (ref 3.5–5.3)
POTASSIUM SERPL-SCNC: 4.4 MMOL/L — SIGNIFICANT CHANGE UP (ref 3.5–5.3)
RBC # BLD: 3.46 M/UL — LOW (ref 3.8–5.2)
RBC # FLD: 17.3 % — HIGH (ref 10.3–14.5)
SODIUM SERPL-SCNC: 138 MMOL/L — SIGNIFICANT CHANGE UP (ref 135–145)
WBC # BLD: 6.86 K/UL — SIGNIFICANT CHANGE UP (ref 3.8–10.5)
WBC # FLD AUTO: 6.86 K/UL — SIGNIFICANT CHANGE UP (ref 3.8–10.5)

## 2024-12-25 RX ADMIN — ACETAMINOPHEN 975 MILLIGRAM(S): 80 SOLUTION/ DROPS ORAL at 17:30

## 2024-12-25 RX ADMIN — SENNOSIDES 2 TABLET(S): 8.6 TABLET, FILM COATED ORAL at 22:48

## 2024-12-25 RX ADMIN — Medication 17 GRAM(S): at 11:39

## 2024-12-25 RX ADMIN — ACETAMINOPHEN 975 MILLIGRAM(S): 80 SOLUTION/ DROPS ORAL at 11:39

## 2024-12-25 RX ADMIN — Medication 25 MILLIGRAM(S): at 06:04

## 2024-12-25 RX ADMIN — CHLORHEXIDINE GLUCONATE 1 APPLICATION(S): 1.2 RINSE ORAL at 11:39

## 2024-12-25 RX ADMIN — Medication 5 UNIT(S): at 17:31

## 2024-12-25 RX ADMIN — APIXABAN 5 MILLIGRAM(S): 5 TABLET, FILM COATED ORAL at 06:04

## 2024-12-25 RX ADMIN — INSULIN GLARGINE-YFGN 15 UNIT(S): 100 INJECTION, SOLUTION SUBCUTANEOUS at 22:48

## 2024-12-25 RX ADMIN — APIXABAN 5 MILLIGRAM(S): 5 TABLET, FILM COATED ORAL at 17:31

## 2024-12-25 RX ADMIN — Medication 5 UNIT(S): at 08:47

## 2024-12-25 RX ADMIN — Medication 2: at 12:34

## 2024-12-25 RX ADMIN — ROSUVASTATIN 5 MILLIGRAM(S): 40 TABLET, FILM COATED ORAL at 22:48

## 2024-12-25 RX ADMIN — Medication 5 UNIT(S): at 12:34

## 2024-12-25 RX ADMIN — Medication 25 MILLIGRAM(S): at 17:30

## 2024-12-25 RX ADMIN — ACETAMINOPHEN 975 MILLIGRAM(S): 80 SOLUTION/ DROPS ORAL at 23:00

## 2024-12-25 RX ADMIN — ACETAMINOPHEN 975 MILLIGRAM(S): 80 SOLUTION/ DROPS ORAL at 06:04

## 2024-12-25 NOTE — PROGRESS NOTE ADULT - ASSESSMENT
79 year old female s/p bilateral femoral artery thrombectomy and left AKA on 12/16. Doing well overall and progressing as expected.     Plan:  - Dressing changed to bl groin and Left aka stump ( xeroform Kerlix , ace)   - Oral AC   - Endo recs appreciated   - ID recs appreciated   - Plan to DC with muñoz  - Dispo planning: ISA

## 2024-12-25 NOTE — PROGRESS NOTE ADULT - SUBJECTIVE AND OBJECTIVE BOX
HPI/Overnight Events:   Patient seen and examined at bedside this AM. No overnight events. No complaints. Denies fever, chills, nausea, vomiting, chest pain, SOB, dizziness, abd pain or any other concerning symptoms.    Vital Signs Last 24 Hrs  T(C): 36.9 (25 Dec 2024 11:29), Max: 37.3 (25 Dec 2024 04:50)  T(F): 98.5 (25 Dec 2024 11:29), Max: 99.1 (25 Dec 2024 04:50)  HR: 100 (25 Dec 2024 11:29) (71 - 100)  BP: 103/64 (25 Dec 2024 11:29) (102/62 - 124/77)  BP(mean): --  RR: 20 (25 Dec 2024 11:29) (18 - 20)  SpO2: 94% (25 Dec 2024 11:29) (94% - 100%)    Parameters below as of 25 Dec 2024 11:29  Patient On (Oxygen Delivery Method): room air        I&O's Detail    24 Dec 2024 07:01  -  25 Dec 2024 07:00  --------------------------------------------------------  IN:    Oral Fluid: 698 mL  Total IN: 698 mL    OUT:    Intermittent Catheterization - Urethral (mL): 700 mL    Voided (mL): 800 mL  Total OUT: 1500 mL    Total NET: -802 mL          LABS:                        9.1    6.86  )-----------( 259      ( 25 Dec 2024 07:40 )             29.1     12-25    138  |  107  |  14.6  ----------------------------<  101[H]  4.4   |  20.0[L]  |  0.87    Ca    7.9[L]      25 Dec 2024 07:40  Phos  3.7     12-25  Mg     1.9     12-25        Urinalysis Basic - ( 25 Dec 2024 07:40 )    Color: x / Appearance: x / SG: x / pH: x  Gluc: 101 mg/dL / Ketone: x  / Bili: x / Urobili: x   Blood: x / Protein: x / Nitrite: x   Leuk Esterase: x / RBC: x / WBC x   Sq Epi: x / Non Sq Epi: x / Bacteria: x          Physical Exam  Constitutional: patient resting comfortably in bed, in no acute distress  Respiratory: non-labored breathing on RA  Cardiovascular: RRR  : muñoz in place, mata urine in bag  Vascular: L AKA site healing appropriately, bullae still present, R DP signal on RLE    MEDICATIONS  (STANDING):  acetaminophen     Tablet .. 975 milliGRAM(s) Oral every 6 hours  apixaban 5 milliGRAM(s) Oral two times a day  chlorhexidine 2% Cloths 1 Application(s) Topical daily  insulin glargine Injectable (LANTUS) 15 Unit(s) SubCutaneous at bedtime  insulin lispro (ADMELOG) corrective regimen sliding scale   SubCutaneous three times a day before meals  insulin lispro Injectable (ADMELOG) 5 Unit(s) SubCutaneous three times a day before meals  metoprolol tartrate 25 milliGRAM(s) Oral two times a day  polyethylene glycol 3350 17 Gram(s) Oral daily  rosuvastatin 5 milliGRAM(s) Oral at bedtime  senna 2 Tablet(s) Oral at bedtime    MEDICATIONS  (PRN):  albuterol    90 MICROgram(s) HFA Inhaler 2 Puff(s) Inhalation every 6 hours PRN Shortness of Breath  metoprolol tartrate Injectable 5 milliGRAM(s) IV Push every 6 hours PRN HR >100        STUDIES:   EKG, CXR, U/S, CT, MRI     MICRO:   Cultures

## 2024-12-26 ENCOUNTER — TRANSCRIPTION ENCOUNTER (OUTPATIENT)
Age: 79
End: 2024-12-26

## 2024-12-26 VITALS
SYSTOLIC BLOOD PRESSURE: 95 MMHG | HEART RATE: 80 BPM | OXYGEN SATURATION: 98 % | TEMPERATURE: 98 F | DIASTOLIC BLOOD PRESSURE: 60 MMHG | RESPIRATION RATE: 18 BRPM

## 2024-12-26 LAB
ANION GAP SERPL CALC-SCNC: 12 MMOL/L — SIGNIFICANT CHANGE UP (ref 5–17)
BUN SERPL-MCNC: 15.1 MG/DL — SIGNIFICANT CHANGE UP (ref 8–20)
CALCIUM SERPL-MCNC: 7.9 MG/DL — LOW (ref 8.4–10.5)
CHLORIDE SERPL-SCNC: 106 MMOL/L — SIGNIFICANT CHANGE UP (ref 96–108)
CO2 SERPL-SCNC: 19 MMOL/L — LOW (ref 22–29)
CREAT SERPL-MCNC: 0.89 MG/DL — SIGNIFICANT CHANGE UP (ref 0.5–1.3)
EGFR: 65 ML/MIN/1.73M2 — SIGNIFICANT CHANGE UP
GLUCOSE BLDC GLUCOMTR-MCNC: 153 MG/DL — HIGH (ref 70–99)
GLUCOSE BLDC GLUCOMTR-MCNC: 158 MG/DL — HIGH (ref 70–99)
GLUCOSE SERPL-MCNC: 179 MG/DL — HIGH (ref 70–99)
HCT VFR BLD CALC: 30.8 % — LOW (ref 34.5–45)
HGB BLD-MCNC: 9.5 G/DL — LOW (ref 11.5–15.5)
MAGNESIUM SERPL-MCNC: 2 MG/DL — SIGNIFICANT CHANGE UP (ref 1.6–2.6)
MCHC RBC-ENTMCNC: 26.7 PG — LOW (ref 27–34)
MCHC RBC-ENTMCNC: 30.8 G/DL — LOW (ref 32–36)
MCV RBC AUTO: 86.5 FL — SIGNIFICANT CHANGE UP (ref 80–100)
PHOSPHATE SERPL-MCNC: 3.6 MG/DL — SIGNIFICANT CHANGE UP (ref 2.4–4.7)
PLATELET # BLD AUTO: 268 K/UL — SIGNIFICANT CHANGE UP (ref 150–400)
POTASSIUM SERPL-MCNC: 4.9 MMOL/L — SIGNIFICANT CHANGE UP (ref 3.5–5.3)
POTASSIUM SERPL-SCNC: 4.9 MMOL/L — SIGNIFICANT CHANGE UP (ref 3.5–5.3)
RBC # BLD: 3.56 M/UL — LOW (ref 3.8–5.2)
RBC # FLD: 17.7 % — HIGH (ref 10.3–14.5)
SODIUM SERPL-SCNC: 137 MMOL/L — SIGNIFICANT CHANGE UP (ref 135–145)
WBC # BLD: 6.18 K/UL — SIGNIFICANT CHANGE UP (ref 3.8–10.5)
WBC # FLD AUTO: 6.18 K/UL — SIGNIFICANT CHANGE UP (ref 3.8–10.5)

## 2024-12-26 PROCEDURE — 88307 TISSUE EXAM BY PATHOLOGIST: CPT

## 2024-12-26 PROCEDURE — C9399: CPT

## 2024-12-26 PROCEDURE — 73600 X-RAY EXAM OF ANKLE: CPT

## 2024-12-26 PROCEDURE — 73630 X-RAY EXAM OF FOOT: CPT

## 2024-12-26 PROCEDURE — 96375 TX/PRO/DX INJ NEW DRUG ADDON: CPT

## 2024-12-26 PROCEDURE — P9040: CPT

## 2024-12-26 PROCEDURE — 84484 ASSAY OF TROPONIN QUANT: CPT

## 2024-12-26 PROCEDURE — 36430 TRANSFUSION BLD/BLD COMPNT: CPT

## 2024-12-26 PROCEDURE — 84100 ASSAY OF PHOSPHORUS: CPT

## 2024-12-26 PROCEDURE — 83880 ASSAY OF NATRIURETIC PEPTIDE: CPT

## 2024-12-26 PROCEDURE — 87637 SARSCOV2&INF A&B&RSV AMP PRB: CPT

## 2024-12-26 PROCEDURE — 74176 CT ABD & PELVIS W/O CONTRAST: CPT | Mod: MC

## 2024-12-26 PROCEDURE — 82962 GLUCOSE BLOOD TEST: CPT

## 2024-12-26 PROCEDURE — 83605 ASSAY OF LACTIC ACID: CPT

## 2024-12-26 PROCEDURE — 85027 COMPLETE CBC AUTOMATED: CPT

## 2024-12-26 PROCEDURE — 87641 MR-STAPH DNA AMP PROBE: CPT

## 2024-12-26 PROCEDURE — 88311 DECALCIFY TISSUE: CPT

## 2024-12-26 PROCEDURE — 99222 1ST HOSP IP/OBS MODERATE 55: CPT

## 2024-12-26 PROCEDURE — 96374 THER/PROPH/DIAG INJ IV PUSH: CPT

## 2024-12-26 PROCEDURE — 83735 ASSAY OF MAGNESIUM: CPT

## 2024-12-26 PROCEDURE — 83036 HEMOGLOBIN GLYCOSYLATED A1C: CPT

## 2024-12-26 PROCEDURE — 88304 TISSUE EXAM BY PATHOLOGIST: CPT

## 2024-12-26 PROCEDURE — 85025 COMPLETE CBC W/AUTO DIFF WBC: CPT

## 2024-12-26 PROCEDURE — C1757: CPT

## 2024-12-26 PROCEDURE — 86923 COMPATIBILITY TEST ELECTRIC: CPT

## 2024-12-26 PROCEDURE — 80053 COMPREHEN METABOLIC PANEL: CPT

## 2024-12-26 PROCEDURE — 36415 COLL VENOUS BLD VENIPUNCTURE: CPT

## 2024-12-26 PROCEDURE — 87186 SC STD MICRODIL/AGAR DIL: CPT

## 2024-12-26 PROCEDURE — 85610 PROTHROMBIN TIME: CPT

## 2024-12-26 PROCEDURE — 86850 RBC ANTIBODY SCREEN: CPT

## 2024-12-26 PROCEDURE — T1013: CPT

## 2024-12-26 PROCEDURE — 82009 KETONE BODYS QUAL: CPT

## 2024-12-26 PROCEDURE — C8924: CPT

## 2024-12-26 PROCEDURE — 71045 X-RAY EXAM CHEST 1 VIEW: CPT

## 2024-12-26 PROCEDURE — 73590 X-RAY EXAM OF LOWER LEG: CPT

## 2024-12-26 PROCEDURE — 87086 URINE CULTURE/COLONY COUNT: CPT

## 2024-12-26 PROCEDURE — 86901 BLOOD TYPING SEROLOGIC RH(D): CPT

## 2024-12-26 PROCEDURE — 80076 HEPATIC FUNCTION PANEL: CPT

## 2024-12-26 PROCEDURE — 93321 DOPPLER ECHO F-UP/LMTD STD: CPT

## 2024-12-26 PROCEDURE — 75635 CT ANGIO ABDOMINAL ARTERIES: CPT | Mod: MC

## 2024-12-26 PROCEDURE — 86900 BLOOD TYPING SEROLOGIC ABO: CPT

## 2024-12-26 PROCEDURE — 70450 CT HEAD/BRAIN W/O DYE: CPT | Mod: MC

## 2024-12-26 PROCEDURE — 85730 THROMBOPLASTIN TIME PARTIAL: CPT

## 2024-12-26 PROCEDURE — 87640 STAPH A DNA AMP PROBE: CPT

## 2024-12-26 PROCEDURE — 93005 ELECTROCARDIOGRAM TRACING: CPT

## 2024-12-26 PROCEDURE — 80048 BASIC METABOLIC PNL TOTAL CA: CPT

## 2024-12-26 PROCEDURE — 99285 EMERGENCY DEPT VISIT HI MDM: CPT | Mod: 25

## 2024-12-26 PROCEDURE — C1889: CPT

## 2024-12-26 PROCEDURE — 93325 DOPPLER ECHO COLOR FLOW MAPG: CPT

## 2024-12-26 PROCEDURE — 81001 URINALYSIS AUTO W/SCOPE: CPT

## 2024-12-26 RX ORDER — POLYETHYLENE GLYCOL 3350 17 G/DOSE
17 POWDER (GRAM) ORAL
Qty: 0 | Refills: 0 | DISCHARGE
Start: 2024-12-26

## 2024-12-26 RX ORDER — METOPROLOL TARTRATE 50 MG
1 TABLET ORAL
Qty: 0 | Refills: 0 | DISCHARGE
Start: 2024-12-26

## 2024-12-26 RX ORDER — ERTAPENEM SODIUM 1 G/1
1000 INJECTION, POWDER, LYOPHILIZED, FOR SOLUTION INTRAMUSCULAR; INTRAVENOUS
Qty: 0 | Refills: 0 | DISCHARGE
Start: 2024-12-26 | End: 2025-01-01

## 2024-12-26 RX ORDER — ACETAMINOPHEN 80 MG/.8ML
3 SOLUTION/ DROPS ORAL
Qty: 0 | Refills: 0 | DISCHARGE
Start: 2024-12-26

## 2024-12-26 RX ORDER — GLIPIZIDE 10 MG
1 TABLET ORAL
Refills: 0 | DISCHARGE

## 2024-12-26 RX ORDER — OXYCODONE HCL 15 MG
10 TABLET ORAL EVERY 4 HOURS
Refills: 0 | Status: DISCONTINUED | OUTPATIENT
Start: 2024-12-26 | End: 2024-12-26

## 2024-12-26 RX ORDER — OXYCODONE HCL 15 MG
1 TABLET ORAL
Qty: 0 | Refills: 0 | DISCHARGE
Start: 2024-12-26

## 2024-12-26 RX ORDER — OXYCODONE HCL 15 MG
5 TABLET ORAL EVERY 4 HOURS
Refills: 0 | Status: DISCONTINUED | OUTPATIENT
Start: 2024-12-26 | End: 2024-12-26

## 2024-12-26 RX ORDER — HYDROMORPHONE HCL 4 MG
0.5 TABLET ORAL EVERY 4 HOURS
Refills: 0 | Status: DISCONTINUED | OUTPATIENT
Start: 2024-12-26 | End: 2024-12-26

## 2024-12-26 RX ORDER — INSULIN LISPRO 100/ML
5 VIAL (ML) SUBCUTANEOUS
Qty: 0 | Refills: 0 | DISCHARGE
Start: 2024-12-26

## 2024-12-26 RX ORDER — ERTAPENEM SODIUM 1 G/1
1000 INJECTION, POWDER, LYOPHILIZED, FOR SOLUTION INTRAMUSCULAR; INTRAVENOUS EVERY 24 HOURS
Refills: 0 | Status: DISCONTINUED | OUTPATIENT
Start: 2024-12-26 | End: 2024-12-26

## 2024-12-26 RX ORDER — INSULIN GLARGINE-YFGN 100 [IU]/ML
15 INJECTION, SOLUTION SUBCUTANEOUS
Qty: 0 | Refills: 0 | DISCHARGE
Start: 2024-12-26

## 2024-12-26 RX ADMIN — CHLORHEXIDINE GLUCONATE 1 APPLICATION(S): 1.2 RINSE ORAL at 13:54

## 2024-12-26 RX ADMIN — Medication 5 UNIT(S): at 09:01

## 2024-12-26 RX ADMIN — APIXABAN 5 MILLIGRAM(S): 5 TABLET, FILM COATED ORAL at 06:24

## 2024-12-26 RX ADMIN — Medication 10 MILLIGRAM(S): at 10:15

## 2024-12-26 RX ADMIN — ACETAMINOPHEN 975 MILLIGRAM(S): 80 SOLUTION/ DROPS ORAL at 06:24

## 2024-12-26 RX ADMIN — Medication 2: at 09:01

## 2024-12-26 RX ADMIN — Medication 17 GRAM(S): at 11:23

## 2024-12-26 RX ADMIN — Medication 2: at 13:01

## 2024-12-26 RX ADMIN — Medication 25 MILLIGRAM(S): at 06:24

## 2024-12-26 RX ADMIN — Medication 5 UNIT(S): at 13:01

## 2024-12-26 RX ADMIN — ACETAMINOPHEN 975 MILLIGRAM(S): 80 SOLUTION/ DROPS ORAL at 11:20

## 2024-12-26 NOTE — DISCHARGE NOTE PROVIDER - NSDCMRMEDTOKEN_GEN_ALL_CORE_FT
Albuterol (Eqv-Proventil HFA) 90 mcg/inh inhalation aerosol: 2 puff(s) inhaled every 4 hours  Eliquis 5 mg oral tablet: 1 tab(s) orally 2 times a day  ergocalciferol: 1,250 microgram(s) orally once a day  Farxiga 5 mg oral tablet: 1 tab(s) orally once a day  insulin glargine 100 units/mL subcutaneous solution: 15 unit(s) subcutaneous once a day (at bedtime)  irbesartan 150 mg oral tablet: 1 tab(s) orally once a day  metoprolol succinate 25 mg oral tablet, extended release: 1 tab(s) orally once a day  oxyCODONE 10 mg oral tablet: 1 tab(s) orally every 4 hours As needed Moderate Pain (4 - 6)  oxyCODONE 5 mg oral tablet: 1 tab(s) orally every 4 hours As needed Mild Pain (1 - 3)  polyethylene glycol 3350 oral powder for reconstitution: 17 gram(s) orally once a day  rosuvastatin 5 mg oral capsule: 1 cap(s) orally once a day (at bedtime)  Tylenol 325 mg oral tablet: 3 tab(s) orally every 6 hours   Albuterol (Eqv-Proventil HFA) 90 mcg/inh inhalation aerosol: 2 puff(s) inhaled every 4 hours  Eliquis 5 mg oral tablet: 1 tab(s) orally 2 times a day  ergocalciferol: 1,250 microgram(s) orally once a day  ertapenem 1 g injection: 1,000 milligram(s) injectable every 24 hours To be given once daily until 1/1/2025  Farxiga 5 mg oral tablet: 1 tab(s) orally once a day  insulin glargine 100 units/mL subcutaneous solution: 15 unit(s) subcutaneous once a day (at bedtime)  irbesartan 150 mg oral tablet: 1 tab(s) orally once a day  metoprolol tartrate 25 mg oral tablet: 1 tab(s) orally 2 times a day  oxyCODONE 10 mg oral tablet: 1 tab(s) orally every 4 hours As needed Moderate Pain (4 - 6)  oxyCODONE 5 mg oral tablet: 1 tab(s) orally every 4 hours As needed Mild Pain (1 - 3)  polyethylene glycol 3350 oral powder for reconstitution: 17 gram(s) orally once a day  rosuvastatin 5 mg oral capsule: 1 cap(s) orally once a day (at bedtime)  Tylenol 325 mg oral tablet: 3 tab(s) orally every 6 hours   Albuterol (Eqv-Proventil HFA) 90 mcg/inh inhalation aerosol: 2 puff(s) inhaled every 4 hours  Eliquis 5 mg oral tablet: 1 tab(s) orally 2 times a day  ergocalciferol: 1,250 microgram(s) orally once a day  Farxiga 5 mg oral tablet: 1 tab(s) orally once a day  insulin glargine 100 units/mL subcutaneous solution: 15 unit(s) subcutaneous once a day (at bedtime)  insulin lispro 100 units/mL injectable solution: 5 injectable 3 times a day (before meals)  irbesartan 150 mg oral tablet: 1 tab(s) orally once a day  metoprolol tartrate 25 mg oral tablet: 1 tab(s) orally 2 times a day  oxyCODONE 10 mg oral tablet: 1 tab(s) orally every 4 hours As needed Moderate Pain (4 - 6)  oxyCODONE 5 mg oral tablet: 1 tab(s) orally every 4 hours As needed Mild Pain (1 - 3)  polyethylene glycol 3350 oral powder for reconstitution: 17 gram(s) orally once a day  rosuvastatin 5 mg oral capsule: 1 cap(s) orally once a day (at bedtime)  Tylenol 325 mg oral tablet: 3 tab(s) orally every 6 hours

## 2024-12-26 NOTE — PROGRESS NOTE ADULT - PROVIDER SPECIALTY LIST ADULT
Endocrinology
Vascular Surgery
Cardiology
SICU
Vascular Surgery
Endocrinology
Internal Medicine
Vascular Surgery
Vascular Surgery
Endocrinology
Vascular Surgery
Vascular Surgery

## 2024-12-26 NOTE — PROGRESS NOTE ADULT - ATTENDING COMMENTS
awaiting rehab
agree with above
agree with above
doing well stable awaiting discharge
hgb with reasonable response following transfusion , left stump with some blisters but staple line is healthy , + edema but no hematoma at stump , patient is calm this morning + sundowning , d/w family at bedside needs to re-orient patient .
seen and examined comfortable and resting stump with small dusky area stable  continue AC/AP  continue dispo  continue OOB and nutritional support
doing well , will switch abx for uti , AKA blisters but healing plan for discharge to [facility]
hgb stable 8.4 from 8.2 , ct showing small groin hematoma , no  hematoma in visualized left AKA site perhaps intramuscular but may just be post op edema .  heparin was held will restart hep gtt without bolus
pt seen and examined , doing ok , stump was sitting in urine , cleansed and new dressing placed , need muñoz for incontinence , continue ABX for 10 Days for uti , await ISA placement .

## 2024-12-26 NOTE — DISCHARGE NOTE PROVIDER - CARE PROVIDERS DIRECT ADDRESSES
[email or web address]

## 2024-12-26 NOTE — CONSULT NOTE ADULT - CONSULT REQUESTED DATE/TIME
17-Dec-2024
17-Dec-2024 14:08
18-Dec-2024 16:20
17-Dec-2024 17:04
26-Dec-2024 12:10
16-Dec-2024 22:41

## 2024-12-26 NOTE — DISCHARGE NOTE PROVIDER - NSDCFUADDINST_GEN_ALL_CORE_FT
You may shower. Wash surgical wounds gently with mild soap (dressings may be removed), do not scrub. Do not submerge the wounds for at least 2 weeks (e.g. no baths, no swimming). If you have staples, they will be removed at a post-operative visit; if you have steristrips (white tape) or dermabond (purple glue), it will fall off on its own. You should continue your diabetic diet. Keep the muñoz in, follow up with Urology as an outpatient to plan for removal of muñoz. Begin to wear the amputation sock provided to you in rehab. Change dressings daily or more often as needed - Island dressings at the groin, dry dressing and kerlix/ACE compression of the AKA site.  Please follow up with your primary care physician regarding your hospitalization.

## 2024-12-26 NOTE — CONSULT NOTE ADULT - ASSESSMENT
79 F PMH AF, HTN, DM, CVA, who presented to the ED with LLE acute limb ischemia.     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  79 F PMH AF, HTN, DM, CVA, who presented to the ED with LLE acute limb ischemia.     S/p bilateral femoral artery thrombectomy and L AKA on Dec 16th  Leukocytosis - resolved   Afebrile  + Urine culture with MDR organism (ESBL E Coli)  Abnormal imaging   CT from Dec 20th showed b/l femoral region hematomas   S/p Ertapenem 3 day course Dec 22nd-24th    Suggest;  Pt is afebrile and does not have leukocytosis  She is also s/p 3 days of Ertapenem  At this point in time I do not think she requires any antibiotics  Silver care per Primary Team/Urology    ID to sign off - please recall with questions - I am available on TEAMS.

## 2024-12-26 NOTE — DISCHARGE NOTE NURSING/CASE MANAGEMENT/SOCIAL WORK - PATIENT PORTAL LINK FT
You can access the FollowMyHealth Patient Portal offered by [facility] by registering at the following website: [email or web address]. By joining [employer]’s FollowMyHealth portal, you will also be able to view your health information using other applications (apps) compatible with our system.

## 2024-12-26 NOTE — PROGRESS NOTE ADULT - SUBJECTIVE AND OBJECTIVE BOX
Subjective: Patient seen and examined at bedside, no current complaints, no overnight events, dressing changed at bedside.       MEDICATIONS  (STANDING):  acetaminophen     Tablet .. 975 milliGRAM(s) Oral every 6 hours  apixaban 5 milliGRAM(s) Oral two times a day  chlorhexidine 2% Cloths 1 Application(s) Topical daily  insulin glargine Injectable (LANTUS) 15 Unit(s) SubCutaneous at bedtime  insulin lispro (ADMELOG) corrective regimen sliding scale   SubCutaneous three times a day before meals  insulin lispro Injectable (ADMELOG) 5 Unit(s) SubCutaneous three times a day before meals  metoprolol tartrate 25 milliGRAM(s) Oral two times a day  polyethylene glycol 3350 17 Gram(s) Oral daily  rosuvastatin 5 milliGRAM(s) Oral at bedtime  senna 2 Tablet(s) Oral at bedtime    MEDICATIONS  (PRN):  albuterol    90 MICROgram(s) HFA Inhaler 2 Puff(s) Inhalation every 6 hours PRN Shortness of Breath  metoprolol tartrate Injectable 5 milliGRAM(s) IV Push every 6 hours PRN HR >100      Vital Signs Last 24 Hrs  T(C): 37.1 (26 Dec 2024 05:20), Max: 37.1 (26 Dec 2024 05:20)  T(F): 98.7 (26 Dec 2024 05:20), Max: 98.7 (26 Dec 2024 05:20)  HR: 80 (26 Dec 2024 05:20) (71 - 100)  BP: 119/62 (26 Dec 2024 05:20) (103/64 - 124/77)  BP(mean): --  RR: 18 (26 Dec 2024 05:20) (18 - 20)  SpO2: 98% (26 Dec 2024 05:20) (94% - 98%)    Parameters below as of 26 Dec 2024 05:20  Patient On (Oxygen Delivery Method): room air        Physical Exam:    Constitutional: NAD  HEENT: EOMI  Respiratory: Respirations non-labored, no accessory muscle use  Gastrointestinal: non-distended  Neurological: A&O x 3  Vascular: L AKA site healing appropriately, bullae present, R AT signal on RLE        LABS:                        9.5    6.18  )-----------( 268      ( 26 Dec 2024 03:56 )             30.8     12-26    137  |  106  |  15.1  ----------------------------<  179[H]  4.9   |  19.0[L]  |  0.89    Ca    7.9[L]      26 Dec 2024 03:56  Phos  3.6     12-26  Mg     2.0     12-26        Urinalysis Basic - ( 26 Dec 2024 03:56 )    Color: x / Appearance: x / SG: x / pH: x  Gluc: 179 mg/dL / Ketone: x  / Bili: x / Urobili: x   Blood: x / Protein: x / Nitrite: x   Leuk Esterase: x / RBC: x / WBC x   Sq Epi: x / Non Sq Epi: x / Bacteria: x        A:  79 year old female s/p bilateral femoral artery thrombectomy and left AKA on 12/16. Doing well overall.     Plan:  - Dressing changed to bl groin and Left aka stump ( xeroform Kerlix , ace)   - Oral AC: Eliquis  - Endo recs appreciated   - ID recs appreciated   - Plan to DC with muñoz  - Dispo planning: ISA

## 2024-12-26 NOTE — DISCHARGE NOTE NURSING/CASE MANAGEMENT/SOCIAL WORK - FINANCIAL ASSISTANCE
[facility] provides services at a reduced cost to those who are determined to be eligible through [facility]’s financial assistance program. Information regarding [facility]’s financial assistance program can be found by going to [email or web address] or by calling [phone].

## 2024-12-26 NOTE — PROGRESS NOTE ADULT - REASON FOR ADMISSION
?Acute limb ischemia
Spironolactone Pregnancy And Lactation Text: This medication can cause feminization of the male fetus and should be avoided during pregnancy. The active metabolite is also found in breast milk.

## 2024-12-26 NOTE — CONSULT NOTE ADULT - SUBJECTIVE AND OBJECTIVE BOX
[facility]  INFECTIOUS DISEASES at [place] / [place] / [place]  =======================================================                              Andrei Cabrera MD                              Professor Emeritus:  Dr Roney Florian MD            Diplomates American Board of Internal Medicine & Infectious Diseases                                   Tel  [phone] Fax [phone]                                  Hospital Consult line:  [phone]  =======================================================    Patient is a 79 Female who presented with a chief complaint of acute limb ischemia.     HPI:  79 F PMH AF, HTN, DM, CVA, who presented to the ED with LLE acute limb ischemia.     S/p bilateral femoral artery thrombectomy and L AKA on Dec 16th. Post-op course complicated by leukocytosis (resolved). Pt afebrile. ID consulted as urine culture from Dec 18th grew ESBL E Coli. S/p Ertapenem 3 day course Dec 22nd-24th.     REVIEW OF SYSTEMS  Constitutional: No fevers, no chills  Skin: No rash	  Eyes: No discharge	  ENMT: No sore throat  Respiratory: No cough  Cardiovascular:  No chest pain  Gastrointestinal: No pain	  Genitourinary: No dysuria  Neurological: No HA    prior hospital charts reviewed [V]  primary team notes reviewed [V]  other consultant notes reviewed [V]    PAST MEDICAL & SURGICAL HISTORY:  DM (diabetes mellitus)    HTN (hypertension)    CVA (cerebrovascular accident)    Afib    SOCIAL HISTORY:  No sick contacts     FAMILY HISTORY:    Allergies  No Known Allergies    ANTIMICROBIALS:  ertapenem  IVPB 1000 every 24 hours    ANTIMICROBIALS (past 90 days):  MEDICATIONS  (STANDING):    cefTRIAXone Injectable.   1000 milliGRAM(s) IV Push (12-20-24 @ 23:35)   1000 milliGRAM(s) IV Push (12-20-24 @ 01:45)   1000 milliGRAM(s) IV Push (12-19-24 @ 00:56)    ertapenem  IVPB   120 mL/Hr IV Intermittent (12-24-24 @ 17:53)   120 mL/Hr IV Intermittent (12-23-24 @ 17:22)   120 mL/Hr IV Intermittent (12-22-24 @ 17:26)    OTHER MEDS:   MEDICATIONS  (STANDING):  acetaminophen     Tablet .. 975 every 6 hours  albuterol    90 MICROgram(s) HFA Inhaler 2 every 6 hours PRN  apixaban 5 two times a day  HYDROmorphone  Injectable 0.5 every 4 hours PRN  insulin glargine Injectable (LANTUS) 15 at bedtime  insulin lispro (ADMELOG) corrective regimen sliding scale  three times a day before meals  insulin lispro Injectable (ADMELOG) 5 three times a day before meals  metoprolol tartrate 25 two times a day  oxyCODONE    IR 5 every 4 hours PRN  oxyCODONE    IR 10 every 4 hours PRN  polyethylene glycol 3350 17 daily  rosuvastatin 5 at bedtime  senna 2 at bedtime    VITALS:  Vital Signs Last 24 Hrs  T(F): 97.9 (12-26-24 @ 12:00), Max: 99.6 (12-20-24 @ 16:31)    Vital Signs Last 24 Hrs  HR: 80 (12-26-24 @ 12:00) (80 - 96)  BP: 95/60 (12-26-24 @ 12:00) (95/60 - 119/62)  RR: 18 (12-26-24 @ 12:00)  SpO2: 98% (12-26-24 @ 12:00) (96% - 98%)  Wt(kg): --    EXAM:  General: Patient in no acute distress   HEENT: NCAT  CV: S1+S2  Lungs: No respiratory distress  Abd: Soft, nontender  Ext: S/p L AKA   Neuro: Calm  Skin: No rash     Labs:                        9.5    6.18  )-----------( 268      ( 26 Dec 2024 03:56 )             30.8     12-26    137  |  106  |  15.1  ----------------------------<  179[H]  4.9   |  19.0[L]  |  0.89    Ca    7.9[L]      26 Dec 2024 03:56  Phos  3.6     12-26  Mg     2.0     12-26    WBC Trend:  WBC Count: 6.18 (12-26-24 @ 03:56)  WBC Count: 6.86 (12-25-24 @ 07:40)  WBC Count: 6.79 (12-24-24 @ 03:59)  WBC Count: 7.35 (12-23-24 @ 05:47)    Auto Neutrophil #: 3.44 K/uL (12-25-24 @ 07:40)  Auto Neutrophil #: 5.94 K/uL (12-20-24 @ 15:55)  Auto Neutrophil #: 9.71 K/uL (12-19-24 @ 04:02)  Auto Neutrophil #: 12.73 K/uL (12-18-24 @ 10:36)  Auto Neutrophil #: 10.88 K/uL (12-18-24 @ 05:00)    Creatine Trend:  Creatinine: 0.89 (12-26)  Creatinine: 0.87 (12-25)  Creatinine: 0.75 (12-24)  Creatinine: 0.74 (12-23)    Liver Biochemical Testing Trend:  Alanine Aminotransferase (ALT/SGPT): 13 (12-17)  Alanine Aminotransferase (ALT/SGPT): 16 (12-17)  Alanine Aminotransferase (ALT/SGPT): 22 (12-17)  Alanine Aminotransferase (ALT/SGPT): 27 (12-16)  Alanine Aminotransferase (ALT/SGPT): 24 (09-27)  Aspartate Aminotransferase (AST/SGOT): 24 (12-17-24 @ 17:02)  Aspartate Aminotransferase (AST/SGOT): 27 (12-17-24 @ 06:50)  Aspartate Aminotransferase (AST/SGOT): 35 (12-17-24 @ 01:15)  Aspartate Aminotransferase (AST/SGOT): 45 (12-16-24 @ 13:05)  Aspartate Aminotransferase (AST/SGOT): 19 (09-27-24 @ 14:11)  Bilirubin Total: 0.5 (12-17)  Bilirubin Direct: 0.2 (12-17)  Bilirubin Total: 0.5 (12-17)  Bilirubin Total: 0.3 (12-17)  Bilirubin Total: 0.8 (12-16)    Auto Eosinophil %: 2.0 % (12-25-24 @ 07:40)    Urinalysis Basic - ( 26 Dec 2024 03:56 )    Color: x / Appearance: x / SG: x / pH: x  Gluc: 179 mg/dL / Ketone: x  / Bili: x / Urobili: x   Blood: x / Protein: x / Nitrite: x   Leuk Esterase: x / RBC: x / WBC x   Sq Epi: x / Non Sq Epi: x / Bacteria: x    MICROBIOLOGY:    MRSA PCR Result.: [place] (12-17-24 @ 23:20)    Culture - Urine (collected 18 Dec 2024 23:39)  Source: Catheterized Catheterized  Final Report:    >100,000 CFU/ml Escherichia coli ESBL  Organism: Escherichia coli ESBL    Sensitivities:      Method Type: BETH      -  Amoxicillin/Clavulanic Acid: R >16/8      -  Ampicillin: R >16 These ampicillin results predict results for amoxicillin      -  Ampicillin/Sulbactam: I 16/8      -  Aztreonam: R >16      -  Cefazolin: R >16 For uncomplicated UTI with K. pneumoniae, E. coli, or P. mirablis: BETH <=16 is sensitive and BETH >=32 is resistant. This also predicts results for oral agents cefaclor, cefdinir, cefpodoxime, cefprozil, cefuroxime axetil, cephalexin and locarbef for uncomplicated UTI. Note that some isolates may be susceptible to these agents while testing resistant to cefazolin.      -  Cefepime: R >16      -  Cefotaxime: R >32      -  Ceftazidime: R >16      -  Ceftriaxone: R >32      -  Cefuroxime: R >16      -  Ciprofloxacin: R >2      -  Ertapenem: S <=0.5      -  Gentamicin: R >8      -  Imipenem: S <=1      -  Levofloxacin: R >4      -  Meropenem: S <=1      -  Minocycline: S <=4      -  Nitrofurantoin: I 64 Should not be used to treat pyelonephritis      -  Piperacillin/Tazobactam: S <=8      -  Tigecycline: S <=2      -  Tobramycin: R >8      -  Trimethoprim/Sulfamethoxazole: S 1/19  Organism: Escherichia coli ESBL    Culture - Urine (collected 18 Aug 2024 07:59)  Source: Clean Catch Clean Catch (Midstream)  Final Report:    >100,000 CFU/ml Escherichia coli ESBL    <10,000 CFU/ml Normal Urogenital daisy present  Organism: Escherichia coli ESBL  Organism: Escherichia coli ESBL    Sensitivities:      Method Type: BETH      -  Ampicillin: R >16 These ampicillin results predict results for amoxicillin      -  Ampicillin/Sulbactam: I 16/8      -  Aztreonam: R >16      -  Cefazolin: R >16 For uncomplicated UTI with K. pneumoniae, E. coli, or P. mirablis: BETH <=16 is sensitive and BETH >=32 is resistant. This also predicts results for oral agents cefaclor, cefdinir, cefpodoxime, cefprozil, cefuroxime axetil, cephalexin and locarbef for uncomplicated UTI. Note that some isolates may be susceptible to these agents while testing resistant to cefazolin.      -  Cefepime: R >16      -  Ceftriaxone: R >32      -  Cefuroxime: R >16      -  Ciprofloxacin: R >2      -  Ertapenem: S <=0.5      -  Gentamicin: R >8      -  Imipenem: S <=1      -  Levofloxacin: R >4      -  Meropenem: S <=1      -  Nitrofurantoin: S <=32 Should not be used to treat pyelonephritis      -  Piperacillin/Tazobactam: S <=8      -  Tobramycin: R >8      -  Trimethoprim/Sulfamethoxazole: S 2/38    Culture - Urine (collected 04 Nov 2023 16:32)  Source: Clean Catch Clean Catch (Midstream)  Final Report:    >100,000 CFU/ml Klebsiella pneumoniae  Organism: Klebsiella pneumoniae  Organism: Klebsiella pneumoniae    Sensitivities:      Method Type: BETH      -  Amoxicillin/Clavulanic Acid: S <=8/4      -  Ampicillin: R >16 These ampicillin results predict results for amoxicillin      -  Ampicillin/Sulbactam: S 8/4      -  Aztreonam: S <=4      -  Cefazolin: S <=2 For uncomplicated UTI with K. pneumoniae, E. coli, or P. mirablis: BETH <=16 is sensitive and BETH >=32 is resistant. This also predicts results for oral agents cefaclor, cefdinir, cefpodoxime, cefprozil, cefuroxime axetil, cephalexin and locarbef for uncomplicated UTI. Note that some isolates may be susceptible to these agents while testing resistant to cefazolin.      -  Cefepime: S <=2      -  Cefoxitin: S <=8      -  Ceftriaxone: S <=1      -  Cefuroxime: S <=4      -  Ciprofloxacin: S <=0.25      -  Ertapenem: S <=0.5      -  Gentamicin: S <=2      -  Imipenem: S <=1      -  Levofloxacin: S <=0.5      -  Meropenem: S <=1      -  Nitrofurantoin: I 64 Should not be used to treat pyelonephritis      -  Piperacillin/Tazobactam: S <=8      -  Tobramycin: S <=2      -  Trimethoprim/Sulfamethoxazole: R >2/38    A1C with Estimated Average Glucose Result: 11.5 % (12-17-24 @ 06:50)    RADIOLOGY:  imaging below personally reviewed    < from: CT Abdomen and Pelvis No Cont (12.20.24 @ 08:17) >    IMPRESSION:    Bilateral femoral region hematomas.  If clinical concern for pseudoaneurysm, recommend evaluation with   vascular ultrasonography.    No retroperitoneal hematoma.    Other findings asdiscussed above.    < end of copied text >

## 2024-12-26 NOTE — DISCHARGE NOTE PROVIDER - HOSPITAL COURSE
Admission HPI: "79y Female with PMH of afib on eliquis (patient and family unable to recall when she last took it or if she even takes it at all), HTN, DM, h/o stroke presents to the ED for 1 week history of worsening left leg pain and discoloration. Patient accompanied by family who state that she has never had any leg pain or vascular concerns in the past but began to complain of left hip pain last week that radiated down the left leg. The pain was constant and there were no aggrevating or alleviating factors with the pain. Family attempted to relieve pain with topical creams but when no relief they brought her in for further management. Of note, patient is unable to ambulate on her own and can only ambulate as far as to the bathroom with assistance. In the ED, provider unable to obtain pulses or doppler signals bilaterally and called emergent vascular surgery consult."    She went to the OR on 12/16 for b/l femoral cutdowns with renu balloon thrombectomy and LLE AKA. She required close monitoring post-operatively for hemodynamic monitoring and blood glucose control, but went to the floor on POD2. Her hospital course was complicated by a UTI initially treated with ceftriaxone, though cultures demonstrated a multi-drug resistant E. coli which requires prolonged course of ertapenem. Endocrinology was consulted for assistance with blood glucose management. Physical therapy recommended [facility] She was cleared for discharge to [facility] on 12/26/24.

## 2024-12-26 NOTE — DISCHARGE NOTE NURSING/CASE MANAGEMENT/SOCIAL WORK - NSDCVIVACCINE_GEN_ALL_CORE_FT
Tdap; 09-Jun-2024 18:10; Grzegorz Lagos (ROSA); [employer]; [id number] (Exp. Date: 01-Dec-2025); IntraMuscular; Deltoid Right.; 0.5 milliLiter(s); VIS (VIS Published: 09-May-2013, VIS Presented: 09-Jun-2024);

## 2024-12-26 NOTE — CONSULT NOTE ADULT - REASON FOR ADMISSION
Acute limb ischemia
?Acute limb ischemia
Acute limb ischemia
?Acute limb ischemia
?Acute limb ischemia
Acute limb ischemia

## 2024-12-26 NOTE — DISCHARGE NOTE PROVIDER - NSDCFUSCHEDAPPT_GEN_ALL_CORE_FT
[facility] Physician [facility] [address]  Scheduled Appointment: 01/31/2025

## 2024-12-26 NOTE — DISCHARGE NOTE PROVIDER - NSDCCPTREATMENT_GEN_ALL_CORE_FT
PRINCIPAL PROCEDURE  Procedure: Left above knee amputation  Findings and Treatment:       SECONDARY PROCEDURE  Procedure: Thromboendarterectomy, common femoral, bilateral  Findings and Treatment:

## 2024-12-26 NOTE — DISCHARGE NOTE PROVIDER - CARE PROVIDER_API CALL
Gulshan Tubbs  Vascular Surgery  [address] [facility] [address]  [place] [address]  Phone: [phone]  Fax: [phone]  Follow Up Time: 2 weeks

## 2024-12-26 NOTE — CONSULT NOTE ADULT - CONSULT REASON
Post-Operative Hemodynamic Instability
uncontrolled diabetes
Positive urine culture
Hyperglycemia, afib w RVR
afib with RVR
comanagement

## 2024-12-30 PROBLEM — Z00.00 ENCOUNTER FOR PREVENTIVE HEALTH EXAMINATION: Status: ACTIVE | Noted: 2024-12-30

## 2024-12-30 LAB — SURGICAL PATHOLOGY STUDY: SIGNIFICANT CHANGE UP

## 2025-01-06 ENCOUNTER — INPATIENT (INPATIENT)
Facility: HOSPITAL | Age: 80
LOS: 17 days | Discharge: NURSING, REHAB OR LONG-TERM CARE | DRG: 872 | End: 2025-01-24
Attending: STUDENT IN AN ORGANIZED HEALTH CARE EDUCATION/TRAINING PROGRAM | Admitting: HOSPITALIST
Payer: COMMERCIAL

## 2025-01-06 VITALS
OXYGEN SATURATION: 96 % | HEART RATE: 106 BPM | HEIGHT: 65 IN | SYSTOLIC BLOOD PRESSURE: 96 MMHG | DIASTOLIC BLOOD PRESSURE: 63 MMHG | WEIGHT: 139.99 LBS | RESPIRATION RATE: 18 BRPM | TEMPERATURE: 98 F

## 2025-01-06 LAB
ALBUMIN SERPL ELPH-MCNC: 2.9 G/DL — LOW (ref 3.3–5.2)
ALP SERPL-CCNC: 277 U/L — HIGH (ref 40–120)
ALT FLD-CCNC: 23 U/L — SIGNIFICANT CHANGE UP
ANION GAP SERPL CALC-SCNC: 16 MMOL/L — SIGNIFICANT CHANGE UP (ref 5–17)
ANISOCYTOSIS BLD QL: SLIGHT — SIGNIFICANT CHANGE UP
APTT BLD: 34.5 SEC — SIGNIFICANT CHANGE UP (ref 24.5–35.6)
APTT BLD: 36.3 SEC — HIGH (ref 24.5–35.6)
AST SERPL-CCNC: 32 U/L — HIGH
BASOPHILS # BLD AUTO: 0 K/UL — SIGNIFICANT CHANGE UP (ref 0–0.2)
BASOPHILS NFR BLD AUTO: 0 % — SIGNIFICANT CHANGE UP (ref 0–2)
BILIRUB SERPL-MCNC: 1.5 MG/DL — SIGNIFICANT CHANGE UP (ref 0.4–2)
BUN SERPL-MCNC: 42.1 MG/DL — HIGH (ref 8–20)
CALCIUM SERPL-MCNC: 9.2 MG/DL — SIGNIFICANT CHANGE UP (ref 8.4–10.5)
CHLORIDE SERPL-SCNC: 100 MMOL/L — SIGNIFICANT CHANGE UP (ref 96–108)
CO2 SERPL-SCNC: 20 MMOL/L — LOW (ref 22–29)
CREAT SERPL-MCNC: 1.18 MG/DL — SIGNIFICANT CHANGE UP (ref 0.5–1.3)
EGFR: 46 ML/MIN/1.73M2 — LOW
EOSINOPHIL # BLD AUTO: 0 K/UL — SIGNIFICANT CHANGE UP (ref 0–0.5)
EOSINOPHIL NFR BLD AUTO: 0 % — SIGNIFICANT CHANGE UP (ref 0–6)
FLUAV AG NPH QL: SIGNIFICANT CHANGE UP
FLUBV AG NPH QL: SIGNIFICANT CHANGE UP
GAS PNL BLDV: SIGNIFICANT CHANGE UP
GLUCOSE SERPL-MCNC: 211 MG/DL — HIGH (ref 70–99)
HCT VFR BLD CALC: 35.2 % — SIGNIFICANT CHANGE UP (ref 34.5–45)
HGB BLD-MCNC: 10.9 G/DL — LOW (ref 11.5–15.5)
INR BLD: 2.11 RATIO — HIGH (ref 0.85–1.16)
INR BLD: 2.26 RATIO — HIGH (ref 0.85–1.16)
LYMPHOCYTES # BLD AUTO: 1.12 K/UL — SIGNIFICANT CHANGE UP (ref 1–3.3)
LYMPHOCYTES # BLD AUTO: 5.5 % — LOW (ref 13–44)
MACROCYTES BLD QL: SLIGHT — SIGNIFICANT CHANGE UP
MANUAL SMEAR VERIFICATION: SIGNIFICANT CHANGE UP
MCHC RBC-ENTMCNC: 26 PG — LOW (ref 27–34)
MCHC RBC-ENTMCNC: 31 G/DL — LOW (ref 32–36)
MCV RBC AUTO: 84 FL — SIGNIFICANT CHANGE UP (ref 80–100)
MONOCYTES # BLD AUTO: 0.94 K/UL — HIGH (ref 0–0.9)
MONOCYTES NFR BLD AUTO: 4.6 % — SIGNIFICANT CHANGE UP (ref 2–14)
NEUTROPHILS # BLD AUTO: 18.36 K/UL — HIGH (ref 1.8–7.4)
NEUTROPHILS NFR BLD AUTO: 89.9 % — HIGH (ref 43–77)
PLAT MORPH BLD: NORMAL — SIGNIFICANT CHANGE UP
PLATELET # BLD AUTO: 305 K/UL — SIGNIFICANT CHANGE UP (ref 150–400)
POLYCHROMASIA BLD QL SMEAR: SIGNIFICANT CHANGE UP
POTASSIUM SERPL-MCNC: 4.9 MMOL/L — SIGNIFICANT CHANGE UP (ref 3.5–5.3)
POTASSIUM SERPL-SCNC: 4.9 MMOL/L — SIGNIFICANT CHANGE UP (ref 3.5–5.3)
PROT SERPL-MCNC: 7.4 G/DL — SIGNIFICANT CHANGE UP (ref 6.6–8.7)
PROTHROM AB SERPL-ACNC: 23.7 SEC — HIGH (ref 9.9–13.4)
PROTHROM AB SERPL-ACNC: 25.3 SEC — HIGH (ref 9.9–13.4)
RBC # BLD: 4.19 M/UL — SIGNIFICANT CHANGE UP (ref 3.8–5.2)
RBC # FLD: 17.2 % — HIGH (ref 10.3–14.5)
RBC BLD AUTO: ABNORMAL
RSV RNA NPH QL NAA+NON-PROBE: SIGNIFICANT CHANGE UP
SARS-COV-2 RNA SPEC QL NAA+PROBE: SIGNIFICANT CHANGE UP
SMUDGE CELLS # BLD: PRESENT — SIGNIFICANT CHANGE UP
SODIUM SERPL-SCNC: 136 MMOL/L — SIGNIFICANT CHANGE UP (ref 135–145)
TROPONIN T, HIGH SENSITIVITY RESULT: 43 NG/L — SIGNIFICANT CHANGE UP (ref 0–51)
WBC # BLD: 20.42 K/UL — HIGH (ref 3.8–10.5)
WBC # FLD AUTO: 20.42 K/UL — HIGH (ref 3.8–10.5)

## 2025-01-06 PROCEDURE — 74174 CTA ABD&PLVS W/CONTRAST: CPT | Mod: 26,MC

## 2025-01-06 PROCEDURE — 99291 CRITICAL CARE FIRST HOUR: CPT

## 2025-01-06 PROCEDURE — 93010 ELECTROCARDIOGRAM REPORT: CPT

## 2025-01-06 PROCEDURE — 73701 CT LOWER EXTREMITY W/DYE: CPT | Mod: 26,LT,MC

## 2025-01-06 PROCEDURE — 71045 X-RAY EXAM CHEST 1 VIEW: CPT | Mod: 26

## 2025-01-06 RX ORDER — BACTERIOSTATIC SODIUM CHLORIDE 0.9 %
1950 VIAL (ML) INJECTION ONCE
Refills: 0 | Status: COMPLETED | OUTPATIENT
Start: 2025-01-06 | End: 2025-01-06

## 2025-01-06 RX ORDER — PIPERACILLIN SODIUM AND TAZOBACTAM SODIUM 2; 250 G/50ML; MG/50ML
3.38 INJECTION, POWDER, FOR SOLUTION INTRAVENOUS ONCE
Refills: 0 | Status: COMPLETED | OUTPATIENT
Start: 2025-01-06 | End: 2025-01-06

## 2025-01-06 RX ORDER — VANCOMYCIN HYDROCHLORIDE 50 MG/ML
1000 KIT ORAL ONCE
Refills: 0 | Status: COMPLETED | OUTPATIENT
Start: 2025-01-06 | End: 2025-01-06

## 2025-01-06 RX ADMIN — Medication 1950 MILLILITER(S): at 15:01

## 2025-01-06 RX ADMIN — PIPERACILLIN SODIUM AND TAZOBACTAM SODIUM 200 GRAM(S): 2; 250 INJECTION, POWDER, FOR SOLUTION INTRAVENOUS at 15:00

## 2025-01-06 RX ADMIN — VANCOMYCIN HYDROCHLORIDE 250 MILLIGRAM(S): KIT at 18:58

## 2025-01-06 NOTE — ED PROVIDER NOTE - PHYSICAL EXAMINATION
Gen: NAD, AOx3  Head: NCAT  HEENT: EOMI, oral mucosa moist, normal conjunctiva, neck supple  Lung: CTAB, no respiratory distress  CV: rrr, no murmur, Normal perfusion  Abd: RUQ pain, (+)Elizondo's sign,   MSK L leg AKA, : No edema, no visible deformities  Neuro: No focal neurologic deficits  Skin: No rash   Psych: normal affect

## 2025-01-06 NOTE — ED PROVIDER NOTE - PROGRESS NOTE DETAILS
MD Sujit: Contacted by CT scan dept, patient unable to provide verbal or written consent for scan at this time due to pain and weakness. Scan deemed as medically imperative and necessary. Verbal approval given to CT scan team to proceed with scan. Patient received in signout pending surgical consultation for acute cholecystitis. Memo: Surgery evaluated, deemed a poor surgical candidate at this time given comorbidities and family unsure if they would want to proceed with surgery. Pending RUQ U/S, AM IR consult for deon blanco. Will admit to medicine with surgery and vascular to follow.

## 2025-01-06 NOTE — ED PROVIDER NOTE - CLINICAL SUMMARY MEDICAL DECISION MAKING FREE TEXT BOX
79-year-old female with past medical history A-fib on Eliquis, hypertension, diabetes, history of stroke with recent left AKA presents from nursing home for purulent discharge from her left AKA.  Patient was reported to have a positive UTI, but is also complaining of right upper quadrant abdominal pain.  She has had no appetite since being at her facility.  Patient tachycardic, hot to touch in the ED.  Sepsis workup initiated, vascular evaluated patient's AKA, recommend local wound care.  As patient is meeting sepsis criteria, Vanco/Zosyn ordered, 30 cc/kg fluid bolus given.  Will scan abdomen as on exam she has positive Elizondo sign.  Patient will require admission with leukocytosis of 20,000, and positive lactate.

## 2025-01-06 NOTE — ED PROVIDER NOTE - OBJECTIVE STATEMENT
79y Female with PMH of afib on eliquis (patient and family unable to recall when she last took it or if she even takes it at all), HTN, DM, h/o stroke presents to the ED from nursing home due to possible surgical site infection. Called patient's nursing home and spoke to charge nurse who states that the patient's wound care personnel saw purulent and malodorous discharge from the surgical site this AM. They cleaned the site but the patient was still uncomfortable so they sent her to the ED. In addition, they report she had a UA positive for UTI. Currently the patient is having abdominal pain and is complaining of fullness in her upper belly. The nursing home charge nurse also states the patient had diarrhea yesterday.    No reports of fevers, chills or rigors.

## 2025-01-06 NOTE — ED ADULT NURSE NOTE - NSFALLHARMRISKINTERV_ED_ALL_ED

## 2025-01-06 NOTE — ED PROVIDER NOTE - NS ED ROS FT
CONSTITUTIONAL: Denies fever, chills, fatigue  HEENT: Denies acute changes in vision and hearing  CARDIO: Denies CP, SOB, palpitations  PULM: Denies cough, wheezing, SOB  ABD: (+) abd pain, n/v/d/c  : Denies dysuria, urinary frequency  NEURO: Denies HA, numbness/tingling  EXTREMITIES: Denies LE swelling, calf pain

## 2025-01-06 NOTE — ED ADULT NURSE REASSESSMENT NOTE - NS ED NURSE REASSESS COMMENT FT1
Assumed care of pt from CARL Hawthorne RN at 1930. Pt is resting comfortably in stretcher. NAD. Pt is A&Ox1 to person. Respirations are even and unlabored. Pt appears to be afib on cardiac monitor, 110bpm. Pt awaiting surgery consult. Pt appears to have left BKA. Plan on going.

## 2025-01-06 NOTE — CONSULT NOTE ADULT - ASSESSMENT
ASSESSMENT: Patient is a 79y old female s/p L AKA 2/2 acute limb ischemia who has failure to thrive.     PLAN:    - Local wound care to b/l groins and left AKA stump, vascular will do if patient is admitted  - No vacular surgical intervention required at this moment.  - Rest of workup per ED  - Plan discussed with Attending, Dr. Tubbs

## 2025-01-06 NOTE — ED ADULT TRIAGE NOTE - CHIEF COMPLAINT QUOTE
sent from facility for foul odor and purulent drainage coming from L BKA amputation preformed on 12/16. Denies fevers, Pt a&ox1 at baseline as per EMS.

## 2025-01-06 NOTE — ED ADULT NURSE NOTE - NS PRO PASSIVE SMOKE EXP
Unknown
Quality 130: Documentation Of Current Medications In The Medical Record: Current Medications Documented
Detail Level: Detailed

## 2025-01-06 NOTE — ED PROVIDER NOTE - ATTENDING CONTRIBUTION TO CARE
I, Tammy Lopez DO, have personally provided 45 minutes of critical care time exclusive of time spent on separately billable procedures. Time includes review of laboratory data, radiology results, discussion with consultants, and monitoring for potential decompensation. Interventions were performed as documented above.     I personally saw the patient with the resident, and completed the key components of the history and physical exam. I then discussed the management plan with the resident. The MDM is mine.

## 2025-01-06 NOTE — CONSULT NOTE ADULT - SUBJECTIVE AND OBJECTIVE BOX
VASCULAR SURGERY CONSULT     HPI: 79y Female with PMH of afib on eliquis (patient and family unable to recall when she last took it or if she even takes it at all), HTN, DM, h/o stroke, who is POD 21 from left AKA 2/2 critical limb ischemia who was sent to the ED from nursing home for evaluation of stump wound. She denies falling or any injury to stump. Patient complains that she is weak, tired, and has not eaten in more than a week and has no appetite. Nursing home also reports she had a UA positive for UTI.       ROS: 10-system review is otherwise negative except HPI above.      PAST MEDICAL & SURGICAL HISTORY:  DM (diabetes mellitus)  HTN (hypertension)  CVA (cerebrovascular accident)  Afib    FAMILY HISTORY:  Family history not pertinent as reviewed with the patient.    SOCIAL HISTORY:  Denies any toxic habits    ALLERGIES: NKA No Known Allergies    HOME MEDICATIONS:   Albuterol (Eqv-Proventil HFA) 90 mcg/inh inhalation aerosol: 2 puff(s) inhaled every 4 hours (16 Dec 2024 15:59)  ergocalciferol: 1,250 microgram(s) orally once a day (16 Dec 2024 15:59)  Farxiga 5 mg oral tablet: 1 tab(s) orally once a day (16 Dec 2024 15:59)  insulin glargine 100 units/mL subcutaneous solution: 15 unit(s) subcutaneous once a day (at bedtime) (26 Dec 2024 11:37)  insulin lispro 100 units/mL injectable solution: 5 injectable 3 times a day (before meals) (26 Dec 2024 13:12)  irbesartan 150 mg oral tablet: 1 tab(s) orally once a day (16 Dec 2024 15:59)  metoprolol tartrate 25 mg oral tablet: 1 tab(s) orally 2 times a day (26 Dec 2024 11:45)  oxyCODONE 10 mg oral tablet: 1 tab(s) orally every 4 hours As needed Moderate Pain (4 - 6) (26 Dec 2024 11:37)  oxyCODONE 5 mg oral tablet: 1 tab(s) orally every 4 hours As needed Mild Pain (1 - 3) (26 Dec 2024 11:37)  polyethylene glycol 3350 oral powder for reconstitution: 17 gram(s) orally once a day (26 Dec 2024 11:37)  rosuvastatin 5 mg oral capsule: 1 cap(s) orally once a day (at bedtime) (16 Dec 2024 15:59)  Tylenol 325 mg oral tablet: 3 tab(s) orally every 6 hours (26 Dec 2024 11:37)  --------------------------------------------------------------------------------------------  PHYSICAL EXAM:   General: NAD, Lying in bed uncomfortably  Neuro: A+Ox3  HEENT: NICKO RIZZO MMM  Cardio: tachycardia  Resp: Non labored breathing on RA  Pelvis: stable  Musculoskeletal: b/l groin incisions with staples, c/d/i. Left AKA stump with superficial skin necrosis with no evidence of deep space infection, no erythema, staples in place.   --------------------------------------------------------------------------------------------    LABS                 10.9   20.42  )----------(  305       ( 06 Jan 2025 14:30 )               35.2     --------------------------------------------------------------------------------------------  IMAGING

## 2025-01-06 NOTE — ED ADULT NURSE NOTE - OBJECTIVE STATEMENT
Pt presents  to the ED A&Ox1 to self at baseline mental status, sent in from NH for wound drainage. Pt family at bedside reports they said it was draining and she had a UTI. Pt c/o right sided flank pain, denies burning upon urination. Pt presents with L BKA done approx 3 weeks ago. Pt RR even and unlabored.

## 2025-01-06 NOTE — ED PROVIDER NOTE - CARE PLAN
1 Principal Discharge DX:	Sepsis   Principal Discharge DX:	Sepsis  Secondary Diagnosis:	Acute cholecystitis

## 2025-01-07 DIAGNOSIS — A41.9 SEPSIS, UNSPECIFIED ORGANISM: ICD-10-CM

## 2025-01-07 LAB
ALBUMIN SERPL ELPH-MCNC: 2.4 G/DL — LOW (ref 3.3–5.2)
ALP SERPL-CCNC: 367 U/L — HIGH (ref 40–120)
ALT FLD-CCNC: 47 U/L — HIGH
ANION GAP SERPL CALC-SCNC: 15 MMOL/L — SIGNIFICANT CHANGE UP (ref 5–17)
AST SERPL-CCNC: 122 U/L — HIGH
BASOPHILS # BLD AUTO: 0.03 K/UL — SIGNIFICANT CHANGE UP (ref 0–0.2)
BASOPHILS NFR BLD AUTO: 0.2 % — SIGNIFICANT CHANGE UP (ref 0–2)
BILIRUB SERPL-MCNC: 1.8 MG/DL — SIGNIFICANT CHANGE UP (ref 0.4–2)
BUN SERPL-MCNC: 44 MG/DL — HIGH (ref 8–20)
CALCIUM SERPL-MCNC: 8.6 MG/DL — SIGNIFICANT CHANGE UP (ref 8.4–10.5)
CHLORIDE SERPL-SCNC: 102 MMOL/L — SIGNIFICANT CHANGE UP (ref 96–108)
CK SERPL-CCNC: 62 U/L — SIGNIFICANT CHANGE UP (ref 25–170)
CO2 SERPL-SCNC: 18 MMOL/L — LOW (ref 22–29)
CREAT SERPL-MCNC: 0.91 MG/DL — SIGNIFICANT CHANGE UP (ref 0.5–1.3)
EGFR: 63 ML/MIN/1.73M2 — SIGNIFICANT CHANGE UP
EOSINOPHIL # BLD AUTO: 0.06 K/UL — SIGNIFICANT CHANGE UP (ref 0–0.5)
EOSINOPHIL NFR BLD AUTO: 0.3 % — SIGNIFICANT CHANGE UP (ref 0–6)
GLUCOSE BLDC GLUCOMTR-MCNC: 236 MG/DL — HIGH (ref 70–99)
GLUCOSE BLDC GLUCOMTR-MCNC: 246 MG/DL — HIGH (ref 70–99)
GLUCOSE BLDC GLUCOMTR-MCNC: 252 MG/DL — HIGH (ref 70–99)
GLUCOSE BLDC GLUCOMTR-MCNC: 278 MG/DL — HIGH (ref 70–99)
GLUCOSE SERPL-MCNC: 268 MG/DL — HIGH (ref 70–99)
HCT VFR BLD CALC: 31.3 % — LOW (ref 34.5–45)
HGB BLD-MCNC: 9.9 G/DL — LOW (ref 11.5–15.5)
IMM GRANULOCYTES NFR BLD AUTO: 2.5 % — HIGH (ref 0–0.9)
LACTATE SERPL-SCNC: 2.5 MMOL/L — HIGH (ref 0.5–2)
LYMPHOCYTES # BLD AUTO: 0.8 K/UL — LOW (ref 1–3.3)
LYMPHOCYTES # BLD AUTO: 4 % — LOW (ref 13–44)
MAGNESIUM SERPL-MCNC: 2.3 MG/DL — SIGNIFICANT CHANGE UP (ref 1.6–2.6)
MCHC RBC-ENTMCNC: 26.5 PG — LOW (ref 27–34)
MCHC RBC-ENTMCNC: 31.6 G/DL — LOW (ref 32–36)
MCV RBC AUTO: 83.9 FL — SIGNIFICANT CHANGE UP (ref 80–100)
MONOCYTES # BLD AUTO: 0.93 K/UL — HIGH (ref 0–0.9)
MONOCYTES NFR BLD AUTO: 4.7 % — SIGNIFICANT CHANGE UP (ref 2–14)
NEUTROPHILS # BLD AUTO: 17.5 K/UL — HIGH (ref 1.8–7.4)
NEUTROPHILS NFR BLD AUTO: 88.3 % — HIGH (ref 43–77)
PHOSPHATE SERPL-MCNC: 4 MG/DL — SIGNIFICANT CHANGE UP (ref 2.4–4.7)
PLATELET # BLD AUTO: 247 K/UL — SIGNIFICANT CHANGE UP (ref 150–400)
POTASSIUM SERPL-MCNC: 4.9 MMOL/L — SIGNIFICANT CHANGE UP (ref 3.5–5.3)
POTASSIUM SERPL-SCNC: 4.9 MMOL/L — SIGNIFICANT CHANGE UP (ref 3.5–5.3)
PROT SERPL-MCNC: 6.5 G/DL — LOW (ref 6.6–8.7)
RBC # BLD: 3.73 M/UL — LOW (ref 3.8–5.2)
RBC # FLD: 17.4 % — HIGH (ref 10.3–14.5)
SODIUM SERPL-SCNC: 135 MMOL/L — SIGNIFICANT CHANGE UP (ref 135–145)
TROPONIN T, HIGH SENSITIVITY RESULT: 29 NG/L — SIGNIFICANT CHANGE UP (ref 0–51)
WBC # BLD: 19.81 K/UL — HIGH (ref 3.8–10.5)
WBC # FLD AUTO: 19.81 K/UL — HIGH (ref 3.8–10.5)

## 2025-01-07 PROCEDURE — 99221 1ST HOSP IP/OBS SF/LOW 40: CPT

## 2025-01-07 PROCEDURE — 93010 ELECTROCARDIOGRAM REPORT: CPT

## 2025-01-07 PROCEDURE — 99223 1ST HOSP IP/OBS HIGH 75: CPT

## 2025-01-07 PROCEDURE — 76705 ECHO EXAM OF ABDOMEN: CPT | Mod: 26

## 2025-01-07 RX ORDER — ONDANSETRON 4 MG/1
4 TABLET, ORALLY DISINTEGRATING ORAL EVERY 8 HOURS
Refills: 0 | Status: DISCONTINUED | OUTPATIENT
Start: 2025-01-07 | End: 2025-01-24

## 2025-01-07 RX ORDER — SODIUM CHLORIDE 9 G/ML
1000 INJECTION, SOLUTION INTRAVENOUS
Refills: 0 | Status: DISCONTINUED | OUTPATIENT
Start: 2025-01-07 | End: 2025-01-24

## 2025-01-07 RX ORDER — DM/PSEUDOEPHED/ACETAMINOPHEN 10-30-250
25 CAPSULE ORAL ONCE
Refills: 0 | Status: DISCONTINUED | OUTPATIENT
Start: 2025-01-07 | End: 2025-01-24

## 2025-01-07 RX ORDER — GLUCAGON 3 MG/1
1 POWDER NASAL ONCE
Refills: 0 | Status: DISCONTINUED | OUTPATIENT
Start: 2025-01-07 | End: 2025-01-24

## 2025-01-07 RX ORDER — SENNOSIDES 8.6 MG
2 TABLET ORAL AT BEDTIME
Refills: 0 | Status: DISCONTINUED | OUTPATIENT
Start: 2025-01-07 | End: 2025-01-24

## 2025-01-07 RX ORDER — MORPHINE SULFATE 60 MG/1
1 TABLET, FILM COATED, EXTENDED RELEASE ORAL EVERY 6 HOURS
Refills: 0 | Status: DISCONTINUED | OUTPATIENT
Start: 2025-01-07 | End: 2025-01-13

## 2025-01-07 RX ORDER — METOPROLOL SUCCINATE 25 MG
25 TABLET, EXTENDED RELEASE 24 HR ORAL
Refills: 0 | Status: DISCONTINUED | OUTPATIENT
Start: 2025-01-07 | End: 2025-01-24

## 2025-01-07 RX ORDER — BACTERIOSTATIC SODIUM CHLORIDE 0.9 %
1000 VIAL (ML) INJECTION
Refills: 0 | Status: DISCONTINUED | OUTPATIENT
Start: 2025-01-07 | End: 2025-01-17

## 2025-01-07 RX ORDER — DAPAGLIFLOZIN 5 MG/1
1 TABLET, FILM COATED ORAL
Refills: 0 | DISCHARGE

## 2025-01-07 RX ORDER — MAGNESIUM, ALUMINUM HYDROXIDE 200-225/5
30 SUSPENSION, ORAL (FINAL DOSE FORM) ORAL EVERY 4 HOURS
Refills: 0 | Status: DISCONTINUED | OUTPATIENT
Start: 2025-01-07 | End: 2025-01-24

## 2025-01-07 RX ORDER — INSULIN LISPRO 100/ML
VIAL (ML) SUBCUTANEOUS EVERY 6 HOURS
Refills: 0 | Status: DISCONTINUED | OUTPATIENT
Start: 2025-01-07 | End: 2025-01-07

## 2025-01-07 RX ORDER — ROSUVASTATIN CALCIUM 10 MG/1
5 TABLET, FILM COATED ORAL AT BEDTIME
Refills: 0 | Status: DISCONTINUED | OUTPATIENT
Start: 2025-01-07 | End: 2025-01-24

## 2025-01-07 RX ORDER — INSULIN LISPRO 100/ML
VIAL (ML) SUBCUTANEOUS
Refills: 0 | Status: DISCONTINUED | OUTPATIENT
Start: 2025-01-07 | End: 2025-01-08

## 2025-01-07 RX ORDER — MORPHINE SULFATE 60 MG/1
1 TABLET, FILM COATED, EXTENDED RELEASE ORAL EVERY 4 HOURS
Refills: 0 | Status: DISCONTINUED | OUTPATIENT
Start: 2025-01-07 | End: 2025-01-07

## 2025-01-07 RX ORDER — DM/PSEUDOEPHED/ACETAMINOPHEN 10-30-250
15 CAPSULE ORAL ONCE
Refills: 0 | Status: DISCONTINUED | OUTPATIENT
Start: 2025-01-07 | End: 2025-01-24

## 2025-01-07 RX ORDER — ACETAMINOPHEN 160 MG/5ML
650 SUSPENSION ORAL EVERY 6 HOURS
Refills: 0 | Status: DISCONTINUED | OUTPATIENT
Start: 2025-01-07 | End: 2025-01-24

## 2025-01-07 RX ORDER — POLYETHYLENE GLYCOL 3350 17 G/17G
17 POWDER, FOR SOLUTION ORAL DAILY
Refills: 0 | Status: DISCONTINUED | OUTPATIENT
Start: 2025-01-07 | End: 2025-01-24

## 2025-01-07 RX ORDER — ALBUTEROL SULFATE 90 UG/1
2 INHALANT RESPIRATORY (INHALATION)
Refills: 0 | DISCHARGE

## 2025-01-07 RX ORDER — DM/PSEUDOEPHED/ACETAMINOPHEN 10-30-250
12.5 CAPSULE ORAL ONCE
Refills: 0 | Status: DISCONTINUED | OUTPATIENT
Start: 2025-01-07 | End: 2025-01-24

## 2025-01-07 RX ORDER — ACETAMINOPHEN, DIPHENHYDRAMINE HCL, PHENYLEPHRINE HCL 325; 25; 5 MG/1; MG/1; MG/1
3 TABLET ORAL AT BEDTIME
Refills: 0 | Status: DISCONTINUED | OUTPATIENT
Start: 2025-01-07 | End: 2025-01-24

## 2025-01-07 RX ORDER — PIPERACILLIN SODIUM AND TAZOBACTAM SODIUM 2; 250 G/50ML; MG/50ML
3.38 INJECTION, POWDER, FOR SOLUTION INTRAVENOUS EVERY 8 HOURS
Refills: 0 | Status: DISCONTINUED | OUTPATIENT
Start: 2025-01-07 | End: 2025-01-08

## 2025-01-07 RX ORDER — METOPROLOL SUCCINATE 25 MG
5 TABLET, EXTENDED RELEASE 24 HR ORAL ONCE
Refills: 0 | Status: COMPLETED | OUTPATIENT
Start: 2025-01-07 | End: 2025-01-07

## 2025-01-07 RX ORDER — BACTERIOSTATIC SODIUM CHLORIDE 0.9 %
1000 VIAL (ML) INJECTION ONCE
Refills: 0 | Status: COMPLETED | OUTPATIENT
Start: 2025-01-07 | End: 2025-01-07

## 2025-01-07 RX ADMIN — Medication 25 MILLIGRAM(S): at 18:09

## 2025-01-07 RX ADMIN — Medication 50 MILLILITER(S): at 18:07

## 2025-01-07 RX ADMIN — Medication 5 MILLIGRAM(S): at 15:58

## 2025-01-07 RX ADMIN — MORPHINE SULFATE 1 MILLIGRAM(S): 60 TABLET, FILM COATED, EXTENDED RELEASE ORAL at 12:43

## 2025-01-07 RX ADMIN — Medication 1000 MILLILITER(S): at 20:07

## 2025-01-07 RX ADMIN — Medication 2: at 09:07

## 2025-01-07 RX ADMIN — PIPERACILLIN SODIUM AND TAZOBACTAM SODIUM 25 GRAM(S): 2; 250 INJECTION, POWDER, FOR SOLUTION INTRAVENOUS at 14:52

## 2025-01-07 RX ADMIN — Medication 4: at 12:19

## 2025-01-07 RX ADMIN — POLYETHYLENE GLYCOL 3350 17 GRAM(S): 17 POWDER, FOR SOLUTION ORAL at 12:20

## 2025-01-07 RX ADMIN — Medication 4: at 18:07

## 2025-01-07 RX ADMIN — ONDANSETRON 4 MILLIGRAM(S): 4 TABLET, ORALLY DISINTEGRATING ORAL at 16:08

## 2025-01-07 RX ADMIN — PIPERACILLIN SODIUM AND TAZOBACTAM SODIUM 25 GRAM(S): 2; 250 INJECTION, POWDER, FOR SOLUTION INTRAVENOUS at 06:21

## 2025-01-07 RX ADMIN — PIPERACILLIN SODIUM AND TAZOBACTAM SODIUM 25 GRAM(S): 2; 250 INJECTION, POWDER, FOR SOLUTION INTRAVENOUS at 22:05

## 2025-01-07 RX ADMIN — Medication 50 MILLILITER(S): at 06:23

## 2025-01-07 RX ADMIN — Medication 1 APPLICATION(S): at 12:20

## 2025-01-07 NOTE — CHART NOTE - NSCHARTNOTEFT_GEN_A_CORE
Patient admitted for treatment of acute cholecystitis and urinary tract infection. No intervention planned by surgical team, so diet was restarted. Daughter Susana called to be given an update and is now aware of plan of care. Patient says that her pain is improving a little bit. Lactate normalized but sugars remain uncontrolled.    Plan:  - Consulted IR this morning for cholecystostomy tube evaluation  - Continue Zosyn for now  - Holding BB and ARB given sepsis and low BP  - Will continue to monitor

## 2025-01-07 NOTE — H&P ADULT - NSHPPHYSICALEXAM_GEN_ALL_CORE
General: Age-appearing, in acute distress  Head: Normocephalic, atraumatic  ENMT: EOMI, no scleral icterus, neck supple, no JVD  Cardiovascular: +S1, S2; Regular rate and rhythm, no murmurs.  Respiratory: CTAB, no wheezing, no rales, no rhonchi  Gastrointestinal: soft, TTP RUQ, ND, (+) BS, (+) Elizondo's sign.   Neuro: AAOx3, CN2-12 intact, no FND  Musculoskeletal: left AKA stump wrapped in gauze.   Skin: Warm, dry, no rash, no jaundice  Psych: Calm, cooperative General: Age-appearing, in acute distress  Head: Normocephalic, atraumatic  ENMT: EOMI, no scleral icterus, neck supple, no JVD  Cardiovascular: +S1, S2; Regular rate, irregular rhythm, no murmurs.  Respiratory: CTAB, no wheezing, no rales, no rhonchi  Gastrointestinal: soft, TTP RUQ, ND, (+) BS, (+) Elizondo's sign.   Neuro: AAOx3, CN2-12 intact, no FND  Musculoskeletal: left AKA stump wrapped in gauze.   Skin: Warm, dry, no rash, no jaundice  Psych: Calm, cooperative

## 2025-01-07 NOTE — ED ADULT NURSE REASSESSMENT NOTE - NS ED NURSE REASSESS COMMENT FT1
pt I stable, no distress, family at bedside vitals are stable, vascular surgery at bedside safety maintained

## 2025-01-07 NOTE — CONSULT NOTE ADULT - ASSESSMENT
Patient is 79-year-old female admitted for sepsis. Seen in consultation for cholecystostomy tube placement. Patient was brought to the IR department for procedure. While obtaining consent, the patient's daughter decided to defer the procedure.  She would like to see how her mother does on antibiotics. All questions were answered at this time. Primary team made aware.  Please reconsult IR if family is becomes amendable for procedure or patient clinical status changes.

## 2025-01-07 NOTE — CONSULT NOTE ADULT - SUBJECTIVE AND OBJECTIVE BOX
VASCULAR SURGERY CONSULT     HPI: 79y Female with a PMHx of afib on Eliquis, CVA, DM, POD 21 s/p L AKA for critical limb ischemia with the vascular service.  She was sent to the ED from nursing home for evaluation of stump wound. She denies falling or any injury to stump. Patient complains that she is weak, tired, and has not eaten in more than a week and has no appetite. Nursing home also reports she had a UA positive for UTI.  Pt reports having 7 episodes of RUQ pain but cannot describe over what course of time.  She endorses some nausea but no vomiting.  No changes in bowel habits.          ROS: 10-system review is otherwise negative except HPI above.      PAST MEDICAL & SURGICAL HISTORY:  DM (diabetes mellitus)      HTN (hypertension)      CVA (cerebrovascular accident)      Afib        FAMILY HISTORY:    Family history not pertinent as reviewed with the patient.    SOCIAL HISTORY:  Denies any toxic habits    ALLERGIES: NKA No Known Allergies      HOME MEDICATIONS: ***  Home Medications:  Albuterol (Eqv-Proventil HFA) 90 mcg/inh inhalation aerosol: 2 puff(s) inhaled every 4 hours (16 Dec 2024 15:59)  ergocalciferol: 1,250 microgram(s) orally once a day (16 Dec 2024 15:59)  Farxiga 5 mg oral tablet: 1 tab(s) orally once a day (16 Dec 2024 15:59)  insulin glargine 100 units/mL subcutaneous solution: 15 unit(s) subcutaneous once a day (at bedtime) (26 Dec 2024 11:37)  insulin lispro 100 units/mL injectable solution: 5 injectable 3 times a day (before meals) (26 Dec 2024 13:12)  irbesartan 150 mg oral tablet: 1 tab(s) orally once a day (16 Dec 2024 15:59)  metoprolol tartrate 25 mg oral tablet: 1 tab(s) orally 2 times a day (26 Dec 2024 11:45)  oxyCODONE 10 mg oral tablet: 1 tab(s) orally every 4 hours As needed Moderate Pain (4 - 6) (26 Dec 2024 11:37)  oxyCODONE 5 mg oral tablet: 1 tab(s) orally every 4 hours As needed Mild Pain (1 - 3) (26 Dec 2024 11:37)  polyethylene glycol 3350 oral powder for reconstitution: 17 gram(s) orally once a day (26 Dec 2024 11:37)  rosuvastatin 5 mg oral capsule: 1 cap(s) orally once a day (at bedtime) (16 Dec 2024 15:59)  Tylenol 325 mg oral tablet: 3 tab(s) orally every 6 hours (26 Dec 2024 11:37)      --------------------------------------------------------------------------------------------  Vital Signs Last 24 Hrs  T(C): 36.7 (06 Jan 2025 23:32), Max: 36.9 (06 Jan 2025 14:40)  T(F): 98 (06 Jan 2025 23:32), Max: 98.5 (06 Jan 2025 14:40)  HR: 112 (06 Jan 2025 23:32) (106 - 117)  BP: 109/67 (06 Jan 2025 23:32) (96/63 - 110/72)  BP(mean): --  RR: 17 (06 Jan 2025 23:32) (17 - 18)  SpO2: 97% (06 Jan 2025 23:32) (96% - 99%)    Parameters below as of 06 Jan 2025 23:32  Patient On (Oxygen Delivery Method): room air        PHYSICAL EXAM:   General: NAD, Lying in bed comfortably  Neuro: A+Ox1  HEENT: EOMI, PERRLA, MMM  Cardio: RRR  Resp: Non labored breathing  GI/Abd: Soft, TTP in the RUQ, nondistended, no guarding, no masses palpated    --------------------------------------------------------------------------------------------    LABS                 10.9   20.42  )----------(  305       ( 06 Jan 2025 14:30 )               35.2      136    |  100    |  42.1   ----------------------------<  211        ( 06 Jan 2025 14:30 )  4.9     |  20.0   |  1.18     Ca    9.2        ( 06 Jan 2025 14:30 )    TPro  7.4    /  Alb  2.9    /  TBili  1.5    /  DBili  x      /  AST  32     /  ALT  23     /  AlkPhos  277    ( 06 Jan 2025 14:30 )    LIVER FUNCTIONS - ( 06 Jan 2025 14:30 )  Alb: 2.9 g/dL / Pro: 7.4 g/dL / ALK PHOS: 277 U/L / ALT: 23 U/L / AST: 32 U/L / GGT: x           PT/INR -  25.3 sec / 2.26 ratio   ( 06 Jan 2025 18:55 )       PTT -  34.5 sec   ( 06 Jan 2025 18:55 )  CAPILLARY BLOOD GLUCOSE        Urinalysis Basic - ( 06 Jan 2025 14:30 )    Color: x / Appearance: x / SG: x / pH: x  Gluc: 211 mg/dL / Ketone: x  / Bili: x / Urobili: x   Blood: x / Protein: x / Nitrite: x   Leuk Esterase: x / RBC: x / WBC x   Sq Epi: x / Non Sq Epi: x / Bacteria: x        18:55 - VBG - pH:       | pCO2:       | pO2:       | Lactate: 1.90   14:30 - VBG - pH: 7.350 | pCO2: 41    | pO2: 60    | Lactate: 2.30     --------------------------------------------------------------------------------------------  IMAGING  < from: CT Lower Extremity w/ IV Cont, Left (01.06.25 @ 17:58) >  Impression:  4.1 x 2.6 x 4.7 cm hypodensity ovoid region posterior to the left   inguinal clips and surrounding a portion of the left superficial femoral   artery as well as contacting the left commonfemoral vein and the   saphenous vein which is favored to represent hematoma over   pseudoaneurysm. Superinfection should be evaluated for on a clinical   basis.    Status post resection at the left mid femoral shaft. Soft tissue density   and foci of air adjacent to the surgical clips at the margin of the left   stump which could be related to soft tissue infection or postoperative   change; correlate with the date of intervention and the patient's   physical examination.    < end of copied text >    < from: CT Angio Abdomen and Pelvis w/ IV Cont (01.06.25 @ 17:58) >  IMPRESSION:  1.  Hydropic/distended gallbladder with gallbladder wall thickening,   trace cholelithiasisand mild pericholecystic fat stranding, compatible   with acute cholecystitis.  2.  No evidence of bowel ischemia.    < end of copied text >

## 2025-01-07 NOTE — PROVIDER CONTACT NOTE (OTHER) - ACTION/TREATMENT ORDERED:
PA made aware. PA ordering EKG and troponin. PA made aware. PA ordering EKG and troponin.    ADD: PA came to bedside and assessed pt. IVP metoprolol ordered.

## 2025-01-07 NOTE — CONSULT NOTE ADULT - ASSESSMENT
ASSESSMENT: This patient is a 79y old female with a PMHx of AKA, afib on eliquis, DM, CVA, who p/w multiple sources of infection.  She is demonstrating cholecystitis, possible surgical wound infection, and UTI.    PLAN:    - Due to her multiple co-morbidities Ms. Graham is not an ideal surgical candidate at this time.  Additional her family are not sure if they want to pursue surgery after their recent experience with her AKA  - Recommend medicine eval for management of her comorbidities and abx management  - Recommend IR evaluation and RUQ US for possible per chris tube placement  - AKA wound care per vascular  - Surgery will follow

## 2025-01-07 NOTE — PROVIDER CONTACT NOTE (OTHER) - SITUATION
When asked where pain is, pt is pointing to her chest and moaning in pain  pt is A&Ox1-2 and is not answering when asked to describe pain

## 2025-01-07 NOTE — CONSULT NOTE ADULT - SUBJECTIVE AND OBJECTIVE BOX
HPI:  79 year-old female with a history of Afib (on eliquis), HTN, DM2, HLD, h/o stroke, limb ischemia (s/p left AKA, s/p b/l femoral thrombectomy) presented to [facility] ED for L AKA stump wound evaluation. In the ED, patient met sepsis criteria.    CT and US suspicious for acute cholecystitis.    Patient is afebrile, however has leukocytosis (20.42).    Patient evaluated by surgery, poor surgical candidate and requesting IR cholecystostomy tube placement.     ============================================================================  Medications:  Home Medications:  Eliquis 5 mg oral tablet: 1 tab(s) orally 2 times a day (07 Jan 2025 04:57)  ergocalciferol: 1,250 microgram(s) orally once a day (16 Dec 2024 15:59)  Farxiga 5 mg oral tablet: 1 tab(s) orally once a day (07 Jan 2025 04:56)  glipiZIDE 5 mg oral tablet: 1 tab(s) orally 2 times a day (07 Jan 2025 04:56)  Insulin Glargine: 15 unit(s) subcutaneous once a day (at bedtime) (07 Jan 2025 05:00)  Insulin Lispro: 5 unit(s) subcutaneous 3 times a day (with meals) (07 Jan 2025 05:00)  irbesartan 150 mg oral tablet: 1 tab(s) orally once a day (16 Dec 2024 15:59)  Metoprolol Tartrate 25 mg oral tablet: 1 tab(s) orally 2 times a day (07 Jan 2025 05:00)  rosuvastatin 5 mg oral capsule: 1 cap(s) orally once a day (at bedtime) (16 Dec 2024 15:59)    MEDICATIONS  (STANDING):  collagenase Ointment 1 Application(s) Topical daily  dextrose 5%. 1000 milliLiter(s) (50 mL/Hr) IV Continuous <Continuous>  dextrose 5%. 1000 milliLiter(s) (100 mL/Hr) IV Continuous <Continuous>  dextrose 50% Injectable 25 Gram(s) IV Push once  dextrose 50% Injectable 12.5 Gram(s) IV Push once  dextrose 50% Injectable 25 Gram(s) IV Push once  glucagon  Injectable 1 milliGRAM(s) IntraMuscular once  insulin lispro (ADMELOG) corrective regimen sliding scale   SubCutaneous every 6 hours  piperacillin/tazobactam IVPB.. 3.375 Gram(s) IV Intermittent every 8 hours  polyethylene glycol 3350 17 Gram(s) Oral daily  rosuvastatin 5 milliGRAM(s) Oral at bedtime  senna 2 Tablet(s) Oral at bedtime  sodium chloride 0.9%. 1000 milliLiter(s) (50 mL/Hr) IV Continuous <Continuous>    MEDICATIONS  (PRN):  acetaminophen     Tablet .. 650 milliGRAM(s) Oral every 6 hours PRN Temp greater or equal to 38C (100.4F), Mild Pain (1 - 3)  aluminum hydroxide/magnesium hydroxide/simethicone Suspension 30 milliLiter(s) Oral every 4 hours PRN Dyspepsia  dextrose Oral Gel 15 Gram(s) Oral once PRN Blood Glucose LESS THAN 70 milliGRAM(s)/deciliter  melatonin 3 milliGRAM(s) Oral at bedtime PRN Insomnia  morphine  - Injectable 1 milliGRAM(s) IV Push every 6 hours PRN Moderate Pain (4 - 6)  morphine  - Injectable 1 milliGRAM(s) IV Push every 4 hours PRN Severe Pain (7 - 10)  ondansetron Injectable 4 milliGRAM(s) IV Push every 8 hours PRN Nausea and/or Vomiting      Allergies:   No Known Allergies    ============================================================================  PAST MEDICAL & SURGICAL HISTORY:  DM (diabetes mellitus)      HTN (hypertension)      CVA (cerebrovascular accident)      Afib          FAMILY HISTORY:      Social History:      ============================================================================  Vitals:  Vital Signs Last 24 Hrs  T(C): 36.6 (07 Jan 2025 11:29), Max: 37.1 (07 Jan 2025 06:40)  T(F): 97.9 (07 Jan 2025 11:29), Max: 98.7 (07 Jan 2025 06:40)  HR: 112 (07 Jan 2025 13:10) (100 - 117)  BP: 101/59 (07 Jan 2025 13:10) (101/59 - 118/67)  BP(mean): --  RR: 18 (07 Jan 2025 13:10) (17 - 27)  SpO2: 98% (07 Jan 2025 13:10) (97% - 99%)    Parameters below as of 07 Jan 2025 13:10  Patient On (Oxygen Delivery Method): room air    Labs:                        10.9   20.42 )-----------( 305      ( 06 Jan 2025 14:30 )             35.2     01-06    136  |  100  |  42.1[H]  ----------------------------<  211[H]  4.9   |  20.0[L]  |  1.18    Ca    9.2      06 Jan 2025 14:30    TPro  7.4  /  Alb  2.9[L]  /  TBili  1.5  /  DBili  x   /  AST  32[H]  /  ALT  23  /  AlkPhos  277[H]  01-06    PT/INR - ( 06 Jan 2025 18:55 )   PT: 25.3 sec;   INR: 2.26 ratio         PTT - ( 06 Jan 2025 18:55 )  PTT:34.5 sec    Imaging:   Pertinent Imaging Reviewed.    ============================================================================

## 2025-01-07 NOTE — H&P ADULT - HISTORY OF PRESENT ILLNESS
78 y/o F w/ PMH of Afib (on eliquis), HTN, DM2, h/o stroke, limb ischemia (s/p left AKA, s/p b/l femoral thrombectomy), presented to ED from nursing home for evaluation of stump wound. Nursing home also reports she had a UA positive for UTI. At bedside pt c/o RUQ pain x 2 days. Pt says pain is nonradiating, constant, sharp in nature. Associated w/ chills. Denies N/V/D, fever, CP, SOB. Rest of ROS (-).    80 y/o F w/ PMH of Afib (on eliquis), HTN, DM2, HLD, h/o stroke, limb ischemia (s/p left AKA, s/p b/l femoral thrombectomy), presented to ED from nursing home for evaluation of stump wound. Nursing home also reports she had a UA positive for UTI. At bedside pt c/o RUQ pain x 2 days. Pt says pain is nonradiating, constant, sharp in nature. Associated w/ chills. Denies N/V/D, fever, CP, SOB. Rest of ROS (-). While in the ED pt received vanc, zosyn, 1.95L NS bolus.

## 2025-01-07 NOTE — H&P ADULT - ASSESSMENT
78 y/o F w/ PMH of Afib (on eliquis), HTN, DM2, h/o stroke, limb ischemia (s/p left AKA, s/p b/l femoral thrombectomy), presented to ED from nursing home for evaluation of stump wound. Nursing home also reports she had a UA positive for UTI. At bedside pt c/o RUQ pain x 2 days. Pt says pain is nonradiating, constant, sharp in nature. Associated w/ chills. Denies N/V/D, fever, CP, SOB. Rest of ROS (-).     #Acute cholecystitis  Admit to stepdown  CT A/P: 1.  Hydropic/distended gallbladder with gallbladder wall thickening,  trace cholelithiasis and mild pericholecystic fat stranding, compatible  with acute cholecystitis. 2.  No evidence of bowel ischemia.  Surgery consulted by ED, not an ideal candidate for surgery given multiple comorbidities, family also unsure if they want to pursrue surgery after their recent experience with her AKA.  IR consult for possible perc. chris. tube placement  Pain control w/ morphine prn  Monitor liver tests  Avoid hepatotoxic meds  C/w Zosyn    #SIRS likely reactive (WBC>12, HR>90)  #Hx of UTI  Pt had UTI at NH, will repeat U/A at this time and obtain urine cx  s/p vanc, zosyn, 1.95L bolus in ED  F/u blood cx  Obtain U/A, urine cx  C/w Zosyn    #Hx of limb ischemia  s/p left AKA, s/p b/l femoral thrombectomy  Vascular surgery consulted by ED, recommending local wound care to b/l groins and left AKA stump, vascular will do if patient is admitted, no vacular surgical intervention required at this moment.  Wound care    #HTN  #Afib  Hold eliquis, start heparin gtt for possible perc. chris. tube placement.     #DM2    #Anemia    #HCM  Heparin gtt for DVT PPX    Dispo: likely 3-4 days         80 y/o F w/ PMH of Afib (on eliquis), HTN, DM2, h/o stroke, limb ischemia (s/p left AKA, s/p b/l femoral thrombectomy), presented to ED from nursing home for evaluation of stump wound. Nursing home also reports she had a UA positive for UTI. At bedside pt c/o RUQ pain x 2 days. Pt says pain is nonradiating, constant, sharp in nature. Associated w/ chills. Denies N/V/D, fever, CP, SOB. Rest of ROS (-).     #Acute cholecystitis  Admit to stepdown  CT A/P: 1.  Hydropic/distended gallbladder with gallbladder wall thickening,  trace cholelithiasis and mild pericholecystic fat stranding, compatible  with acute cholecystitis. 2.  No evidence of bowel ischemia.  Surgery consulted by ED, not an ideal candidate for surgery given multiple comorbidities, family also unsure if they want to pursrue surgery after their recent experience with her AKA.  IR consult for possible perc. chris. tube placement  Pain control w/ morphine prn  Monitor liver tests  Avoid hepatotoxic meds  C/w Zosyn    #SIRS likely reactive (WBC>12, HR>90)  #Hx of UTI  Pt had UTI at NH, will repeat U/A at this time and obtain urine cx  s/p vanc, zosyn, 1.95L bolus in ED  F/u blood cx  Obtain U/A, urine cx  C/w Zosyn    #Hx of limb ischemia  s/p left AKA, s/p b/l femoral thrombectomy  Vascular surgery consulted by ED, recommending local wound care to b/l groins and left AKA stump, vascular will do if patient is admitted, no vacular surgical intervention required at this moment.  Wound care    #HTN  #Afib  Hold Eliquis for possible perc. chris. tube placement.     #DM2    #Anemia    #HCM  SCDs for DVT PPX    Dispo: likely 3-4 days         80 y/o F w/ PMH of Afib (on eliquis), HTN, DM2, h/o stroke, limb ischemia (s/p left AKA, s/p b/l femoral thrombectomy), presented to ED from nursing home for evaluation of stump wound. Nursing home also reports she had a UA positive for UTI. At bedside pt c/o RUQ pain x 2 days. Pt says pain is nonradiating, constant, sharp in nature. Associated w/ chills. Denies N/V/D, fever, CP, SOB. Rest of ROS (-).     #Sepsis likely due to acute cholecystitis vs UTI  #Acute cholecystitis  s/p vanc, zosyn, 1.95L bolus in ED  Admit to stepdown  Pt had UTI at NH, will repeat U/A at this time and obtain urine cx  CXR:  No radiographic evidence of active chest disease.  CT A/P: Hydropic/distended gallbladder with gallbladder wall thickening, trace cholelithiasis and mild pericholecystic fat stranding, compatible  with acute cholecystitis. No evidence of bowel ischemia.  Surgery consulted by ED, not an ideal candidate for surgery given multiple comorbidities, family also unsure if they want to pursue surgery after their recent experience with her AKA.  IR consult for possible perc. chris. tube placement  Pain control w/ morphine prn  Monitor liver tests  Avoid hepatotoxic meds  F/u blood cx  Obtain U/A, urine cx  C/w Zosyn    #Hx of limb ischemia  s/p left AKA, s/p b/l femoral thrombectomy  Vascular surgery consulted by ED, recommending local wound care to b/l groins and left AKA stump, vascular will do if patient is admitted, no vacular surgical intervention required at this moment.  Wound care    #HTN  #Afib  Hold Eliquis for possible perc. chris. tube placement.     #DM2    #Anemia    #HCM  SCDs for DVT PPX    Dispo: likely 3-4 days         80 y/o F w/ PMH of Afib (on eliquis), HTN, DM2, HLD, h/o stroke, limb ischemia (s/p left AKA, s/p b/l femoral thrombectomy), presented to ED from nursing home for evaluation of stump wound. Nursing home also reports she had a UA positive for UTI. At bedside pt c/o RUQ pain x 2 days. Pt says pain is nonradiating, constant, sharp in nature. Associated w/ chills. Denies N/V/D, fever, CP, SOB. Rest of ROS (-). While in the ED pt received vanc, zosyn, 1.95L NS bolus.    #Sepsis likely due to acute cholecystitis vs UTI  #Acute cholecystitis  s/p vanc, zosyn, 1.95L NS bolus in ED  Admit to stepdown  Pt had UTI at NH, will repeat U/A at this time and obtain urine cx  CXR:  No radiographic evidence of active chest disease.  CT A/P: Hydropic/distended gallbladder with gallbladder wall thickening, trace cholelithiasis and mild pericholecystic fat stranding, compatible  with acute cholecystitis. No evidence of bowel ischemia.  Surgery consulted by ED, not an ideal candidate for surgery given multiple comorbidities, family also unsure if they want to pursue surgery after their recent experience with her AKA.  IR consult for possible perc. chris. tube placement  Pain control w/ morphine prn  Bowel regimen (Miralax 17g daily + Senna 8.6mg daily)   Monitor liver tests  Avoid hepatotoxic meds  F/u blood cx  Obtain U/A, urine cx  C/w Zosyn    #Hx of limb ischemia  s/p left AKA, s/p b/l femoral thrombectomy  Vascular surgery consulted by ED, recommending local wound care to b/l groins and left AKA stump, vascular will do if patient is admitted, no vacular surgical intervention required at this moment.  Wound care provided by vascular surgery    #HTN  #Afib  Hold Eliquis for possible perc. chris. tube placement.   On Metoprolol tartrate 25mg bid at home, will hold at BP is soft in setting of sepsis  On Irbesartan 150mg daily at home, will hold at BP is soft in setting of sepsis    #DM2  On Glargine 15u qhs and Lispro 5u TID w/ meals, will hold while NPO  ISS q6 while NPO  FSG q6 while NPO    #HLD  Crestor 5mg qhs    #Anemia  No signs of active bleed.  Monitor H/H  Transfuse to keep Hb>7, if clinically indicated  Check iron, ferritin, transferrin saturation, TIBC, b12, folate to characterize anemia     #HCM  SCDs for DVT PPX    Dispo: likely 3-4 days         78 y/o F w/ PMH of Afib (on eliquis), HTN, DM2, HLD, h/o stroke, limb ischemia (s/p left AKA, s/p b/l femoral thrombectomy), presented to ED from nursing home for evaluation of stump wound. Nursing home also reports she had a UA positive for UTI. At bedside pt c/o RUQ pain x 2 days. Pt says pain is nonradiating, constant, sharp in nature. Associated w/ chills. Denies N/V/D, fever, CP, SOB. Rest of ROS (-). While in the ED pt received vanc, zosyn, 1.95L NS bolus.    #Sepsis likely due to acute cholecystitis vs UTI  #Acute cholecystitis  s/p vanc, zosyn, 1.95L NS bolus in ED  Admit to stepdown  Pt had UTI at NH, will repeat U/A at this time and obtain urine cx  CXR:  No radiographic evidence of active chest disease.  CT A/P: Hydropic/distended gallbladder with gallbladder wall thickening, trace cholelithiasis and mild pericholecystic fat stranding, compatible  with acute cholecystitis. No evidence of bowel ischemia.  Surgery consulted by ED, not an ideal candidate for surgery given multiple comorbidities, family also unsure if they want to pursue surgery after their recent experience with her AKA.  IR consult for possible perc. chris. tube placement  Pain control w/ morphine prn  Bowel regimen (Miralax 17g daily + Senna 8.6mg daily)   Monitor liver tests  Avoid hepatotoxic meds  F/u blood cx  Obtain U/A, urine cx  C/w Zosyn  Gentle IVF hydration    #Hx of limb ischemia  s/p left AKA, s/p b/l femoral thrombectomy  Vascular surgery consulted by ED, recommending local wound care to b/l groins and left AKA stump, vascular will do if patient is admitted, no vacular surgical intervention required at this moment.  Wound care provided by vascular surgery    #HTN  #Afib  Hold Eliquis for possible perc. chris. tube placement.   On Metoprolol tartrate 25mg bid at home, will hold at BP is soft in setting of sepsis  On Irbesartan 150mg daily at home, will hold at BP is soft in setting of sepsis    #DM2  On Glargine 15u qhs and Lispro 5u TID w/ meals, will hold while NPO  ISS q6 while NPO  FSG q6 while NPO    #HLD  Crestor 5mg qhs    #Anemia  No signs of active bleed.  Monitor H/H  Transfuse to keep Hb>7, if clinically indicated  Check iron, ferritin, transferrin saturation, TIBC, b12, folate to characterize anemia     #HCM  SCDs for DVT PPX    Dispo: likely 3-4 days

## 2025-01-07 NOTE — CHART NOTE - NSCHARTNOTEFT_GEN_A_CORE
Contacted by RN for possible chest pain.  Patient seen and examined at bedside. Daughter bedside for translation as patient only Syrian speaking and has intermittent bouts of confusion endorsed by family.  Patient initially getting EKG when seen, during EKG she was not displaying overt signs of pain. When asked by her daughter if she is in any pain patient states no, however upon further exam and questioning she will say yes only then again to change to no.  Patient also endorses dizziness. Denies SOB, HA, numbness or any other complaints    Vital Signs  T(C): 36.7 (01-07-25 @ 15:16), Max: 37.1 (01-07-25 @ 06:40)  HR: 123 (01-07-25 @ 15:16) (100 - 123)  BP: 133/73 (01-07-25 @ 15:16) (99/64 - 133/73)  RR: 18 (01-07-25 @ 15:16) (17 - 27)  SpO2: 96% (01-07-25 @ 15:16) (96% - 99%)    Physical Exam:  Gen:  laying in bed in NAD  CV: S1, S2, irregular, tachy  Lungs: CTA b/l, unlabored respirations on RA  Abd: RUQ tender to palpation, + villela sign, +bowel sounds, soft/nd  Ext: left AKA wrapped stump noted, without edema    A/P: CP vs ABD pain  patient unable to effectively articulate her pain/discomfort  cardiac workup initiated   -EKG reveiwed by Attending   - Trop, CK, CBC, CMP, Lactate, Mag, Phos    surg/IR consulted for acute chris   -continue to monitor with Abx treatment as elected by family    Afib RVR  - 5mg IVP Lopressor with good response (120-140s down to 100-110)  -cardio meds reviewed   -previously seen by [facility] on last admission  -AC being held for possible perc chris    Family updated bedside, possibly onset of hospital delirium as patient was overall better prior to transport to CDU    Discussed with Attending  RN to monitor, assess and escalate to PA PRN.  Will continue to monitor. Contacted by RN for possible chest pain.  Patient seen and examined at bedside. Daughter bedside for translation as patient only Vietnamese speaking and has intermittent bouts of confusion endorsed by family.  Patient initially getting EKG when seen, during EKG she was not displaying overt signs of pain. When asked by her daughter if she is in any pain patient states no, however upon further exam and questioning she will say yes only then again to change to no.  Patient also endorses dizziness. Denies SOB, HA, numbness or any other complaints    Vital Signs  T(C): 36.7 (01-07-25 @ 15:16), Max: 37.1 (01-07-25 @ 06:40)  HR: 123 (01-07-25 @ 15:16) (100 - 123)  BP: 133/73 (01-07-25 @ 15:16) (99/64 - 133/73)  RR: 18 (01-07-25 @ 15:16) (17 - 27)  SpO2: 96% (01-07-25 @ 15:16) (96% - 99%)    Physical Exam:  Gen:  laying in bed in NAD  CV: S1, S2, irregular, tachy  Lungs: CTA b/l, unlabored respirations on RA  Abd: RUQ tender to palpation, + [place] sign, +bowel sounds, soft/nd  Ext: left AKA wrapped stump noted, without edema    A/P: CP vs ABD pain  patient unable to effectively articulate her pain/discomfort  cardiac workup initiated   -EKG reviewed by Attending   - Trop, CK, CBC, CMP, Lactate, Mag, Phos     acute chris  -surgery consult- non surgical management prefered 2/2 high risk procedure for patient's current state  -IR consulted for perc chris, family declining at present and opting for conservative Abx treatment      Afib RVR  - 5mg IVP Lopressor with good response (120-140s down to 100-110)  -cardio meds reviewed    -resume home dose metoprolol with hold parameters  -previously seen by [facility] on last admission  -AC being held for possible perc chris, reconsider starting if Abx management only     Family updated bedside, possibly onset of hospital delirium as patient was overall better prior to transport to CDU    Discussed with Attending  RN to monitor, assess and escalate to PA PRN.  Will continue to monitor.

## 2025-01-07 NOTE — ED ADULT NURSE REASSESSMENT NOTE - NSFALLRISKINTERV_ED_ALL_ED
Assistance OOB with selected safe patient handling equipment if applicable/Assistance with ambulation/Communicate fall risk and risk factors to all staff, patient, and family/Monitor gait and stability/Monitor for mental status changes and reorient to person, place, and time, as needed/Provide visual cue: yellow wristband, yellow gown, etc/Reinforce activity limits and safety measures with patient and family/Toileting schedule using arm’s reach rule for commode and bathroom/Use of alarms - bed, stretcher, chair and/or video monitoring/Call bell, personal items and telephone in reach/Instruct patient to call for assistance before getting out of bed/chair/stretcher/Non-slip footwear applied when patient is off stretcher/[place] to call system/Physically safe environment - no spills, clutter or unnecessary equipment/Purposeful Proactive Rounding/Room/bathroom lighting operational, light cord in reach

## 2025-01-07 NOTE — ED ADULT NURSE REASSESSMENT NOTE - NS ED NURSE REASSESS COMMENT FT1
Assumed care of pt at this time. Pt A&O x 1 presents to ED for evaluation on L AKA wound. Dressing CDI. Necrosis present on wound. Family remains at bedside. Pt on telemonitor. Bed locked and in lowest position. Call bell within reach. Frequent checks made.

## 2025-01-07 NOTE — ED ADULT NURSE REASSESSMENT NOTE - NS ED NURSE REASSESS COMMENT FT1
Pt is resting comfortably in stretcher. NAD. Pt is A&Ox1 to person. Respirations are even and unlabored. Pt awaiting surg consult. Plan on going. Daughter at bedside updated on plan of care.    ED  [place].

## 2025-01-08 LAB
ALBUMIN SERPL ELPH-MCNC: 2.1 G/DL — LOW (ref 3.3–5.2)
ALP SERPL-CCNC: 331 U/L — HIGH (ref 40–120)
ALT FLD-CCNC: 50 U/L — HIGH
ANION GAP SERPL CALC-SCNC: 12 MMOL/L — SIGNIFICANT CHANGE UP (ref 5–17)
APPEARANCE UR: ABNORMAL
AST SERPL-CCNC: 109 U/L — HIGH
BACTERIA # UR AUTO: NEGATIVE /HPF — SIGNIFICANT CHANGE UP
BILIRUB DIRECT SERPL-MCNC: 1.5 MG/DL — HIGH (ref 0–0.3)
BILIRUB INDIRECT FLD-MCNC: 0.3 MG/DL — SIGNIFICANT CHANGE UP (ref 0.2–1)
BILIRUB SERPL-MCNC: 1.8 MG/DL — SIGNIFICANT CHANGE UP (ref 0.4–2)
BILIRUB UR-MCNC: ABNORMAL
BUN SERPL-MCNC: 42.6 MG/DL — HIGH (ref 8–20)
CALCIUM SERPL-MCNC: 8.1 MG/DL — LOW (ref 8.4–10.5)
CAST: 1 /LPF — SIGNIFICANT CHANGE UP (ref 0–4)
CHLORIDE SERPL-SCNC: 104 MMOL/L — SIGNIFICANT CHANGE UP (ref 96–108)
CO2 SERPL-SCNC: 20 MMOL/L — LOW (ref 22–29)
COLOR SPEC: SIGNIFICANT CHANGE UP
CREAT SERPL-MCNC: 0.97 MG/DL — SIGNIFICANT CHANGE UP (ref 0.5–1.3)
DIFF PNL FLD: ABNORMAL
EGFR: 59 ML/MIN/1.73M2 — LOW
FERRITIN SERPL-MCNC: 561 NG/ML — HIGH (ref 13–330)
FOLATE SERPL-MCNC: 7.3 NG/ML — SIGNIFICANT CHANGE UP
GLUCOSE BLDC GLUCOMTR-MCNC: 166 MG/DL — HIGH (ref 70–99)
GLUCOSE BLDC GLUCOMTR-MCNC: 218 MG/DL — HIGH (ref 70–99)
GLUCOSE BLDC GLUCOMTR-MCNC: 236 MG/DL — HIGH (ref 70–99)
GLUCOSE BLDC GLUCOMTR-MCNC: 256 MG/DL — HIGH (ref 70–99)
GLUCOSE SERPL-MCNC: 232 MG/DL — HIGH (ref 70–99)
GLUCOSE UR QL: >=1000 MG/DL
HCT VFR BLD CALC: 28.8 % — LOW (ref 34.5–45)
HGB BLD-MCNC: 8.9 G/DL — LOW (ref 11.5–15.5)
IRON SATN MFR SERPL: 11 % — LOW (ref 14–50)
IRON SATN MFR SERPL: 16 UG/DL — LOW (ref 37–145)
KETONES UR-MCNC: ABNORMAL MG/DL
LACTATE SERPL-SCNC: 1.7 MMOL/L — SIGNIFICANT CHANGE UP (ref 0.5–2)
LEUKOCYTE ESTERASE UR-ACNC: ABNORMAL
MCHC RBC-ENTMCNC: 26.3 PG — LOW (ref 27–34)
MCHC RBC-ENTMCNC: 30.9 G/DL — LOW (ref 32–36)
MCV RBC AUTO: 85 FL — SIGNIFICANT CHANGE UP (ref 80–100)
NITRITE UR-MCNC: NEGATIVE — SIGNIFICANT CHANGE UP
PH UR: 6 — SIGNIFICANT CHANGE UP (ref 5–8)
PLATELET # BLD AUTO: 217 K/UL — SIGNIFICANT CHANGE UP (ref 150–400)
POTASSIUM SERPL-MCNC: 4.7 MMOL/L — SIGNIFICANT CHANGE UP (ref 3.5–5.3)
POTASSIUM SERPL-SCNC: 4.7 MMOL/L — SIGNIFICANT CHANGE UP (ref 3.5–5.3)
PROT SERPL-MCNC: 5.8 G/DL — LOW (ref 6.6–8.7)
PROT UR-MCNC: 30 MG/DL
RBC # BLD: 3.39 M/UL — LOW (ref 3.8–5.2)
RBC # FLD: 17.2 % — HIGH (ref 10.3–14.5)
RBC CASTS # UR COMP ASSIST: 2 /HPF — SIGNIFICANT CHANGE UP (ref 0–4)
SODIUM SERPL-SCNC: 135 MMOL/L — SIGNIFICANT CHANGE UP (ref 135–145)
SP GR SPEC: >1.03 — HIGH (ref 1–1.03)
SQUAMOUS # UR AUTO: 2 /HPF — SIGNIFICANT CHANGE UP (ref 0–5)
TIBC SERPL-MCNC: 140 UG/DL — LOW (ref 220–430)
TRANSFERRIN SERPL-MCNC: 98 MG/DL — LOW (ref 192–382)
UROBILINOGEN FLD QL: 2 MG/DL (ref 0.2–1)
VIT B12 SERPL-MCNC: 1364 PG/ML — HIGH (ref 232–1245)
WBC # BLD: 15.46 K/UL — HIGH (ref 3.8–10.5)
WBC # FLD AUTO: 15.46 K/UL — HIGH (ref 3.8–10.5)
WBC UR QL: 3 /HPF — SIGNIFICANT CHANGE UP (ref 0–5)
YEAST-LIKE CELLS: PRESENT

## 2025-01-08 PROCEDURE — 99233 SBSQ HOSP IP/OBS HIGH 50: CPT

## 2025-01-08 RX ORDER — PIPERACILLIN SODIUM AND TAZOBACTAM SODIUM 2; 250 G/50ML; MG/50ML
3.38 INJECTION, POWDER, FOR SOLUTION INTRAVENOUS EVERY 8 HOURS
Refills: 0 | Status: COMPLETED | OUTPATIENT
Start: 2025-01-08 | End: 2025-01-15

## 2025-01-08 RX ORDER — INSULIN LISPRO 100/ML
3 VIAL (ML) SUBCUTANEOUS
Refills: 0 | Status: DISCONTINUED | OUTPATIENT
Start: 2025-01-08 | End: 2025-01-21

## 2025-01-08 RX ORDER — INSULIN LISPRO 100/ML
VIAL (ML) SUBCUTANEOUS
Refills: 0 | Status: DISCONTINUED | OUTPATIENT
Start: 2025-01-08 | End: 2025-01-24

## 2025-01-08 RX ORDER — INSULIN GLARGINE-YFGN 100 [IU]/ML
10 INJECTION, SOLUTION SUBCUTANEOUS AT BEDTIME
Refills: 0 | Status: DISCONTINUED | OUTPATIENT
Start: 2025-01-08 | End: 2025-01-21

## 2025-01-08 RX ADMIN — Medication 2 TABLET(S): at 21:57

## 2025-01-08 RX ADMIN — INSULIN GLARGINE-YFGN 10 UNIT(S): 100 INJECTION, SOLUTION SUBCUTANEOUS at 21:57

## 2025-01-08 RX ADMIN — PIPERACILLIN SODIUM AND TAZOBACTAM SODIUM 25 GRAM(S): 2; 250 INJECTION, POWDER, FOR SOLUTION INTRAVENOUS at 05:08

## 2025-01-08 RX ADMIN — Medication 4: at 17:25

## 2025-01-08 RX ADMIN — Medication 3: at 11:22

## 2025-01-08 RX ADMIN — POLYETHYLENE GLYCOL 3350 17 GRAM(S): 17 POWDER, FOR SOLUTION ORAL at 13:44

## 2025-01-08 RX ADMIN — PIPERACILLIN SODIUM AND TAZOBACTAM SODIUM 25 GRAM(S): 2; 250 INJECTION, POWDER, FOR SOLUTION INTRAVENOUS at 21:57

## 2025-01-08 RX ADMIN — Medication 50 MILLILITER(S): at 07:40

## 2025-01-08 RX ADMIN — Medication 25 MILLIGRAM(S): at 17:26

## 2025-01-08 RX ADMIN — PIPERACILLIN SODIUM AND TAZOBACTAM SODIUM 25 GRAM(S): 2; 250 INJECTION, POWDER, FOR SOLUTION INTRAVENOUS at 13:44

## 2025-01-08 RX ADMIN — Medication 2: at 08:45

## 2025-01-08 RX ADMIN — Medication 3 UNIT(S): at 17:24

## 2025-01-08 RX ADMIN — ROSUVASTATIN CALCIUM 5 MILLIGRAM(S): 10 TABLET, FILM COATED ORAL at 21:57

## 2025-01-08 NOTE — PROGRESS NOTE ADULT - SUBJECTIVE AND OBJECTIVE BOX
SUBJECTIVE / 24H EVENTS:    Pt seen and examined at bedside by the team during rounds. Pt found to be resting in bed comfortably with no new acute complaints at the time of examination. Pt denies CP, SOB, N/V, fever, chills.     MEDICATIONS  (STANDING):  collagenase Ointment 1 Application(s) Topical daily  dextrose 5%. 1000 milliLiter(s) (50 mL/Hr) IV Continuous <Continuous>  dextrose 5%. 1000 milliLiter(s) (100 mL/Hr) IV Continuous <Continuous>  dextrose 50% Injectable 25 Gram(s) IV Push once  dextrose 50% Injectable 12.5 Gram(s) IV Push once  dextrose 50% Injectable 25 Gram(s) IV Push once  glucagon  Injectable 1 milliGRAM(s) IntraMuscular once  insulin lispro (ADMELOG) corrective regimen sliding scale   SubCutaneous three times a day before meals  metoprolol tartrate 25 milliGRAM(s) Oral two times a day  piperacillin/tazobactam IVPB.. 3.375 Gram(s) IV Intermittent every 8 hours  polyethylene glycol 3350 17 Gram(s) Oral daily  rosuvastatin 5 milliGRAM(s) Oral at bedtime  senna 2 Tablet(s) Oral at bedtime  sodium chloride 0.9%. 1000 milliLiter(s) (50 mL/Hr) IV Continuous <Continuous>    MEDICATIONS  (PRN):  acetaminophen     Tablet .. 650 milliGRAM(s) Oral every 6 hours PRN Temp greater or equal to 38C (100.4F), Mild Pain (1 - 3)  aluminum hydroxide/magnesium hydroxide/simethicone Suspension 30 milliLiter(s) Oral every 4 hours PRN Dyspepsia  dextrose Oral Gel 15 Gram(s) Oral once PRN Blood Glucose LESS THAN 70 milliGRAM(s)/deciliter  melatonin 3 milliGRAM(s) Oral at bedtime PRN Insomnia  morphine  - Injectable 1 milliGRAM(s) IV Push every 6 hours PRN Moderate Pain (4 - 6)  morphine  - Injectable 1 milliGRAM(s) IV Push every 4 hours PRN Severe Pain (7 - 10)  ondansetron Injectable 4 milliGRAM(s) IV Push every 8 hours PRN Nausea and/or Vomiting      Vital Signs Last 24 Hrs  T(C): 36.8 (08 Jan 2025 11:45), Max: 36.8 (07 Jan 2025 16:31)  T(F): 98.2 (08 Jan 2025 11:45), Max: 98.3 (08 Jan 2025 04:45)  HR: 94 (08 Jan 2025 11:45) (84 - 123)  BP: 103/60 (08 Jan 2025 11:45) (96/57 - 138/84)  BP(mean): 76 (07 Jan 2025 14:54) (76 - 76)  RR: 18 (08 Jan 2025 11:45) (17 - 18)  SpO2: 99% (08 Jan 2025 11:45) (95% - 100%)    Parameters below as of 08 Jan 2025 11:45  Patient On (Oxygen Delivery Method): room air        Physical Exam  Constitutional: patient appears comfortable resting in bed, in no apparent distress  Respiratory: respirations are unlabored, no accessory muscle use, no conversational dyspnea  Cardiovascular: regular rate & rhythm  Gastrointestinal: abdomen is soft & non-distended, non-tender, no rebound tenderness / guarding  Skin: mucous membranes moist, no diaphoresis, pallor, cyanosis or jaundice      I&O's Detail    07 Jan 2025 07:01  -  08 Jan 2025 07:00  --------------------------------------------------------  IN:  Total IN: 0 mL    OUT:    Voided (mL): 600 mL  Total OUT: 600 mL    Total NET: -600 mL          LABS:                        8.9    15.46 )-----------( 217      ( 08 Jan 2025 04:41 )             28.8     01-08    135  |  104  |  42.6[H]  ----------------------------<  232[H]  4.7   |  20.0[L]  |  0.97    Ca    8.1[L]      08 Jan 2025 04:41  Phos  4.0     01-07  Mg     2.3     01-07    TPro  5.8[L]  /  Alb  2.1[L]  /  TBili  1.8  /  DBili  1.5[H]  /  AST  109[H]  /  ALT  50[H]  /  AlkPhos  331[H]  01-08    PT/INR - ( 06 Jan 2025 18:55 )   PT: 25.3 sec;   INR: 2.26 ratio         PTT - ( 06 Jan 2025 18:55 )  PTT:34.5 sec  Urinalysis Basic - ( 08 Jan 2025 04:41 )    Color: x / Appearance: x / SG: x / pH: x  Gluc: 232 mg/dL / Ketone: x  / Bili: x / Urobili: x   Blood: x / Protein: x / Nitrite: x   Leuk Esterase: x / RBC: x / WBC x   Sq Epi: x / Non Sq Epi: x / Bacteria: x

## 2025-01-08 NOTE — PROGRESS NOTE ADULT - ASSESSMENT
Pt is an 80 yo F with acute chris. Pt family refusing interventions at this time from IR or team. Pt progressing on abx,     - No surgical intervention at this time per family's request   - Surgery to s/o     Please reconsult with any other questions or concerns

## 2025-01-08 NOTE — PROGRESS NOTE ADULT - ASSESSMENT
78 y/o F w/ PMH of Afib (on eliquis), HTN, DM2, HLD, h/o stroke, limb ischemia (s/p left AKA, s/p b/l femoral thrombectomy), presented to ED from nursing home for evaluation of stump wound. Nursing home also reports she had a UA positive for UTI. At bedside pt c/o RUQ pain x 2 days. Pt says pain is nonradiating, constant, sharp in nature. Associated w/ chills. Denies N/V/D, fever, CP, SOB. Rest of ROS (-). While in the ED pt received vanc, zosyn, 1.95L NS bolus.      #Acute cholecystitis   sepsis present on admission  s/p vanc, zosyn, 1.95L NS bolus in ED  Pt had UTI at NH, will repeat U/A at this time and obtain urine cx  CXR:  No radiographic evidence of active chest disease.  CT A/P: Hydropic/distended gallbladder with gallbladder wall thickening, trace cholelithiasis and mild pericholecystic fat stranding, compatible  with acute cholecystitis. No evidence of bowel ischemia.  Surgery consulted by ED, not an ideal candidate for surgery given multiple comorbidities, family also unsure if they want to pursue surgery after their recent experience with her AKA.  family declines chris drain by IR   Pain control w/ morphine prn  Monitor liver tests  F/u blood cx  C/w Zosyn  Gentle IVF hydration    #Hx of limb ischemia  s/p left AKA, s/p b/l femoral thrombectomy  Vascular surgery consulted by ED, recommending local wound care to b/l groins and left AKA stump, vascular will do if patient is admitted, no vacular surgical intervention required at this moment.    #HTN  #Afib  Hold Eliquis for possible perc. chris. tube placement.   On Metoprolol tartrate 25mg bid at home, will hold at BP is soft in setting of sepsis  On Irbesartan 150mg daily at home, will hold at BP is soft in setting of sepsis    #DM2  On Glargine 15u qhs and Lispro 5u TID w/ meals    #HLD  Crestor 5mg qhs    #Anemia  No signs of active bleed.  Monitor H/H  Transfuse to keep Hb>7, if clinically indicated  Check iron, ferritin, transferrin saturation, TIBC, b12, folate to characterize anemia     #HCM  SCDs for DVT PPX    Dispo: unclear

## 2025-01-08 NOTE — PROGRESS NOTE ADULT - SUBJECTIVE AND OBJECTIVE BOX
seen for acute cholecystitis  in er holding  eating a bit   improved abd pain  daughter at bedside, declines invasive procedures  ros negative  bedside  utilized     MEDICATIONS  (STANDING):  collagenase Ointment 1 Application(s) Topical daily  dextrose 5%. 1000 milliLiter(s) (50 mL/Hr) IV Continuous <Continuous>  dextrose 5%. 1000 milliLiter(s) (100 mL/Hr) IV Continuous <Continuous>  dextrose 50% Injectable 25 Gram(s) IV Push once  dextrose 50% Injectable 12.5 Gram(s) IV Push once  dextrose 50% Injectable 25 Gram(s) IV Push once  glucagon  Injectable 1 milliGRAM(s) IntraMuscular once  insulin lispro (ADMELOG) corrective regimen sliding scale   SubCutaneous three times a day before meals  metoprolol tartrate 25 milliGRAM(s) Oral two times a day  piperacillin/tazobactam IVPB.. 3.375 Gram(s) IV Intermittent every 8 hours  polyethylene glycol 3350 17 Gram(s) Oral daily  rosuvastatin 5 milliGRAM(s) Oral at bedtime  senna 2 Tablet(s) Oral at bedtime  sodium chloride 0.9%. 1000 milliLiter(s) (50 mL/Hr) IV Continuous <Continuous>    MEDICATIONS  (PRN):  acetaminophen     Tablet .. 650 milliGRAM(s) Oral every 6 hours PRN Temp greater or equal to 38C (100.4F), Mild Pain (1 - 3)  aluminum hydroxide/magnesium hydroxide/simethicone Suspension 30 milliLiter(s) Oral every 4 hours PRN Dyspepsia  dextrose Oral Gel 15 Gram(s) Oral once PRN Blood Glucose LESS THAN 70 milliGRAM(s)/deciliter  melatonin 3 milliGRAM(s) Oral at bedtime PRN Insomnia  morphine  - Injectable 1 milliGRAM(s) IV Push every 6 hours PRN Moderate Pain (4 - 6)  morphine  - Injectable 1 milliGRAM(s) IV Push every 4 hours PRN Severe Pain (7 - 10)  ondansetron Injectable 4 milliGRAM(s) IV Push every 8 hours PRN Nausea and/or Vomiting      Allergies    No Known Allergies      Vital Signs Last 24 Hrs  T(C): 36.8 (08 Jan 2025 11:45), Max: 36.8 (07 Jan 2025 16:31)  T(F): 98.2 (08 Jan 2025 11:45), Max: 98.3 (08 Jan 2025 04:45)  HR: 94 (08 Jan 2025 11:45) (84 - 123)  BP: 103/60 (08 Jan 2025 11:45) (96/57 - 138/84)  BP(mean): 76 (07 Jan 2025 14:54) (76 - 76)  RR: 18 (08 Jan 2025 11:45) (17 - 18)  SpO2: 99% (08 Jan 2025 11:45) (95% - 100%)    Parameters below as of 08 Jan 2025 11:45  Patient On (Oxygen Delivery Method): room air        PHYSICAL EXAM:    GENERAL: chronically ill appearing   CHEST/LUNG: Clear to ausculation bilaterally  HEART: Regular rate and rhythm; S1 S2  ABDOMEN: Soft, Nontender, Nondistended; Bowel sounds present  EXTREMITIES: mild edema RLE, left aka bandaged        LABS:                        8.9    15.46 )-----------( 217      ( 08 Jan 2025 04:41 )             28.8     01-08    135  |  104  |  42.6[H]  ----------------------------<  232[H]  4.7   |  20.0[L]  |  0.97    Ca    8.1[L]      08 Jan 2025 04:41  Phos  4.0     01-07  Mg     2.3     01-07    TPro  5.8[L]  /  Alb  2.1[L]  /  TBili  1.8  /  DBili  1.5[H]  /  AST  109[H]  /  ALT  50[H]  /  AlkPhos  331[H]  01-08    PT/INR - ( 06 Jan 2025 18:55 )   PT: 25.3 sec;   INR: 2.26 ratio         PTT - ( 06 Jan 2025 18:55 )  PTT:34.5 sec  Urinalysis Basic - ( 08 Jan 2025 04:41 )    Color: x / Appearance: x / SG: x / pH: x  Gluc: 232 mg/dL / Ketone: x  / Bili: x / Urobili: x   Blood: x / Protein: x / Nitrite: x   Leuk Esterase: x / RBC: x / WBC x   Sq Epi: x / Non Sq Epi: x / Bacteria: x        CAPILLARY BLOOD GLUCOSE      POCT Blood Glucose.: 256 mg/dL (08 Jan 2025 11:21)  POCT Blood Glucose.: 236 mg/dL (08 Jan 2025 08:35)  POCT Blood Glucose.: 278 mg/dL (07 Jan 2025 17:37)        RADIOLOGY & ADDITIONAL TESTS:

## 2025-01-09 LAB
ALBUMIN SERPL ELPH-MCNC: 1.8 G/DL — LOW (ref 3.3–5.2)
ALP SERPL-CCNC: 419 U/L — HIGH (ref 40–120)
ALT FLD-CCNC: 73 U/L — HIGH
ANION GAP SERPL CALC-SCNC: 13 MMOL/L — SIGNIFICANT CHANGE UP (ref 5–17)
AST SERPL-CCNC: 145 U/L — HIGH
BASOPHILS # BLD AUTO: 0.02 K/UL — SIGNIFICANT CHANGE UP (ref 0–0.2)
BASOPHILS NFR BLD AUTO: 0.2 % — SIGNIFICANT CHANGE UP (ref 0–2)
BILIRUB SERPL-MCNC: 2.1 MG/DL — HIGH (ref 0.4–2)
BUN SERPL-MCNC: 34.6 MG/DL — HIGH (ref 8–20)
CALCIUM SERPL-MCNC: 8 MG/DL — LOW (ref 8.4–10.5)
CHLORIDE SERPL-SCNC: 107 MMOL/L — SIGNIFICANT CHANGE UP (ref 96–108)
CO2 SERPL-SCNC: 16 MMOL/L — LOW (ref 22–29)
CREAT SERPL-MCNC: 0.75 MG/DL — SIGNIFICANT CHANGE UP (ref 0.5–1.3)
EGFR: 80 ML/MIN/1.73M2 — SIGNIFICANT CHANGE UP
EOSINOPHIL # BLD AUTO: 0.04 K/UL — SIGNIFICANT CHANGE UP (ref 0–0.5)
EOSINOPHIL NFR BLD AUTO: 0.3 % — SIGNIFICANT CHANGE UP (ref 0–6)
GLUCOSE BLDC GLUCOMTR-MCNC: 167 MG/DL — HIGH (ref 70–99)
GLUCOSE BLDC GLUCOMTR-MCNC: 178 MG/DL — HIGH (ref 70–99)
GLUCOSE BLDC GLUCOMTR-MCNC: 197 MG/DL — HIGH (ref 70–99)
GLUCOSE BLDC GLUCOMTR-MCNC: 198 MG/DL — HIGH (ref 70–99)
GLUCOSE SERPL-MCNC: 175 MG/DL — HIGH (ref 70–99)
HCT VFR BLD CALC: 31.4 % — LOW (ref 34.5–45)
HGB BLD-MCNC: 9.7 G/DL — LOW (ref 11.5–15.5)
IMM GRANULOCYTES NFR BLD AUTO: 0.8 % — SIGNIFICANT CHANGE UP (ref 0–0.9)
LYMPHOCYTES # BLD AUTO: 0.73 K/UL — LOW (ref 1–3.3)
LYMPHOCYTES # BLD AUTO: 5.5 % — LOW (ref 13–44)
MAGNESIUM SERPL-MCNC: 2 MG/DL — SIGNIFICANT CHANGE UP (ref 1.6–2.6)
MCHC RBC-ENTMCNC: 26 PG — LOW (ref 27–34)
MCHC RBC-ENTMCNC: 30.9 G/DL — LOW (ref 32–36)
MCV RBC AUTO: 84.2 FL — SIGNIFICANT CHANGE UP (ref 80–100)
MONOCYTES # BLD AUTO: 0.66 K/UL — SIGNIFICANT CHANGE UP (ref 0–0.9)
MONOCYTES NFR BLD AUTO: 5 % — SIGNIFICANT CHANGE UP (ref 2–14)
NEUTROPHILS # BLD AUTO: 11.73 K/UL — HIGH (ref 1.8–7.4)
NEUTROPHILS NFR BLD AUTO: 88.2 % — HIGH (ref 43–77)
PHOSPHATE SERPL-MCNC: 3 MG/DL — SIGNIFICANT CHANGE UP (ref 2.4–4.7)
PLATELET # BLD AUTO: 209 K/UL — SIGNIFICANT CHANGE UP (ref 150–400)
POTASSIUM SERPL-MCNC: 4 MMOL/L — SIGNIFICANT CHANGE UP (ref 3.5–5.3)
POTASSIUM SERPL-SCNC: 4 MMOL/L — SIGNIFICANT CHANGE UP (ref 3.5–5.3)
PROT SERPL-MCNC: 5.7 G/DL — LOW (ref 6.6–8.7)
RBC # BLD: 3.73 M/UL — LOW (ref 3.8–5.2)
RBC # FLD: 17.1 % — HIGH (ref 10.3–14.5)
SODIUM SERPL-SCNC: 136 MMOL/L — SIGNIFICANT CHANGE UP (ref 135–145)
WBC # BLD: 13.28 K/UL — HIGH (ref 3.8–10.5)
WBC # FLD AUTO: 13.28 K/UL — HIGH (ref 3.8–10.5)

## 2025-01-09 PROCEDURE — 99233 SBSQ HOSP IP/OBS HIGH 50: CPT

## 2025-01-09 RX ORDER — ENOXAPARIN SODIUM 100 MG/ML
60 INJECTION SUBCUTANEOUS EVERY 12 HOURS
Refills: 0 | Status: DISCONTINUED | OUTPATIENT
Start: 2025-01-09 | End: 2025-01-10

## 2025-01-09 RX ADMIN — POLYETHYLENE GLYCOL 3350 17 GRAM(S): 17 POWDER, FOR SOLUTION ORAL at 12:27

## 2025-01-09 RX ADMIN — Medication 25 MILLIGRAM(S): at 05:18

## 2025-01-09 RX ADMIN — MORPHINE SULFATE 1 MILLIGRAM(S): 60 TABLET, FILM COATED, EXTENDED RELEASE ORAL at 22:26

## 2025-01-09 RX ADMIN — Medication 3 UNIT(S): at 12:27

## 2025-01-09 RX ADMIN — PIPERACILLIN SODIUM AND TAZOBACTAM SODIUM 25 GRAM(S): 2; 250 INJECTION, POWDER, FOR SOLUTION INTRAVENOUS at 21:29

## 2025-01-09 RX ADMIN — Medication 2: at 17:32

## 2025-01-09 RX ADMIN — ENOXAPARIN SODIUM 60 MILLIGRAM(S): 100 INJECTION SUBCUTANEOUS at 17:35

## 2025-01-09 RX ADMIN — PIPERACILLIN SODIUM AND TAZOBACTAM SODIUM 25 GRAM(S): 2; 250 INJECTION, POWDER, FOR SOLUTION INTRAVENOUS at 05:18

## 2025-01-09 RX ADMIN — INSULIN GLARGINE-YFGN 10 UNIT(S): 100 INJECTION, SOLUTION SUBCUTANEOUS at 21:36

## 2025-01-09 RX ADMIN — ROSUVASTATIN CALCIUM 5 MILLIGRAM(S): 10 TABLET, FILM COATED ORAL at 21:30

## 2025-01-09 RX ADMIN — PIPERACILLIN SODIUM AND TAZOBACTAM SODIUM 25 GRAM(S): 2; 250 INJECTION, POWDER, FOR SOLUTION INTRAVENOUS at 12:24

## 2025-01-09 RX ADMIN — MORPHINE SULFATE 1 MILLIGRAM(S): 60 TABLET, FILM COATED, EXTENDED RELEASE ORAL at 21:26

## 2025-01-09 RX ADMIN — Medication 1 APPLICATION(S): at 12:29

## 2025-01-09 RX ADMIN — Medication 3 UNIT(S): at 17:32

## 2025-01-09 RX ADMIN — Medication 3 UNIT(S): at 08:52

## 2025-01-09 RX ADMIN — Medication 2: at 08:52

## 2025-01-09 RX ADMIN — Medication 50 MILLILITER(S): at 17:28

## 2025-01-09 RX ADMIN — Medication 2 TABLET(S): at 21:30

## 2025-01-09 RX ADMIN — Medication 2: at 12:27

## 2025-01-09 RX ADMIN — Medication 25 MILLIGRAM(S): at 17:30

## 2025-01-09 NOTE — PROGRESS NOTE ADULT - SUBJECTIVE AND OBJECTIVE BOX
seen for acute chris     in er holding  bedside  utilized  feels well  tolerating diet  no abd pain      MEDICATIONS  (STANDING):  collagenase Ointment 1 Application(s) Topical daily  dextrose 5%. 1000 milliLiter(s) (50 mL/Hr) IV Continuous <Continuous>  dextrose 5%. 1000 milliLiter(s) (100 mL/Hr) IV Continuous <Continuous>  dextrose 50% Injectable 25 Gram(s) IV Push once  dextrose 50% Injectable 12.5 Gram(s) IV Push once  dextrose 50% Injectable 25 Gram(s) IV Push once  enoxaparin Injectable 60 milliGRAM(s) SubCutaneous every 12 hours  glucagon  Injectable 1 milliGRAM(s) IntraMuscular once  insulin glargine Injectable (LANTUS) 10 Unit(s) SubCutaneous at bedtime  insulin lispro (ADMELOG) corrective regimen sliding scale   SubCutaneous three times a day before meals  insulin lispro Injectable (ADMELOG) 3 Unit(s) SubCutaneous three times a day before meals  metoprolol tartrate 25 milliGRAM(s) Oral two times a day  piperacillin/tazobactam IVPB.. 3.375 Gram(s) IV Intermittent every 8 hours  polyethylene glycol 3350 17 Gram(s) Oral daily  rosuvastatin 5 milliGRAM(s) Oral at bedtime  senna 2 Tablet(s) Oral at bedtime  sodium chloride 0.9%. 1000 milliLiter(s) (50 mL/Hr) IV Continuous <Continuous>    MEDICATIONS  (PRN):  acetaminophen     Tablet .. 650 milliGRAM(s) Oral every 6 hours PRN Temp greater or equal to 38C (100.4F), Mild Pain (1 - 3)  aluminum hydroxide/magnesium hydroxide/simethicone Suspension 30 milliLiter(s) Oral every 4 hours PRN Dyspepsia  dextrose Oral Gel 15 Gram(s) Oral once PRN Blood Glucose LESS THAN 70 milliGRAM(s)/deciliter  melatonin 3 milliGRAM(s) Oral at bedtime PRN Insomnia  morphine  - Injectable 1 milliGRAM(s) IV Push every 6 hours PRN Moderate Pain (4 - 6)  morphine  - Injectable 1 milliGRAM(s) IV Push every 4 hours PRN Severe Pain (7 - 10)  ondansetron Injectable 4 milliGRAM(s) IV Push every 8 hours PRN Nausea and/or Vomiting      Allergies    No Known Allergies         Vital Signs Last 24 Hrs  T(C): 37.1 (09 Jan 2025 07:25), Max: 37.1 (08 Jan 2025 19:15)  T(F): 98.7 (09 Jan 2025 07:25), Max: 98.7 (08 Jan 2025 19:15)  HR: 87 (09 Jan 2025 07:25) (64 - 99)  BP: 126/82 (09 Jan 2025 07:25) (103/60 - 131/72)  BP(mean): 90 (08 Jan 2025 19:15) (90 - 90)  RR: 18 (09 Jan 2025 07:25) (18 - 18)  SpO2: 96% (09 Jan 2025 07:25) (95% - 99%)    Parameters below as of 09 Jan 2025 07:25  Patient On (Oxygen Delivery Method): room air        PHYSICAL EXAM:    GENERAL: NAD   CHEST/LUNG: Clear to ausculation bilaterally;    HEART: Regular rate and rhythm; S1 S 2  ABDOMEN: Soft, Nontender, Nondistended; Bowel sounds present  EXTREMITIES:  left aka bandaged   NERVOUS SYSTEM:  Alert & Oriented X3    LABS:                        9.7    13.28 )-----------( 209      ( 09 Jan 2025 07:00 )             31.4     01-09    136  |  107  |  34.6[H]  ----------------------------<  175[H]  4.0   |  16.0[L]  |  0.75    Ca    8.0[L]      09 Jan 2025 07:00  Phos  3.0     01-09  Mg     2.0     01-09    TPro  5.7[L]  /  Alb  1.8[L]  /  TBili  2.1[H]  /  DBili  x   /  AST  145[H]  /  ALT  73[H]  /  AlkPhos  419[H]  01-09      Urinalysis Basic - ( 09 Jan 2025 07:00 )    Color: x / Appearance: x / SG: x / pH: x  Gluc: 175 mg/dL / Ketone: x  / Bili: x / Urobili: x   Blood: x / Protein: x / Nitrite: x   Leuk Esterase: x / RBC: x / WBC x   Sq Epi: x / Non Sq Epi: x / Bacteria: x        CAPILLARY BLOOD GLUCOSE      POCT Blood Glucose.: 197 mg/dL (09 Jan 2025 08:42)  POCT Blood Glucose.: 166 mg/dL (08 Jan 2025 21:55)  POCT Blood Glucose.: 218 mg/dL (08 Jan 2025 17:09)        RADIOLOGY & ADDITIONAL TESTS:

## 2025-01-09 NOTE — DIETITIAN INITIAL EVALUATION ADULT - NS FNS DIET ORDER
Diet, DASH/TLC:   Sodium & Cholesterol Restricted  Consistent Carbohydrate {Evening Snack} (CSTCHOSN)  Supplement Feeding Modality:  Oral  Glucerna Shake Cans or Servings Per Day:  1       Frequency:  Two Times a day (01-08-25 @ 14:16)

## 2025-01-09 NOTE — PROGRESS NOTE ADULT - ASSESSMENT
78 y/o F w/ PMH of Afib (on eliquis), HTN, DM2, HLD, h/o stroke, limb ischemia (s/p left AKA, s/p b/l femoral thrombectomy), presented to ED from nursing home for evaluation of stump wound. Nursing home also reports she had a UA positive for UTI. At bedside pt c/o RUQ pain x 2 days. Pt says pain is nonradiating, constant, sharp in nature. Associated w/ chills. Denies N/V/D, fever, CP, SOB. Rest of ROS (-). While in the ED pt received vanc, zosyn, 1.95L NS bolus.      #Acute cholecystitis   sepsis present on admission  s/p vanc, zosyn, 1.95L NS bolus in ED  Pt had UTI at NH, will repeat U/A at this time and obtain urine cx  CXR:  No radiographic evidence of active chest disease.  CT A/P: Hydropic/distended gallbladder with gallbladder wall thickening, trace cholelithiasis and mild pericholecystic fat stranding, compatible  with acute cholecystitis. No evidence of bowel ischemia.  Surgery consulted by ED, not an ideal candidate for surgery given multiple comorbidities, family also unsure if they want to pursue surgery after their recent experience with her AKA.  family declines chris drain by IR   Pain control w/ morphine prn  Monitor liver tests  F/u blood cx  C/w Zosyn    #Hx of limb ischemia  s/p left AKA, s/p b/l femoral thrombectomy  Vascular surgery consulted by ED, recommending local wound care to b/l groins and left AKA stump, vascular will do if patient is admitted, no vacular surgical intervention required at this moment.    #HTN  #Afib  Hold Eliquis for possible perc. chris. tube placement.     changed to therapeutic lovenox in case needs intervention  On Metoprolol tartrate 25mg bid at home,  On Irbesartan 150mg daily at home,     #DM2  On Glargine 15u qhs and Lispro 5u TID w/ meals    #HLD  Crestor 5mg qhs    #Anemia  No signs of active bleed.  Monitor H/H  Transfuse to keep Hb>7, if clinically indicated    Dispo: active. await cultures

## 2025-01-09 NOTE — DIETITIAN INITIAL EVALUATION ADULT - PERTINENT MEDS FT
MEDICATIONS  (STANDING):  enoxaparin Injectable 60 milliGRAM(s) SubCutaneous every 12 hours  glucagon  Injectable 1 milliGRAM(s) IntraMuscular once  insulin lispro Injectable (ADMELOG) 3 Unit(s) SubCutaneous three times a day before meals  metoprolol tartrate 25 milliGRAM(s) Oral two times a day  piperacillin/tazobactam IVPB.. 3.375 Gram(s) IV Intermittent every 8 hours  polyethylene glycol 3350 17 Gram(s) Oral daily  rosuvastatin 5 milliGRAM(s) Oral at bedtime  senna 2 Tablet(s) Oral at bedtime  sodium chloride 0.9%. 1000 milliLiter(s) (50 mL/Hr) IV Continuous <Continuous>    MEDICATIONS  (PRN):  acetaminophen     Tablet .. 650 milliGRAM(s) Oral every 6 hours PRN Temp greater or equal to 38C (100.4F), Mild Pain (1 - 3)  aluminum hydroxide/magnesium hydroxide/simethicone Suspension 30 milliLiter(s) Oral every 4 hours PRN Dyspepsia  dextrose Oral Gel 15 Gram(s) Oral once PRN Blood Glucose LESS THAN 70 milliGRAM(s)/deciliter  melatonin 3 milliGRAM(s) Oral at bedtime PRN Insomnia  morphine  - Injectable 1 milliGRAM(s) IV Push every 6 hours PRN Moderate Pain (4 - 6)

## 2025-01-09 NOTE — DIETITIAN INITIAL EVALUATION ADULT - ETIOLOGY
Related to inadequate protein energy intake with persistent lack of appetite in setting of advanced age, recent AKA, now Acute cholecystitis

## 2025-01-09 NOTE — DIETITIAN INITIAL EVALUATION ADULT - OTHER INFO
Pt is a 79 year old F w/ PMH of Afib, HTN, DM2, HLD, h/o stroke, limb ischemia (s/p left AKA, s/p b/l femoral thrombectomy), presented from nursing home for evaluation of stump wound. Per H&P;  UA positive for UTI.   Pt found to have Acute cholecystitis;   Surgery consulted by ED, (per surgery note) not an ideal candidate for surgery given multiple comorbidities, family also unsure if they want to pursue surgery after their recent experience with her AKA.

## 2025-01-09 NOTE — DIETITIAN INITIAL EVALUATION ADULT - PERTINENT LABORATORY DATA
01-09    136  |  107  |  34.6[H]  ----------------------------<  175[H]  4.0   |  16.0[L]  |  0.75    Ca    8.0[L]      09 Jan 2025 07:00  Phos  3.0     01-09  Mg     2.0     01-09    TPro  5.7[L]  /  Alb  1.8[L]  /  TBili  2.1[H]  /  DBili  x   /  AST  145[H]  /  ALT  73[H]  /  AlkPhos  419[H]  01-09  POCT Blood Glucose.: 198 mg/dL (01-09-25 @ 11:50)  A1C with Estimated Average Glucose Result: 11.5 % (12-17-24 @ 06:50)

## 2025-01-09 NOTE — DIETITIAN NUTRITION RISK NOTIFICATION - TREATMENT: THE FOLLOWING DIET HAS BEEN RECOMMENDED
Diet, DASH/TLC:   Sodium & Cholesterol Restricted  Consistent Carbohydrate {Evening Snack} (CSTCHOSN)  Supplement Feeding Modality:  Oral  Glucerna Shake Cans or Servings Per Day:  1       Frequency:  Two Times a day (01-08-25 @ 14:16) [Active]

## 2025-01-09 NOTE — DIETITIAN INITIAL EVALUATION ADULT - ORAL INTAKE PTA/DIET HISTORY
Spoke with pt's daughter at bedside via  over phone (#[phone]). Daughter reports pt has had extremely poor appetite/PO intake over past month or so; "she just does not want to eat anything". Daughter bringing food from home which she will not eat either. Daughter think endorses weight loss; however unable to quantify. Glucerna on tray noted; daughter reports pt likes and drink, flavor (vanilla) preference obtained. Brief NFPE conducted.

## 2025-01-10 LAB
ALBUMIN SERPL ELPH-MCNC: 1.9 G/DL — LOW (ref 3.3–5.2)
ALP SERPL-CCNC: 502 U/L — HIGH (ref 40–120)
ALT FLD-CCNC: 73 U/L — HIGH
ANION GAP SERPL CALC-SCNC: 11 MMOL/L — SIGNIFICANT CHANGE UP (ref 5–17)
AST SERPL-CCNC: 125 U/L — HIGH
BASOPHILS # BLD AUTO: 0.03 K/UL — SIGNIFICANT CHANGE UP (ref 0–0.2)
BASOPHILS NFR BLD AUTO: 0.2 % — SIGNIFICANT CHANGE UP (ref 0–2)
BILIRUB SERPL-MCNC: 2.1 MG/DL — HIGH (ref 0.4–2)
BUN SERPL-MCNC: 28.1 MG/DL — HIGH (ref 8–20)
CALCIUM SERPL-MCNC: 8 MG/DL — LOW (ref 8.4–10.5)
CHLORIDE SERPL-SCNC: 106 MMOL/L — SIGNIFICANT CHANGE UP (ref 96–108)
CO2 SERPL-SCNC: 18 MMOL/L — LOW (ref 22–29)
CREAT SERPL-MCNC: 0.67 MG/DL — SIGNIFICANT CHANGE UP (ref 0.5–1.3)
EGFR: 88 ML/MIN/1.73M2 — SIGNIFICANT CHANGE UP
EOSINOPHIL # BLD AUTO: 0.1 K/UL — SIGNIFICANT CHANGE UP (ref 0–0.5)
EOSINOPHIL NFR BLD AUTO: 0.7 % — SIGNIFICANT CHANGE UP (ref 0–6)
GLUCOSE BLDC GLUCOMTR-MCNC: 143 MG/DL — HIGH (ref 70–99)
GLUCOSE BLDC GLUCOMTR-MCNC: 161 MG/DL — HIGH (ref 70–99)
GLUCOSE BLDC GLUCOMTR-MCNC: 188 MG/DL — HIGH (ref 70–99)
GLUCOSE BLDC GLUCOMTR-MCNC: 192 MG/DL — HIGH (ref 70–99)
GLUCOSE BLDC GLUCOMTR-MCNC: 199 MG/DL — HIGH (ref 70–99)
GLUCOSE SERPL-MCNC: 155 MG/DL — HIGH (ref 70–99)
HCT VFR BLD CALC: 30.7 % — LOW (ref 34.5–45)
HGB BLD-MCNC: 9.6 G/DL — LOW (ref 11.5–15.5)
IMM GRANULOCYTES NFR BLD AUTO: 1.5 % — HIGH (ref 0–0.9)
LYMPHOCYTES # BLD AUTO: 1 K/UL — SIGNIFICANT CHANGE UP (ref 1–3.3)
LYMPHOCYTES # BLD AUTO: 6.6 % — LOW (ref 13–44)
MAGNESIUM SERPL-MCNC: 1.9 MG/DL — SIGNIFICANT CHANGE UP (ref 1.6–2.6)
MCHC RBC-ENTMCNC: 25.6 PG — LOW (ref 27–34)
MCHC RBC-ENTMCNC: 31.3 G/DL — LOW (ref 32–36)
MCV RBC AUTO: 81.9 FL — SIGNIFICANT CHANGE UP (ref 80–100)
MONOCYTES # BLD AUTO: 0.74 K/UL — SIGNIFICANT CHANGE UP (ref 0–0.9)
MONOCYTES NFR BLD AUTO: 4.9 % — SIGNIFICANT CHANGE UP (ref 2–14)
NEUTROPHILS # BLD AUTO: 12.96 K/UL — HIGH (ref 1.8–7.4)
NEUTROPHILS NFR BLD AUTO: 86.1 % — HIGH (ref 43–77)
PLATELET # BLD AUTO: 211 K/UL — SIGNIFICANT CHANGE UP (ref 150–400)
POTASSIUM SERPL-MCNC: 4.1 MMOL/L — SIGNIFICANT CHANGE UP (ref 3.5–5.3)
POTASSIUM SERPL-SCNC: 4.1 MMOL/L — SIGNIFICANT CHANGE UP (ref 3.5–5.3)
PROT SERPL-MCNC: 5.4 G/DL — LOW (ref 6.6–8.7)
RBC # BLD: 3.75 M/UL — LOW (ref 3.8–5.2)
RBC # FLD: 17.1 % — HIGH (ref 10.3–14.5)
SODIUM SERPL-SCNC: 134 MMOL/L — LOW (ref 135–145)
WBC # BLD: 15.06 K/UL — HIGH (ref 3.8–10.5)
WBC # FLD AUTO: 15.06 K/UL — HIGH (ref 3.8–10.5)

## 2025-01-10 PROCEDURE — G0545: CPT

## 2025-01-10 PROCEDURE — 99233 SBSQ HOSP IP/OBS HIGH 50: CPT

## 2025-01-10 PROCEDURE — 99223 1ST HOSP IP/OBS HIGH 75: CPT

## 2025-01-10 RX ORDER — ASCORBIC ACID 500 MG/ML
500 VIAL (ML) INJECTION DAILY
Refills: 0 | Status: DISCONTINUED | OUTPATIENT
Start: 2025-01-10 | End: 2025-01-20

## 2025-01-10 RX ORDER — IRON/FOLIC ACID/C/B6/B12/ZINC 150-1.25MG
1 TABLET ORAL DAILY
Refills: 0 | Status: DISCONTINUED | OUTPATIENT
Start: 2025-01-10 | End: 2025-01-24

## 2025-01-10 RX ADMIN — Medication 2: at 17:16

## 2025-01-10 RX ADMIN — ROSUVASTATIN CALCIUM 5 MILLIGRAM(S): 10 TABLET, FILM COATED ORAL at 21:22

## 2025-01-10 RX ADMIN — MORPHINE SULFATE 1 MILLIGRAM(S): 60 TABLET, FILM COATED, EXTENDED RELEASE ORAL at 13:52

## 2025-01-10 RX ADMIN — Medication 3 UNIT(S): at 08:51

## 2025-01-10 RX ADMIN — PIPERACILLIN SODIUM AND TAZOBACTAM SODIUM 25 GRAM(S): 2; 250 INJECTION, POWDER, FOR SOLUTION INTRAVENOUS at 05:15

## 2025-01-10 RX ADMIN — Medication 2: at 13:42

## 2025-01-10 RX ADMIN — Medication 2: at 08:51

## 2025-01-10 RX ADMIN — Medication 25 MILLIGRAM(S): at 17:13

## 2025-01-10 RX ADMIN — INSULIN GLARGINE-YFGN 10 UNIT(S): 100 INJECTION, SOLUTION SUBCUTANEOUS at 21:18

## 2025-01-10 RX ADMIN — Medication 1 TABLET(S): at 17:13

## 2025-01-10 RX ADMIN — Medication 50 MILLILITER(S): at 13:44

## 2025-01-10 RX ADMIN — Medication 3 UNIT(S): at 17:16

## 2025-01-10 RX ADMIN — Medication 2 TABLET(S): at 21:22

## 2025-01-10 RX ADMIN — Medication 3 UNIT(S): at 13:41

## 2025-01-10 RX ADMIN — Medication 1 APPLICATION(S): at 13:41

## 2025-01-10 RX ADMIN — Medication 50 MILLILITER(S): at 21:18

## 2025-01-10 RX ADMIN — Medication 25 MILLIGRAM(S): at 05:07

## 2025-01-10 RX ADMIN — POLYETHYLENE GLYCOL 3350 17 GRAM(S): 17 POWDER, FOR SOLUTION ORAL at 13:42

## 2025-01-10 RX ADMIN — Medication 500 MILLIGRAM(S): at 13:40

## 2025-01-10 RX ADMIN — PIPERACILLIN SODIUM AND TAZOBACTAM SODIUM 25 GRAM(S): 2; 250 INJECTION, POWDER, FOR SOLUTION INTRAVENOUS at 21:18

## 2025-01-10 RX ADMIN — PIPERACILLIN SODIUM AND TAZOBACTAM SODIUM 25 GRAM(S): 2; 250 INJECTION, POWDER, FOR SOLUTION INTRAVENOUS at 13:42

## 2025-01-10 RX ADMIN — ENOXAPARIN SODIUM 60 MILLIGRAM(S): 100 INJECTION SUBCUTANEOUS at 05:07

## 2025-01-10 NOTE — PROGRESS NOTE ADULT - NUTRITIONAL ASSESSMENT
This patient has been assessed with a concern for Malnutrition and has been determined to have a diagnosis/diagnoses of Moderate protein-calorie malnutrition.    This patient is being managed with:   Diet DASH/TLC-  Sodium & Cholesterol Restricted  Consistent Carbohydrate {Evening Snack} (CSTCHOSN)  Supplement Feeding Modality:  Oral  Glucerna Shake Cans or Servings Per Day:  1       Frequency:  Two Times a day  Entered: Jan 8 2025  2:16PM

## 2025-01-10 NOTE — PROGRESS NOTE ADULT - SUBJECTIVE AND OBJECTIVE BOX
seen for cholecystitis    feels well  tolerating diet  no pain/nausea  wants to go home soon  bedside  utilized     MEDICATIONS  (STANDING):  ascorbic acid 500 milliGRAM(s) Oral daily  collagenase Ointment 1 Application(s) Topical daily  dextrose 5%. 1000 milliLiter(s) (50 mL/Hr) IV Continuous <Continuous>  dextrose 5%. 1000 milliLiter(s) (100 mL/Hr) IV Continuous <Continuous>  dextrose 50% Injectable 25 Gram(s) IV Push once  dextrose 50% Injectable 12.5 Gram(s) IV Push once  dextrose 50% Injectable 25 Gram(s) IV Push once  enoxaparin Injectable 60 milliGRAM(s) SubCutaneous every 12 hours  glucagon  Injectable 1 milliGRAM(s) IntraMuscular once  insulin glargine Injectable (LANTUS) 10 Unit(s) SubCutaneous at bedtime  insulin lispro (ADMELOG) corrective regimen sliding scale   SubCutaneous three times a day before meals  insulin lispro Injectable (ADMELOG) 3 Unit(s) SubCutaneous three times a day before meals  metoprolol tartrate 25 milliGRAM(s) Oral two times a day  multivitamin/minerals 1 Tablet(s) Oral daily  piperacillin/tazobactam IVPB.. 3.375 Gram(s) IV Intermittent every 8 hours  polyethylene glycol 3350 17 Gram(s) Oral daily  rosuvastatin 5 milliGRAM(s) Oral at bedtime  senna 2 Tablet(s) Oral at bedtime  sodium chloride 0.9%. 1000 milliLiter(s) (50 mL/Hr) IV Continuous <Continuous>    MEDICATIONS  (PRN):  acetaminophen     Tablet .. 650 milliGRAM(s) Oral every 6 hours PRN Temp greater or equal to 38C (100.4F), Mild Pain (1 - 3)  aluminum hydroxide/magnesium hydroxide/simethicone Suspension 30 milliLiter(s) Oral every 4 hours PRN Dyspepsia  dextrose Oral Gel 15 Gram(s) Oral once PRN Blood Glucose LESS THAN 70 milliGRAM(s)/deciliter  melatonin 3 milliGRAM(s) Oral at bedtime PRN Insomnia  morphine  - Injectable 1 milliGRAM(s) IV Push every 6 hours PRN Moderate Pain (4 - 6)  morphine  - Injectable 1 milliGRAM(s) IV Push every 4 hours PRN Severe Pain (7 - 10)  ondansetron Injectable 4 milliGRAM(s) IV Push every 8 hours PRN Nausea and/or Vomiting      Allergies    No Known Allergies       Vital Signs Last 24 Hrs  T(C): 37.1 (10 Waqar 2025 07:45), Max: 37.1 (10 Waqar 2025 07:45)  T(F): 98.7 (10 Wqaar 2025 07:45), Max: 98.7 (10 Waqar 2025 07:45)  HR: 89 (10 Waqar 2025 07:45) (89 - 101)  BP: 112/68 (10 Waqar 2025 07:45) (112/68 - 126/83)  BP(mean): --  RR: 18 (10 Waqar 2025 07:45) (18 - 18)  SpO2: 96% (10 Waqar 2025 07:45) (96% - 99%)    Parameters below as of 10 Waqar 2025 07:45  Patient On (Oxygen Delivery Method): room air        PHYSICAL EXAM:    GENERAL: NAD,   CHEST/LUNG: Clear to ausculation bilaterally;   HEART: Regular rate and rhythm; S1 S2;   ABDOMEN: Soft, Nontender,  ; Bowel sounds present  EXTREMITIES: left aka bandage  NERVOUS SYSTEM:  Alert & Oriented X3    LABS:                        9.6    15.06 )-----------( 211      ( 10 Waqar 2025 04:10 )             30.7     01-10    134[L]  |  106  |  28.1[H]  ----------------------------<  155[H]  4.1   |  18.0[L]  |  0.67    Ca    8.0[L]      10 Waqar 2025 04:10  Phos  3.0     01-09  Mg     1.9     01-10    TPro  5.4[L]  /  Alb  1.9[L]  /  TBili  2.1[H]  /  DBili  x   /  AST  125[H]  /  ALT  73[H]  /  AlkPhos  502[H]  01-10      Urinalysis Basic - ( 10 Waqar 2025 04:10 )    Color: x / Appearance: x / SG: x / pH: x  Gluc: 155 mg/dL / Ketone: x  / Bili: x / Urobili: x   Blood: x / Protein: x / Nitrite: x   Leuk Esterase: x / RBC: x / WBC x   Sq Epi: x / Non Sq Epi: x / Bacteria: x        CAPILLARY BLOOD GLUCOSE      POCT Blood Glucose.: 192 mg/dL (10 Waqar 2025 08:44)  POCT Blood Glucose.: 167 mg/dL (09 Jan 2025 21:26)  POCT Blood Glucose.: 178 mg/dL (09 Jan 2025 17:09)  POCT Blood Glucose.: 198 mg/dL (09 Jan 2025 11:50)        RADIOLOGY & ADDITIONAL TESTS:

## 2025-01-10 NOTE — PROGRESS NOTE ADULT - SUBJECTIVE AND OBJECTIVE BOX
consulted for choley tube placement    patient received anticoagulation and is not NPO, unable to consent for herself and unlikely to cooperate with this procedure without anesthesia support.    will make patient NPO after MN, hold AM anticoagulation, and plan to place the choley tube tomorrow AM (pending anesthesia availability)    discussed with patient and son via

## 2025-01-10 NOTE — PROGRESS NOTE ADULT - ASSESSMENT
78 y/o F w/ PMH of Afib (on eliquis), HTN, DM2, HLD, h/o stroke, limb ischemia (s/p left AKA, s/p b/l femoral thrombectomy), presented to ED from nursing home for evaluation of stump wound. Nursing home also reports she had a UA positive for UTI. At bedside pt c/o RUQ pain x 2 days. Pt says pain is nonradiating, constant, sharp in nature. Associated w/ chills. Denies N/V/D, fever, CP, SOB. Rest of ROS (-). While in the ED pt received vanc, zosyn, 1.95L NS bolus.      #Acute cholecystitis   sepsis present on admission  s/p vanc, zosyn, 1.95L NS bolus in ED  Pt had UTI at NH, will repeat U/A at this time and obtain urine cx  CXR:  No radiographic evidence of active chest disease.  CT A/P: Hydropic/distended gallbladder with gallbladder wall thickening, trace cholelithiasis and mild pericholecystic fat stranding, compatible  with acute cholecystitis. No evidence of bowel ischemia.  Surgery consulted by ED, not an ideal candidate for surgery given multiple comorbidities, family also unsure if they want to pursue surgery after their recent experience with her AKA.  family declines chris drain by IR   Pain control w/ morphine prn  Monitor liver tests  neagtive  blood cx  C/w Zosyn  ID eval for abx choice/duration for dc     #Hx of limb ischemia  s/p left AKA, s/p b/l femoral thrombectomy  Vascular surgery consulted by ED, recommending local wound care to b/l groins and left AKA stump, vascular will do if patient is admitted, no vacular surgical intervention required at this moment.    #HTN  #Afib  Hold Eliquis for possible perc. chris. tube placement.     changed to therapeutic lovenox in case needs intervention  On Metoprolol tartrate 25mg bid at home,  On Irbesartan 150mg daily at home,     #DM2  On Glargine 15u qhs and Lispro 5u TID w/ meals    #HLD  Crestor 5mg qhs    #Anemia  No signs of active bleed.  Monitor H/H  Transfuse to keep Hb>7, if clinically indicated    Dispo: active. await cultures

## 2025-01-10 NOTE — CONSULT NOTE ADULT - ASSESSMENT
FULL CONSULT TO FOLLOW  79F with Afib (on eliquis), HTN, DM2, HLD, h/o stroke, limb ischemia (s/p left AKA on 12/16, s/p b/l femoral thrombectomy), presented to ED from nursing home for evaluation of stump wound and RUQ pain. Found to have acute cholecystitis. Family declined surgical interventions.     ID consulted for acute cholecystitis     - 1/6 CT AP with distended GB with wall thickening, mild pericholecystic fat stranding and trace cholelithiasis >> findings c/w acute chris   - 1/6 Abd US with findings c/w acute chris   - CT LLE - left groin with findings suggestive of hematoma vs PSA (less likely). Left stump with foci of air adjacent to surgical clips (post-op vs infection)  - stump evaluated by vascular surgery - no concerns for infection. Local wound care as per surgery.   - known to harbor ESBL organisms   - 1/6 BCx - ngtd   - Leukocytosis now plateaued after initial improvement   - Altered LFTs noted - monitor LFTs   - family previously declined surgical interventions for acute chris, but now agreeable to proceed with cholecystostomy tube  - Agree with IR intervention for source control. If perform, please send bile fluid for culture   - BP stable. Afebrile     Continue piperacillin-tazobactam for now  If clinical condition worsens, switch to meropenem    D/w Dr. Barnes   d/w family at bedside in Lat[place]ian

## 2025-01-10 NOTE — CONSULT NOTE ADULT - SUBJECTIVE AND OBJECTIVE BOX
[facility]  INFECTIOUS DISEASES at [place] and [place]  =====================================================         Andrei Cabrera MD                                                        Diplomates American Board of Internal Medicine & Infectious Diseases                * [place] Office - Appt - Tel  [phone] Fax [phone]                * [place] Office - Appt - Tel [phone] Fax [phone]                                  Hospital Consult line:  [phone]  =====================================================      [id number]  DANNI DELEONMARISOL        CC: Patient is a 79y old  Female who presents with a chief complaint of Sepsis     (09 Jan 2025 12:52)      79y  Female     HPI:  78 y/o F w/ PMH of Afib (on eliquis), HTN, DM2, HLD, h/o stroke, limb ischemia (s/p left AKA, s/p b/l femoral thrombectomy), presented to ED from nursing home for evaluation of stump wound. Nursing home also reports she had a UA positive for UTI. At bedside pt c/o RUQ pain x 2 days. Pt says pain is nonradiating, constant, sharp in nature. Associated w/ chills. Denies N/V/D, fever, CP, SOB. Rest of ROS (-). While in the ED pt received vanc, zosyn, 1.95L NS bolus. (07 Jan 2025 04:43)    ______________________________________________________  PAST MEDICAL & SURGICAL HISTORY:  DM (diabetes mellitus)    HTN (hypertension)    CVA (cerebrovascular accident)    Afib    Social History:  no tobacco, alcohol, drugs    FAMILY HISTORY:  no pertinent family history     ______________________________________________________  Allergies    No Known Allergies    Intolerances        ______________________________________________________  MEDICATIONS:  Antibiotics:  piperacillin/tazobactam IVPB.. 3.375 Gram(s) IV Intermittent every 8 hours    Other medications:  ascorbic acid 500 milliGRAM(s) Oral daily  collagenase Ointment 1 Application(s) Topical daily  dextrose 5%. 1000 milliLiter(s) IV Continuous <Continuous>  dextrose 5%. 1000 milliLiter(s) IV Continuous <Continuous>  dextrose 50% Injectable 25 Gram(s) IV Push once  dextrose 50% Injectable 12.5 Gram(s) IV Push once  dextrose 50% Injectable 25 Gram(s) IV Push once  glucagon  Injectable 1 milliGRAM(s) IntraMuscular once  insulin glargine Injectable (LANTUS) 10 Unit(s) SubCutaneous at bedtime  insulin lispro (ADMELOG) corrective regimen sliding scale   SubCutaneous three times a day before meals  insulin lispro Injectable (ADMELOG) 3 Unit(s) SubCutaneous three times a day before meals  metoprolol tartrate 25 milliGRAM(s) Oral two times a day  multivitamin/minerals 1 Tablet(s) Oral daily  polyethylene glycol 3350 17 Gram(s) Oral daily  rosuvastatin 5 milliGRAM(s) Oral at bedtime  senna 2 Tablet(s) Oral at bedtime  sodium chloride 0.9%. 1000 milliLiter(s) IV Continuous <Continuous>    ______________________________________________________  REVIEW OF SYSTEMS:  CONSTITUTIONAL:  No fever or chills  HEENT:  No diplopia or blurred vision.  No earache, sore throat or runny nose.  CARDIOVASCULAR:  No chest pain  RESPIRATORY:  No cough, shortness of breath  GASTROINTESTINAL:  No nausea, vomiting, abdominal pain or diarrhea.  GENITOURINARY:  No dysuria, frequency or urgency. No blood in urine  MUSCULOSKELETAL:  no joint aches, no muscle pain  SKIN:  No change in skin, hair or nails.  NEUROLOGIC:  No headaches, seizures  PSYCHIATRIC:  No disorder of thought or mood.  ENDOCRINE:  No heat or cold intolerance  HEMATOLOGICAL:  No easy bruising or bleeding.     _____________________________________________________  PHYSICAL EXAM:  GEN: AAOx4. Lying comfortable in bed, in no acute distress   HEENT: normocephalic and atraumatic. EOMI. PERRL.  Anicteric sclerae. Moist mucous membranes. No mucosal lesions. No nasal discharge.   NECK: Supple. No palpable neck masses or LN  LUNGS: eupneic, CTA B/L, no adventitious sounds  HEART: RRR, no m/r/g  ABDOMEN: Soft, NT, ND, no hepatosplenomegally, no palpable masses.  +BS.    : No CVA tenderness, no Silver catheter  EXTREMITIES: well perfused, without  edema.  MSK: No joint deformity or swelling  LYMPH: no palpable cervical, supraclavicular, axillary or inguinal lymph nodes  NEUROLOGIC: Grossly no motor focal deficits   PSYCHIATRIC: Appropriate affect and mood.  SKIN: No rash, wounds or jaundice  LINES: PIV, no phlebitis     ______________________________________________________      Vitals:  T(F): 97.7 (10 Waqar 2025 11:40), Max: 98.7 (10 Waqar 2025 07:45)  HR: 88 (10 Waqar 2025 11:40)  BP: 110/71 (10 Waqar 2025 11:40)  RR: 18 (10 Waqar 2025 11:40)  SpO2: 99% (10 Waqar 2025 11:40) (96% - 99%)  temp max in last 48H T(F): , Max: 98.7 (01-08-25 @ 19:15)    Current Antibiotics:  piperacillin/tazobactam IVPB.. 3.375 Gram(s) IV Intermittent every 8 hours    Other medications:  ascorbic acid 500 milliGRAM(s) Oral daily  collagenase Ointment 1 Application(s) Topical daily  dextrose 5%. 1000 milliLiter(s) IV Continuous <Continuous>  dextrose 5%. 1000 milliLiter(s) IV Continuous <Continuous>  dextrose 50% Injectable 25 Gram(s) IV Push once  dextrose 50% Injectable 12.5 Gram(s) IV Push once  dextrose 50% Injectable 25 Gram(s) IV Push once  glucagon  Injectable 1 milliGRAM(s) IntraMuscular once  insulin glargine Injectable (LANTUS) 10 Unit(s) SubCutaneous at bedtime  insulin lispro (ADMELOG) corrective regimen sliding scale   SubCutaneous three times a day before meals  insulin lispro Injectable (ADMELOG) 3 Unit(s) SubCutaneous three times a day before meals  metoprolol tartrate 25 milliGRAM(s) Oral two times a day  multivitamin/minerals 1 Tablet(s) Oral daily  polyethylene glycol 3350 17 Gram(s) Oral daily  rosuvastatin 5 milliGRAM(s) Oral at bedtime  senna 2 Tablet(s) Oral at bedtime  sodium chloride 0.9%. 1000 milliLiter(s) IV Continuous <Continuous>                            9.6    15.06 )-----------( 211      ( 10 Waqar 2025 04:10 )             30.7     01-10    134[L]  |  106  |  28.1[H]  ----------------------------<  155[H]  4.1   |  18.0[L]  |  0.67    Ca    8.0[L]      10 Waqar 2025 04:10  Phos  3.0     01-09  Mg     1.9     01-10    TPro  5.4[L]  /  Alb  1.9[L]  /  TBili  2.1[H]  /  DBili  x   /  AST  125[H]  /  ALT  73[H]  /  AlkPhos  502[H]  01-10    RECENT CULTURES:  01-06 @ 14:30 .Blood BLOOD     No growth at 72 Hours      WBC Count: 15.06 K/uL (01-10-25 @ 04:10)  WBC Count: 13.28 K/uL (01-09-25 @ 07:00)  WBC Count: 15.46 K/uL (01-08-25 @ 04:41)  WBC Count: 19.81 K/uL (01-07-25 @ 16:30)  WBC Count: 20.42 K/uL (01-06-25 @ 14:30)    Creatinine: 0.67 mg/dL (01-10-25 @ 04:10)  Creatinine: 0.75 mg/dL (01-09-25 @ 07:00)  Creatinine: 0.97 mg/dL (01-08-25 @ 04:41)  Creatinine: 0.91 mg/dL (01-07-25 @ 16:30)  Creatinine: 1.18 mg/dL (01-06-25 @ 14:30)       SARS-CoV-2 Result: NotDetec (01-06-25 @ 14:30)  SARS-CoV-2 Result: NotDetec (12-16-24 @ 15:55)    ______________________________________________________  CARDIOLOGY    ______________________________________________________  RADIOLOGY  < from: US Abdomen Upper Quadrant Right (01.07.25 @ 05:52) >  FINDINGS:  Liver: Limited evaluation. No hepatomegaly or intrahepatic biliary   dilatation.  Bile ducts: Normal caliber. Common bile duct measures 5 mm.  Gallbladder: Distended. Contains sludge and small gallstones. Adjacent   pericholecystic fluid. Wall thickening measuring up to 5 mm. Cannot   assess for Elizondo's sign as the patient was administered pain medication   prior to this study.  Pancreas: Not visualized.  Right kidney: Not visualized, obscured by overlying gas filled loops of   bowel.  Ascites: Small pericholecystic fluid.  IVC: Visualized portions are within normal limits.    IMPRESSION:  Findings suspicious for acute cholecystitis.    < end of copied text >    < from: CT Lower Extremity w/ IV Cont, Left (01.06.25 @ 17:58) >  Impression:  4.1 x 2.6 x 4.7 cm hypodensity ovoid region posterior to the left   inguinal clips and surrounding a portion of the left superficial femoral   artery as well as contacting the left commonfemoral vein and the   saphenous vein which is favored to represent hematoma over   pseudoaneurysm. Superinfection should be evaluated for on a clinical   basis.    Status post resection at the left mid femoral shaft. Soft tissue density   and foci of air adjacent to the surgical clips at the margin of the left   stump which could be related to soft tissue infection or postoperative   change; correlate with the date of intervention and the patient's   physical examination.    < end of copied text >  < from: CT Angio Abdomen and Pelvis w/ IV Cont (01.06.25 @ 17:58) >  FINDINGS:  LOWER CHEST: Trace left pleural effusion, decreased. Resolution of   previous noted right pleural effusion bibasilar focal   atelectasis/consolidation. Cardiomegaly.    LIVER: Within normal limits.  BILE DUCTS: Normal caliber.  GALLBLADDER: Distended/hydropic, measuring 6.2 cm in transverse dimension   with gallbladder wall thickening and trace cholelithiasis. Mild   pericholecystic fat stranding  SPLEEN: Within normal limits.  PANCREAS: Within normal limits.  ADRENALS: Within normal limits.  KIDNEYS/URETERS: No renal collecting system obstruction bilaterally. 2   adjacent 5 mm nonobstructing calculi upper pole right kidney.    BLADDER: Dependent intravesicular air; question secondary to   catheterization.  REPRODUCTIVE ORGANS: Within normal limits    BOWEL: No bowel obstruction. Appendix is normal.. No evidence of bowel   ischemia. No colonic wall thickening. No small bowel obstruction  PERITONEUM/RETROPERITONEUM: Within normal limits.  VESSELS: Atherosclerotic calcifications. The celiac axis and SMA are   widely patent. The ABEL is visualized and patent. Limited evaluation of   the renal arteries, however, there may be proximal moderate stenosis   bilaterally  LYMPH NODES: No lymphadenopathy.  ABDOMINAL WALL: Small umbilical hernia containing fat  BONES: Degenerative changes.    IMPRESSION:  1.  Hydropic/distended gallbladder with gallbladder wall thickening,   trace cholelithiasisand mild pericholecystic fat stranding, compatible   with acute cholecystitis.  2.  No evidence of bowel ischemia.    < end of copied text >   [facility]  INFECTIOUS DISEASES at [place] and [place]  =====================================================         Andrei Cabrera MD                                                        Diplomates American Board of Internal Medicine & Infectious Diseases                * [place] Office - Appt - Tel  [phone] Fax [phone]                * [place] Office - Appt - Tel [phone] Fax [phone]                                  Hospital Consult line:  [phone]  =====================================================      [id number]  DANNI ADRIENNEMARISOL        CC: Patient is a 79y old  Female who presents with a chief complaint of Sepsis  (09 Jan 2025 12:52)      HPI: 80 y/o F w/ PMH of Afib (on eliquis), HTN, DM2, HLD, h/o stroke, limb ischemia (s/p left AKA, s/p b/l femoral thrombectomy), presented to ED from nursing home for evaluation of stump wound. Nursing home also reports she had a UA positive for UTI. At bedside pt c/o RUQ pain x 2 days. Pt says pain is nonradiating, constant, sharp in nature. Associated w/ chills. Denies N/V/D, fever, CP, SOB. Rest of ROS (-). While in the ED pt received vanc, zosyn, 1.95L NS bolus. (07 Jan 2025 04:43)    ID consulted for acute cholecystitis     Chart reviewed  Leukocytosis plateaued   Family had previously declined cholecystostomy tube, but now agreeable - planned for     On my exam -   ROS limited by debility   Admits to feeling better   Only mild abdominal pain  Feels thirsty Appetite fair   No urinary symptoms   Grandson at bedside      services not required as provider is fluent in [place].   ______________________________________________________  PAST MEDICAL & SURGICAL HISTORY:  DM (diabetes mellitus)    HTN (hypertension)    CVA (cerebrovascular accident)    Afib    Social History:  no tobacco, alcohol, drugs    FAMILY HISTORY:  no pertinent family history     ______________________________________________________  Allergies    No Known Allergies    Intolerances      ______________________________________________________  REVIEW OF SYSTEMS:  CONSTITUTIONAL:  No fever or chills  HEENT:  No diplopia or blurred vision.  No earache, sore throat or runny nose.  CARDIOVASCULAR:  No chest pain  RESPIRATORY:  No cough, shortness of breath  GASTROINTESTINAL: as per HPI   GENITOURINARY:  No dysuria, frequency or urgency. No blood in urine  MUSCULOSKELETAL:  no joint aches, no muscle pain  SKIN:  as per HPI   NEUROLOGIC:  No headaches, seizures  PSYCHIATRIC:  No disorder of thought or mood.  ENDOCRINE:  No heat or cold intolerance  HEMATOLOGICAL:  No easy bruising or bleeding.     _____________________________________________________  PHYSICAL EXAM:  GEN: elderly, frail, weak. in NAD   HEENT: normocephalic and atraumatic. Anicteric sclerae. Moist mucous membranes. No mucosal lesions. Dentures.   NECK: Supple. No palpable neck masses or LN  LUNGS: eupneic, CTA B/L, weak cough   HEART: RRR, no m/r/g  ABDOMEN: Soft, mild TTP over RUQ, +RUQ fullness, no guarding or rebound.  +BS.    :no Silver catheter  EXTREMITIES: without  edema.  MSK: Left AKA - stump in clean dressings   LYMPH: no palpable cervical, supraclavicular lymph nodes  NEUROLOGIC: Generalized weakness  PSYCHIATRIC: Appropriate affect and mood.  SKIN: No rash or jaundice  LINES: PIV, no phlebitis     ______________________________________________________      Vitals:  T(F): 97.7 (10 Waqar 2025 11:40), Max: 98.7 (10 Waqar 2025 07:45)  HR: 88 (10 Waqar 2025 11:40)  BP: 110/71 (10 Waqar 2025 11:40)  RR: 18 (10 Waqar 2025 11:40)  SpO2: 99% (10 Waqar 2025 11:40) (96% - 99%)  temp max in last 48H T(F): , Max: 98.7 (01-08-25 @ 19:15)    Current Antibiotics:  piperacillin/tazobactam IVPB.. 3.375 Gram(s) IV Intermittent every 8 hours    Other medications:  ascorbic acid 500 milliGRAM(s) Oral daily  collagenase Ointment 1 Application(s) Topical daily  dextrose 5%. 1000 milliLiter(s) IV Continuous <Continuous>  dextrose 5%. 1000 milliLiter(s) IV Continuous <Continuous>  dextrose 50% Injectable 25 Gram(s) IV Push once  dextrose 50% Injectable 12.5 Gram(s) IV Push once  dextrose 50% Injectable 25 Gram(s) IV Push once  glucagon  Injectable 1 milliGRAM(s) IntraMuscular once  insulin glargine Injectable (LANTUS) 10 Unit(s) SubCutaneous at bedtime  insulin lispro (ADMELOG) corrective regimen sliding scale   SubCutaneous three times a day before meals  insulin lispro Injectable (ADMELOG) 3 Unit(s) SubCutaneous three times a day before meals  metoprolol tartrate 25 milliGRAM(s) Oral two times a day  multivitamin/minerals 1 Tablet(s) Oral daily  polyethylene glycol 3350 17 Gram(s) Oral daily  rosuvastatin 5 milliGRAM(s) Oral at bedtime  senna 2 Tablet(s) Oral at bedtime  sodium chloride 0.9%. 1000 milliLiter(s) IV Continuous <Continuous>                            9.6    15.06 )-----------( 211      ( 10 Waqar 2025 04:10 )             30.7     01-10    134[L]  |  106  |  28.1[H]  ----------------------------<  155[H]  4.1   |  18.0[L]  |  0.67    Ca    8.0[L]      10 Waqar 2025 04:10  Phos  3.0     01-09  Mg     1.9     01-10    TPro  5.4[L]  /  Alb  1.9[L]  /  TBili  2.1[H]  /  DBili  x   /  AST  125[H]  /  ALT  73[H]  /  AlkPhos  502[H]  01-10    RECENT CULTURES:  01-06 @ 14:30 .Blood BLOOD     No growth at 72 Hours      WBC Count: 15.06 K/uL (01-10-25 @ 04:10)  WBC Count: 13.28 K/uL (01-09-25 @ 07:00)  WBC Count: 15.46 K/uL (01-08-25 @ 04:41)  WBC Count: 19.81 K/uL (01-07-25 @ 16:30)  WBC Count: 20.42 K/uL (01-06-25 @ 14:30)    Creatinine: 0.67 mg/dL (01-10-25 @ 04:10)  Creatinine: 0.75 mg/dL (01-09-25 @ 07:00)  Creatinine: 0.97 mg/dL (01-08-25 @ 04:41)  Creatinine: 0.91 mg/dL (01-07-25 @ 16:30)  Creatinine: 1.18 mg/dL (01-06-25 @ 14:30)       SARS-CoV-2 Result: NotDetec (01-06-25 @ 14:30)  SARS-CoV-2 Result: NotDete (12-16-24 @ 15:55)    ______________________________________________________  CARDIOLOGY    ______________________________________________________  RADIOLOGY  < from: US Abdomen Upper Quadrant Right (01.07.25 @ 05:52) >  FINDINGS:  Liver: Limited evaluation. No hepatomegaly or intrahepatic biliary   dilatation.  Bile ducts: Normal caliber. Common bile duct measures 5 mm.  Gallbladder: Distended. Contains sludge and small gallstones. Adjacent   pericholecystic fluid. Wall thickening measuring up to 5 mm. Cannot   assess for Elizondo's sign as the patient was administered pain medication   prior to this study.  Pancreas: Not visualized.  Right kidney: Not visualized, obscured by overlying gas filled loops of   bowel.  Ascites: Small pericholecystic fluid.  IVC: Visualized portions are within normal limits.    IMPRESSION:  Findings suspicious for acute cholecystitis.    < end of copied text >    < from: CT Lower Extremity w/ IV Cont, Left (01.06.25 @ 17:58) >  Impression:  4.1 x 2.6 x 4.7 cm hypodensity ovoid region posterior to the left   inguinal clips and surrounding a portion of the left superficial femoral   artery as well as contacting the left common femoral vein and the   saphenous vein which is favored to represent hematoma over   pseudoaneurysm. Superinfection should be evaluated for on a clinical   basis.    Status post resection at the left mid femoral shaft. Soft tissue density   and foci of air adjacent to the surgical clips at the margin of the left   stump which could be related to soft tissue infection or postoperative   change; correlate with the date of intervention and the patient's   physical examination.    < end of copied text >    < from: CT Angio Abdomen and Pelvis w/ IV Cont (01.06.25 @ 17:58) >  FINDINGS:  LOWER CHEST: Trace left pleural effusion, decreased. Resolution of   previous noted right pleural effusion bibasilar focal   atelectasis/consolidation. Cardiomegaly.    LIVER: Within normal limits.  BILE DUCTS: Normal caliber.  GALLBLADDER: Distended/hydropic, measuring 6.2 cm in transverse dimension   with gallbladder wall thickening and trace cholelithiasis. Mild   pericholecystic fat stranding  SPLEEN: Within normal limits.  PANCREAS: Within normal limits.  ADRENALS: Within normal limits.  KIDNEYS/URETERS: No renal collecting system obstruction bilaterally. 2   adjacent 5 mm nonobstructing calculi upper pole right kidney.    BLADDER: Dependent intravesicular air; question secondary to   catheterization.  REPRODUCTIVE ORGANS: Within normal limits    BOWEL: No bowel obstruction. Appendix is normal.. No evidence of bowel   ischemia. No colonic wall thickening. No small bowel obstruction  PERITONEUM/RETROPERITONEUM: Within normal limits.  VESSELS: Atherosclerotic calcifications. The celiac axis and SMA are   widely patent. The ABEL is visualized and patent. Limited evaluation of   the renal arteries, however, there may be proximal moderate stenosis   bilaterally  LYMPH NODES: No lymphadenopathy.  ABDOMINAL WALL: Small umbilical hernia containing fat  BONES: Degenerative changes.    IMPRESSION:  1.  Hydropic/distended gallbladder with gallbladder wall thickening,   trace cholelithiasis and mild pericholecystic fat stranding, compatible   with acute cholecystitis.  2.  No evidence of bowel ischemia.    < end of copied text >

## 2025-01-11 LAB
ALBUMIN SERPL ELPH-MCNC: 1.9 G/DL — LOW (ref 3.3–5.2)
ALP SERPL-CCNC: 554 U/L — HIGH (ref 40–120)
ALT FLD-CCNC: 63 U/L — HIGH
ANION GAP SERPL CALC-SCNC: 10 MMOL/L — SIGNIFICANT CHANGE UP (ref 5–17)
AST SERPL-CCNC: 92 U/L — HIGH
BILIRUB SERPL-MCNC: 1.6 MG/DL — SIGNIFICANT CHANGE UP (ref 0.4–2)
BUN SERPL-MCNC: 22.5 MG/DL — HIGH (ref 8–20)
CALCIUM SERPL-MCNC: 7.7 MG/DL — LOW (ref 8.4–10.5)
CHLORIDE SERPL-SCNC: 105 MMOL/L — SIGNIFICANT CHANGE UP (ref 96–108)
CO2 SERPL-SCNC: 19 MMOL/L — LOW (ref 22–29)
CREAT SERPL-MCNC: 0.74 MG/DL — SIGNIFICANT CHANGE UP (ref 0.5–1.3)
EGFR: 81 ML/MIN/1.73M2 — SIGNIFICANT CHANGE UP
GLUCOSE BLDC GLUCOMTR-MCNC: 106 MG/DL — HIGH (ref 70–99)
GLUCOSE BLDC GLUCOMTR-MCNC: 116 MG/DL — HIGH (ref 70–99)
GLUCOSE BLDC GLUCOMTR-MCNC: 150 MG/DL — HIGH (ref 70–99)
GLUCOSE SERPL-MCNC: 88 MG/DL — SIGNIFICANT CHANGE UP (ref 70–99)
HCT VFR BLD CALC: 30.7 % — LOW (ref 34.5–45)
HGB BLD-MCNC: 9.8 G/DL — LOW (ref 11.5–15.5)
INR BLD: 1.41 RATIO — HIGH (ref 0.85–1.16)
MCHC RBC-ENTMCNC: 26.1 PG — LOW (ref 27–34)
MCHC RBC-ENTMCNC: 31.9 G/DL — LOW (ref 32–36)
MCV RBC AUTO: 81.6 FL — SIGNIFICANT CHANGE UP (ref 80–100)
PLATELET # BLD AUTO: 205 K/UL — SIGNIFICANT CHANGE UP (ref 150–400)
POTASSIUM SERPL-MCNC: 3.7 MMOL/L — SIGNIFICANT CHANGE UP (ref 3.5–5.3)
POTASSIUM SERPL-SCNC: 3.7 MMOL/L — SIGNIFICANT CHANGE UP (ref 3.5–5.3)
PROT SERPL-MCNC: 5.4 G/DL — LOW (ref 6.6–8.7)
PROTHROM AB SERPL-ACNC: 16.3 SEC — HIGH (ref 9.9–13.4)
RBC # BLD: 3.76 M/UL — LOW (ref 3.8–5.2)
RBC # FLD: 17.3 % — HIGH (ref 10.3–14.5)
SODIUM SERPL-SCNC: 134 MMOL/L — LOW (ref 135–145)
WBC # BLD: 9.97 K/UL — SIGNIFICANT CHANGE UP (ref 3.8–10.5)
WBC # FLD AUTO: 9.97 K/UL — SIGNIFICANT CHANGE UP (ref 3.8–10.5)

## 2025-01-11 PROCEDURE — 99232 SBSQ HOSP IP/OBS MODERATE 35: CPT

## 2025-01-11 RX ORDER — ENOXAPARIN SODIUM 100 MG/ML
60 INJECTION SUBCUTANEOUS EVERY 12 HOURS
Refills: 0 | Status: DISCONTINUED | OUTPATIENT
Start: 2025-01-11 | End: 2025-01-22

## 2025-01-11 RX ADMIN — ENOXAPARIN SODIUM 60 MILLIGRAM(S): 100 INJECTION SUBCUTANEOUS at 17:39

## 2025-01-11 RX ADMIN — POLYETHYLENE GLYCOL 3350 17 GRAM(S): 17 POWDER, FOR SOLUTION ORAL at 11:52

## 2025-01-11 RX ADMIN — Medication 50 MILLILITER(S): at 04:35

## 2025-01-11 RX ADMIN — Medication 3 UNIT(S): at 12:40

## 2025-01-11 RX ADMIN — PIPERACILLIN SODIUM AND TAZOBACTAM SODIUM 25 GRAM(S): 2; 250 INJECTION, POWDER, FOR SOLUTION INTRAVENOUS at 15:26

## 2025-01-11 RX ADMIN — PIPERACILLIN SODIUM AND TAZOBACTAM SODIUM 25 GRAM(S): 2; 250 INJECTION, POWDER, FOR SOLUTION INTRAVENOUS at 23:52

## 2025-01-11 RX ADMIN — PIPERACILLIN SODIUM AND TAZOBACTAM SODIUM 25 GRAM(S): 2; 250 INJECTION, POWDER, FOR SOLUTION INTRAVENOUS at 04:35

## 2025-01-11 RX ADMIN — Medication 500 MILLIGRAM(S): at 11:52

## 2025-01-11 RX ADMIN — Medication 25 MILLIGRAM(S): at 17:39

## 2025-01-11 RX ADMIN — Medication 50 MILLILITER(S): at 15:26

## 2025-01-11 RX ADMIN — ROSUVASTATIN CALCIUM 5 MILLIGRAM(S): 10 TABLET, FILM COATED ORAL at 21:56

## 2025-01-11 RX ADMIN — Medication 1 TABLET(S): at 11:52

## 2025-01-11 RX ADMIN — Medication 3 UNIT(S): at 17:39

## 2025-01-11 RX ADMIN — Medication 2 TABLET(S): at 21:59

## 2025-01-11 NOTE — PROGRESS NOTE ADULT - NUTRITIONAL ASSESSMENT
79y Female with PMH of Afib (on eliquis), HTN, DM2, HLD, h/o stroke, limb ischemia (s/p left AKA on 12/16, s/p b/l femoral thrombectomy), presented to ED from nursing home on 1/6/25 for evaluation of stump wound and RUQ pain but no fever. Initial WBC was 20.42 and imaging c/w acute cholecystitis. Seen by surgery who recommended possible IR cholecystostomy tube. Family refused surgical/IR intervention, and pt placed on abx (Zosyn).    Family became agreeable to cholecystostomy tube yesterday, and procedure was to be performed yesterday, but due to recent anticoagulation as well as pt not being NPO, procedure was postponed to today. However, this morning, WBC has normalized and LFT's have improved (except Alk-Phos).  Pt has clinically improved as well with no pain or nausea, and only mild RUQ tenderness to palpation.    In light of the above findings, pt may have had a CBD stone or cystic duct stone with cholangitis or cholecystitis, but with normalized WBC, normalized Bili, and improving LFT's, pt likely has passed the stone.  Therefore, will hold off on cholecystostomy tube for now.  Will get HIDA scan to assess for cystic duct/CBD obstruction. If positive for acute cholecystitis, will consider cholecystostomy tube. If evidence of CBD obstruction, then ERCP should be considered. However, if HIDA is normal, no further need for intervention.    Discussed with Hospitalist Dr. Whitaker.

## 2025-01-11 NOTE — PROGRESS NOTE ADULT - NUTRITIONAL ASSESSMENT
This patient has been assessed with a concern for Malnutrition and has been determined to have a diagnosis/diagnoses of Moderate protein-calorie malnutrition.    This patient is being managed with:   Diet DASH/TLC-  Sodium & Cholesterol Restricted  Consistent Carbohydrate {Evening Snack} (CSTCHOSN)  Supplement Feeding Modality:  Oral  Glucerna Shake Cans or Servings Per Day:  1       Frequency:  Two Times a day  Entered: Jan 11 2025  9:38AM

## 2025-01-11 NOTE — PROGRESS NOTE ADULT - SUBJECTIVE AND OBJECTIVE BOX
HPI: 79y Female with acute cholecystitis    79y Female with PMH of Afib (on eliquis), HTN, DM2, HLD, h/o stroke, limb ischemia (s/p left AKA on 12/16, s/p b/l femoral thrombectomy), presented to ED from nursing home for evaluation of stump wound and RUQ pain. Found to have acute cholecystitis. Family declined surgical interventions. Now referred for IR cholecystostomy tube. Was unable to perform procedure yesterday due to pt not NPO and also due to pt on anticoagulation, therefore postponed to today.      PMH/PSH  Sepsis  DM (diabetes mellitus)  HTN (hypertension)  CVA (cerebrovascular accident)  Afib  Sepsis  INFECTED LEFT STUB  Acute cholecystitis      Allergies: No Known Allergies      Medications (Abx/Cardiac/Anticoagulation/Blood Products)  acetaminophen     Tablet .. 650 milliGRAM(s) Oral every 6 hours PRN  aluminum hydroxide/magnesium hydroxide/simethicone Suspension 30 milliLiter(s) Oral every 4 hours PRN  ascorbic acid 500 milliGRAM(s) Oral daily  collagenase Ointment 1 Application(s) Topical daily  dextrose 5%. 1000 milliLiter(s) IV Continuous <Continuous>  dextrose 5%. 1000 milliLiter(s) IV Continuous <Continuous>  dextrose 50% Injectable 25 Gram(s) IV Push once  dextrose 50% Injectable 12.5 Gram(s) IV Push once  dextrose 50% Injectable 25 Gram(s) IV Push once  dextrose Oral Gel 15 Gram(s) Oral once PRN  glucagon  Injectable 1 milliGRAM(s) IntraMuscular once  insulin glargine Injectable (LANTUS) 10 Unit(s) SubCutaneous at bedtime  insulin lispro (ADMELOG) corrective regimen sliding scale   SubCutaneous three times a day before meals  insulin lispro Injectable (ADMELOG) 3 Unit(s) SubCutaneous three times a day before meals  melatonin 3 milliGRAM(s) Oral at bedtime PRN  metoprolol tartrate 25 milliGRAM(s) Oral two times a day  morphine  - Injectable 1 milliGRAM(s) IV Push every 6 hours PRN  morphine  - Injectable 1 milliGRAM(s) IV Push every 4 hours PRN  multivitamin/minerals 1 Tablet(s) Oral daily  ondansetron Injectable 4 milliGRAM(s) IV Push every 8 hours PRN  piperacillin/tazobactam IVPB.. 3.375 Gram(s) IV Intermittent every 8 hours  polyethylene glycol 3350 17 Gram(s) Oral daily  rosuvastatin 5 milliGRAM(s) Oral at bedtime  senna 2 Tablet(s) Oral at bedtime  sodium chloride 0.9%. 1000 milliLiter(s) IV Continuous <Continuous>      Data:    T(C): 36.7  HR: 101  BP: 128/81  RR: 16  SpO2: 94%      Lab                     9.8    9.97  )-----------( 205      ( 11 Jan 2025 05:35 )             30.7                    9.6    15.06  )-----------( 211      ( 10 Waqar 2025 04:10 )                 30.7     134[L]  |  105  |  22.5[H]  ----------------------------<  88      (11 Jan 2025 05:35)  3.7   |  19.0[L]  |  0.74                          TBili 1.6 / AST 92[H] / ALT 63[H] / AlkPhos 502[H]     (11 Jan 2025 05:35)    TBili 2.1[H] / [H] / ALT 73[H] / AlkPhos 554[H]     (10 Waqar 2025 04:10)    PT: 16.3 sec;   INR: 1.41 ratio    (11 Jan 2025 05:35)      Imaging:     CT ABD/PEL 1/6/25  IMPRESSION:  1.  Hydropic/distended gallbladder with gallbladder wall thickening,   trace cholelithiasis and mild pericholecystic fat stranding, compatible   with acute cholecystitis.  2.  No evidence of bowel ischemia.    ABDOMINAL US 1/7/25  Distended. Contains sludge and small gallstones. Adjacent   pericholecystic fluid. Wall thickening measuring up to 5 mm. Cannot   assess for Elizondo's sign as the patient was administered pain medication   prior to this study.  IMPRESSION: Findings suspicious for acute cholecystitis     HPI: 79y Female with acute cholecystitis    79y Female with PMH of Afib (on eliquis), HTN, DM2, HLD, h/o stroke, limb ischemia (s/p left AKA on 12/16, s/p b/l femoral thrombectomy), presented to ED from nursing home for evaluation of stump wound and RUQ pain. Found to have acute cholecystitis. Family declined surgical interventions. Now referred for IR cholecystostomy tube. Was unable to perform procedure yesterday due to pt not NPO and also due to pt on anticoagulation, therefore postponed to today.      PMH/PSH  Sepsis  DM (diabetes mellitus)  HTN (hypertension)  CVA (cerebrovascular accident)  Afib  Sepsis  INFECTED LEFT STUB  Acute cholecystitis      Allergies: No Known Allergies      Medications (Abx/Cardiac/Anticoagulation/Blood Products)  acetaminophen     Tablet .. 650 milliGRAM(s) Oral every 6 hours PRN  aluminum hydroxide/magnesium hydroxide/simethicone Suspension 30 milliLiter(s) Oral every 4 hours PRN  ascorbic acid 500 milliGRAM(s) Oral daily  collagenase Ointment 1 Application(s) Topical daily  dextrose 5%. 1000 milliLiter(s) IV Continuous <Continuous>  dextrose 5%. 1000 milliLiter(s) IV Continuous <Continuous>  dextrose 50% Injectable 25 Gram(s) IV Push once  dextrose 50% Injectable 12.5 Gram(s) IV Push once  dextrose 50% Injectable 25 Gram(s) IV Push once  dextrose Oral Gel 15 Gram(s) Oral once PRN  glucagon  Injectable 1 milliGRAM(s) IntraMuscular once  insulin glargine Injectable (LANTUS) 10 Unit(s) SubCutaneous at bedtime  insulin lispro (ADMELOG) corrective regimen sliding scale   SubCutaneous three times a day before meals  insulin lispro Injectable (ADMELOG) 3 Unit(s) SubCutaneous three times a day before meals  melatonin 3 milliGRAM(s) Oral at bedtime PRN  metoprolol tartrate 25 milliGRAM(s) Oral two times a day  morphine  - Injectable 1 milliGRAM(s) IV Push every 6 hours PRN  morphine  - Injectable 1 milliGRAM(s) IV Push every 4 hours PRN  multivitamin/minerals 1 Tablet(s) Oral daily  ondansetron Injectable 4 milliGRAM(s) IV Push every 8 hours PRN  piperacillin/tazobactam IVPB.. 3.375 Gram(s) IV Intermittent every 8 hours  polyethylene glycol 3350 17 Gram(s) Oral daily  rosuvastatin 5 milliGRAM(s) Oral at bedtime  senna 2 Tablet(s) Oral at bedtime  sodium chloride 0.9%. 1000 milliLiter(s) IV Continuous <Continuous>      Data:    T(C): 36.7  HR: 101  BP: 128/81  RR: 16  SpO2: 94%      Lab                     9.8    9.97  )-----------( 205      ( 11 Jan 2025 05:35 )             30.7                    9.6    15.06  )-----------( 211      ( 10 Waqar 2025 04:10 )                 30.7     134[L]  |  105  |  22.5[H]  ----------------------------<  88      (11 Jan 2025 05:35)  3.7   |  19.0[L]  |  0.74                          TBili 1.6 / AST 92[H] / ALT 63[H] / AlkPhos 554[H]     (11 Jan 2025 05:35)    TBili 2.1[H] / [H] / ALT 73[H] / AlkPhos 502[H]     (10 Waqar 2025 04:10)    PT: 16.3 sec;   INR: 1.41 ratio    (11 Jan 2025 05:35)      Imaging:     CT ABD/PEL 1/6/25  IMPRESSION:  1.  Hydropic/distended gallbladder with gallbladder wall thickening,   trace cholelithiasis and mild pericholecystic fat stranding, compatible   with acute cholecystitis.  2.  No evidence of bowel ischemia.    ABDOMINAL US 1/7/25  Distended. Contains sludge and small gallstones. Adjacent   pericholecystic fluid. Wall thickening measuring up to 5 mm. Cannot   assess for Elizondo's sign as the patient was administered pain medication   prior to this study.  IMPRESSION: Findings suspicious for acute cholecystitis

## 2025-01-11 NOTE — PROGRESS NOTE ADULT - ASSESSMENT
78 y/o F w/ PMH of Afib (on eliquis), HTN, DM2, HLD, h/o stroke, limb ischemia (s/p left AKA, s/p b/l femoral thrombectomy), presented to ED from nursing home for evaluation of stump wound. Nursing home also reports she had a UA positive for UTI. At bedside pt c/o RUQ pain x 2 days. Pt says pain is nonradiating, constant, sharp in nature. Associated w/ chills. Denies N/V/D, fever, CP, SOB. Rest of ROS (-). While in the ED pt received vanc, zosyn, 1.95L NS bolus.      #Acute cholecystitis   sepsis present on admission  s/p vanc, zosyn, 1.95L NS bolus in ED  Pt had UTI at NH, will repeat U/A at this time and obtain urine cx  CXR:  No radiographic evidence of active chest disease.  CT A/P: Hydropic/distended gallbladder with gallbladder wall thickening, trace cholelithiasis and mild pericholecystic fat stranding, compatible  with acute cholecystitis. No evidence of bowel ischemia.  Surgery consulted by ED, not an ideal candidate for surgery given multiple comorbidities, family also unsure if they want to pursue surgery after their recent experience with her AKA.  family declines chris drain by IR   Pain control w/ morphine prn  Monitor liver tests  neagtive  blood cx  C/w Zosyn  ID eval for abx choice/duration for dc     #Hx of limb ischemia  s/p left AKA, s/p b/l femoral thrombectomy  Vascular surgery consulted by ED, recommending local wound care to b/l groins and left AKA stump, vascular will do if patient is admitted, no vacular surgical intervention required at this moment.    #HTN  #Afib  Hold Eliquis for possible perc. chris. tube placement.     changed to therapeutic lovenox in case needs intervention  On Metoprolol tartrate 25mg bid at home,  On Irbesartan 150mg daily at home,     #DM2  On Glargine 15u qhs and Lispro 5u TID w/ meals    #HLD  Crestor 5mg qhs    #Anemia  No signs of active bleed.  Monitor H/H  Transfuse to keep Hb>7, if clinically indicated    Dispo: active. await cultures         78 y/o F w/ PMH of Afib (on eliquis), HTN, DM2, HLD, h/o stroke, limb ischemia (s/p left AKA, s/p b/l femoral thrombectomy), presented to ED from nursing home for evaluation of stump wound. Nursing home also reports she had a UA positive for UTI. At bedside pt c/o RUQ pain x 2 days. Pt says pain is nonradiating, constant, sharp in nature. Associated w/ chills. Denies N/V/D, fever, CP, SOB. Rest of ROS (-). While in the ED pt received vanc, zosyn, 1.95L NS bolus.    #Acute cholecystitis  sepsis present on admission  s/p vanc, zosyn, 1.95L NS bolus in ED  Pt had UTI at NH, repeat U/A negative.  CXR:  No radiographic evidence of active chest disease.  CT A/P: Hydropic/distended gallbladder with gallbladder wall thickening, trace cholelithiasis and mild pericholecystic fat stranding, compatible  with acute cholecystitis. No evidence of bowel ischemia.  Surgery consulted by ED, not an ideal candidate for surgery given multiple comorbidities, family also unsure if they want to pursue surgery after their recent experience with her AKA.  Pain control w/ morphine prn  Monitor liver tests  Blood cx negative  C/w Zosyn  ID eval for abx choice/duration for dc   IR consulted for PCT, recs noted - holding off on PCT for now as pt liver tests downtrended, obtain HIDA and will re-consider PCT pending HIDA scan results - discussed w/ Dr. Monroy    #Hx of limb ischemia  s/p left AKA, s/p b/l femoral thrombectomy  Vascular surgery consulted by ED, recommending local wound care to b/l groins and left AKA stump, vascular will do if patient is admitted, no vacular surgical intervention required at this moment.    #HTN  #Afib  Hold Eliquis for possible perc. chris. tube placement, changed to therapeutic lovenox in case needs intervention  On Metoprolol tartrate 25mg bid at home  On Irbesartan 150mg daily at home    #DM2  On Glargine 15u qhs and Lispro 5u TID w/ meals    #HLD  Crestor 5mg qhs    #Anemia  No signs of active bleed.  Monitor H/H  Transfuse to keep Hb>7, if clinically indicated    Dispo: active. pending HIDA scan and possible PCT placement

## 2025-01-11 NOTE — PROGRESS NOTE ADULT - SUBJECTIVE AND OBJECTIVE BOX
[facility] Division of Hospital Medicine       SUBJECTIVE / OVERNIGHT EVENTS:    Pt seen and examined at bedside. Pt has no complaints. States abd pain has significantly improved. Denies CP, SOB, N/V/D, fever, chills. Rest of ROS (-).     MEDICATIONS  (STANDING):  ascorbic acid 500 milliGRAM(s) Oral daily  collagenase Ointment 1 Application(s) Topical daily  dextrose 5%. 1000 milliLiter(s) (50 mL/Hr) IV Continuous <Continuous>  dextrose 5%. 1000 milliLiter(s) (100 mL/Hr) IV Continuous <Continuous>  dextrose 50% Injectable 25 Gram(s) IV Push once  dextrose 50% Injectable 12.5 Gram(s) IV Push once  dextrose 50% Injectable 25 Gram(s) IV Push once  enoxaparin Injectable 60 milliGRAM(s) SubCutaneous every 12 hours  glucagon  Injectable 1 milliGRAM(s) IntraMuscular once  insulin glargine Injectable (LANTUS) 10 Unit(s) SubCutaneous at bedtime  insulin lispro (ADMELOG) corrective regimen sliding scale   SubCutaneous three times a day before meals  insulin lispro Injectable (ADMELOG) 3 Unit(s) SubCutaneous three times a day before meals  metoprolol tartrate 25 milliGRAM(s) Oral two times a day  multivitamin/minerals 1 Tablet(s) Oral daily  piperacillin/tazobactam IVPB.. 3.375 Gram(s) IV Intermittent every 8 hours  polyethylene glycol 3350 17 Gram(s) Oral daily  rosuvastatin 5 milliGRAM(s) Oral at bedtime  senna 2 Tablet(s) Oral at bedtime  sodium chloride 0.9%. 1000 milliLiter(s) (50 mL/Hr) IV Continuous <Continuous>    MEDICATIONS  (PRN):  acetaminophen     Tablet .. 650 milliGRAM(s) Oral every 6 hours PRN Temp greater or equal to 38C (100.4F), Mild Pain (1 - 3)  aluminum hydroxide/magnesium hydroxide/simethicone Suspension 30 milliLiter(s) Oral every 4 hours PRN Dyspepsia  dextrose Oral Gel 15 Gram(s) Oral once PRN Blood Glucose LESS THAN 70 milliGRAM(s)/deciliter  melatonin 3 milliGRAM(s) Oral at bedtime PRN Insomnia  morphine  - Injectable 1 milliGRAM(s) IV Push every 6 hours PRN Moderate Pain (4 - 6)  morphine  - Injectable 1 milliGRAM(s) IV Push every 4 hours PRN Severe Pain (7 - 10)  ondansetron Injectable 4 milliGRAM(s) IV Push every 8 hours PRN Nausea and/or Vomiting        I&O's Summary      PHYSICAL EXAM:  Vital Signs Last 24 Hrs  T(C): 37.1 (11 Jan 2025 15:14), Max: 37.1 (11 Jan 2025 15:14)  T(F): 98.8 (11 Jan 2025 15:14), Max: 98.8 (11 Jan 2025 15:14)  HR: 95 (11 Jan 2025 15:14) (78 - 101)  BP: 122/86 (11 Jan 2025 15:14) (110/70 - 137/83)  BP(mean): --  RR: 19 (11 Jan 2025 15:14) (16 - 19)  SpO2: 98% (11 Jan 2025 15:14) (94% - 98%)    Parameters below as of 11 Jan 2025 15:14  Patient On (Oxygen Delivery Method): nasal cannula          General: Age-appearing, in no acute distress  Head: Normocephalic, atraumatic  ENMT: EOMI, no scleral icterus, neck supple, no JVD  Cardiovascular: +S1, S2; Regular rate and rhythm, no murmurs.  Respiratory: CTAB, no wheezing, no rales, no rhonchi  Gastrointestinal: soft, ND, NT, (+) BS, (-) rebound, (-) Elizondo's sign.   Neuro: AAOx3, CN2-12 intact, no FND  Musculoskeletal: left aka bandage  Skin: Warm, dry, no rash, no jaundice  Psych: Calm, cooperative     LABS:                        9.8    9.97  )-----------( 205      ( 11 Jan 2025 05:35 )             30.7     01-11    134[L]  |  105  |  22.5[H]  ----------------------------<  88  3.7   |  19.0[L]  |  0.74    Ca    7.7[L]      11 Jan 2025 05:35  Mg     1.9     01-10    TPro  5.4[L]  /  Alb  1.9[L]  /  TBili  1.6  /  DBili  x   /  AST  92[H]  /  ALT  63[H]  /  AlkPhos  554[H]  01-11    PT/INR - ( 11 Jan 2025 05:35 )   PT: 16.3 sec;   INR: 1.41 ratio               Urinalysis Basic - ( 11 Jan 2025 05:35 )    Color: x / Appearance: x / SG: x / pH: x  Gluc: 88 mg/dL / Ketone: x  / Bili: x / Urobili: x   Blood: x / Protein: x / Nitrite: x   Leuk Esterase: x / RBC: x / WBC x   Sq Epi: x / Non Sq Epi: x / Bacteria: x        CAPILLARY BLOOD GLUCOSE      POCT Blood Glucose.: 150 mg/dL (11 Jan 2025 12:37)  POCT Blood Glucose.: 106 mg/dL (11 Jan 2025 07:18)  POCT Blood Glucose.: 143 mg/dL (10 Waqar 2025 21:07)  POCT Blood Glucose.: 199 mg/dL (10 Waqar 2025 16:54)        RADIOLOGY & ADDITIONAL TESTS:  Results Reviewed:   Imaging Personally Reviewed:  Electrocardiogram Personally Reviewed:

## 2025-01-12 LAB
CULTURE RESULTS: SIGNIFICANT CHANGE UP
GLUCOSE BLDC GLUCOMTR-MCNC: 144 MG/DL — HIGH (ref 70–99)
GLUCOSE BLDC GLUCOMTR-MCNC: 149 MG/DL — HIGH (ref 70–99)
GLUCOSE BLDC GLUCOMTR-MCNC: 154 MG/DL — HIGH (ref 70–99)
GLUCOSE BLDC GLUCOMTR-MCNC: 85 MG/DL — SIGNIFICANT CHANGE UP (ref 70–99)
GLUCOSE BLDC GLUCOMTR-MCNC: 95 MG/DL — SIGNIFICANT CHANGE UP (ref 70–99)
SPECIMEN SOURCE: SIGNIFICANT CHANGE UP

## 2025-01-12 PROCEDURE — 99232 SBSQ HOSP IP/OBS MODERATE 35: CPT

## 2025-01-12 RX ORDER — TRIAMCINOLONE ACETONIDE 0.025 %
1 CREAM (GRAM) TOPICAL EVERY 12 HOURS
Refills: 0 | Status: DISCONTINUED | OUTPATIENT
Start: 2025-01-12 | End: 2025-01-13

## 2025-01-12 RX ADMIN — PIPERACILLIN SODIUM AND TAZOBACTAM SODIUM 25 GRAM(S): 2; 250 INJECTION, POWDER, FOR SOLUTION INTRAVENOUS at 13:56

## 2025-01-12 RX ADMIN — PIPERACILLIN SODIUM AND TAZOBACTAM SODIUM 25 GRAM(S): 2; 250 INJECTION, POWDER, FOR SOLUTION INTRAVENOUS at 22:15

## 2025-01-12 RX ADMIN — ROSUVASTATIN CALCIUM 5 MILLIGRAM(S): 10 TABLET, FILM COATED ORAL at 22:30

## 2025-01-12 RX ADMIN — ENOXAPARIN SODIUM 60 MILLIGRAM(S): 100 INJECTION SUBCUTANEOUS at 05:30

## 2025-01-12 RX ADMIN — PIPERACILLIN SODIUM AND TAZOBACTAM SODIUM 25 GRAM(S): 2; 250 INJECTION, POWDER, FOR SOLUTION INTRAVENOUS at 05:30

## 2025-01-12 RX ADMIN — Medication 1 TABLET(S): at 12:28

## 2025-01-12 RX ADMIN — ENOXAPARIN SODIUM 60 MILLIGRAM(S): 100 INJECTION SUBCUTANEOUS at 18:55

## 2025-01-12 RX ADMIN — Medication 3 UNIT(S): at 12:26

## 2025-01-12 RX ADMIN — Medication 2 TABLET(S): at 22:31

## 2025-01-12 RX ADMIN — Medication 500 MILLIGRAM(S): at 12:27

## 2025-01-12 RX ADMIN — Medication 50 MILLILITER(S): at 00:17

## 2025-01-12 RX ADMIN — INSULIN GLARGINE-YFGN 10 UNIT(S): 100 INJECTION, SOLUTION SUBCUTANEOUS at 22:13

## 2025-01-12 RX ADMIN — Medication 1 APPLICATION(S): at 13:57

## 2025-01-12 NOTE — PROGRESS NOTE ADULT - SUBJECTIVE AND OBJECTIVE BOX
DANNI LEAHY Patient is a 79y old  Female who presents with a chief complaint of Sepsis     (09 Jan 2025 12:52)     HPI:  80 y/o F w/ PMH of Afib (on eliquis), HTN, DM2, HLD, h/o stroke, limb ischemia (s/p left AKA, s/p b/l femoral thrombectomy), presented to ED from nursing home for evaluation of stump wound. Nursing home also reports she had a UA positive for UTI. At bedside pt c/o RUQ pain x 2 days. Pt says pain is nonradiating, constant, sharp in nature. Associated w/ chills. Denies N/V/D, fever, CP, SOB. Rest of ROS (-). While in the ED pt received vanc, zosyn, 1.95L NS bolus. (07 Jan 2025 04:43)    The patient was seen and evaluated lyingin bed - acute sepsis - on zosyn vanco- HIDA for chris - pending - abdominal pain improved - DW patient and family at bedside   The patient is in no acute distress.      I&O's Summary    12 Jan 2025 07:01  -  12 Jan 2025 20:54  --------------------------------------------------------  IN: 950 mL / OUT: 1 mL / NET: 949 mL      Allergies    No Known Allergies    Intolerances      HEALTH ISSUES - PROBLEM Dx:        PAST MEDICAL & SURGICAL HISTORY:  DM (diabetes mellitus)      HTN (hypertension)      CVA (cerebrovascular accident)      Afib              Vital Signs Last 24 Hrs  T(C): 36.7 (12 Jan 2025 18:50), Max: 36.8 (11 Jan 2025 21:34)  T(F): 98 (12 Jan 2025 18:50), Max: 98.2 (11 Jan 2025 21:34)  HR: 95 (12 Jan 2025 18:50) (89 - 107)  BP: 107/73 (12 Jan 2025 18:50) (106/61 - 120/77)  BP(mean): --  RR: 17 (12 Jan 2025 18:50) (17 - 18)  SpO2: 94% (12 Jan 2025 18:50) (94% - 99%)    Parameters below as of 12 Jan 2025 18:50  Patient On (Oxygen Delivery Method): room air    T(C): 36.7 (01-12-25 @ 18:50), Max: 36.8 (01-11-25 @ 21:34)  HR: 95 (01-12-25 @ 18:50) (89 - 107)  BP: 107/73 (01-12-25 @ 18:50) (106/61 - 120/77)  RR: 17 (01-12-25 @ 18:50) (17 - 18)  SpO2: 94% (01-12-25 @ 18:50) (94% - 99%)  Wt(kg): --    PHYSICAL EXAM:    GENERAL: NAD elderly   HEAD:  Atraumatic, Normocephalic  EYES: EOMI, conjunctiva and sclera clear  ENMT:  Moist mucous membranes,  NERVOUS SYSTEM:  Alert & Oriented X3,  Moves upper and lower extremities; CNS-II-XII  CHEST/LUNG: Clear to auscultation bilaterally; No rales, rhonchi, wheezing,   HEART: Regular rate and rhythm  ABDOMEN: Soft, Nontender, Nondistended; Bowel sounds present  EXTREMITIES:  Peripheral Pulses,     acetaminophen     Tablet .. 650 milliGRAM(s) Oral every 6 hours PRN  aluminum hydroxide/magnesium hydroxide/simethicone Suspension 30 milliLiter(s) Oral every 4 hours PRN  ascorbic acid 500 milliGRAM(s) Oral daily  collagenase Ointment 1 Application(s) Topical daily  dextrose 5%. 1000 milliLiter(s) IV Continuous <Continuous>  dextrose 5%. 1000 milliLiter(s) IV Continuous <Continuous>  dextrose 50% Injectable 25 Gram(s) IV Push once  dextrose 50% Injectable 12.5 Gram(s) IV Push once  dextrose 50% Injectable 25 Gram(s) IV Push once  dextrose Oral Gel 15 Gram(s) Oral once PRN  enoxaparin Injectable 60 milliGRAM(s) SubCutaneous every 12 hours  glucagon  Injectable 1 milliGRAM(s) IntraMuscular once  insulin glargine Injectable (LANTUS) 10 Unit(s) SubCutaneous at bedtime  insulin lispro (ADMELOG) corrective regimen sliding scale   SubCutaneous three times a day before meals  insulin lispro Injectable (ADMELOG) 3 Unit(s) SubCutaneous three times a day before meals  melatonin 3 milliGRAM(s) Oral at bedtime PRN  metoprolol tartrate 25 milliGRAM(s) Oral two times a day  morphine  - Injectable 1 milliGRAM(s) IV Push every 6 hours PRN  morphine  - Injectable 1 milliGRAM(s) IV Push every 4 hours PRN  multivitamin/minerals 1 Tablet(s) Oral daily  ondansetron Injectable 4 milliGRAM(s) IV Push every 8 hours PRN  piperacillin/tazobactam IVPB.. 3.375 Gram(s) IV Intermittent every 8 hours  polyethylene glycol 3350 17 Gram(s) Oral daily  rosuvastatin 5 milliGRAM(s) Oral at bedtime  senna 2 Tablet(s) Oral at bedtime  sodium chloride 0.9%. 1000 milliLiter(s) IV Continuous <Continuous>  triamcinolone 0.1% Cream 1 Application(s) Topical every 12 hours      LABS:                          9.8    9.97  )-----------( 205      ( 11 Jan 2025 05:35 )             30.7     01-11    134[L]  |  105  |  22.5[H]  ----------------------------<  88  3.7   |  19.0[L]  |  0.74    Ca    7.7[L]      11 Jan 2025 05:35    TPro  5.4[L]  /  Alb  1.9[L]  /  TBili  1.6  /  DBili  x   /  AST  92[H]  /  ALT  63[H]  /  AlkPhos  554[H]  01-11    LIVER FUNCTIONS - ( 11 Jan 2025 05:35 )  Alb: 1.9 g/dL / Pro: 5.4 g/dL / ALK PHOS: 554 U/L / ALT: 63 U/L / AST: 92 U/L / GGT: x           PT/INR - ( 11 Jan 2025 05:35 )   PT: 16.3 sec;   INR: 1.41 ratio               Urinalysis Basic - ( 11 Jan 2025 05:35 )    Color: x / Appearance: x / SG: x / pH: x  Gluc: 88 mg/dL / Ketone: x  / Bili: x / Urobili: x   Blood: x / Protein: x / Nitrite: x   Leuk Esterase: x / RBC: x / WBC x   Sq Epi: x / Non Sq Epi: x / Bacteria: x      CAPILLARY BLOOD GLUCOSE      POCT Blood Glucose.: 144 mg/dL (12 Jan 2025 17:17)  POCT Blood Glucose.: 149 mg/dL (12 Jan 2025 12:03)  POCT Blood Glucose.: 95 mg/dL (12 Jan 2025 07:47)  POCT Blood Glucose.: 85 mg/dL (12 Jan 2025 00:08)      RADIOLOGY & ADDITIONAL TESTS:      Consultant notes reviewed  I independently reviwed all images/ labarotory results /cultures/ telemetry/EKG and my findings are indicated above  and discussed care plan with consultants/nursing/ family /patient

## 2025-01-12 NOTE — PROGRESS NOTE ADULT - ASSESSMENT
78 y/o F w/ PMH of Afib (on eliquis), HTN, DM2, HLD, h/o stroke, limb ischemia (s/p left AKA, s/p b/l femoral thrombectomy), presented to ED from nursing home for evaluation of stump wound. Nursing home also reports she had a UA positive for UTI. At bedside pt c/o RUQ pain x 2 days. Pt says pain is nonradiating, constant, sharp in nature. Associated w/ chills. Denies N/V/D, fever, CP, SOB. Rest of ROS (-). While in the ED pt received vanc, zosyn, 1.95L NS bolus.    #Acute cholecystitis-sepsis present on admission-now improved  cont zosyn,   CT A/P: Hydropic/distended gallbladder with gallbladder wall thickening, trace cholelithiasis and mild pericholecystic fat stranding, compatible  with acute cholecystitis. No evidence of bowel ischemia.  Surgery consulted by ED, not an ideal candidate for surgery given multiple comorbidities, family also unsure if they want to pursue surgery after their recent experience with her AKA.  Pain control w/ morphine prn  Blood cx negative  IR consulted for PCT, recs - HIDA and will re-consider PCT     #Hx of limb ischemia  s/p left AKA, s/p b/l femoral thrombectomy  Vascular surgery consulted by ED, recommending local wound care to b/l groins and left AKA stump, vascular will do if patient is admitted, no vacular surgical intervention required at this moment.    #HTN  #Afib  Hold Eliquis for possible perc. chris. tube placement, changed to therapeutic lovenox in case needs intervention  On Metoprolol tartrate 25mg bid at home  On Irbesartan 150mg daily at home    #DM2  On Glargine 15u qhs and Lispro 5u TID w/ meals    #HLD  Crestor 5mg qhs    #Anemia  No signs of active bleed.  Monitor H/H  Transfuse to keep Hb>7, if clinically indicated    Dispo: active. pending HIDA scan to assess for cystic duct/CBD obstruction. If positive for acute cholecystitis, will consider cholecystostomy tube. If evidence of CBD obstruction, then ERCP should be considered. However, if HIDA is normal, no further need for intervention.

## 2025-01-12 NOTE — PROGRESS NOTE ADULT - NUTRITIONAL ASSESSMENT
This patient has been assessed with a concern for Malnutrition and has been determined to have a diagnosis/diagnoses of Moderate protein-calorie malnutrition.    This patient is being managed with:   Diet DASH/TLC-  Sodium & Cholesterol Restricted  Consistent Carbohydrate {Evening Snack} (CSTCHOSN)  Supplement Feeding Modality:  Oral  Glucerna Shake Cans or Servings Per Day:  1       Frequency:  Two Times a day  Entered: Jan 11 2025  9:38AM    This patient has been assessed with a concern for Malnutrition and has been determined to have a diagnosis/diagnoses of Moderate protein-calorie malnutrition.    This patient is being managed with:   Diet DASH/TLC-  Sodium & Cholesterol Restricted  Consistent Carbohydrate {Evening Snack} (CSTCHOSN)  Supplement Feeding Modality:  Oral  Glucerna Shake Cans or Servings Per Day:  1       Frequency:  Two Times a day  Entered: Jan 11 2025  9:38AM

## 2025-01-13 LAB
ALBUMIN SERPL ELPH-MCNC: 1.8 G/DL — LOW (ref 3.3–5.2)
ALP SERPL-CCNC: 572 U/L — HIGH (ref 40–120)
ALT FLD-CCNC: 34 U/L — HIGH
ANION GAP SERPL CALC-SCNC: 12 MMOL/L — SIGNIFICANT CHANGE UP (ref 5–17)
AST SERPL-CCNC: 51 U/L — HIGH
BILIRUB SERPL-MCNC: 1.2 MG/DL — SIGNIFICANT CHANGE UP (ref 0.4–2)
BUN SERPL-MCNC: 16 MG/DL — SIGNIFICANT CHANGE UP (ref 8–20)
CALCIUM SERPL-MCNC: 7.6 MG/DL — LOW (ref 8.4–10.5)
CHLORIDE SERPL-SCNC: 105 MMOL/L — SIGNIFICANT CHANGE UP (ref 96–108)
CO2 SERPL-SCNC: 18 MMOL/L — LOW (ref 22–29)
CREAT SERPL-MCNC: 0.62 MG/DL — SIGNIFICANT CHANGE UP (ref 0.5–1.3)
EGFR: 89 ML/MIN/1.73M2 — SIGNIFICANT CHANGE UP
GLUCOSE BLDC GLUCOMTR-MCNC: 126 MG/DL — HIGH (ref 70–99)
GLUCOSE BLDC GLUCOMTR-MCNC: 135 MG/DL — HIGH (ref 70–99)
GLUCOSE SERPL-MCNC: 140 MG/DL — HIGH (ref 70–99)
GRAM STN FLD: ABNORMAL
HCT VFR BLD CALC: 31.3 % — LOW (ref 34.5–45)
HGB BLD-MCNC: 9.6 G/DL — LOW (ref 11.5–15.5)
MCHC RBC-ENTMCNC: 25.5 PG — LOW (ref 27–34)
MCHC RBC-ENTMCNC: 30.7 G/DL — LOW (ref 32–36)
MCV RBC AUTO: 83 FL — SIGNIFICANT CHANGE UP (ref 80–100)
PLATELET # BLD AUTO: 206 K/UL — SIGNIFICANT CHANGE UP (ref 150–400)
POTASSIUM SERPL-MCNC: 3.8 MMOL/L — SIGNIFICANT CHANGE UP (ref 3.5–5.3)
POTASSIUM SERPL-SCNC: 3.8 MMOL/L — SIGNIFICANT CHANGE UP (ref 3.5–5.3)
PROT SERPL-MCNC: 5.6 G/DL — LOW (ref 6.6–8.7)
RBC # BLD: 3.77 M/UL — LOW (ref 3.8–5.2)
RBC # FLD: 17.3 % — HIGH (ref 10.3–14.5)
SODIUM SERPL-SCNC: 135 MMOL/L — SIGNIFICANT CHANGE UP (ref 135–145)
SPECIMEN SOURCE: SIGNIFICANT CHANGE UP
WBC # BLD: 9.85 K/UL — SIGNIFICANT CHANGE UP (ref 3.8–10.5)
WBC # FLD AUTO: 9.85 K/UL — SIGNIFICANT CHANGE UP (ref 3.8–10.5)

## 2025-01-13 PROCEDURE — 78226 HEPATOBILIARY SYSTEM IMAGING: CPT | Mod: 26

## 2025-01-13 PROCEDURE — 47490 INCISION OF GALLBLADDER: CPT

## 2025-01-13 PROCEDURE — 99233 SBSQ HOSP IP/OBS HIGH 50: CPT

## 2025-01-13 RX ORDER — MORPHINE SULFATE 60 MG/1
2.61 TABLET, FILM COATED, EXTENDED RELEASE ORAL ONCE
Refills: 0 | Status: DISCONTINUED | OUTPATIENT
Start: 2025-01-13 | End: 2025-01-13

## 2025-01-13 RX ORDER — TRIAMCINOLONE ACETONIDE 0.025 %
1 CREAM (GRAM) TOPICAL EVERY 12 HOURS
Refills: 0 | Status: DISCONTINUED | OUTPATIENT
Start: 2025-01-13 | End: 2025-01-24

## 2025-01-13 RX ORDER — MORPHINE SULFATE 60 MG/1
2.5 TABLET, FILM COATED, EXTENDED RELEASE ORAL ONCE
Refills: 0 | Status: DISCONTINUED | OUTPATIENT
Start: 2025-01-13 | End: 2025-01-24

## 2025-01-13 RX ORDER — ANTISEPTIC SURGICAL SCRUB 0.04 MG/ML
1 SOLUTION TOPICAL
Refills: 0 | Status: DISCONTINUED | OUTPATIENT
Start: 2025-01-13 | End: 2025-01-24

## 2025-01-13 RX ADMIN — PIPERACILLIN SODIUM AND TAZOBACTAM SODIUM 25 GRAM(S): 2; 250 INJECTION, POWDER, FOR SOLUTION INTRAVENOUS at 22:09

## 2025-01-13 RX ADMIN — Medication 0: at 17:41

## 2025-01-13 RX ADMIN — Medication 1 TABLET(S): at 16:27

## 2025-01-13 RX ADMIN — MORPHINE SULFATE 1 MILLIGRAM(S): 60 TABLET, FILM COATED, EXTENDED RELEASE ORAL at 22:59

## 2025-01-13 RX ADMIN — PIPERACILLIN SODIUM AND TAZOBACTAM SODIUM 25 GRAM(S): 2; 250 INJECTION, POWDER, FOR SOLUTION INTRAVENOUS at 06:42

## 2025-01-13 RX ADMIN — Medication 25 MILLIGRAM(S): at 16:28

## 2025-01-13 RX ADMIN — ENOXAPARIN SODIUM 60 MILLIGRAM(S): 100 INJECTION SUBCUTANEOUS at 17:36

## 2025-01-13 RX ADMIN — MORPHINE SULFATE 1 MILLIGRAM(S): 60 TABLET, FILM COATED, EXTENDED RELEASE ORAL at 23:59

## 2025-01-13 RX ADMIN — Medication 1 APPLICATION(S): at 16:29

## 2025-01-13 RX ADMIN — ENOXAPARIN SODIUM 60 MILLIGRAM(S): 100 INJECTION SUBCUTANEOUS at 06:43

## 2025-01-13 RX ADMIN — ROSUVASTATIN CALCIUM 5 MILLIGRAM(S): 10 TABLET, FILM COATED ORAL at 22:09

## 2025-01-13 RX ADMIN — POLYETHYLENE GLYCOL 3350 17 GRAM(S): 17 POWDER, FOR SOLUTION ORAL at 16:28

## 2025-01-13 RX ADMIN — Medication 2 TABLET(S): at 22:09

## 2025-01-13 RX ADMIN — ANTISEPTIC SURGICAL SCRUB 1 APPLICATION(S): 0.04 SOLUTION TOPICAL at 16:33

## 2025-01-13 RX ADMIN — Medication 500 MILLIGRAM(S): at 16:28

## 2025-01-13 RX ADMIN — INSULIN GLARGINE-YFGN 10 UNIT(S): 100 INJECTION, SOLUTION SUBCUTANEOUS at 22:09

## 2025-01-13 RX ADMIN — PIPERACILLIN SODIUM AND TAZOBACTAM SODIUM 25 GRAM(S): 2; 250 INJECTION, POWDER, FOR SOLUTION INTRAVENOUS at 15:29

## 2025-01-13 RX ADMIN — MORPHINE SULFATE 1 MILLIGRAM(S): 60 TABLET, FILM COATED, EXTENDED RELEASE ORAL at 16:33

## 2025-01-13 RX ADMIN — Medication 3 UNIT(S): at 17:41

## 2025-01-13 NOTE — PROGRESS NOTE ADULT - ASSESSMENT
80 y/o F w/ PMH of Afib (on eliquis), HTN, DM2, HLD, h/o stroke, limb ischemia (s/p left AKA, s/p b/l femoral thrombectomy), presented to ED from nursing home for evaluation of stump wound. Nursing home also reports she had a UA positive for UTI. At bedside pt c/o RUQ pain x 2 days. Pt says pain is nonradiating, constant, sharp in nature. Associated w/ chills. Denies N/V/D, fever, CP, SOB. Rest of ROS (-). While in the ED pt received vanc, zosyn, 1.95L NS bolus.    #Acute cholecystitis-sepsis present on admission  HIDA scan + cystic duct/CBD obstruction.  positive for acute cholecystitis,s/pcholecystostomy tube.   cont zosyn,   CT A/P: Hydropic/distended gallbladder with gallbladder wall thickening, trace cholelithiasis and mild pericholecystic fat stranding, compatible  with acute cholecystitis. No evidence of bowel ischemia.  Surgery consulted by ED, not an ideal candidate for surgery given multiple comorbidities, family also unsure if they want to pursue surgery after their recent experience with her AKA.  Pain control w/ morphine prn  Blood cx negative  IR consulted for PCT, recs - HIDA and will re-consider PCT     #Hx of limb ischemia  s/p left AKA, s/p b/l femoral thrombectomy  Vascular surgery consulted by ED, recommending local wound care to b/l groins and left AKA stump, vascular will do if patient is admitted, no vacular surgical intervention required at this moment.    #HTN  #Afib  Hold Eliquis for possible perc. chris. tube placement, changed to therapeutic lovenox in case needs intervention  On Metoprolol tartrate 25mg bid at home  On Irbesartan 150mg daily at home    #DM2  On Glargine 15u qhs and Lispro 5u TID w/ meals    #HLD  Crestor 5mg qhs    #Anemia  No signs of active bleed.  Monitor H/H  Transfuse to keep Hb>7, if clinically indicated    Dispo: active.

## 2025-01-13 NOTE — PROCEDURE NOTE - PROCEDURE FINDINGS AND DETAILS
10F drain placed in gallbladder via transhepatic approach. Confirmed w fluoroscopy. dark black fluid draining. Continue gravity drainage.    Please call Interventional Radiology with any questions, concerns, or issues.

## 2025-01-13 NOTE — PROGRESS NOTE ADULT - SUBJECTIVE AND OBJECTIVE BOX
DANNI LEAHY Patient is a 79y old  Female who presents with a chief complaint of Sepsis     (09 Jan 2025 12:52)     HPI:  80 y/o F w/ PMH of Afib (on eliquis), HTN, DM2, HLD, h/o stroke, limb ischemia (s/p left AKA, s/p b/l femoral thrombectomy), presented to ED from nursing home for evaluation of stump wound. Nursing home also reports she had a UA positive for UTI. At bedside pt c/o RUQ pain x 2 days. Pt says pain is nonradiating, constant, sharp in nature. Associated w/ chills. Denies N/V/D, fever, CP, SOB. Rest of ROS (-). While in the ED pt received vanc, zosyn, 1.95L NS bolus. (07 Jan 2025 04:43)    The patient was seen and evaluated offers no new complaints - HIDA scan positive DW patient IR and family at bedside - agreeable to cholecystostomy tube  The patient is in no acute distress.  Denied any fever chest pain, palpitations, shortness of breath, abdominal pain, fever, dysuria, cough, edema       I&O's Summary    12 Jan 2025 07:01  -  13 Jan 2025 07:00  --------------------------------------------------------  IN: 1850 mL / OUT: 1 mL / NET: 1849 mL      Allergies    No Known Allergies    Intolerances      HEALTH ISSUES - PROBLEM Dx:        PAST MEDICAL & SURGICAL HISTORY:  DM (diabetes mellitus)      HTN (hypertension)      CVA (cerebrovascular accident)      Afib              Vital Signs Last 24 Hrs  T(C): 36.6 (13 Jan 2025 15:28), Max: 36.8 (13 Jan 2025 14:39)  T(F): 97.8 (13 Jan 2025 15:28), Max: 98.2 (13 Jan 2025 14:39)  HR: 96 (13 Jan 2025 15:28) (92 - 96)  BP: 104/66 (13 Jan 2025 15:28) (104/66 - 106/65)  BP(mean): 80 (13 Jan 2025 07:01) (80 - 80)  RR: 17 (13 Jan 2025 15:28) (17 - 18)  SpO2: 97% (13 Jan 2025 15:28) (97% - 98%)    Parameters below as of 13 Jan 2025 15:28  Patient On (Oxygen Delivery Method): room air    T(C): 36.6 (01-13-25 @ 15:28), Max: 36.8 (01-13-25 @ 14:39)  HR: 96 (01-13-25 @ 15:28) (92 - 96)  BP: 104/66 (01-13-25 @ 15:28) (104/66 - 106/65)  RR: 17 (01-13-25 @ 15:28) (17 - 18)  SpO2: 97% (01-13-25 @ 15:28) (97% - 98%)  Wt(kg): --    PHYSICAL EXAM:    GENERAL: NAD  HEAD:  Atraumatic, Normocephalic  EYES: EOMI,, conjunctiva and sclera clear  ENMT:  Moist mucous membranes,   NERVOUS SYSTEM:  Alert & Oriented X3,no focal deficits   Moves upper and lower extremities; CNS-II-XII  CHEST/LUNG: Clear to auscultation bilaterally; No rales, rhonchi, wheezing,   HEART: Regular rate and rhythm;,   ABDOMEN: Soft, some tender, Nondistended; Bowel sounds present  EXTREMITIES:  Peripheral Pulses,  psychiatry- mood and affect appropriate,        acetaminophen     Tablet .. 650 milliGRAM(s) Oral every 6 hours PRN  aluminum hydroxide/magnesium hydroxide/simethicone Suspension 30 milliLiter(s) Oral every 4 hours PRN  ascorbic acid 500 milliGRAM(s) Oral daily  chlorhexidine 2% Cloths 1 Application(s) Topical <User Schedule>  collagenase Ointment 1 Application(s) Topical daily  collagenase Ointment 1 Application(s) Topical daily  dextrose 5%. 1000 milliLiter(s) IV Continuous <Continuous>  dextrose 5%. 1000 milliLiter(s) IV Continuous <Continuous>  dextrose 50% Injectable 25 Gram(s) IV Push once  dextrose 50% Injectable 12.5 Gram(s) IV Push once  dextrose 50% Injectable 25 Gram(s) IV Push once  dextrose Oral Gel 15 Gram(s) Oral once PRN  enoxaparin Injectable 60 milliGRAM(s) SubCutaneous every 12 hours  glucagon  Injectable 1 milliGRAM(s) IntraMuscular once  insulin glargine Injectable (LANTUS) 10 Unit(s) SubCutaneous at bedtime  insulin lispro (ADMELOG) corrective regimen sliding scale   SubCutaneous three times a day before meals  insulin lispro Injectable (ADMELOG) 3 Unit(s) SubCutaneous three times a day before meals  melatonin 3 milliGRAM(s) Oral at bedtime PRN  metoprolol tartrate 25 milliGRAM(s) Oral two times a day  morphine  - Injectable 1 milliGRAM(s) IV Push every 6 hours PRN  morphine  - Injectable 1 milliGRAM(s) IV Push every 4 hours PRN  morphine  - Injectable 2.5 milliGRAM(s) IV Push once  multivitamin/minerals 1 Tablet(s) Oral daily  ondansetron Injectable 4 milliGRAM(s) IV Push every 8 hours PRN  piperacillin/tazobactam IVPB.. 3.375 Gram(s) IV Intermittent every 8 hours  polyethylene glycol 3350 17 Gram(s) Oral daily  rosuvastatin 5 milliGRAM(s) Oral at bedtime  senna 2 Tablet(s) Oral at bedtime  sodium chloride 0.9%. 1000 milliLiter(s) IV Continuous <Continuous>  triamcinolone 0.1% Ointment 1 Application(s) Topical every 12 hours      LABS:                          9.6    9.85  )-----------( 206      ( 13 Jan 2025 15:05 )             31.3     01-13    135  |  105  |  16.0  ----------------------------<  140[H]  3.8   |  18.0[L]  |  0.62    Ca    7.6[L]      13 Jan 2025 15:05    TPro  5.6[L]  /  Alb  1.8[L]  /  TBili  1.2  /  DBili  x   /  AST  51[H]  /  ALT  34[H]  /  AlkPhos  572[H]  01-13    LIVER FUNCTIONS - ( 13 Jan 2025 15:05 )  Alb: 1.8 g/dL / Pro: 5.6 g/dL / ALK PHOS: 572 U/L / ALT: 34 U/L / AST: 51 U/L / GGT: x                 Urinalysis Basic - ( 13 Jan 2025 15:05 )    Color: x / Appearance: x / SG: x / pH: x  Gluc: 140 mg/dL / Ketone: x  / Bili: x / Urobili: x   Blood: x / Protein: x / Nitrite: x   Leuk Esterase: x / RBC: x / WBC x   Sq Epi: x / Non Sq Epi: x / Bacteria: x      CAPILLARY BLOOD GLUCOSE      POCT Blood Glucose.: 135 mg/dL (13 Jan 2025 17:40)  POCT Blood Glucose.: 154 mg/dL (12 Jan 2025 22:11)      RADIOLOGY & ADDITIONAL TESTS:      Consultant notes reviewed  I independently reviwed all images/ labarotory results /cultures/ telemetry/EKG and my findings are indicated above  and discussed care plan with consultants/nursing/ family /patient  DANNI LEAHY Patient is a 79y old  Female who presents with a chief complaint of Sepsis     (09 Jan 2025 12:52)     HPI:  78 y/o F w/ PMH of Afib (on eliquis), HTN, DM2, HLD, h/o stroke, limb ischemia (s/p left AKA, s/p b/l femoral thrombectomy), presented to ED from nursing home for evaluation of stump wound. Nursing home also reports she had a UA positive for UTI. At bedside pt c/o RUQ pain x 2 days. Pt says pain is nonradiating, constant, sharp in nature. Associated w/ chills. Denies N/V/D, fever, CP, SOB. Rest of ROS (-). While in the ED pt received vanc, zosyn, 1.95L NS bolus. (07 Jan 2025 04:43)    The patient was seen and evaluated offers no new complaints - HIDA scan positive DW patient IR and family at bedside - agreeable to cholecystostomy tube  The patient is in no acute distress.  Denied any fever chest pain, palpitations, shortness of breath, abdominal pain, fever, dysuria, cough, edema       I&O's Summary    12 Jan 2025 07:01  -  13 Jan 2025 07:00  --------------------------------------------------------  IN: 1850 mL / OUT: 1 mL / NET: 1849 mL      Allergies    No Known Allergies    Intolerances      HEALTH ISSUES - PROBLEM Dx:        PAST MEDICAL & SURGICAL HISTORY:  DM (diabetes mellitus)      HTN (hypertension)      CVA (cerebrovascular accident)      Afib              Vital Signs Last 24 Hrs  T(C): 36.6 (13 Jan 2025 15:28), Max: 36.8 (13 Jan 2025 14:39)  T(F): 97.8 (13 Jan 2025 15:28), Max: 98.2 (13 Jan 2025 14:39)  HR: 96 (13 Jan 2025 15:28) (92 - 96)  BP: 104/66 (13 Jan 2025 15:28) (104/66 - 106/65)  BP(mean): 80 (13 Jan 2025 07:01) (80 - 80)  RR: 17 (13 Jan 2025 15:28) (17 - 18)  SpO2: 97% (13 Jan 2025 15:28) (97% - 98%)    Parameters below as of 13 Jan 2025 15:28  Patient On (Oxygen Delivery Method): room air    T(C): 36.6 (01-13-25 @ 15:28), Max: 36.8 (01-13-25 @ 14:39)  HR: 96 (01-13-25 @ 15:28) (92 - 96)  BP: 104/66 (01-13-25 @ 15:28) (104/66 - 106/65)  RR: 17 (01-13-25 @ 15:28) (17 - 18)  SpO2: 97% (01-13-25 @ 15:28) (97% - 98%)  Wt(kg): --    PHYSICAL EXAM:    GENERAL: NAD frail elderly  HEAD:  Atraumatic, Normocephalic  EYES: EOMI,, conjunctiva and sclera clear  ENMT:  Moist mucous membranes,   NERVOUS SYSTEM:  Alert & Oriented X3,no focal deficits  barely Moves upper and lower extremities; CNS-II-XII  CHEST/LUNG: Clear to auscultation bilaterally; No rales, rhonchi, wheezing,   HEART: Regular rate and rhythm;,   ABDOMEN: Soft, some tender, Nondistended; Bowel sounds present  EXTREMITIES:  Peripheral Pulses, left AKA  psychiatry- mood and affect appropriate,        acetaminophen     Tablet .. 650 milliGRAM(s) Oral every 6 hours PRN  aluminum hydroxide/magnesium hydroxide/simethicone Suspension 30 milliLiter(s) Oral every 4 hours PRN  ascorbic acid 500 milliGRAM(s) Oral daily  chlorhexidine 2% Cloths 1 Application(s) Topical <User Schedule>  collagenase Ointment 1 Application(s) Topical daily  collagenase Ointment 1 Application(s) Topical daily  dextrose 5%. 1000 milliLiter(s) IV Continuous <Continuous>  dextrose 5%. 1000 milliLiter(s) IV Continuous <Continuous>  dextrose 50% Injectable 25 Gram(s) IV Push once  dextrose 50% Injectable 12.5 Gram(s) IV Push once  dextrose 50% Injectable 25 Gram(s) IV Push once  dextrose Oral Gel 15 Gram(s) Oral once PRN  enoxaparin Injectable 60 milliGRAM(s) SubCutaneous every 12 hours  glucagon  Injectable 1 milliGRAM(s) IntraMuscular once  insulin glargine Injectable (LANTUS) 10 Unit(s) SubCutaneous at bedtime  insulin lispro (ADMELOG) corrective regimen sliding scale   SubCutaneous three times a day before meals  insulin lispro Injectable (ADMELOG) 3 Unit(s) SubCutaneous three times a day before meals  melatonin 3 milliGRAM(s) Oral at bedtime PRN  metoprolol tartrate 25 milliGRAM(s) Oral two times a day  morphine  - Injectable 1 milliGRAM(s) IV Push every 6 hours PRN  morphine  - Injectable 1 milliGRAM(s) IV Push every 4 hours PRN  morphine  - Injectable 2.5 milliGRAM(s) IV Push once  multivitamin/minerals 1 Tablet(s) Oral daily  ondansetron Injectable 4 milliGRAM(s) IV Push every 8 hours PRN  piperacillin/tazobactam IVPB.. 3.375 Gram(s) IV Intermittent every 8 hours  polyethylene glycol 3350 17 Gram(s) Oral daily  rosuvastatin 5 milliGRAM(s) Oral at bedtime  senna 2 Tablet(s) Oral at bedtime  sodium chloride 0.9%. 1000 milliLiter(s) IV Continuous <Continuous>  triamcinolone 0.1% Ointment 1 Application(s) Topical every 12 hours      LABS:                          9.6    9.85  )-----------( 206      ( 13 Jan 2025 15:05 )             31.3     01-13    135  |  105  |  16.0  ----------------------------<  140[H]  3.8   |  18.0[L]  |  0.62    Ca    7.6[L]      13 Jan 2025 15:05    TPro  5.6[L]  /  Alb  1.8[L]  /  TBili  1.2  /  DBili  x   /  AST  51[H]  /  ALT  34[H]  /  AlkPhos  572[H]  01-13    LIVER FUNCTIONS - ( 13 Jan 2025 15:05 )  Alb: 1.8 g/dL / Pro: 5.6 g/dL / ALK PHOS: 572 U/L / ALT: 34 U/L / AST: 51 U/L / GGT: x                 Urinalysis Basic - ( 13 Jan 2025 15:05 )    Color: x / Appearance: x / SG: x / pH: x  Gluc: 140 mg/dL / Ketone: x  / Bili: x / Urobili: x   Blood: x / Protein: x / Nitrite: x   Leuk Esterase: x / RBC: x / WBC x   Sq Epi: x / Non Sq Epi: x / Bacteria: x      CAPILLARY BLOOD GLUCOSE      POCT Blood Glucose.: 135 mg/dL (13 Jan 2025 17:40)  POCT Blood Glucose.: 154 mg/dL (12 Jan 2025 22:11)      RADIOLOGY & ADDITIONAL TESTS:      Consultant notes reviewed  I independently reviwed all images/ labarotory results /cultures/ telemetry/EKG and my findings are indicated above  and discussed care plan with consultants/nursing/ family /patient

## 2025-01-14 LAB
ALBUMIN SERPL ELPH-MCNC: 1.6 G/DL — LOW (ref 3.3–5.2)
ALP SERPL-CCNC: 504 U/L — HIGH (ref 40–120)
ALT FLD-CCNC: 26 U/L — SIGNIFICANT CHANGE UP
ANION GAP SERPL CALC-SCNC: 10 MMOL/L — SIGNIFICANT CHANGE UP (ref 5–17)
AST SERPL-CCNC: 31 U/L — SIGNIFICANT CHANGE UP
BILIRUB SERPL-MCNC: 1 MG/DL — SIGNIFICANT CHANGE UP (ref 0.4–2)
BUN SERPL-MCNC: 13.3 MG/DL — SIGNIFICANT CHANGE UP (ref 8–20)
CALCIUM SERPL-MCNC: 7.5 MG/DL — LOW (ref 8.4–10.5)
CHLORIDE SERPL-SCNC: 106 MMOL/L — SIGNIFICANT CHANGE UP (ref 96–108)
CO2 SERPL-SCNC: 19 MMOL/L — LOW (ref 22–29)
CREAT SERPL-MCNC: 0.64 MG/DL — SIGNIFICANT CHANGE UP (ref 0.5–1.3)
EGFR: 89 ML/MIN/1.73M2 — SIGNIFICANT CHANGE UP
GLUCOSE BLDC GLUCOMTR-MCNC: 101 MG/DL — HIGH (ref 70–99)
GLUCOSE BLDC GLUCOMTR-MCNC: 110 MG/DL — HIGH (ref 70–99)
GLUCOSE BLDC GLUCOMTR-MCNC: 110 MG/DL — HIGH (ref 70–99)
GLUCOSE BLDC GLUCOMTR-MCNC: 125 MG/DL — HIGH (ref 70–99)
GLUCOSE SERPL-MCNC: 115 MG/DL — HIGH (ref 70–99)
HCT VFR BLD CALC: 27.9 % — LOW (ref 34.5–45)
HGB BLD-MCNC: 9.1 G/DL — LOW (ref 11.5–15.5)
MCHC RBC-ENTMCNC: 25.9 PG — LOW (ref 27–34)
MCHC RBC-ENTMCNC: 32.6 G/DL — SIGNIFICANT CHANGE UP (ref 32–36)
MCV RBC AUTO: 79.5 FL — LOW (ref 80–100)
MRSA PCR RESULT.: SIGNIFICANT CHANGE UP
PLATELET # BLD AUTO: 184 K/UL — SIGNIFICANT CHANGE UP (ref 150–400)
POTASSIUM SERPL-MCNC: 3.8 MMOL/L — SIGNIFICANT CHANGE UP (ref 3.5–5.3)
POTASSIUM SERPL-SCNC: 3.8 MMOL/L — SIGNIFICANT CHANGE UP (ref 3.5–5.3)
PROT SERPL-MCNC: 5.2 G/DL — LOW (ref 6.6–8.7)
RBC # BLD: 3.51 M/UL — LOW (ref 3.8–5.2)
RBC # FLD: 17.3 % — HIGH (ref 10.3–14.5)
S AUREUS DNA NOSE QL NAA+PROBE: SIGNIFICANT CHANGE UP
SODIUM SERPL-SCNC: 135 MMOL/L — SIGNIFICANT CHANGE UP (ref 135–145)
WBC # BLD: 5.99 K/UL — SIGNIFICANT CHANGE UP (ref 3.8–10.5)
WBC # FLD AUTO: 5.99 K/UL — SIGNIFICANT CHANGE UP (ref 3.8–10.5)

## 2025-01-14 PROCEDURE — 99232 SBSQ HOSP IP/OBS MODERATE 35: CPT

## 2025-01-14 PROCEDURE — G0545: CPT

## 2025-01-14 PROCEDURE — 74176 CT ABD & PELVIS W/O CONTRAST: CPT | Mod: 26

## 2025-01-14 PROCEDURE — 99233 SBSQ HOSP IP/OBS HIGH 50: CPT

## 2025-01-14 RX ORDER — KETOROLAC TROMETHAMINE 10 MG
15 TABLET ORAL ONCE
Refills: 0 | Status: DISCONTINUED | OUTPATIENT
Start: 2025-01-14 | End: 2025-01-14

## 2025-01-14 RX ORDER — KETOROLAC TROMETHAMINE 10 MG
10 TABLET ORAL ONCE
Refills: 0 | Status: DISCONTINUED | OUTPATIENT
Start: 2025-01-14 | End: 2025-01-14

## 2025-01-14 RX ORDER — HYDROCORTISONE 1 %
1 CREAM (GRAM) TOPICAL
Refills: 0 | Status: DISCONTINUED | OUTPATIENT
Start: 2025-01-14 | End: 2025-01-24

## 2025-01-14 RX ADMIN — ROSUVASTATIN CALCIUM 5 MILLIGRAM(S): 10 TABLET, FILM COATED ORAL at 22:13

## 2025-01-14 RX ADMIN — Medication 1 APPLICATION(S): at 11:31

## 2025-01-14 RX ADMIN — Medication 2 TABLET(S): at 22:12

## 2025-01-14 RX ADMIN — ANTISEPTIC SURGICAL SCRUB 1 APPLICATION(S): 0.04 SOLUTION TOPICAL at 05:55

## 2025-01-14 RX ADMIN — Medication 500 MILLIGRAM(S): at 11:29

## 2025-01-14 RX ADMIN — PIPERACILLIN SODIUM AND TAZOBACTAM SODIUM 25 GRAM(S): 2; 250 INJECTION, POWDER, FOR SOLUTION INTRAVENOUS at 14:16

## 2025-01-14 RX ADMIN — POLYETHYLENE GLYCOL 3350 17 GRAM(S): 17 POWDER, FOR SOLUTION ORAL at 11:30

## 2025-01-14 RX ADMIN — Medication 1 APPLICATION(S): at 05:55

## 2025-01-14 RX ADMIN — Medication 15 MILLIGRAM(S): at 12:30

## 2025-01-14 RX ADMIN — PIPERACILLIN SODIUM AND TAZOBACTAM SODIUM 25 GRAM(S): 2; 250 INJECTION, POWDER, FOR SOLUTION INTRAVENOUS at 05:59

## 2025-01-14 RX ADMIN — ENOXAPARIN SODIUM 60 MILLIGRAM(S): 100 INJECTION SUBCUTANEOUS at 05:55

## 2025-01-14 RX ADMIN — Medication 15 MILLIGRAM(S): at 11:30

## 2025-01-14 RX ADMIN — Medication 1 APPLICATION(S): at 17:30

## 2025-01-14 RX ADMIN — Medication 1 APPLICATION(S): at 11:30

## 2025-01-14 RX ADMIN — Medication 1 TABLET(S): at 11:29

## 2025-01-14 RX ADMIN — Medication 3 UNIT(S): at 08:39

## 2025-01-14 RX ADMIN — Medication 1 APPLICATION(S): at 15:53

## 2025-01-14 RX ADMIN — ENOXAPARIN SODIUM 60 MILLIGRAM(S): 100 INJECTION SUBCUTANEOUS at 17:31

## 2025-01-14 RX ADMIN — PIPERACILLIN SODIUM AND TAZOBACTAM SODIUM 25 GRAM(S): 2; 250 INJECTION, POWDER, FOR SOLUTION INTRAVENOUS at 22:13

## 2025-01-14 NOTE — CONSULT NOTE ADULT - CONSULT REASON
Abdominal pain
Cholecystitis
Cholecystostomy tube placement
acute cholecystitis
Evaluation of left AKA stump.

## 2025-01-14 NOTE — PROGRESS NOTE ADULT - ASSESSMENT
79F with Afib (on eliquis), HTN, DM2, HLD, h/o stroke, limb ischemia (s/p left AKA on 12/16, s/p b/l femoral thrombectomy), presented to ED from nursing home for evaluation of stump wound and RUQ pain. Found to have acute cholecystitis. Family declined surgical interventions.     ID consulted for acute cholecystitis     - 1/6 CT AP with distended GB with wall thickening, mild pericholecystic fat stranding and trace cholelithiasis >> findings c/w acute chris   - 1/6 Abd US with findings c/w acute chris   - CT LLE - left groin with findings suggestive of hematoma vs PSA (less likely). Left stump with foci of air adjacent to surgical clips (post-op vs infection)      - stump evaluated by vascular surgery - no concerns for infection. Local wound care as per surgery.   - 1/13 HIDA - acute chris   - 1/6 BCx - neg  - s/p cholecystostomy tube by IR on 1/13     Follow culture - Gram with  GPC in pairs and chains   - Leukocytosis resolved   - Altered LFTs almost normal - ALP remains elevated   - BP stable. Afebrile     Continue piperacillin-tazobactam  Follow cultures

## 2025-01-14 NOTE — PROGRESS NOTE ADULT - ASSESSMENT
78 y/o F w/ PMH of Afib (on eliquis), HTN, DM2, HLD, h/o stroke, limb ischemia (s/p left AKA, s/p b/l femoral thrombectomy), presented to ED from nursing home for evaluation of stump wound. Nursing home also reports she had a UA positive for UTI. At bedside pt c/o RUQ pain x 2 days. Pt says pain is nonradiating, constant, sharp in nature. Associated w/ chills. Denies N/V/D, fever, CP, SOB. Rest of ROS (-). While in the ED pt received vanc, zosyn, 1.95L NS bolus.    #Acute cholecystitis-sepsis present on admission- cotn zosyn per ID   HIDA scan + cystic duct/CBD obstruction.  positive for acute cholecystitis, s/pcholecystostomy tube.   cont zosyn,   Abdominal pain- repeat CT today  shows- without large volume ascities or free air noted. Formal read without acute pathology aside from acute cholecystitis. Possible post procedural pain vs small amount of bilious peritonitis. However, drain functioning appropriately and no large pericholecystic collection.   - diet as tolerated   - 1/13 HIDA - acute chris   - 1/6 BCx - neg  - s/p cholecystostomy tube by IR on 1/13     Follow culture - Gram with  GPC in pairs and chains   - Leukocytosis resolved   - Altered LFTs almost normal - ALP remains elevated      Pain control w/ morphine prn  Blood cx negative  IR consulted for PCT, recs - HIDA and will re-consider PCT     #Hx of limb ischemia  s/p left AKA, s/p b/l femoral thrombectomy  Vascular surgery consulted by ED, recommending local wound care to b/l groins and left AKA stump, vascular will do if patient is admitted, no vacular surgical intervention required at this moment.    #HTN  #Afib  Hold Eliquis for possible perc. chris. tube placement, changed to therapeutic lovenox in case needs intervention  On Metoprolol tartrate 25mg bid at home  On Irbesartan 150mg daily at home    #DM2  On Glargine 15u qhs and Lispro 5u TID w/ meals    #HLD  Crestor 5mg qhs    #Anemia  No signs of active bleed.  Monitor H/H  Transfuse to keep Hb>7, if clinically indicated    Dispo: active.  on IV zosyn per ID

## 2025-01-14 NOTE — CHART NOTE - NSCHARTNOTEFT_GEN_A_CORE
Notified by RN, patient complaining of itchy back. Rash noted to back. Order placed for hydrocortisone 1% cream BID to rash. MD notified.

## 2025-01-14 NOTE — CONSULT NOTE ADULT - NS ATTEND AMEND GEN_ALL_CORE FT
agree with above
Patient seen and examined.  Agree with the above.  Patient is s/p PCT placement.  CT Reviewed with no pathologies noted.  Please reconsult PRN.

## 2025-01-14 NOTE — PROGRESS NOTE ADULT - NUTRITIONAL ASSESSMENT
This patient has been assessed with a concern for Malnutrition and has been determined to have a diagnosis/diagnoses of Moderate protein-calorie malnutrition.    This patient is being managed with:   Diet NPO-  Except Medications  Entered: Jan 14 2025 12:20PM      -

## 2025-01-14 NOTE — PROGRESS NOTE ADULT - SUBJECTIVE AND OBJECTIVE BOX
[facility]  INFECTIOUS DISEASES at [place] and [place]  ===============================================================                  Andrei Cabrera MD               Diplomates American Board of Internal Medicine & Infectious Diseases                * [place] Office - Appt - Tel  [phone] Fax [phone]                * [place] Office - Appt - Tel [phone] Fax [phone]                                  Hospital Consult line:  [phone]  ==============================================================    DANNI LEAHY [id number]    Follow up:  acute cholecystitis     s/p chrsi tube by IR on 1/13  afebrile   hemodynamically stable   no acute events overnight     breakfast tray untouched      services not required as provider is fluent in [place].     I have personally reviewed the labs and data; pertinent labs and data are listed in this note; please see below.     _______________________________________________________________  REVIEW OF SYSTEMS  poor appetite - report RUQ pain when eating. very weak. Feels thirsty. no nausea or vomiting.    ________________________________________________________________  Allergies:  No Known Allergies        ________________________________________________________________  PHYSICAL EXAM  GEN: frail, elderly, chronically ill appearing but in NAD, lying in bed.   HEENT: Anicteric sclerae. Dry mucous membranes. No mucosal lesions. Dentures. Dysphonic   NECK: Supple.   LUNGS: eupneic. CTA B/L.  HEART: RRR, +m  ABDOMEN: Soft, mild RUQ discomfort. RUQ drain with bile.  +BS.    : purewick   EXTREMITIES: right leg edematous   MSK: left aka   NEUROLOGIC: Generalized weakness   SKIN: No jaundice  LINES: PIV   ________________________________________________________________  Vitals:  T(F): 98.9 (14 Jan 2025 07:44), Max: 98.9 (14 Jan 2025 07:44)  HR: 98 (14 Jan 2025 07:44)  BP: 142/67 (14 Jan 2025 07:44)  RR: 18 (14 Jan 2025 07:44)  SpO2: 91% (14 Jan 2025 07:44) (91% - 98%)  temp max in last 48H T(F): , Max: 98.9 (01-14-25 @ 07:44)    Current Antibiotics:  piperacillin/tazobactam IVPB.. 3.375 Gram(s) IV Intermittent every 8 hours    Other medications:  ascorbic acid 500 milliGRAM(s) Oral daily  chlorhexidine 2% Cloths 1 Application(s) Topical <User Schedule>  collagenase Ointment 1 Application(s) Topical daily  collagenase Ointment 1 Application(s) Topical daily  dextrose 5%. 1000 milliLiter(s) IV Continuous <Continuous>  dextrose 5%. 1000 milliLiter(s) IV Continuous <Continuous>  dextrose 50% Injectable 25 Gram(s) IV Push once  dextrose 50% Injectable 12.5 Gram(s) IV Push once  dextrose 50% Injectable 25 Gram(s) IV Push once  enoxaparin Injectable 60 milliGRAM(s) SubCutaneous every 12 hours  glucagon  Injectable 1 milliGRAM(s) IntraMuscular once  insulin glargine Injectable (LANTUS) 10 Unit(s) SubCutaneous at bedtime  insulin lispro (ADMELOG) corrective regimen sliding scale   SubCutaneous three times a day before meals  insulin lispro Injectable (ADMELOG) 3 Unit(s) SubCutaneous three times a day before meals  metoprolol tartrate 25 milliGRAM(s) Oral two times a day  morphine  - Injectable 2.5 milliGRAM(s) IV Push once  multivitamin/minerals 1 Tablet(s) Oral daily  polyethylene glycol 3350 17 Gram(s) Oral daily  rosuvastatin 5 milliGRAM(s) Oral at bedtime  senna 2 Tablet(s) Oral at bedtime  sodium chloride 0.9%. 1000 milliLiter(s) IV Continuous <Continuous>  triamcinolone 0.1% Ointment 1 Application(s) Topical every 12 hours                            9.1    5.99  )-----------( 184      ( 14 Jan 2025 06:32 )             27.9     01-14    135  |  106  |  13.3  ----------------------------<  115[H]  3.8   |  19.0[L]  |  0.64    Ca    7.5[L]      14 Jan 2025 06:32    TPro  5.2[L]  /  Alb  1.6[L]  /  TBili  1.0  /  DBili  x   /  AST  31  /  ALT  26  /  AlkPhos  504[H]  01-14    RECENT CULTURES:  01-13 @ 14:07 Bile       No polymorphonuclear leukocytes per low power field  Few Gram Positive Cocci in Pairs and Chains per oil power field      01-06 @ 14:30 .Blood BLOOD     No growth at 5 days        WBC Count: 5.99 K/uL (01-14-25 @ 06:32)  WBC Count: 9.85 K/uL (01-13-25 @ 15:05)  WBC Count: 9.97 K/uL (01-11-25 @ 05:35)  WBC Count: 15.06 K/uL (01-10-25 @ 04:10)    Creatinine: 0.64 mg/dL (01-14-25 @ 06:32)  Creatinine: 0.62 mg/dL (01-13-25 @ 15:05)  Creatinine: 0.74 mg/dL (01-11-25 @ 05:35)  Creatinine: 0.67 mg/dL (01-10-25 @ 04:10)       SARS-CoV-2 Result: NotDetec (01-06-25 @ 14:30)  SARS-CoV-2 Result: NotDetec (12-16-24 @ 15:55)      ________________________________________________________________  CARDIOLOGY   ________________________________________________________________  RADIOLOGY  < from: NM Hepatobiliary Imaging (01.13.25 @ 10:41) >  FINDINGS: There is prompt, homogeneous uptake of radiopharmaceutical by   the hepatocytes. Activity is first seen in the bowel at about 10 minutes.   The gallbladder is not visualized at any time during the study, despite   morphine administration. There is good clearance of activity from the   liver at the end of the study.    IMPRESSION: Findings compatible with acute cholecystitis.    < end of copied text >  < from: US Abdomen Upper Quadrant Right (01.07.25 @ 05:52) >  FINDINGS:  Liver: Limited evaluation. No hepatomegaly or intrahepatic biliary   dilatation.  Bile ducts: Normal caliber. Common bile duct measures 5 mm.  Gallbladder: Distended. Contains sludge and small gallstones. Adjacent   pericholecystic fluid. Wall thickening measuring up to 5 mm. Cannot   assess for Elizondo's sign as the patient was administered pain medication   prior to this study.  Pancreas: Not visualized.  Right kidney: Not visualized, obscured by overlying gas filled loops of   bowel.  Ascites: Small pericholecystic fluid.  IVC: Visualized portions are within normal limits.    IMPRESSION:  Findings suspicious for acute cholecystitis.    < end of copied text >  < from: CT Angio Abdomen and Pelvis w/ IV Cont (01.06.25 @ 17:58) >    IMPRESSION:  1.  Hydropic/distended gallbladder with gallbladder wall thickening,   trace cholelithiasisand mild pericholecystic fat stranding, compatible   with acute cholecystitis.  2.  No evidence of bowel ischemia.    < end of copied text >

## 2025-01-14 NOTE — PROGRESS NOTE ADULT - SUBJECTIVE AND OBJECTIVE BOX
DANNI LEAHY Patient is a 79y old  Female who presents with a chief complaint of Sepsis     (09 Jan 2025 12:52)     HPI:  80 y/o F w/ PMH of Afib (on eliquis), HTN, DM2, HLD, h/o stroke, limb ischemia (s/p left AKA, s/p b/l femoral thrombectomy), presented to ED from nursing home for evaluation of stump wound. Nursing home also reports she had a UA positive for UTI. At bedside pt c/o RUQ pain x 2 days. Pt says pain is nonradiating, constant, sharp in nature. Associated w/ chills. Denies N/V/D, fever, CP, SOB. Rest of ROS (-). While in the ED pt received vanc, zosyn, 1.95L NS bolus. (07 Jan 2025 04:43)    The patient was seen and evaluated complains of abdominal pain- worse since last night   The patient is in no acute distress.      I&O's Summary    13 Jan 2025 07:01  -  14 Jan 2025 07:00  --------------------------------------------------------  IN: 0 mL / OUT: 400 mL / NET: -400 mL      Allergies    No Known Allergies    Intolerances      HEALTH ISSUES - PROBLEM Dx:        PAST MEDICAL & SURGICAL HISTORY:  DM (diabetes mellitus)      HTN (hypertension)      CVA (cerebrovascular accident)      Afib              Vital Signs Last 24 Hrs  T(C): 36.6 (14 Jan 2025 16:32), Max: 37.2 (14 Jan 2025 07:44)  T(F): 97.9 (14 Jan 2025 16:32), Max: 98.9 (14 Jan 2025 07:44)  HR: 81 (14 Jan 2025 16:32) (81 - 98)  BP: 107/64 (14 Jan 2025 16:32) (105/60 - 142/67)  BP(mean): --  RR: 18 (14 Jan 2025 16:32) (18 - 18)  SpO2: 96% (14 Jan 2025 16:32) (91% - 96%)    Parameters below as of 14 Jan 2025 16:32  Patient On (Oxygen Delivery Method): room air    T(C): 36.6 (01-14-25 @ 16:32), Max: 37.2 (01-14-25 @ 07:44)  HR: 81 (01-14-25 @ 16:32) (81 - 98)  BP: 107/64 (01-14-25 @ 16:32) (105/60 - 142/67)  RR: 18 (01-14-25 @ 16:32) (18 - 18)  SpO2: 96% (01-14-25 @ 16:32) (91% - 96%)  Wt(kg): --    PHYSICAL EXAM:    GENERAL: NAD frail elderly in pain  HEAD:  Atraumatic, Normocephalic  EYES: EOMI, conjunctiva and sclera clear  ENMT:  Moist mucous membranes,    NERVOUS SYSTEM:  Alert & Oriented X2, in bed can Moves upper and lower extremities; CNS-II-XII  CHEST/LUNG: Clear to auscultation bilaterally; No rales, rhonchi, wheezing,   HEART: Regular rate and rhythm; No murmurs,   ABDOMEN:tender, distended; Bowel sounds present  EXTREMITIES:  Peripheral Pulses, No  cyanosis, or edema    psychiatry- mood and affect appropriate,    acetaminophen     Tablet .. 650 milliGRAM(s) Oral every 6 hours PRN  aluminum hydroxide/magnesium hydroxide/simethicone Suspension 30 milliLiter(s) Oral every 4 hours PRN  ascorbic acid 500 milliGRAM(s) Oral daily  chlorhexidine 2% Cloths 1 Application(s) Topical <User Schedule>  collagenase Ointment 1 Application(s) Topical daily  collagenase Ointment 1 Application(s) Topical daily  dextrose 5%. 1000 milliLiter(s) IV Continuous <Continuous>  dextrose 5%. 1000 milliLiter(s) IV Continuous <Continuous>  dextrose 50% Injectable 25 Gram(s) IV Push once  dextrose 50% Injectable 12.5 Gram(s) IV Push once  dextrose 50% Injectable 25 Gram(s) IV Push once  dextrose Oral Gel 15 Gram(s) Oral once PRN  enoxaparin Injectable 60 milliGRAM(s) SubCutaneous every 12 hours  glucagon  Injectable 1 milliGRAM(s) IntraMuscular once  hydrocortisone 1% Cream 1 Application(s) Topical two times a day  insulin glargine Injectable (LANTUS) 10 Unit(s) SubCutaneous at bedtime  insulin lispro (ADMELOG) corrective regimen sliding scale   SubCutaneous three times a day before meals  insulin lispro Injectable (ADMELOG) 3 Unit(s) SubCutaneous three times a day before meals  melatonin 3 milliGRAM(s) Oral at bedtime PRN  metoprolol tartrate 25 milliGRAM(s) Oral two times a day  morphine  - Injectable 1 milliGRAM(s) IV Push every 6 hours PRN  morphine  - Injectable 1 milliGRAM(s) IV Push every 4 hours PRN  morphine  - Injectable 2.5 milliGRAM(s) IV Push once  multivitamin/minerals 1 Tablet(s) Oral daily  ondansetron Injectable 4 milliGRAM(s) IV Push every 8 hours PRN  piperacillin/tazobactam IVPB.. 3.375 Gram(s) IV Intermittent every 8 hours  polyethylene glycol 3350 17 Gram(s) Oral daily  rosuvastatin 5 milliGRAM(s) Oral at bedtime  senna 2 Tablet(s) Oral at bedtime  sodium chloride 0.9%. 1000 milliLiter(s) IV Continuous <Continuous>  triamcinolone 0.1% Ointment 1 Application(s) Topical every 12 hours      LABS:                          9.1    5.99  )-----------( 184      ( 14 Jan 2025 06:32 )             27.9     01-14    135  |  106  |  13.3  ----------------------------<  115[H]  3.8   |  19.0[L]  |  0.64    Ca    7.5[L]      14 Jan 2025 06:32    TPro  5.2[L]  /  Alb  1.6[L]  /  TBili  1.0  /  DBili  x   /  AST  31  /  ALT  26  /  AlkPhos  504[H]  01-14    LIVER FUNCTIONS - ( 14 Jan 2025 06:32 )  Alb: 1.6 g/dL / Pro: 5.2 g/dL / ALK PHOS: 504 U/L / ALT: 26 U/L / AST: 31 U/L / GGT: x                 Urinalysis Basic - ( 14 Jan 2025 06:32 )    Color: x / Appearance: x / SG: x / pH: x  Gluc: 115 mg/dL / Ketone: x  / Bili: x / Urobili: x   Blood: x / Protein: x / Nitrite: x   Leuk Esterase: x / RBC: x / WBC x   Sq Epi: x / Non Sq Epi: x / Bacteria: x      CAPILLARY BLOOD GLUCOSE      POCT Blood Glucose.: 101 mg/dL (14 Jan 2025 17:08)  POCT Blood Glucose.: 110 mg/dL (14 Jan 2025 11:45)  POCT Blood Glucose.: 125 mg/dL (14 Jan 2025 08:26)  POCT Blood Glucose.: 126 mg/dL (13 Jan 2025 21:28)  POCT Blood Glucose.: 135 mg/dL (13 Jan 2025 17:40)      RADIOLOGY & ADDITIONAL TESTS:      Consultant notes reviewed  I independently reviwed all images/ labarotory results /cultures/ telemetry/EKG and my findings are indicated above  and discussed care plan with consultants/nursing/ family /patient

## 2025-01-14 NOTE — PROVIDER CONTACT NOTE (CRITICAL VALUE NOTIFICATION) - SITUATION
Critical Body fluid culture Gram stain positive fo no polymorphonuclear leukocytes and few gram positive cocci in pairs and chains

## 2025-01-14 NOTE — CONSULT NOTE ADULT - SUBJECTIVE AND OBJECTIVE BOX
HPI: Patient is an 79F with Afib (on eliquis), HTN, DM2, HLD, h/o stroke, limb ischemia (s/p left AKA on 12/16, s/p b/l femoral thrombectomy), presented to ED from nursing home for evaluation of stump wound and RUQ pain. Found to have acute cholecystitis. Family declined surgical interventions and PCT was placed by IR on 1/13. This morning patient complained of severe abdominal pain and ACS was consulted due to concerns of an acute abdomen. Labs from this AM wnl and vitals also stable.     Patient seen and examined at bedside. Confused, however this is stable since she was hospitalized. Reports right upper and lower abdominal pain. Otherwise has no acute complaints.     MEDICATIONS  (STANDING):  ascorbic acid 500 milliGRAM(s) Oral daily  chlorhexidine 2% Cloths 1 Application(s) Topical <User Schedule>  collagenase Ointment 1 Application(s) Topical daily  collagenase Ointment 1 Application(s) Topical daily  dextrose 5%. 1000 milliLiter(s) (50 mL/Hr) IV Continuous <Continuous>  dextrose 5%. 1000 milliLiter(s) (100 mL/Hr) IV Continuous <Continuous>  dextrose 50% Injectable 25 Gram(s) IV Push once  dextrose 50% Injectable 12.5 Gram(s) IV Push once  dextrose 50% Injectable 25 Gram(s) IV Push once  enoxaparin Injectable 60 milliGRAM(s) SubCutaneous every 12 hours  glucagon  Injectable 1 milliGRAM(s) IntraMuscular once  hydrocortisone 1% Cream 1 Application(s) Topical two times a day  insulin glargine Injectable (LANTUS) 10 Unit(s) SubCutaneous at bedtime  insulin lispro (ADMELOG) corrective regimen sliding scale   SubCutaneous three times a day before meals  insulin lispro Injectable (ADMELOG) 3 Unit(s) SubCutaneous three times a day before meals  metoprolol tartrate 25 milliGRAM(s) Oral two times a day  morphine  - Injectable 2.5 milliGRAM(s) IV Push once  multivitamin/minerals 1 Tablet(s) Oral daily  piperacillin/tazobactam IVPB.. 3.375 Gram(s) IV Intermittent every 8 hours  polyethylene glycol 3350 17 Gram(s) Oral daily  rosuvastatin 5 milliGRAM(s) Oral at bedtime  senna 2 Tablet(s) Oral at bedtime  sodium chloride 0.9%. 1000 milliLiter(s) (50 mL/Hr) IV Continuous <Continuous>  triamcinolone 0.1% Ointment 1 Application(s) Topical every 12 hours    MEDICATIONS  (PRN):  acetaminophen     Tablet .. 650 milliGRAM(s) Oral every 6 hours PRN Temp greater or equal to 38C (100.4F), Mild Pain (1 - 3)  aluminum hydroxide/magnesium hydroxide/simethicone Suspension 30 milliLiter(s) Oral every 4 hours PRN Dyspepsia  dextrose Oral Gel 15 Gram(s) Oral once PRN Blood Glucose LESS THAN 70 milliGRAM(s)/deciliter  melatonin 3 milliGRAM(s) Oral at bedtime PRN Insomnia  morphine  - Injectable 1 milliGRAM(s) IV Push every 6 hours PRN Moderate Pain (4 - 6)  morphine  - Injectable 1 milliGRAM(s) IV Push every 4 hours PRN Severe Pain (7 - 10)  ondansetron Injectable 4 milliGRAM(s) IV Push every 8 hours PRN Nausea and/or Vomiting    Vital Signs Last 24 Hrs  T(C): 37.2 (14 Jan 2025 07:44), Max: 37.2 (14 Jan 2025 07:44)  T(F): 98.9 (14 Jan 2025 07:44), Max: 98.9 (14 Jan 2025 07:44)  HR: 98 (14 Jan 2025 07:44) (86 - 98)  BP: 142/67 (14 Jan 2025 07:44) (104/66 - 142/67)  BP(mean): --  RR: 18 (14 Jan 2025 07:44) (17 - 18)  SpO2: 91% (14 Jan 2025 07:44) (91% - 98%)    Parameters below as of 14 Jan 2025 05:58  Patient On (Oxygen Delivery Method): room air                          9.1    5.99  )-----------( 184      ( 14 Jan 2025 06:32 )             27.9     01-14    135  |  106  |  13.3  ----------------------------<  115[H]  3.8   |  19.0[L]  |  0.64    Ca    7.5[L]      14 Jan 2025 06:32    TPro  5.2[L]  /  Alb  1.6[L]  /  TBili  1.0  /  DBili  x   /  AST  31  /  ALT  26  /  AlkPhos  504[H]  01-14    VITALS:   T(C): 37.2 (01-14-25 @ 07:44), Max: 37.2 (01-14-25 @ 07:44)  HR: 98 (01-14-25 @ 07:44) (86 - 98)  BP: 142/67 (01-14-25 @ 07:44) (104/66 - 142/67)  RR: 18 (01-14-25 @ 07:44) (17 - 18)  SpO2: 91% (01-14-25 @ 07:44) (91% - 98%)    GENERAL: NAD, lying in bed comfortably  HEAD:  Atraumatic, normocephalic  EYES: Conjunctiva and sclera clear  ENT: Moist mucous membranes  NECK: Supple, no JVD  HEART: Regular rate and rhythm  LUNGS: Unlabored respirations.   ABDOMEN: Soft, RUQ/RLQ ttp, nd and no rebound or guarding, IR drain with bilious effluent   EXTREMITIES: 2+ peripheral pulses bilaterally. No clubbing, cyanosis, or edema  NERVOUS SYSTEM:  A&Ox3, no focal deficits   SKIN: No rashes or lesions HPI: Patient is an 79F with Afib (on eliquis), HTN, DM2, HLD, h/o stroke, limb ischemia (s/p left AKA on 12/16, s/p b/l femoral thrombectomy), presented to ED from nursing home for evaluation of stump wound and RUQ pain. Found to have acute cholecystitis. Family declined surgical interventions and PCT was placed by IR on 1/13. This morning patient complained of severe abdominal pain and ACS was consulted due to concerns of an acute abdomen. Labs from this AM wnl and vitals also stable.     Patient seen and examined at bedside. Confused, however this is stable since she was hospitalized. Reports right upper and lower abdominal pain. Otherwise has no acute complaints.     MEDICATIONS  (STANDING):  ascorbic acid 500 milliGRAM(s) Oral daily  chlorhexidine 2% Cloths 1 Application(s) Topical <User Schedule>  collagenase Ointment 1 Application(s) Topical daily  collagenase Ointment 1 Application(s) Topical daily  dextrose 5%. 1000 milliLiter(s) (50 mL/Hr) IV Continuous <Continuous>  dextrose 5%. 1000 milliLiter(s) (100 mL/Hr) IV Continuous <Continuous>  dextrose 50% Injectable 25 Gram(s) IV Push once  dextrose 50% Injectable 12.5 Gram(s) IV Push once  dextrose 50% Injectable 25 Gram(s) IV Push once  enoxaparin Injectable 60 milliGRAM(s) SubCutaneous every 12 hours  glucagon  Injectable 1 milliGRAM(s) IntraMuscular once  hydrocortisone 1% Cream 1 Application(s) Topical two times a day  insulin glargine Injectable (LANTUS) 10 Unit(s) SubCutaneous at bedtime  insulin lispro (ADMELOG) corrective regimen sliding scale   SubCutaneous three times a day before meals  insulin lispro Injectable (ADMELOG) 3 Unit(s) SubCutaneous three times a day before meals  metoprolol tartrate 25 milliGRAM(s) Oral two times a day  morphine  - Injectable 2.5 milliGRAM(s) IV Push once  multivitamin/minerals 1 Tablet(s) Oral daily  piperacillin/tazobactam IVPB.. 3.375 Gram(s) IV Intermittent every 8 hours  polyethylene glycol 3350 17 Gram(s) Oral daily  rosuvastatin 5 milliGRAM(s) Oral at bedtime  senna 2 Tablet(s) Oral at bedtime  sodium chloride 0.9%. 1000 milliLiter(s) (50 mL/Hr) IV Continuous <Continuous>  triamcinolone 0.1% Ointment 1 Application(s) Topical every 12 hours    MEDICATIONS  (PRN):  acetaminophen     Tablet .. 650 milliGRAM(s) Oral every 6 hours PRN Temp greater or equal to 38C (100.4F), Mild Pain (1 - 3)  aluminum hydroxide/magnesium hydroxide/simethicone Suspension 30 milliLiter(s) Oral every 4 hours PRN Dyspepsia  dextrose Oral Gel 15 Gram(s) Oral once PRN Blood Glucose LESS THAN 70 milliGRAM(s)/deciliter  melatonin 3 milliGRAM(s) Oral at bedtime PRN Insomnia  morphine  - Injectable 1 milliGRAM(s) IV Push every 6 hours PRN Moderate Pain (4 - 6)  morphine  - Injectable 1 milliGRAM(s) IV Push every 4 hours PRN Severe Pain (7 - 10)  ondansetron Injectable 4 milliGRAM(s) IV Push every 8 hours PRN Nausea and/or Vomiting    Vital Signs Last 24 Hrs  T(C): 37.2 (14 Jan 2025 07:44), Max: 37.2 (14 Jan 2025 07:44)  T(F): 98.9 (14 Jan 2025 07:44), Max: 98.9 (14 Jan 2025 07:44)  HR: 98 (14 Jan 2025 07:44) (86 - 98)  BP: 142/67 (14 Jan 2025 07:44) (104/66 - 142/67)  BP(mean): --  RR: 18 (14 Jan 2025 07:44) (17 - 18)  SpO2: 91% (14 Jan 2025 07:44) (91% - 98%)    Parameters below as of 14 Jan 2025 05:58  Patient On (Oxygen Delivery Method): room air                          9.1    5.99  )-----------( 184      ( 14 Jan 2025 06:32 )             27.9     01-14    135  |  106  |  13.3  ----------------------------<  115[H]  3.8   |  19.0[L]  |  0.64    Ca    7.5[L]      14 Jan 2025 06:32    TPro  5.2[L]  /  Alb  1.6[L]  /  TBili  1.0  /  DBili  x   /  AST  31  /  ALT  26  /  AlkPhos  504[H]  01-14    VITALS:   T(C): 37.2 (01-14-25 @ 07:44), Max: 37.2 (01-14-25 @ 07:44)  HR: 98 (01-14-25 @ 07:44) (86 - 98)  BP: 142/67 (01-14-25 @ 07:44) (104/66 - 142/67)  RR: 18 (01-14-25 @ 07:44) (17 - 18)  SpO2: 91% (01-14-25 @ 07:44) (91% - 98%)    GENERAL: NAD, lying in bed comfortably  HEAD:  Atraumatic, normocephalic  EYES: Conjunctiva and sclera clear  ENT: Moist mucous membranes  NECK: Supple, no JVD  HEART: Regular rate and rhythm  LUNGS: Unlabored respirations.   ABDOMEN: Soft, RUQ/RLQ ttp, nd and no rebound or guarding, IR drain with bilious effluent   EXTREMITIES: L AKA  NERVOUS SYSTEM:  A&Ox3, no focal deficits   SKIN: No rashes or lesions

## 2025-01-15 LAB
-  AMPICILLIN: SIGNIFICANT CHANGE UP
-  VANCOMYCIN: SIGNIFICANT CHANGE UP
ALBUMIN SERPL ELPH-MCNC: 1.7 G/DL — LOW (ref 3.3–5.2)
ALP SERPL-CCNC: 346 U/L — HIGH (ref 40–120)
ALT FLD-CCNC: 21 U/L — SIGNIFICANT CHANGE UP
ANION GAP SERPL CALC-SCNC: 10 MMOL/L — SIGNIFICANT CHANGE UP (ref 5–17)
AST SERPL-CCNC: 29 U/L — SIGNIFICANT CHANGE UP
BILIRUB SERPL-MCNC: 0.9 MG/DL — SIGNIFICANT CHANGE UP (ref 0.4–2)
BUN SERPL-MCNC: 13.3 MG/DL — SIGNIFICANT CHANGE UP (ref 8–20)
CALCIUM SERPL-MCNC: 7.3 MG/DL — LOW (ref 8.4–10.5)
CHLORIDE SERPL-SCNC: 104 MMOL/L — SIGNIFICANT CHANGE UP (ref 96–108)
CO2 SERPL-SCNC: 19 MMOL/L — LOW (ref 22–29)
CREAT SERPL-MCNC: 0.66 MG/DL — SIGNIFICANT CHANGE UP (ref 0.5–1.3)
EGFR: 88 ML/MIN/1.73M2 — SIGNIFICANT CHANGE UP
GLUCOSE BLDC GLUCOMTR-MCNC: 100 MG/DL — HIGH (ref 70–99)
GLUCOSE BLDC GLUCOMTR-MCNC: 87 MG/DL — SIGNIFICANT CHANGE UP (ref 70–99)
GLUCOSE BLDC GLUCOMTR-MCNC: 94 MG/DL — SIGNIFICANT CHANGE UP (ref 70–99)
GLUCOSE BLDC GLUCOMTR-MCNC: 99 MG/DL — SIGNIFICANT CHANGE UP (ref 70–99)
GLUCOSE SERPL-MCNC: 98 MG/DL — SIGNIFICANT CHANGE UP (ref 70–99)
HCT VFR BLD CALC: 29.1 % — LOW (ref 34.5–45)
HGB BLD-MCNC: 9.1 G/DL — LOW (ref 11.5–15.5)
MCHC RBC-ENTMCNC: 25.7 PG — LOW (ref 27–34)
MCHC RBC-ENTMCNC: 31.3 G/DL — LOW (ref 32–36)
MCV RBC AUTO: 82.2 FL — SIGNIFICANT CHANGE UP (ref 80–100)
METHOD TYPE: SIGNIFICANT CHANGE UP
PLATELET # BLD AUTO: 165 K/UL — SIGNIFICANT CHANGE UP (ref 150–400)
POTASSIUM SERPL-MCNC: 3.7 MMOL/L — SIGNIFICANT CHANGE UP (ref 3.5–5.3)
POTASSIUM SERPL-SCNC: 3.7 MMOL/L — SIGNIFICANT CHANGE UP (ref 3.5–5.3)
PROT SERPL-MCNC: 5 G/DL — LOW (ref 6.6–8.7)
RBC # BLD: 3.54 M/UL — LOW (ref 3.8–5.2)
RBC # FLD: 17.3 % — HIGH (ref 10.3–14.5)
SODIUM SERPL-SCNC: 133 MMOL/L — LOW (ref 135–145)
WBC # BLD: 4.36 K/UL — SIGNIFICANT CHANGE UP (ref 3.8–10.5)
WBC # FLD AUTO: 4.36 K/UL — SIGNIFICANT CHANGE UP (ref 3.8–10.5)

## 2025-01-15 PROCEDURE — 99232 SBSQ HOSP IP/OBS MODERATE 35: CPT

## 2025-01-15 PROCEDURE — 99233 SBSQ HOSP IP/OBS HIGH 50: CPT

## 2025-01-15 PROCEDURE — G0545: CPT

## 2025-01-15 RX ORDER — PIPERACILLIN SODIUM AND TAZOBACTAM SODIUM 2; 250 G/50ML; MG/50ML
3.38 INJECTION, POWDER, FOR SOLUTION INTRAVENOUS EVERY 8 HOURS
Refills: 0 | Status: DISCONTINUED | OUTPATIENT
Start: 2025-01-15 | End: 2025-01-19

## 2025-01-15 RX ADMIN — POLYETHYLENE GLYCOL 3350 17 GRAM(S): 17 POWDER, FOR SOLUTION ORAL at 11:32

## 2025-01-15 RX ADMIN — Medication 1 APPLICATION(S): at 12:00

## 2025-01-15 RX ADMIN — ENOXAPARIN SODIUM 60 MILLIGRAM(S): 100 INJECTION SUBCUTANEOUS at 17:44

## 2025-01-15 RX ADMIN — Medication 1 APPLICATION(S): at 12:46

## 2025-01-15 RX ADMIN — ENOXAPARIN SODIUM 60 MILLIGRAM(S): 100 INJECTION SUBCUTANEOUS at 05:55

## 2025-01-15 RX ADMIN — Medication 500 MILLIGRAM(S): at 11:32

## 2025-01-15 RX ADMIN — ROSUVASTATIN CALCIUM 5 MILLIGRAM(S): 10 TABLET, FILM COATED ORAL at 23:01

## 2025-01-15 RX ADMIN — Medication 50 MILLILITER(S): at 00:25

## 2025-01-15 RX ADMIN — ACETAMINOPHEN 650 MILLIGRAM(S): 160 SUSPENSION ORAL at 20:16

## 2025-01-15 RX ADMIN — Medication 3 UNIT(S): at 12:30

## 2025-01-15 RX ADMIN — ANTISEPTIC SURGICAL SCRUB 1 APPLICATION(S): 0.04 SOLUTION TOPICAL at 06:35

## 2025-01-15 RX ADMIN — PIPERACILLIN SODIUM AND TAZOBACTAM SODIUM 25 GRAM(S): 2; 250 INJECTION, POWDER, FOR SOLUTION INTRAVENOUS at 05:54

## 2025-01-15 RX ADMIN — Medication 1 TABLET(S): at 12:26

## 2025-01-15 RX ADMIN — Medication 25 MILLIGRAM(S): at 17:46

## 2025-01-15 RX ADMIN — Medication 1 APPLICATION(S): at 05:57

## 2025-01-15 RX ADMIN — Medication 1 APPLICATION(S): at 17:49

## 2025-01-15 RX ADMIN — ACETAMINOPHEN, DIPHENHYDRAMINE HCL, PHENYLEPHRINE HCL 3 MILLIGRAM(S): 325; 25; 5 TABLET ORAL at 23:01

## 2025-01-15 RX ADMIN — Medication 25 MILLIGRAM(S): at 05:55

## 2025-01-15 RX ADMIN — PIPERACILLIN SODIUM AND TAZOBACTAM SODIUM 25 GRAM(S): 2; 250 INJECTION, POWDER, FOR SOLUTION INTRAVENOUS at 23:00

## 2025-01-15 RX ADMIN — Medication 1 APPLICATION(S): at 05:56

## 2025-01-15 RX ADMIN — Medication 1 APPLICATION(S): at 17:47

## 2025-01-15 NOTE — PROGRESS NOTE ADULT - ASSESSMENT
79F with Afib (on eliquis), HTN, DM2, HLD, h/o stroke, limb ischemia (s/p left AKA on 12/16, s/p b/l femoral thrombectomy), presented to ED from nursing home for evaluation of stump wound and RUQ pain. Found to have acute cholecystitis. Family declined surgical interventions.     ID consulted for acute cholecystitis     - 1/6 CT AP with distended GB with wall thickening, mild pericholecystic fat stranding and trace cholelithiasis >> findings c/w acute chris   - 1/6 Abd US with findings c/w acute chris   - CT LLE - left groin with findings suggestive of hematoma vs PSA (less likely). Left stump with foci of air adjacent to surgical clips (post-op vs infection)      - stump evaluated by vascular surgery - no concerns for infection. Local wound care as per surgery.   - 1/13 HIDA - acute chris   - 1/6 BCx - neg  - s/p cholecystostomy tube by IR on 1/13     Follow culture - E. faecium   - Leukocytosis resolved   - Altered LFTs almost normal - ALP remains minimally elevated   - BP stable. Afebrile     Resume piperacillin-tazobactam  Add linezolid 600 mg PO BID pending cultures   Would continue for antibiotics for ~5 days post-tube    79F with Afib (on eliquis), HTN, DM2, HLD, h/o stroke, limb ischemia (s/p left AKA on 12/16, s/p b/l femoral thrombectomy), presented to ED from nursing home for evaluation of stump wound and RUQ pain. Found to have acute cholecystitis. Family declined surgical interventions.     ID consulted for acute cholecystitis     - 1/6 CT AP with distended GB with wall thickening, mild pericholecystic fat stranding and trace cholelithiasis >> findings c/w acute chris   - 1/6 Abd US with findings c/w acute chris   - CT LLE - left groin with findings suggestive of hematoma vs PSA (less likely). Left stump with foci of air adjacent to surgical clips (post-op vs infection)      - stump evaluated by vascular surgery - no concerns for infection. Local wound care as per surgery.   - 1/13 HIDA - acute chris   - 1/6 BCx - neg  - s/p cholecystostomy tube by IR on 1/13     Follow culture - E. faecium   - Leukocytosis resolved   - Altered LFTs almost normal - ALP remains minimally elevated   - BP stable. Afebrile     continue piperacillin-tazobactam  Add linezolid 600 mg PO BID pending cultures   Would continue for antibiotics for ~5 days post-tube

## 2025-01-15 NOTE — PROGRESS NOTE ADULT - SUBJECTIVE AND OBJECTIVE BOX
[facility]  INFECTIOUS DISEASES at [place] and [place]  ===============================================================                  Andrei Cabrera MD               Diplomates American Board of Internal Medicine & Infectious Diseases                * [place] Office - Appt - Tel  [phone] Fax [phone]                * [place] Office - Appt - Tel [phone] Fax [phone]                                  Hospital Consult line:  [phone]  ==============================================================    DANNI LEAHY [id number]    Follow up: acute chris     No fever  hemodynamically stable      services not required as provider is fluent in [place].     I have personally reviewed the labs and data; pertinent labs and data are listed in this note; please see below.     _______________________________________________________________  REVIEW OF SYSTEMS  c/o severe abd pain. Poor appetite.   ________________________________________________________________  Allergies:  No Known Allergies    ________________________________________________________________  PHYSICAL EXAM  GEN: elderly, frail, ill appearing, in pain NAD  HEENT: Anicteric sclerae. Moist mucous membranes.   LUNGS: eupneic. CTA B/L.  HEART: RRR, no m/r/g  ABDOMEN: Soft, mild TTP over RUQ. No guarding or rebound.   : No Silver catheter  EXTREMITIES:, without  edema.  MSK: left AKA  NEUROLOGIC: Generalized weakness   SKIN: No jaundice  LINES: PIV   ________________________________________________________________  Vitals:  T(F): 98.8 (15 Waqar 2025 08:35), Max: 98.8 (15 Waqar 2025 08:35)  HR: 90 (15 Waqar 2025 08:35)  BP: 133/85 (15 Waqar 2025 08:35)  RR: 18 (15 Waqar 2025 08:35)  SpO2: 94% (15 Waqar 2025 08:35) (94% - 98%)  temp max in last 48H T(F): , Max: 98.9 (01-14-25 @ 07:44)    Current Antibiotics:    Other medications:  ascorbic acid 500 milliGRAM(s) Oral daily  chlorhexidine 2% Cloths 1 Application(s) Topical <User Schedule>  collagenase Ointment 1 Application(s) Topical daily  collagenase Ointment 1 Application(s) Topical daily  dextrose 5%. 1000 milliLiter(s) IV Continuous <Continuous>  dextrose 5%. 1000 milliLiter(s) IV Continuous <Continuous>  dextrose 50% Injectable 25 Gram(s) IV Push once  dextrose 50% Injectable 12.5 Gram(s) IV Push once  dextrose 50% Injectable 25 Gram(s) IV Push once  enoxaparin Injectable 60 milliGRAM(s) SubCutaneous every 12 hours  glucagon  Injectable 1 milliGRAM(s) IntraMuscular once  hydrocortisone 1% Cream 1 Application(s) Topical two times a day  insulin glargine Injectable (LANTUS) 10 Unit(s) SubCutaneous at bedtime  insulin lispro (ADMELOG) corrective regimen sliding scale   SubCutaneous three times a day before meals  insulin lispro Injectable (ADMELOG) 3 Unit(s) SubCutaneous three times a day before meals  metoprolol tartrate 25 milliGRAM(s) Oral two times a day  morphine  - Injectable 2.5 milliGRAM(s) IV Push once  multivitamin/minerals 1 Tablet(s) Oral daily  polyethylene glycol 3350 17 Gram(s) Oral daily  rosuvastatin 5 milliGRAM(s) Oral at bedtime  senna 2 Tablet(s) Oral at bedtime  sodium chloride 0.9%. 1000 milliLiter(s) IV Continuous <Continuous>  triamcinolone 0.1% Ointment 1 Application(s) Topical every 12 hours                            9.1    4.36  )-----------( 165      ( 15 Waqar 2025 04:44 )             29.1     01-15    133[L]  |  104  |  13.3  ----------------------------<  98  3.7   |  19.0[L]  |  0.66    Ca    7.3[L]      15 Waqar 2025 04:44    TPro  5.0[L]  /  Alb  1.7[L]  /  TBili  0.9  /  DBili  x   /  AST  29  /  ALT  21  /  AlkPhos  346[H]  01-15    RECENT CULTURES:  01-13 @ 14:07 Bile     Few Enterococcus faecium    No polymorphonuclear leukocytes per low power field  Few Gram Positive Cocci in Pairs and Chains per oil power field      01-06 @ 14:30 .Blood BLOOD     No growth at 5 days    WBC Count: 4.36 K/uL (01-15-25 @ 04:44)  WBC Count: 5.99 K/uL (01-14-25 @ 06:32)  WBC Count: 9.85 K/uL (01-13-25 @ 15:05)  WBC Count: 9.97 K/uL (01-11-25 @ 05:35)    Creatinine: 0.66 mg/dL (01-15-25 @ 04:44)  Creatinine: 0.64 mg/dL (01-14-25 @ 06:32)  Creatinine: 0.62 mg/dL (01-13-25 @ 15:05)  Creatinine: 0.74 mg/dL (01-11-25 @ 05:35)     SARS-CoV-2 Result: NotDetec (01-06-25 @ 14:30)  SARS-CoV-2 Result: NotDetec (12-16-24 @ 15:55)    ________________________________________________________________  CARDIOLOGY   ________________________________________________________________  RADIOLOGY  < from: CT Abdomen and Pelvis No Cont (01.14.25 @ 12:04) >    IMPRESSION:    Bilateral pleural effusions with partial lower lobe atelectasis.    Percutaneous gallbladder drainage catheter within a moderately distended   gallbladder.  Cholelithiasis and pericholecystic stranding is suggestive of acute   cholecystitis.    Small amount of free intraperitoneal fluid with thickening of the   peritoneal reflections.    Bladder wall thickening with dependent debris/wall thickening, correlate   forcystitis.    Other findings as discussed above.    < end of copied text >

## 2025-01-15 NOTE — PHYSICAL THERAPY INITIAL EVALUATION ADULT - PASSIVE RANGE OF MOTION EXAMINATION, REHAB EVAL
bilateral lower extremity Passive ROM was WNL/Right LE Passive ROM was WFL (within functional limits)

## 2025-01-15 NOTE — PROGRESS NOTE ADULT - NUTRITIONAL ASSESSMENT
This patient has been assessed with a concern for Malnutrition and has been determined to have a diagnosis/diagnoses of Moderate protein-calorie malnutrition.    This patient is being managed with:   Diet DASH/TLC-  Sodium & Cholesterol Restricted  Consistent Carbohydrate {Evening Snack} (CSTCHOSN)  Supplement Feeding Modality:  Oral  Glucerna Shake Cans or Servings Per Day:  1       Frequency:  Two Times a day  Entered: Waqar 15 2025  8:57AM

## 2025-01-15 NOTE — PROGRESS NOTE ADULT - SUBJECTIVE AND OBJECTIVE BOX
DANNI LEAHY Patient is a 79y old  Female who presents with a chief complaint of Sepsis     (09 Jan 2025 12:52)     HPI:  80 y/o F w/ PMH of Afib (on eliquis), HTN, DM2, HLD, h/o stroke, limb ischemia (s/p left AKA, s/p b/l femoral thrombectomy), presented to ED from nursing home for evaluation of stump wound. Nursing home also reports she had a UA positive for UTI. At bedside pt c/o RUQ pain x 2 days. Pt says pain is nonradiating, constant, sharp in nature. Associated w/ chills. Denies N/V/D, fever, CP, SOB. Rest of ROS (-). While in the ED pt received vanc, zosyn, 1.95L NS bolus. (07 Jan 2025 04:43)    The patient was seen and evaluated offers no new complaints - BIle + enterococcus faecium- on Zyvox and zosyn- DW family and ID   The patient is in no acute distress.  Denied any fever chest pain, palpitations, shortness of breath, abdominal pain, fever, dysuria, cough, edema       I&O's Summary    14 Jan 2025 07:01  -  15 Waqar 2025 07:00  --------------------------------------------------------  IN: 0 mL / OUT: 240 mL / NET: -240 mL      Allergies    No Known Allergies    Intolerances      HEALTH ISSUES - PROBLEM Dx:        PAST MEDICAL & SURGICAL HISTORY:  DM (diabetes mellitus)      HTN (hypertension)      CVA (cerebrovascular accident)      Afib              Vital Signs Last 24 Hrs  T(C): 37.1 (15 Waqar 2025 08:35), Max: 37.1 (15 Waqar 2025 08:35)  T(F): 98.8 (15 Waqar 2025 08:35), Max: 98.8 (15 Waqar 2025 08:35)  HR: 90 (15 Waqar 2025 08:35) (81 - 104)  BP: 133/85 (15 Waqar 2025 08:35) (104/68 - 146/94)  BP(mean): --  RR: 18 (15 Waqar 2025 08:35) (18 - 18)  SpO2: 94% (15 Waqar 2025 08:35) (94% - 98%)    Parameters below as of 15 Waqar 2025 08:35  Patient On (Oxygen Delivery Method): room air    T(C): 37.1 (01-15-25 @ 08:35), Max: 37.1 (01-15-25 @ 08:35)  HR: 90 (01-15-25 @ 08:35) (81 - 104)  BP: 133/85 (01-15-25 @ 08:35) (104/68 - 146/94)  RR: 18 (01-15-25 @ 08:35) (18 - 18)  SpO2: 94% (01-15-25 @ 08:35) (94% - 98%)  Wt(kg): --    PHYSICAL EXAM:    GENERAL: NAD frail elderly conversing   HEAD:  Atraumatic, Normocephalic  EYES: EOMI, conjunctiva and sclera clear  ENMT:  Moist mucous membranes,   NERVOUS SYSTEM:  Alert & Oriented X3,  Moves upper and lower extremities; CNS-II-XII  CHEST/LUNG: Clear to auscultation bilaterally;   HEART: Regular rate and rhythm;    ABDOMEN: Soft, Nontender, Nondistended; Bowel sounds present  EXTREMITIES:  Peripheral Pulses,   psychiatry- mood and affect appropriate, Insight and judgement intact     acetaminophen     Tablet .. 650 milliGRAM(s) Oral every 6 hours PRN  aluminum hydroxide/magnesium hydroxide/simethicone Suspension 30 milliLiter(s) Oral every 4 hours PRN  ascorbic acid 500 milliGRAM(s) Oral daily  chlorhexidine 2% Cloths 1 Application(s) Topical <User Schedule>  collagenase Ointment 1 Application(s) Topical daily  collagenase Ointment 1 Application(s) Topical daily  dextrose 5%. 1000 milliLiter(s) IV Continuous <Continuous>  dextrose 5%. 1000 milliLiter(s) IV Continuous <Continuous>  dextrose 50% Injectable 25 Gram(s) IV Push once  dextrose 50% Injectable 12.5 Gram(s) IV Push once  dextrose 50% Injectable 25 Gram(s) IV Push once  dextrose Oral Gel 15 Gram(s) Oral once PRN  enoxaparin Injectable 60 milliGRAM(s) SubCutaneous every 12 hours  glucagon  Injectable 1 milliGRAM(s) IntraMuscular once  hydrocortisone 1% Cream 1 Application(s) Topical two times a day  insulin glargine Injectable (LANTUS) 10 Unit(s) SubCutaneous at bedtime  insulin lispro (ADMELOG) corrective regimen sliding scale   SubCutaneous three times a day before meals  insulin lispro Injectable (ADMELOG) 3 Unit(s) SubCutaneous three times a day before meals  melatonin 3 milliGRAM(s) Oral at bedtime PRN  metoprolol tartrate 25 milliGRAM(s) Oral two times a day  morphine  - Injectable 2.5 milliGRAM(s) IV Push once  multivitamin/minerals 1 Tablet(s) Oral daily  ondansetron Injectable 4 milliGRAM(s) IV Push every 8 hours PRN  piperacillin/tazobactam IVPB.. 3.375 Gram(s) IV Intermittent every 8 hours  polyethylene glycol 3350 17 Gram(s) Oral daily  rosuvastatin 5 milliGRAM(s) Oral at bedtime  senna 2 Tablet(s) Oral at bedtime  sodium chloride 0.9%. 1000 milliLiter(s) IV Continuous <Continuous>  triamcinolone 0.1% Ointment 1 Application(s) Topical every 12 hours      LABS:                          9.1    4.36  )-----------( 165      ( 15 Waqar 2025 04:44 )             29.1     01-15    133[L]  |  104  |  13.3  ----------------------------<  98  3.7   |  19.0[L]  |  0.66    Ca    7.3[L]      15 Waqar 2025 04:44    TPro  5.0[L]  /  Alb  1.7[L]  /  TBili  0.9  /  DBili  x   /  AST  29  /  ALT  21  /  AlkPhos  346[H]  01-15    LIVER FUNCTIONS - ( 15 Waqar 2025 04:44 )  Alb: 1.7 g/dL / Pro: 5.0 g/dL / ALK PHOS: 346 U/L / ALT: 21 U/L / AST: 29 U/L / GGT: x                 Urinalysis Basic - ( 15 Waqar 2025 04:44 )    Color: x / Appearance: x / SG: x / pH: x  Gluc: 98 mg/dL / Ketone: x  / Bili: x / Urobili: x   Blood: x / Protein: x / Nitrite: x   Leuk Esterase: x / RBC: x / WBC x   Sq Epi: x / Non Sq Epi: x / Bacteria: x      CAPILLARY BLOOD GLUCOSE      POCT Blood Glucose.: 100 mg/dL (15 Waqar 2025 12:11)  POCT Blood Glucose.: 94 mg/dL (15 Waqar 2025 08:10)  POCT Blood Glucose.: 110 mg/dL (14 Jan 2025 21:34)  POCT Blood Glucose.: 101 mg/dL (14 Jan 2025 17:08)      RADIOLOGY & ADDITIONAL TESTS:      Consultant notes reviewed  I independently reviwed all images/ labarotory results /cultures/ telemetry/EKG and my findings are indicated above  and discussed care plan with consultants/nursing/ family /patient  DANNI LEAHY Patient is a 79y old  Female who presents with a chief complaint of Sepsis     (09 Jan 2025 12:52)     HPI:  80 y/o F w/ PMH of Afib (on eliquis), HTN, DM2, HLD, h/o stroke, limb ischemia (s/p left AKA, s/p b/l femoral thrombectomy), presented to ED from nursing home for evaluation of stump wound. Nursing home also reports she had a UA positive for UTI. At bedside pt c/o RUQ pain x 2 days. Pt says pain is nonradiating, constant, sharp in nature. Associated w/ chills. Denies N/V/D, fever, CP, SOB. Rest of ROS (-). While in the ED pt received vanc, zosyn, 1.95L NS bolus. (07 Jan 2025 04:43)    The patient was seen and evaluated offers no new complaints - BIle + enterococcus faecium- on Zyvox and zosyn- DW family and ID   The patient is in no acute distress.  Denied any fever chest pain, palpitations, shortness of breath, abdominal pain, fever, dysuria, cough, edema       I&O's Summary    14 Jan 2025 07:01  -  15 Waqar 2025 07:00  --------------------------------------------------------  IN: 0 mL / OUT: 240 mL / NET: -240 mL      Allergies    No Known Allergies    Intolerances      HEALTH ISSUES - PROBLEM Dx:        PAST MEDICAL & SURGICAL HISTORY:  DM (diabetes mellitus)      HTN (hypertension)      CVA (cerebrovascular accident)      Afib              Vital Signs Last 24 Hrs  T(C): 37.1 (15 Waqar 2025 08:35), Max: 37.1 (15 Waqar 2025 08:35)  T(F): 98.8 (15 Waqar 2025 08:35), Max: 98.8 (15 Waqar 2025 08:35)  HR: 90 (15 Waqar 2025 08:35) (81 - 104)  BP: 133/85 (15 Waqar 2025 08:35) (104/68 - 146/94)  BP(mean): --  RR: 18 (15 Waqar 2025 08:35) (18 - 18)  SpO2: 94% (15 Waqar 2025 08:35) (94% - 98%)    Parameters below as of 15 Waqar 2025 08:35  Patient On (Oxygen Delivery Method): room air    T(C): 37.1 (01-15-25 @ 08:35), Max: 37.1 (01-15-25 @ 08:35)  HR: 90 (01-15-25 @ 08:35) (81 - 104)  BP: 133/85 (01-15-25 @ 08:35) (104/68 - 146/94)  RR: 18 (01-15-25 @ 08:35) (18 - 18)  SpO2: 94% (01-15-25 @ 08:35) (94% - 98%)  Wt(kg): --    PHYSICAL EXAM:    GENERAL: NAD frail elderly conversing   HEAD:  Atraumatic, Normocephalic  EYES: EOMI, conjunctiva and sclera clear  ENMT:  Moist mucous membranes,   NERVOUS SYSTEM:  Alert & Oriented X3, barely Moves upper and lower extremities; CNS-II-XII  CHEST/LUNG: Clear to auscultation bilaterally;   HEART: Regular rate and rhythm;    ABDOMEN: Soft, Nontender, Nondistended; Bowel sounds present  EXTREMITIES:  Peripheral Pulses, left AKA  psychiatry- mood and affect appropriate,    acetaminophen     Tablet .. 650 milliGRAM(s) Oral every 6 hours PRN  aluminum hydroxide/magnesium hydroxide/simethicone Suspension 30 milliLiter(s) Oral every 4 hours PRN  ascorbic acid 500 milliGRAM(s) Oral daily  chlorhexidine 2% Cloths 1 Application(s) Topical <User Schedule>  collagenase Ointment 1 Application(s) Topical daily  collagenase Ointment 1 Application(s) Topical daily  dextrose 5%. 1000 milliLiter(s) IV Continuous <Continuous>  dextrose 5%. 1000 milliLiter(s) IV Continuous <Continuous>  dextrose 50% Injectable 25 Gram(s) IV Push once  dextrose 50% Injectable 12.5 Gram(s) IV Push once  dextrose 50% Injectable 25 Gram(s) IV Push once  dextrose Oral Gel 15 Gram(s) Oral once PRN  enoxaparin Injectable 60 milliGRAM(s) SubCutaneous every 12 hours  glucagon  Injectable 1 milliGRAM(s) IntraMuscular once  hydrocortisone 1% Cream 1 Application(s) Topical two times a day  insulin glargine Injectable (LANTUS) 10 Unit(s) SubCutaneous at bedtime  insulin lispro (ADMELOG) corrective regimen sliding scale   SubCutaneous three times a day before meals  insulin lispro Injectable (ADMELOG) 3 Unit(s) SubCutaneous three times a day before meals  melatonin 3 milliGRAM(s) Oral at bedtime PRN  metoprolol tartrate 25 milliGRAM(s) Oral two times a day  morphine  - Injectable 2.5 milliGRAM(s) IV Push once  multivitamin/minerals 1 Tablet(s) Oral daily  ondansetron Injectable 4 milliGRAM(s) IV Push every 8 hours PRN  piperacillin/tazobactam IVPB.. 3.375 Gram(s) IV Intermittent every 8 hours  polyethylene glycol 3350 17 Gram(s) Oral daily  rosuvastatin 5 milliGRAM(s) Oral at bedtime  senna 2 Tablet(s) Oral at bedtime  sodium chloride 0.9%. 1000 milliLiter(s) IV Continuous <Continuous>  triamcinolone 0.1% Ointment 1 Application(s) Topical every 12 hours      LABS:                          9.1    4.36  )-----------( 165      ( 15 Waqar 2025 04:44 )             29.1     01-15    133[L]  |  104  |  13.3  ----------------------------<  98  3.7   |  19.0[L]  |  0.66    Ca    7.3[L]      15 Waqar 2025 04:44    TPro  5.0[L]  /  Alb  1.7[L]  /  TBili  0.9  /  DBili  x   /  AST  29  /  ALT  21  /  AlkPhos  346[H]  01-15    LIVER FUNCTIONS - ( 15 Waqar 2025 04:44 )  Alb: 1.7 g/dL / Pro: 5.0 g/dL / ALK PHOS: 346 U/L / ALT: 21 U/L / AST: 29 U/L / GGT: x                 Urinalysis Basic - ( 15 Waqar 2025 04:44 )    Color: x / Appearance: x / SG: x / pH: x  Gluc: 98 mg/dL / Ketone: x  / Bili: x / Urobili: x   Blood: x / Protein: x / Nitrite: x   Leuk Esterase: x / RBC: x / WBC x   Sq Epi: x / Non Sq Epi: x / Bacteria: x      CAPILLARY BLOOD GLUCOSE      POCT Blood Glucose.: 100 mg/dL (15 Waqar 2025 12:11)  POCT Blood Glucose.: 94 mg/dL (15 Waqar 2025 08:10)  POCT Blood Glucose.: 110 mg/dL (14 Jan 2025 21:34)  POCT Blood Glucose.: 101 mg/dL (14 Jan 2025 17:08)      RADIOLOGY & ADDITIONAL TESTS:      Consultant notes reviewed  I independently reviwed all images/ labarotory results /cultures/ telemetry/EKG and my findings are indicated above  and discussed care plan with consultants/nursing/ family /patient

## 2025-01-15 NOTE — CHART NOTE - NSCHARTNOTEFT_GEN_A_CORE
Source: Patient, chart    Current Diet: Diet, DASH/TLC:   Sodium & Cholesterol Restricted  Consistent Carbohydrate {Evening Snack} (CSTCHOSN)  Supplement Feeding Modality:  Oral  Glucerna Shake Cans or Servings Per Day:  1       Frequency:  Two Times a day (01-15-25 @ 08:58)    PO intake: 50-75%      Source for PO intake: Patient, chart    Current Weight:   1/6 139.9 lbs (dosing weight)    RD unable to obtain updated bed scale weight as pt with multiple pillows and blankets on bed     Pertinent Medications: MEDICATIONS  (STANDING):  ascorbic acid 500 milliGRAM(s) Oral daily  dextrose 5%. 1000 milliLiter(s) (100 mL/Hr) IV Continuous <Continuous>  dextrose 50% Injectable 25 Gram(s) IV Push once  enoxaparin Injectable 60 milliGRAM(s) SubCutaneous every 12 hours  glucagon  Injectable 1 milliGRAM(s) IntraMuscular once  insulin glargine Injectable (LANTUS) 10 Unit(s) SubCutaneous at bedtime  insulin lispro (ADMELOG) corrective regimen sliding scale   SubCutaneous three times a day before meals  metoprolol tartrate 25 milliGRAM(s) Oral two times a day  morphine  - Injectable 2.5 milliGRAM(s) IV Push once  multivitamin/minerals 1 Tablet(s) Oral daily  polyethylene glycol 3350 17 Gram(s) Oral daily  senna 2 Tablet(s) Oral at bedtime    MEDICATIONS  (PRN):  dextrose Oral Gel 15 Gram(s) Oral once PRN Blood Glucose LESS THAN 70 milliGRAM(s)/deciliter  ondansetron Injectable 4 milliGRAM(s) IV Push every 8 hours PRN Nausea and/or Vomiting    Pertinent Labs: 01-15 Na133 mmol/L[L] Glu 98 mg/dL K+ 3.7 mmol/L Cr  0.66 mg/dL BUN 13.3 mg/dL Alb 1.7 g/dL[L]     Skin:  Stage 1 left buttocks   Right heel DTI      Nutrition focused physical exam conducted previously with signs of malnutrition present    Subcutaneous fat loss: [x] Orbital fat pads region, [x]Buccal fat region, [ ]Triceps region,  [ ]Ribs region    Muscle wasting: [x]Temples region, [x]Clavicle region, [x]Shoulder region, [ ]Scapula region, [ ]Interosseous region,  [ ]thigh region, [ ]Calf region    Estimated Needs:   [x] recalculated:   9057-4600 kcal (22-27 kcal/kg 63.5kg)  76-88 g protein (1.2-1.4g/kg 63.5kg)    Hospital Course: Per hospitalist note "80 y/o F w/ PMH of Afib (on eliquis), HTN, DM2, HLD, h/o stroke, limb ischemia (s/p left AKA, s/p b/l femoral thrombectomy), presented to ED from nursing home for evaluation of stump wound.     Current Nutrition Diagnosis: Chronic moderate malnutrition related to inadequate protein energy intake with persistent lack of appetite in setting of advanced age, recent AKA, now Acute cholecystitis.      Recommendations:     Monitoring and Evaluation:   [ ] PO intake [ ] Tolerance to diet prescription [X] Weights  [X] Follow up per protocol [X] Labs: Source: Patient, chart    Current Diet: Diet, DASH/TLC:   Sodium & Cholesterol Restricted  Consistent Carbohydrate {Evening Snack} (CSTCHOSN)  Supplement Feeding Modality:  Oral  Glucerna Shake Cans or Servings Per Day:  1       Frequency:  Two Times a day (01-15-25 @ 08:58)    PO intake: 50-75%      Source for PO intake: Patient, chart    Current Weight:   1/6 139.9 lbs (dosing weight)    RD unable to obtain updated bed scale weight as pt with multiple pillows and blankets on bed     Pertinent Medications: MEDICATIONS  (STANDING):  ascorbic acid 500 milliGRAM(s) Oral daily  dextrose 5%. 1000 milliLiter(s) (100 mL/Hr) IV Continuous <Continuous>  dextrose 50% Injectable 25 Gram(s) IV Push once  enoxaparin Injectable 60 milliGRAM(s) SubCutaneous every 12 hours  glucagon  Injectable 1 milliGRAM(s) IntraMuscular once  insulin glargine Injectable (LANTUS) 10 Unit(s) SubCutaneous at bedtime  insulin lispro (ADMELOG) corrective regimen sliding scale   SubCutaneous three times a day before meals  metoprolol tartrate 25 milliGRAM(s) Oral two times a day  morphine  - Injectable 2.5 milliGRAM(s) IV Push once  multivitamin/minerals 1 Tablet(s) Oral daily  polyethylene glycol 3350 17 Gram(s) Oral daily  senna 2 Tablet(s) Oral at bedtime    MEDICATIONS  (PRN):  dextrose Oral Gel 15 Gram(s) Oral once PRN Blood Glucose LESS THAN 70 milliGRAM(s)/deciliter  ondansetron Injectable 4 milliGRAM(s) IV Push every 8 hours PRN Nausea and/or Vomiting    Pertinent Labs: 01-15 Na133 mmol/L[L] Glu 98 mg/dL K+ 3.7 mmol/L Cr  0.66 mg/dL BUN 13.3 mg/dL Alb 1.7 g/dL[L]     Skin:  Stage 1 left buttocks   Right heel DTI      Nutrition focused physical exam conducted previously with signs of malnutrition present    Subcutaneous fat loss: [x] Orbital fat pads region, [x]Buccal fat region, [ ]Triceps region,  [ ]Ribs region    Muscle wasting: [x]Temples region, [x]Clavicle region, [x]Shoulder region, [ ]Scapula region, [ ]Interosseous region,  [ ]thigh region, [ ]Calf region    Estimated Needs:   [x] recalculated:   6490-5753 kcal (22-27 kcal/kg 63.5kg)  76-88 g protein (1.2-1.4g/kg 63.5kg)    Hospital Course: Per hospitalist note "80 y/o F w/ PMH of Afib (on eliquis), HTN, DM2, HLD, h/o stroke, limb ischemia (s/p left AKA, s/p b/l femoral thrombectomy), presented to ED from nursing home for evaluation of stump wound.     Current Nutrition Diagnosis: Chronic moderate malnutrition related to inadequate protein energy intake with persistent lack of appetite in setting of advanced age, recent AKA, now Acute cholecystitis.    Patient English speaking -  services used (ID# [id number]). Pt eating breakfast upon assessment this AM. Pt states her appetite is ok, family arrived mid-interview to assist in nutrition interview. Family states pt eats very small amounts at meals. Pt also receives Glucerna BID and reports drinking. Pt with no c/o N/V/C/D at this time, unsure of her last BM. Pt and family with no further nutrition-related questions at this time. Will continue to monitor and follow up as needed. RD remains available.     Recommendations:   1) Continue diet as tolerated.   2) Continue Glucerna BID as tolerated to optimize PO intake.   3) Encourage po intake, monitor diet tolerance, and provide assistance at meals as needed.   4) Continue Rx: MVI daily.   5) Monitor BG levels, correct prn.   6) Obtain weekly weights to monitor trends.     Monitoring and Evaluation:   [x] PO intake [x] Tolerance to diet prescription [X] Weights  [X] Follow up per protocol [X] Labs: chem 8, POCT glucose

## 2025-01-15 NOTE — PROGRESS NOTE ADULT - ASSESSMENT
80 y/o F w/ PMH of Afib (on eliquis), HTN, DM2, HLD, h/o stroke, limb ischemia (s/p left AKA, s/p b/l femoral thrombectomy), presented to ED from nursing home for evaluation of stump wound. Nursing home also reports she had a UA positive for UTI. At bedside pt c/o RUQ pain x 2 days. Pt says pain is nonradiating, constant, sharp in nature. Associated w/ chills. Denies N/V/D, fever, CP, SOB. Rest of ROS (-). While in the ED pt received vanc, zosyn, 1.95L NS bolus.    #Acute cholecystitis-sepsis present on admission- cont zosyn per ID   HIDA scan + cystic duct/CBD obstruction.  positive for acute cholecystitis, s/pcholecystostomy tube.   - 1/13 HIDA - acute chris   - 1/6 BCx - neg  - s/p cholecystostomy tube by IR on 1/13     Follow culture - E. faecium - DW ID - Plan continue piperacillin-tazobactam  Add linezolid 600 mg PO BID pending cultures   - Leukocytosis resolved   - Altered LFTs ALP  minimally elevated   Pain control w/ morphine prn  Blood cx negative  IR consulted for PCT, recs - HIDA and will re-consider PCT     #Hx of limb ischemia  s/p left AKA, s/p b/l femoral thrombectomy  Vascular surgery consulted by ED, recommending local wound care to b/l groins and left AKA stump, vascular will do if patient is admitted, no vacular surgical intervention required at this moment.    #HTN-#Afib  Hold Eliquis for possible perc. chris. tube placement, changed to therapeutic lovenox in case needs intervention  On Metoprolol tartrate 25mg bid at home  On Irbesartan 150mg daily at home    #DM2  On Glargine 15u qhs and Lispro 5u TID w/ meals    #HLD  Crestor 5mg qhs    #Anemia  No signs of active bleed.  Monitor H/H  Transfuse to keep Hb>7, if clinically indicated    Dispo: active.  on IV zosyn and zyvox per ID

## 2025-01-16 LAB
ALBUMIN SERPL ELPH-MCNC: 1.8 G/DL — LOW (ref 3.3–5.2)
ALP SERPL-CCNC: 350 U/L — HIGH (ref 40–120)
ALT FLD-CCNC: 19 U/L — SIGNIFICANT CHANGE UP
ANION GAP SERPL CALC-SCNC: 10 MMOL/L — SIGNIFICANT CHANGE UP (ref 5–17)
AST SERPL-CCNC: 33 U/L — HIGH
BILIRUB SERPL-MCNC: 0.8 MG/DL — SIGNIFICANT CHANGE UP (ref 0.4–2)
BUN SERPL-MCNC: 11.2 MG/DL — SIGNIFICANT CHANGE UP (ref 8–20)
CALCIUM SERPL-MCNC: 7.5 MG/DL — LOW (ref 8.4–10.5)
CHLORIDE SERPL-SCNC: 110 MMOL/L — HIGH (ref 96–108)
CO2 SERPL-SCNC: 19 MMOL/L — LOW (ref 22–29)
CREAT SERPL-MCNC: 0.58 MG/DL — SIGNIFICANT CHANGE UP (ref 0.5–1.3)
EGFR: 91 ML/MIN/1.73M2 — SIGNIFICANT CHANGE UP
GLUCOSE BLDC GLUCOMTR-MCNC: 101 MG/DL — HIGH (ref 70–99)
GLUCOSE BLDC GLUCOMTR-MCNC: 102 MG/DL — HIGH (ref 70–99)
GLUCOSE BLDC GLUCOMTR-MCNC: 128 MG/DL — HIGH (ref 70–99)
GLUCOSE BLDC GLUCOMTR-MCNC: 98 MG/DL — SIGNIFICANT CHANGE UP (ref 70–99)
GLUCOSE SERPL-MCNC: 99 MG/DL — SIGNIFICANT CHANGE UP (ref 70–99)
HCT VFR BLD CALC: 29.2 % — LOW (ref 34.5–45)
HGB BLD-MCNC: 8.9 G/DL — LOW (ref 11.5–15.5)
MCHC RBC-ENTMCNC: 25.1 PG — LOW (ref 27–34)
MCHC RBC-ENTMCNC: 30.5 G/DL — LOW (ref 32–36)
MCV RBC AUTO: 82.5 FL — SIGNIFICANT CHANGE UP (ref 80–100)
PLATELET # BLD AUTO: 187 K/UL — SIGNIFICANT CHANGE UP (ref 150–400)
POTASSIUM SERPL-MCNC: 3.6 MMOL/L — SIGNIFICANT CHANGE UP (ref 3.5–5.3)
POTASSIUM SERPL-SCNC: 3.6 MMOL/L — SIGNIFICANT CHANGE UP (ref 3.5–5.3)
PROT SERPL-MCNC: 5.2 G/DL — LOW (ref 6.6–8.7)
RBC # BLD: 3.54 M/UL — LOW (ref 3.8–5.2)
RBC # FLD: 17.2 % — HIGH (ref 10.3–14.5)
SODIUM SERPL-SCNC: 138 MMOL/L — SIGNIFICANT CHANGE UP (ref 135–145)
WBC # BLD: 3.01 K/UL — LOW (ref 3.8–10.5)
WBC # FLD AUTO: 3.01 K/UL — LOW (ref 3.8–10.5)

## 2025-01-16 PROCEDURE — 99232 SBSQ HOSP IP/OBS MODERATE 35: CPT

## 2025-01-16 PROCEDURE — G0545: CPT

## 2025-01-16 PROCEDURE — 99233 SBSQ HOSP IP/OBS HIGH 50: CPT

## 2025-01-16 RX ORDER — SODIUM BICARBONATE 42 MG/ML
0.12 INJECTION, SOLUTION INTRAVENOUS
Qty: 75 | Refills: 0 | Status: DISCONTINUED | OUTPATIENT
Start: 2025-01-16 | End: 2025-01-20

## 2025-01-16 RX ADMIN — Medication 3 UNIT(S): at 13:43

## 2025-01-16 RX ADMIN — Medication 25 MILLIGRAM(S): at 05:35

## 2025-01-16 RX ADMIN — Medication 1 APPLICATION(S): at 05:36

## 2025-01-16 RX ADMIN — Medication 1 APPLICATION(S): at 17:55

## 2025-01-16 RX ADMIN — ROSUVASTATIN CALCIUM 5 MILLIGRAM(S): 10 TABLET, FILM COATED ORAL at 22:44

## 2025-01-16 RX ADMIN — ACETAMINOPHEN 650 MILLIGRAM(S): 160 SUSPENSION ORAL at 05:35

## 2025-01-16 RX ADMIN — Medication 3 UNIT(S): at 09:28

## 2025-01-16 RX ADMIN — ANTISEPTIC SURGICAL SCRUB 1 APPLICATION(S): 0.04 SOLUTION TOPICAL at 05:35

## 2025-01-16 RX ADMIN — Medication 1 APPLICATION(S): at 05:35

## 2025-01-16 RX ADMIN — ENOXAPARIN SODIUM 60 MILLIGRAM(S): 100 INJECTION SUBCUTANEOUS at 17:55

## 2025-01-16 RX ADMIN — PIPERACILLIN SODIUM AND TAZOBACTAM SODIUM 25 GRAM(S): 2; 250 INJECTION, POWDER, FOR SOLUTION INTRAVENOUS at 05:36

## 2025-01-16 RX ADMIN — ENOXAPARIN SODIUM 60 MILLIGRAM(S): 100 INJECTION SUBCUTANEOUS at 05:34

## 2025-01-16 RX ADMIN — PIPERACILLIN SODIUM AND TAZOBACTAM SODIUM 25 GRAM(S): 2; 250 INJECTION, POWDER, FOR SOLUTION INTRAVENOUS at 13:42

## 2025-01-16 RX ADMIN — Medication 50 MILLILITER(S): at 09:47

## 2025-01-16 RX ADMIN — Medication 1 APPLICATION(S): at 09:28

## 2025-01-16 RX ADMIN — Medication 500 MILLIGRAM(S): at 09:27

## 2025-01-16 RX ADMIN — SODIUM BICARBONATE 100 MEQ/KG/HR: 42 INJECTION, SOLUTION INTRAVENOUS at 18:30

## 2025-01-16 RX ADMIN — Medication 3 UNIT(S): at 17:54

## 2025-01-16 RX ADMIN — PIPERACILLIN SODIUM AND TAZOBACTAM SODIUM 25 GRAM(S): 2; 250 INJECTION, POWDER, FOR SOLUTION INTRAVENOUS at 22:44

## 2025-01-16 RX ADMIN — Medication 1 TABLET(S): at 09:27

## 2025-01-16 RX ADMIN — Medication 25 MILLIGRAM(S): at 17:54

## 2025-01-16 NOTE — PROGRESS NOTE ADULT - SUBJECTIVE AND OBJECTIVE BOX
DANNI LEAHY Patient is a 79y old  Female who presents with a chief complaint of Sepsis     (09 Jan 2025 12:52)     HPI:  80 y/o F w/ PMH of Afib (on eliquis), HTN, DM2, HLD, h/o stroke, limb ischemia (s/p left AKA, s/p b/l femoral thrombectomy), presented to ED from nursing home for evaluation of stump wound. Nursing home also reports she had a UA positive for UTI. At bedside pt c/o RUQ pain x 2 days. Pt says pain is nonradiating, constant, sharp in nature. Associated w/ chills. Denies N/V/D, fever, CP, SOB. Rest of ROS (-). While in the ED pt received vanc, zosyn, 1.95L NS bolus. (07 Jan 2025 04:43)    The patient was seen and evaluated uncomfortable - no fever - elderly --- 1/13 HIDA - acute chris   - s/p cholecystostomy tube by IR on 1/13=+ culture with E. faecium - amp-S-  continue piperacillin-tazobactam through 1/18   The patient is in no acute distress.  Denied any fever chest pain, palpitations, shortness of breath,       I&O's Summary    15 Waqar 2025 07:01  -  16 Jan 2025 07:00  --------------------------------------------------------  IN: 0 mL / OUT: 840 mL / NET: -840 mL    16 Jan 2025 07:01  -  16 Jan 2025 16:48  --------------------------------------------------------  IN: 180 mL / OUT: 0 mL / NET: 180 mL      Allergies    No Known Allergies    Intolerances      HEALTH ISSUES - PROBLEM Dx:        PAST MEDICAL & SURGICAL HISTORY:  DM (diabetes mellitus)      HTN (hypertension)      CVA (cerebrovascular accident)      Afib              Vital Signs Last 24 Hrs  T(C): 36.5 (16 Jan 2025 15:49), Max: 36.5 (16 Jan 2025 15:49)  T(F): 97.7 (16 Jan 2025 15:49), Max: 97.7 (16 Jan 2025 15:49)  HR: 92 (16 Jan 2025 15:49) (75 - 92)  BP: 117/80 (16 Jan 2025 15:49) (109/67 - 131/71)  BP(mean): --  RR: 18 (16 Jan 2025 15:49) (16 - 18)  SpO2: 98% (16 Jan 2025 15:49) (96% - 99%)    Parameters below as of 16 Jan 2025 15:49  Patient On (Oxygen Delivery Method): room air    T(C): 36.5 (01-16-25 @ 15:49), Max: 36.5 (01-16-25 @ 15:49)  HR: 92 (01-16-25 @ 15:49) (75 - 92)  BP: 117/80 (01-16-25 @ 15:49) (109/67 - 131/71)  RR: 18 (01-16-25 @ 15:49) (16 - 18)  SpO2: 98% (01-16-25 @ 15:49) (96% - 99%)  Wt(kg): --    PHYSICAL EXAM:    GENERAL: NAD, frail elderly  HEAD:  Atraumatic, Normocephalic  EYES: conjunctiva and sclera clear  ENMT:  Moist mucous membranes,  NERVOUS SYSTEM:  Alert & Oriented X2, barely Moves upper and lower extremities; CNS-II-XII  CHEST/LUNG: Clear to auscultation bilaterally; No rales, rhonchi, wheezing,   HEART: Regular rate and rhythm; No murmurs,   ABDOMEN: Soft, Nontender, Nondistended; Bowel sounds present  EXTREMITIES:  Peripheral Pulses, left AKS      acetaminophen     Tablet .. 650 milliGRAM(s) Oral every 6 hours PRN  aluminum hydroxide/magnesium hydroxide/simethicone Suspension 30 milliLiter(s) Oral every 4 hours PRN  ascorbic acid 500 milliGRAM(s) Oral daily  chlorhexidine 2% Cloths 1 Application(s) Topical <User Schedule>  collagenase Ointment 1 Application(s) Topical daily  collagenase Ointment 1 Application(s) Topical daily  dextrose 5%. 1000 milliLiter(s) IV Continuous <Continuous>  dextrose 5%. 1000 milliLiter(s) IV Continuous <Continuous>  dextrose 50% Injectable 25 Gram(s) IV Push once  dextrose 50% Injectable 12.5 Gram(s) IV Push once  dextrose 50% Injectable 25 Gram(s) IV Push once  dextrose Oral Gel 15 Gram(s) Oral once PRN  enoxaparin Injectable 60 milliGRAM(s) SubCutaneous every 12 hours  glucagon  Injectable 1 milliGRAM(s) IntraMuscular once  hydrocortisone 1% Cream 1 Application(s) Topical two times a day  insulin glargine Injectable (LANTUS) 10 Unit(s) SubCutaneous at bedtime  insulin lispro (ADMELOG) corrective regimen sliding scale   SubCutaneous three times a day before meals  insulin lispro Injectable (ADMELOG) 3 Unit(s) SubCutaneous three times a day before meals  melatonin 3 milliGRAM(s) Oral at bedtime PRN  metoprolol tartrate 25 milliGRAM(s) Oral two times a day  morphine  - Injectable 2.5 milliGRAM(s) IV Push once  multivitamin/minerals 1 Tablet(s) Oral daily  ondansetron Injectable 4 milliGRAM(s) IV Push every 8 hours PRN  piperacillin/tazobactam IVPB.. 3.375 Gram(s) IV Intermittent every 8 hours  polyethylene glycol 3350 17 Gram(s) Oral daily  rosuvastatin 5 milliGRAM(s) Oral at bedtime  senna 2 Tablet(s) Oral at bedtime  sodium chloride 0.9%. 1000 milliLiter(s) IV Continuous <Continuous>  triamcinolone 0.1% Ointment 1 Application(s) Topical every 12 hours      LABS:                          8.9    3.01  )-----------( 187      ( 16 Jan 2025 04:18 )             29.2     01-16    138  |  110[H]  |  11.2  ----------------------------<  99  3.6   |  19.0[L]  |  0.58    Ca    7.5[L]      16 Jan 2025 04:18    TPro  5.2[L]  /  Alb  1.8[L]  /  TBili  0.8  /  DBili  x   /  AST  33[H]  /  ALT  19  /  AlkPhos  350[H]  01-16    LIVER FUNCTIONS - ( 16 Jan 2025 04:18 )  Alb: 1.8 g/dL / Pro: 5.2 g/dL / ALK PHOS: 350 U/L / ALT: 19 U/L / AST: 33 U/L / GGT: x                 Urinalysis Basic - ( 16 Jan 2025 04:18 )    Color: x / Appearance: x / SG: x / pH: x  Gluc: 99 mg/dL / Ketone: x  / Bili: x / Urobili: x   Blood: x / Protein: x / Nitrite: x   Leuk Esterase: x / RBC: x / WBC x   Sq Epi: x / Non Sq Epi: x / Bacteria: x      CAPILLARY BLOOD GLUCOSE      POCT Blood Glucose.: 101 mg/dL (16 Jan 2025 12:12)  POCT Blood Glucose.: 102 mg/dL (16 Jan 2025 08:18)  POCT Blood Glucose.: 99 mg/dL (15 Waqar 2025 21:54)  POCT Blood Glucose.: 87 mg/dL (15 Waqar 2025 17:09)      RADIOLOGY & ADDITIONAL TESTS:      Consultant notes reviewed  I independently reviwed all images/ labarotory results /cultures/ telemetry/EKG and my findings are indicated above  and discussed care plan with consultants/nursing/ family /patient

## 2025-01-16 NOTE — PROGRESS NOTE ADULT - TIME BILLING
Chart review, interpretation of laboratory and imaging results,  patient evaluation, medication review, documentation, coordination with medical team. Interpretation  and review of microbiologic data. Overseeing antibiotic therapy.

## 2025-01-16 NOTE — PROGRESS NOTE ADULT - ASSESSMENT
79F with Afib (on eliquis), HTN, DM2, HLD, h/o stroke, limb ischemia (s/p left AKA on 12/16, s/p b/l femoral thrombectomy), presented to ED from nursing home for evaluation of stump wound and RUQ pain. Found to have acute cholecystitis. Family declined surgical interventions.     ID following for acute cholecystitis     - 1/6 CT AP with distended GB with wall thickening, mild pericholecystic fat stranding and trace cholelithiasis >> findings c/w acute chris   - 1/6 Abd US with findings c/w acute chris   - CT LLE - left groin with findings suggestive of hematoma vs PSA (less likely). Left stump with foci of air adjacent to surgical clips (post-op vs infection)      - stump evaluated by vascular surgery - no concerns for infection. Local wound care as per surgery.   - 1/13 HIDA - acute chris   - 1/6 BCx - neg  - s/p cholecystostomy tube by IR on 1/13     Follow culture with E. faecium - amp-S  - Leukocytosis resolved   - Altered LFTs improved - ALP remains elevated   - BP stable. Afebrile     continue piperacillin-tazobactam through 1/18   If discharge before, can complete treatment with amoxicillin-clavulanate 875 mg PO BID    Will not actively follow  Please call with questions.

## 2025-01-16 NOTE — PROGRESS NOTE ADULT - ASSESSMENT
80 y/o F w/ PMH of Afib (on eliquis), HTN, DM2, HLD, h/o stroke, limb ischemia (s/p left AKA, s/p b/l femoral thrombectomy), presented to ED from nursing home for evaluation of stump wound. Nursing home also reports she had a UA positive for UTI. At bedside pt c/o RUQ pain x 2 days. Pt says pain is nonradiating, constant, sharp in nature. Associated w/ chills. Denies N/V/D, fever, CP, SOB. Rest of ROS (-). While in the ED pt received vanc, zosyn, 1.95L NS bolus.    #Acute cholecystitis-sepsis present on admission- cont zosyn per ID   HIDA scan + cystic duct/CBD obstruction.  positive for acute cholecystitis, s/pcholecystostomy tube.   - 1/13 HIDA - acute chris   - 1/6 BCx - neg  - s/p cholecystostomy tube by IR on 1/13     Follow culture - E. faecium - DW ID - Plan continue piperacillin-tazobactam  - Leukocytosis resolved   - Altered LFTs ALP  minimally elevated   Pain control w/ morphine prn  Blood cx negative      #Hx of limb ischemia  s/p left AKA, s/p b/l femoral thrombectomy  Vascular surgery consulted by ED, recommending local wound care to b/l groins and left AKA stump, vascular will do if patient is admitted, no vacular surgical intervention required at this moment.    #HTN-#Afib  Hold Eliquis for possible perc. chris. tube placement, changed to therapeutic lovenox in case needs intervention  On Metoprolol tartrate 25mg bid at home  On Irbesartan 150mg daily at home    #DM2  On Glargine 15u qhs and Lispro 5u TID w/ meals    #HLD  Crestor 5mg qhs    #Anemia  No signs of active bleed.  Monitor H/H  Transfuse to keep Hb>7, if clinically indicated    Dispo: active.  on IV zosyn

## 2025-01-16 NOTE — PROGRESS NOTE ADULT - SUBJECTIVE AND OBJECTIVE BOX
[facility]  INFECTIOUS DISEASES at [place] and [place]  ===============================================================                  Andrei Cabrera MD               Diplomates American Board of Internal Medicine & Infectious Diseases                * [place] Office - Appt - Tel  [phone] Fax [phone]                * [place] Office - Appt - Tel [phone] Fax [phone]                                  Hospital Consult line:  [phone]  ==============================================================    DANNI LEAHY [id number]    Follow up: acute chris     No acute events overnight   afebrile   hemodynamically stable      services not required as provider is fluent in [place].     I have personally reviewed the labs and data; pertinent labs and data are listed in this note; please see below.     _______________________________________________________________  REVIEW OF SYSTEMS  abd pain much improved today. Feeling thirsty. Unable to eat on her own. No nausea or vomiting. Reports loose BM   ________________________________________________________________  Allergies:  No Known Allergies    ________________________________________________________________  PHYSICAL EXAM  GEN: chronically ill appearing, frail, elderly but in NAD   HEENT: Anicteric sclerae. Moist mucous membranes. No mucosal lesions. Dentures   NECK: Supple.  LUNGS: eupneic. CTA B/L.  HEART: RRR, +m  ABDOMEN: Soft, mild discomfort in RUQ, ND, no guarding/rebound.   RUQ drain with bile   :No Silver catheter  MSK: left aka   NEUROLOGIC: Generalized weakness   SKIN: No rash  LINES: PIV   ________________________________________________________________  Vitals:  T(F): 97.4 (16 Jan 2025 08:02), Max: 98.2 (15 Waqar 2025 16:47)  HR: 75 (16 Jan 2025 08:02)  BP: 131/71 (16 Jan 2025 08:02)  RR: 16 (16 Jan 2025 08:02)  SpO2: 99% (16 Jan 2025 08:02) (96% - 99%)  temp max in last 48H T(F): , Max: 98.8 (01-15-25 @ 08:35)    Current Antibiotics:  piperacillin/tazobactam IVPB.. 3.375 Gram(s) IV Intermittent every 8 hours    Other medications:  ascorbic acid 500 milliGRAM(s) Oral daily  chlorhexidine 2% Cloths 1 Application(s) Topical <User Schedule>  collagenase Ointment 1 Application(s) Topical daily  collagenase Ointment 1 Application(s) Topical daily  dextrose 5%. 1000 milliLiter(s) IV Continuous <Continuous>  dextrose 5%. 1000 milliLiter(s) IV Continuous <Continuous>  dextrose 50% Injectable 25 Gram(s) IV Push once  dextrose 50% Injectable 12.5 Gram(s) IV Push once  dextrose 50% Injectable 25 Gram(s) IV Push once  enoxaparin Injectable 60 milliGRAM(s) SubCutaneous every 12 hours  glucagon  Injectable 1 milliGRAM(s) IntraMuscular once  hydrocortisone 1% Cream 1 Application(s) Topical two times a day  insulin glargine Injectable (LANTUS) 10 Unit(s) SubCutaneous at bedtime  insulin lispro (ADMELOG) corrective regimen sliding scale   SubCutaneous three times a day before meals  insulin lispro Injectable (ADMELOG) 3 Unit(s) SubCutaneous three times a day before meals  metoprolol tartrate 25 milliGRAM(s) Oral two times a day  morphine  - Injectable 2.5 milliGRAM(s) IV Push once  multivitamin/minerals 1 Tablet(s) Oral daily  polyethylene glycol 3350 17 Gram(s) Oral daily  rosuvastatin 5 milliGRAM(s) Oral at bedtime  senna 2 Tablet(s) Oral at bedtime  sodium chloride 0.9%. 1000 milliLiter(s) IV Continuous <Continuous>  triamcinolone 0.1% Ointment 1 Application(s) Topical every 12 hours                            8.9    3.01  )-----------( 187      ( 16 Jan 2025 04:18 )             29.2     01-16    138  |  110[H]  |  11.2  ----------------------------<  99  3.6   |  19.0[L]  |  0.58    Ca    7.5[L]      16 Jan 2025 04:18    TPro  5.2[L]  /  Alb  1.8[L]  /  TBili  0.8  /  DBili  x   /  AST  33[H]  /  ALT  19  /  AlkPhos  350[H]  01-16    RECENT CULTURES:  01-13 @ 14:07 Bile Enterococcus faecium    Few Enterococcus faecium  - Ampicillin: S 8 Predicts results to ampicillin/sulbactam, amoxacillin-clavulanate and piperacillin-tazobactam.  - Vancomycin: S 0.5    No polymorphonuclear leukocytes per low power field  Few Gram Positive Cocci in Pairs and Chains per oil power field      01-06 @ 14:30 .Blood BLOOD     No growth at 5 days      WBC Count: 3.01 K/uL (01-16-25 @ 04:18)  WBC Count: 4.36 K/uL (01-15-25 @ 04:44)  WBC Count: 5.99 K/uL (01-14-25 @ 06:32)  WBC Count: 9.85 K/uL (01-13-25 @ 15:05)    Creatinine: 0.58 mg/dL (01-16-25 @ 04:18)  Creatinine: 0.66 mg/dL (01-15-25 @ 04:44)  Creatinine: 0.64 mg/dL (01-14-25 @ 06:32)  Creatinine: 0.62 mg/dL (01-13-25 @ 15:05)     SARS-CoV-2 Result: NotDetec (01-06-25 @ 14:30)    ________________________________________________________________  CARDIOLOGY   ________________________________________________________________  RADIOLOGY  < from: CT Abdomen and Pelvis No Cont (01.14.25 @ 12:04) >    FINDINGS:    LOWER CHEST:  Small bilateral pleural effusions, new from prior exam with partial   atelectasis lower lobes.    Streak artifact degrades image quality.  Evaluation of the solid organ parenchyma is limited without intravenous   contrast.    LIVER: Within normal limits.  BILE DUCTS: Normal caliber.  GALLBLADDER:  Percutaneous gastrostomy tube within moderately distended gallbladder.  Small dependent gallstones.  Pericholecystic inflammatory stranding right upper quadrant.  SPLEEN: Within normal limits.  PANCREAS: Within normal limits.  ADRENALS: Within normal limits.  KIDNEYS/URETERS:  Nonobstructing right intrarenal calcifications.    BLADDER:  Nondependent intraluminal air, correlate for recent instrumentation.  Dependent debris/ wall thickening.    REPRODUCTIVE ORGANS:  Mild low attenuation thickening of the endometrial cavity.  Periureteral calcifications.    BOWEL:  Dependent high attenuation intraluminal contents within the fundus of   stomach.  Colonic fecal retention; no bowel obstruction.  Evaluation of the bowel wall is limited without intravenous contrast.  Appendix is normal.  PERITONEUM/RETROPERITONEUM:  Small amount of free intraperitoneal fluid with thickening along the   bilateral lateral peritoneal reflections.  No extraluminal gas or drainable fluid collections.    VESSELS: Atherosclerotic changes.  LYMPH NODES:  No enlarged lymphadenopathy.    ABDOMINAL WALL:  Small fat-containing umbilical hernia.  Bilateral subcutaneous flank wall edema.    BONES:  Degenerative changes.  Chronic right-sided rib fracture deformities.    IMPRESSION:    Bilateral pleural effusions with partial lower lobe atelectasis.    Percutaneous gallbladder drainage catheter within a moderately distended   gallbladder.  Cholelithiasis and pericholecystic stranding is suggestive of acute   cholecystitis.    Small amount of free intraperitoneal fluid with thickening of the   peritoneal reflections.    Bladder wall thickening with dependent debris/wall thickening, correlate   forcystitis.    Other findings as discussed above.    < end of copied text >

## 2025-01-17 LAB
ALBUMIN SERPL ELPH-MCNC: 1.6 G/DL — LOW (ref 3.3–5.2)
ALP SERPL-CCNC: 294 U/L — HIGH (ref 40–120)
ALT FLD-CCNC: 17 U/L — SIGNIFICANT CHANGE UP
ANION GAP SERPL CALC-SCNC: 13 MMOL/L — SIGNIFICANT CHANGE UP (ref 5–17)
AST SERPL-CCNC: 42 U/L — HIGH
BILIRUB SERPL-MCNC: 0.7 MG/DL — SIGNIFICANT CHANGE UP (ref 0.4–2)
BUN SERPL-MCNC: 7.8 MG/DL — LOW (ref 8–20)
CALCIUM SERPL-MCNC: 7.1 MG/DL — LOW (ref 8.4–10.5)
CHLORIDE SERPL-SCNC: 104 MMOL/L — SIGNIFICANT CHANGE UP (ref 96–108)
CO2 SERPL-SCNC: 19 MMOL/L — LOW (ref 22–29)
CREAT SERPL-MCNC: 0.54 MG/DL — SIGNIFICANT CHANGE UP (ref 0.5–1.3)
EGFR: 92 ML/MIN/1.73M2 — SIGNIFICANT CHANGE UP
GI PCR PANEL: DETECTED
GLUCOSE BLDC GLUCOMTR-MCNC: 102 MG/DL — HIGH (ref 70–99)
GLUCOSE BLDC GLUCOMTR-MCNC: 111 MG/DL — HIGH (ref 70–99)
GLUCOSE BLDC GLUCOMTR-MCNC: 79 MG/DL — SIGNIFICANT CHANGE UP (ref 70–99)
GLUCOSE BLDC GLUCOMTR-MCNC: 98 MG/DL — SIGNIFICANT CHANGE UP (ref 70–99)
GLUCOSE SERPL-MCNC: 98 MG/DL — SIGNIFICANT CHANGE UP (ref 70–99)
HCT VFR BLD CALC: 28.7 % — LOW (ref 34.5–45)
HGB BLD-MCNC: 8.9 G/DL — LOW (ref 11.5–15.5)
MCHC RBC-ENTMCNC: 25.6 PG — LOW (ref 27–34)
MCHC RBC-ENTMCNC: 31 G/DL — LOW (ref 32–36)
MCV RBC AUTO: 82.5 FL — SIGNIFICANT CHANGE UP (ref 80–100)
PLATELET # BLD AUTO: 150 K/UL — SIGNIFICANT CHANGE UP (ref 150–400)
POTASSIUM SERPL-MCNC: 3.8 MMOL/L — SIGNIFICANT CHANGE UP (ref 3.5–5.3)
POTASSIUM SERPL-SCNC: 3.8 MMOL/L — SIGNIFICANT CHANGE UP (ref 3.5–5.3)
PROT SERPL-MCNC: 5 G/DL — LOW (ref 6.6–8.7)
RBC # BLD: 3.48 M/UL — LOW (ref 3.8–5.2)
RBC # FLD: 17.4 % — HIGH (ref 10.3–14.5)
SALMONELLA DNA STL QL NAA+PROBE: DETECTED
SODIUM SERPL-SCNC: 136 MMOL/L — SIGNIFICANT CHANGE UP (ref 135–145)
WBC # BLD: 3.38 K/UL — LOW (ref 3.8–10.5)
WBC # FLD AUTO: 3.38 K/UL — LOW (ref 3.8–10.5)

## 2025-01-17 PROCEDURE — 99232 SBSQ HOSP IP/OBS MODERATE 35: CPT

## 2025-01-17 RX ADMIN — Medication 1 TABLET(S): at 09:05

## 2025-01-17 RX ADMIN — ENOXAPARIN SODIUM 60 MILLIGRAM(S): 100 INJECTION SUBCUTANEOUS at 05:28

## 2025-01-17 RX ADMIN — Medication 1 APPLICATION(S): at 18:14

## 2025-01-17 RX ADMIN — ANTISEPTIC SURGICAL SCRUB 1 APPLICATION(S): 0.04 SOLUTION TOPICAL at 05:35

## 2025-01-17 RX ADMIN — PIPERACILLIN SODIUM AND TAZOBACTAM SODIUM 25 GRAM(S): 2; 250 INJECTION, POWDER, FOR SOLUTION INTRAVENOUS at 05:28

## 2025-01-17 RX ADMIN — Medication 1 APPLICATION(S): at 05:35

## 2025-01-17 RX ADMIN — PIPERACILLIN SODIUM AND TAZOBACTAM SODIUM 25 GRAM(S): 2; 250 INJECTION, POWDER, FOR SOLUTION INTRAVENOUS at 23:00

## 2025-01-17 RX ADMIN — Medication 50 MILLILITER(S): at 05:35

## 2025-01-17 RX ADMIN — Medication 3 UNIT(S): at 09:05

## 2025-01-17 RX ADMIN — PIPERACILLIN SODIUM AND TAZOBACTAM SODIUM 25 GRAM(S): 2; 250 INJECTION, POWDER, FOR SOLUTION INTRAVENOUS at 14:07

## 2025-01-17 RX ADMIN — ENOXAPARIN SODIUM 60 MILLIGRAM(S): 100 INJECTION SUBCUTANEOUS at 18:13

## 2025-01-17 RX ADMIN — Medication 500 MILLIGRAM(S): at 09:05

## 2025-01-17 RX ADMIN — Medication 3 UNIT(S): at 18:13

## 2025-01-17 RX ADMIN — Medication 25 MILLIGRAM(S): at 05:28

## 2025-01-17 RX ADMIN — Medication 3 UNIT(S): at 14:08

## 2025-01-17 RX ADMIN — Medication 2 TABLET(S): at 23:00

## 2025-01-17 RX ADMIN — ROSUVASTATIN CALCIUM 5 MILLIGRAM(S): 10 TABLET, FILM COATED ORAL at 23:00

## 2025-01-17 RX ADMIN — Medication 1 APPLICATION(S): at 09:05

## 2025-01-17 RX ADMIN — SODIUM BICARBONATE 100 MEQ/KG/HR: 42 INJECTION, SOLUTION INTRAVENOUS at 11:51

## 2025-01-17 RX ADMIN — Medication 1 APPLICATION(S): at 18:13

## 2025-01-17 RX ADMIN — Medication 25 MILLIGRAM(S): at 18:13

## 2025-01-17 NOTE — PROGRESS NOTE ADULT - ASSESSMENT
80 y/o F w/ PMH of Afib (on eliquis), HTN, DM2, HLD, h/o stroke, limb ischemia (s/p left AKA, s/p b/l femoral thrombectomy), presented to ED from nursing home for evaluation of stump wound. Nursing home also reports she had a UA positive for UTI. At bedside pt c/o RUQ pain x 2 days. Pt says pain is nonradiating, constant, sharp in nature. Associated w/ chills. Denies N/V/D, fever, CP, SOB. Rest of ROS (-). While in the ED pt received vanc, zosyn, 1.95L NS bolus.    #Acute cholecystitis-sepsis present on admission-   HIDA scan + cystic duct/CBD obstruction. positive for acute cholecystitis, s/p cholecystostomy tube.   cont zosyn per ID till 1/18- not eating well- still uncomfortable frail elderly   - 1/13 HIDA - acute chris   - 1/6 BCx - neg  - s/p cholecystostomy tube by IR on 1/13     Follow culture - E. faecium - DW ID - Plan continue piperacillin-tazobactam  - Leukocytosis resolved   Pain control w/ morphine prn  Blood cx negative    #Hx of limb ischemia  s/p left AKA, s/p b/l femoral thrombectomy  Vascular surgery consulted by ED, recommending local wound care to b/l groins and left AKA stump, vascular will do if patient is admitted, no vacular surgical intervention required at this moment.    #HTN-#Afib  Hold Eliquis changed to therapeutic lovenox - can switch back to Eliquis on discharge  On Metoprolol tartrate 25mg bid   On Irbesartan 150mg daily at home    #DM2  On Glargine 15u qhs and Lispro 5u TID w/ meals    #HLD  Crestor 5mg qhs    #Anemia- hgb8.9 - stable around   No signs of active bleed.  Monitor H/H  Transfuse to keep Hb>7, if clinically indicated    Dispo: active.  on IV zosyn till 1.18- poor oral intake - IVF maintenance- will increase to 100/hr

## 2025-01-17 NOTE — PROGRESS NOTE ADULT - SUBJECTIVE AND OBJECTIVE BOX
DANNI LEAHY Patient is a 79y old  Female who presents with a chief complaint of Sepsis     (09 Jan 2025 12:52)     HPI:  80 y/o F w/ PMH of Afib (on eliquis), HTN, DM2, HLD, h/o stroke, limb ischemia (s/p left AKA, s/p b/l femoral thrombectomy), presented to ED from nursing home for evaluation of stump wound. Nursing home also reports she had a UA positive for UTI. At bedside pt c/o RUQ pain x 2 days. Pt says pain is nonradiating, constant, sharp in nature. Associated w/ chills. Denies N/V/D, fever, CP, SOB. Rest of ROS (-). While in the ED pt received vanc, zosyn, 1.95L NS bolus. (07 Jan 2025 04:43)    The patient was seen and evaluated looks less toxic- isn't really eating - bile drain g dark color- offers no new complaints - has family at bedside   The patient is in no acute distress.      I&O's Summary    16 Jan 2025 07:01  -  17 Jan 2025 07:00  --------------------------------------------------------  IN: 954 mL / OUT: 640 mL / NET: 314 mL    17 Jan 2025 07:01  -  17 Jan 2025 15:34  --------------------------------------------------------  IN: 400 mL / OUT: 0 mL / NET: 400 mL      Allergies    No Known Allergies    Intolerances      HEALTH ISSUES - PROBLEM Dx:        PAST MEDICAL & SURGICAL HISTORY:  DM (diabetes mellitus)      HTN (hypertension)      CVA (cerebrovascular accident)      Afib              Vital Signs Last 24 Hrs  T(C): 36.8 (17 Jan 2025 08:02), Max: 36.9 (17 Jan 2025 01:20)  T(F): 98.3 (17 Jan 2025 08:02), Max: 98.4 (17 Jan 2025 01:20)  HR: 96 (17 Jan 2025 08:02) (92 - 98)  BP: 131/77 (17 Jan 2025 08:02) (117/80 - 141/81)  BP(mean): --  RR: 17 (17 Jan 2025 08:02) (17 - 18)  SpO2: 99% (17 Jan 2025 08:02) (98% - 99%)    Parameters below as of 17 Jan 2025 08:02  Patient On (Oxygen Delivery Method): room air    T(C): 36.8 (01-17-25 @ 08:02), Max: 36.9 (01-17-25 @ 01:20)  HR: 96 (01-17-25 @ 08:02) (92 - 98)  BP: 131/77 (01-17-25 @ 08:02) (117/80 - 141/81)  RR: 17 (01-17-25 @ 08:02) (17 - 18)  SpO2: 99% (01-17-25 @ 08:02) (98% - 99%)  Wt(kg): --    PHYSICAL EXAM:    GENERAL: NAD frail elderly   HEAD:  Atraumatic, Normocephalic  EYES: EOMI,  conjunctiva and sclera clear  ENMT:  Moist mucous membranes,    NERVOUS SYSTEM:  Alert & Oriented X3,  Moves upper and lower extremities; CNS-II-XII  CHEST/LUNG: Clear to auscultation bilaterally; No rales, rhonchi, wheezing,   HEART: Regular rate and rhythm; No murmurs,   ABDOMEN: Soft,  tube draining dark bile Nontender, Nondistended; Bowel sounds present  EXTREMITIES:  Peripheral Pulses, No  cyanosis, or edema  psychiatry- mood and affect     acetaminophen     Tablet .. 650 milliGRAM(s) Oral every 6 hours PRN  aluminum hydroxide/magnesium hydroxide/simethicone Suspension 30 milliLiter(s) Oral every 4 hours PRN  ascorbic acid 500 milliGRAM(s) Oral daily  chlorhexidine 2% Cloths 1 Application(s) Topical <User Schedule>  collagenase Ointment 1 Application(s) Topical daily  collagenase Ointment 1 Application(s) Topical daily  dextrose 5%. 1000 milliLiter(s) IV Continuous <Continuous>  dextrose 5%. 1000 milliLiter(s) IV Continuous <Continuous>  dextrose 50% Injectable 25 Gram(s) IV Push once  dextrose 50% Injectable 12.5 Gram(s) IV Push once  dextrose 50% Injectable 25 Gram(s) IV Push once  dextrose Oral Gel 15 Gram(s) Oral once PRN  enoxaparin Injectable 60 milliGRAM(s) SubCutaneous every 12 hours  glucagon  Injectable 1 milliGRAM(s) IntraMuscular once  hydrocortisone 1% Cream 1 Application(s) Topical two times a day  insulin glargine Injectable (LANTUS) 10 Unit(s) SubCutaneous at bedtime  insulin lispro (ADMELOG) corrective regimen sliding scale   SubCutaneous three times a day before meals  insulin lispro Injectable (ADMELOG) 3 Unit(s) SubCutaneous three times a day before meals  melatonin 3 milliGRAM(s) Oral at bedtime PRN  metoprolol tartrate 25 milliGRAM(s) Oral two times a day  morphine  - Injectable 2.5 milliGRAM(s) IV Push once  multivitamin/minerals 1 Tablet(s) Oral daily  ondansetron Injectable 4 milliGRAM(s) IV Push every 8 hours PRN  piperacillin/tazobactam IVPB.. 3.375 Gram(s) IV Intermittent every 8 hours  polyethylene glycol 3350 17 Gram(s) Oral daily  rosuvastatin 5 milliGRAM(s) Oral at bedtime  senna 2 Tablet(s) Oral at bedtime  sodium bicarbonate  Infusion 0.118 mEq/kG/Hr IV Continuous <Continuous>  sodium chloride 0.9%. 1000 milliLiter(s) IV Continuous <Continuous>  triamcinolone 0.1% Ointment 1 Application(s) Topical every 12 hours      LABS:                          8.9    3.38  )-----------( 150      ( 17 Jan 2025 05:15 )             28.7     01-17    136  |  104  |  7.8[L]  ----------------------------<  98  3.8   |  19.0[L]  |  0.54    Ca    7.1[L]      17 Jan 2025 05:15    TPro  5.0[L]  /  Alb  1.6[L]  /  TBili  0.7  /  DBili  x   /  AST  42[H]  /  ALT  17  /  AlkPhos  294[H]  01-17    LIVER FUNCTIONS - ( 17 Jan 2025 05:15 )  Alb: 1.6 g/dL / Pro: 5.0 g/dL / ALK PHOS: 294 U/L / ALT: 17 U/L / AST: 42 U/L / GGT: x                 Urinalysis Basic - ( 17 Jan 2025 05:15 )    Color: x / Appearance: x / SG: x / pH: x  Gluc: 98 mg/dL / Ketone: x  / Bili: x / Urobili: x   Blood: x / Protein: x / Nitrite: x   Leuk Esterase: x / RBC: x / WBC x   Sq Epi: x / Non Sq Epi: x / Bacteria: x      CAPILLARY BLOOD GLUCOSE      POCT Blood Glucose.: 98 mg/dL (17 Jan 2025 12:08)  POCT Blood Glucose.: 111 mg/dL (17 Jan 2025 08:04)  POCT Blood Glucose.: 98 mg/dL (16 Jan 2025 22:10)  POCT Blood Glucose.: 128 mg/dL (16 Jan 2025 17:16)      RADIOLOGY & ADDITIONAL TESTS:      Consultant notes reviewed  I independently reviwed all images/ labarotory results /cultures/ telemetry/EKG and my findings are indicated above  and discussed care plan with consultants/nursing/ family /patient

## 2025-01-18 LAB
ANION GAP SERPL CALC-SCNC: 12 MMOL/L — SIGNIFICANT CHANGE UP (ref 5–17)
BUN SERPL-MCNC: 5.8 MG/DL — LOW (ref 8–20)
CALCIUM SERPL-MCNC: 7.3 MG/DL — LOW (ref 8.4–10.5)
CHLORIDE SERPL-SCNC: 104 MMOL/L — SIGNIFICANT CHANGE UP (ref 96–108)
CK SERPL-CCNC: 16 U/L — LOW (ref 25–170)
CO2 SERPL-SCNC: 24 MMOL/L — SIGNIFICANT CHANGE UP (ref 22–29)
CREAT SERPL-MCNC: 0.54 MG/DL — SIGNIFICANT CHANGE UP (ref 0.5–1.3)
CULTURE RESULTS: ABNORMAL
CULTURE RESULTS: SIGNIFICANT CHANGE UP
EGFR: 92 ML/MIN/1.73M2 — SIGNIFICANT CHANGE UP
GLUCOSE BLDC GLUCOMTR-MCNC: 103 MG/DL — HIGH (ref 70–99)
GLUCOSE BLDC GLUCOMTR-MCNC: 112 MG/DL — HIGH (ref 70–99)
GLUCOSE BLDC GLUCOMTR-MCNC: 117 MG/DL — HIGH (ref 70–99)
GLUCOSE BLDC GLUCOMTR-MCNC: 126 MG/DL — HIGH (ref 70–99)
GLUCOSE BLDC GLUCOMTR-MCNC: 99 MG/DL — SIGNIFICANT CHANGE UP (ref 70–99)
GLUCOSE SERPL-MCNC: 130 MG/DL — HIGH (ref 70–99)
HCT VFR BLD CALC: 34.3 % — LOW (ref 34.5–45)
HGB BLD-MCNC: 10.6 G/DL — LOW (ref 11.5–15.5)
LACTATE SERPL-SCNC: 1.4 MMOL/L — SIGNIFICANT CHANGE UP (ref 0.5–2)
MAGNESIUM SERPL-MCNC: 1.5 MG/DL — LOW (ref 1.6–2.6)
MCHC RBC-ENTMCNC: 25.5 PG — LOW (ref 27–34)
MCHC RBC-ENTMCNC: 30.9 G/DL — LOW (ref 32–36)
MCV RBC AUTO: 82.7 FL — SIGNIFICANT CHANGE UP (ref 80–100)
ORGANISM # SPEC MICROSCOPIC CNT: ABNORMAL
ORGANISM # SPEC MICROSCOPIC CNT: SIGNIFICANT CHANGE UP
PHOSPHATE SERPL-MCNC: 2.5 MG/DL — SIGNIFICANT CHANGE UP (ref 2.4–4.7)
PLATELET # BLD AUTO: 171 K/UL — SIGNIFICANT CHANGE UP (ref 150–400)
POTASSIUM SERPL-MCNC: 3.5 MMOL/L — SIGNIFICANT CHANGE UP (ref 3.5–5.3)
POTASSIUM SERPL-SCNC: 3.5 MMOL/L — SIGNIFICANT CHANGE UP (ref 3.5–5.3)
RBC # BLD: 4.15 M/UL — SIGNIFICANT CHANGE UP (ref 3.8–5.2)
RBC # FLD: 17.6 % — HIGH (ref 10.3–14.5)
SODIUM SERPL-SCNC: 140 MMOL/L — SIGNIFICANT CHANGE UP (ref 135–145)
SPECIMEN SOURCE: SIGNIFICANT CHANGE UP
SPECIMEN SOURCE: SIGNIFICANT CHANGE UP
TROPONIN T, HIGH SENSITIVITY RESULT: 30 NG/L — SIGNIFICANT CHANGE UP (ref 0–51)
WBC # BLD: 4.75 K/UL — SIGNIFICANT CHANGE UP (ref 3.8–10.5)
WBC # FLD AUTO: 4.75 K/UL — SIGNIFICANT CHANGE UP (ref 3.8–10.5)

## 2025-01-18 PROCEDURE — 99232 SBSQ HOSP IP/OBS MODERATE 35: CPT

## 2025-01-18 PROCEDURE — 93010 ELECTROCARDIOGRAM REPORT: CPT

## 2025-01-18 RX ORDER — MORPHINE SULFATE 60 MG/1
2 TABLET, FILM COATED, EXTENDED RELEASE ORAL EVERY 6 HOURS
Refills: 0 | Status: DISCONTINUED | OUTPATIENT
Start: 2025-01-18 | End: 2025-01-24

## 2025-01-18 RX ORDER — MAGNESIUM SULFATE 0.8 MEQ/ML
2 AMPUL (ML) INJECTION ONCE
Refills: 0 | Status: COMPLETED | OUTPATIENT
Start: 2025-01-18 | End: 2025-01-18

## 2025-01-18 RX ORDER — MORPHINE SULFATE 60 MG/1
2 TABLET, FILM COATED, EXTENDED RELEASE ORAL ONCE
Refills: 0 | Status: DISCONTINUED | OUTPATIENT
Start: 2025-01-18 | End: 2025-01-18

## 2025-01-18 RX ADMIN — ANTISEPTIC SURGICAL SCRUB 1 APPLICATION(S): 0.04 SOLUTION TOPICAL at 05:42

## 2025-01-18 RX ADMIN — Medication 1 TABLET(S): at 13:31

## 2025-01-18 RX ADMIN — MORPHINE SULFATE 2 MILLIGRAM(S): 60 TABLET, FILM COATED, EXTENDED RELEASE ORAL at 18:00

## 2025-01-18 RX ADMIN — Medication 1 APPLICATION(S): at 13:28

## 2025-01-18 RX ADMIN — ACETAMINOPHEN 650 MILLIGRAM(S): 160 SUSPENSION ORAL at 13:25

## 2025-01-18 RX ADMIN — ACETAMINOPHEN 650 MILLIGRAM(S): 160 SUSPENSION ORAL at 14:25

## 2025-01-18 RX ADMIN — PIPERACILLIN SODIUM AND TAZOBACTAM SODIUM 25 GRAM(S): 2; 250 INJECTION, POWDER, FOR SOLUTION INTRAVENOUS at 05:43

## 2025-01-18 RX ADMIN — Medication 1 APPLICATION(S): at 05:42

## 2025-01-18 RX ADMIN — Medication 25 GRAM(S): at 18:37

## 2025-01-18 RX ADMIN — SODIUM BICARBONATE 100 MEQ/KG/HR: 42 INJECTION, SOLUTION INTRAVENOUS at 13:32

## 2025-01-18 RX ADMIN — POLYETHYLENE GLYCOL 3350 17 GRAM(S): 17 POWDER, FOR SOLUTION ORAL at 13:25

## 2025-01-18 RX ADMIN — Medication 1 APPLICATION(S): at 18:39

## 2025-01-18 RX ADMIN — Medication 1 APPLICATION(S): at 13:26

## 2025-01-18 RX ADMIN — PIPERACILLIN SODIUM AND TAZOBACTAM SODIUM 25 GRAM(S): 2; 250 INJECTION, POWDER, FOR SOLUTION INTRAVENOUS at 21:57

## 2025-01-18 RX ADMIN — Medication 1 APPLICATION(S): at 13:27

## 2025-01-18 RX ADMIN — ENOXAPARIN SODIUM 60 MILLIGRAM(S): 100 INJECTION SUBCUTANEOUS at 05:42

## 2025-01-18 RX ADMIN — ROSUVASTATIN CALCIUM 5 MILLIGRAM(S): 10 TABLET, FILM COATED ORAL at 22:01

## 2025-01-18 RX ADMIN — MORPHINE SULFATE 2 MILLIGRAM(S): 60 TABLET, FILM COATED, EXTENDED RELEASE ORAL at 17:00

## 2025-01-18 RX ADMIN — Medication 30 MILLILITER(S): at 13:25

## 2025-01-18 RX ADMIN — Medication 500 MILLIGRAM(S): at 13:25

## 2025-01-18 RX ADMIN — SODIUM BICARBONATE 100 MEQ/KG/HR: 42 INJECTION, SOLUTION INTRAVENOUS at 21:59

## 2025-01-18 RX ADMIN — Medication 25 MILLIGRAM(S): at 05:42

## 2025-01-18 RX ADMIN — INSULIN GLARGINE-YFGN 10 UNIT(S): 100 INJECTION, SOLUTION SUBCUTANEOUS at 22:00

## 2025-01-18 RX ADMIN — Medication 1 APPLICATION(S): at 18:50

## 2025-01-18 RX ADMIN — PIPERACILLIN SODIUM AND TAZOBACTAM SODIUM 25 GRAM(S): 2; 250 INJECTION, POWDER, FOR SOLUTION INTRAVENOUS at 13:30

## 2025-01-18 RX ADMIN — Medication 2 TABLET(S): at 22:01

## 2025-01-18 RX ADMIN — ENOXAPARIN SODIUM 60 MILLIGRAM(S): 100 INJECTION SUBCUTANEOUS at 18:38

## 2025-01-18 NOTE — RAPID RESPONSE TEAM SUMMARY - NSADDTLFINDINGSRRT_GEN_ALL_CORE
Vital Signs  T(F): 97.9  HR: 131  BP: 178/101  RR: 26  SpO2: 99%   FS: 126    PHYSICAL EXAM:  GENERAL: Patient moaning in pain, lying in bed; NAD  LUNGS: Good air entry bilaterally, clear to auscultation b/l, symmetric breath sounds; No wheezing or rhonchi appreciated, unlabored respirations  HEART: Regular rate and rhythm  GASTROINTESTINAL: Bowel sounds present, no guarding or rigidity; Abdomen is soft, nontender, and nondistended, negative grey ferreira/david sign  NEUROLOGIC: Awake, alert & oriented X 3, answers questions appropriately, no gross neuro deficits   .

## 2025-01-18 NOTE — PROGRESS NOTE ADULT - ASSESSMENT
78 y/o F w/ PMH of Afib (on eliquis), HTN, DM2, HLD, h/o stroke, limb ischemia (s/p left AKA, s/p b/l femoral thrombectomy), presented to ED from nursing home for evaluation of stump wound. Nursing home also reports she had a UA positive for UTI. At bedside pt c/o RUQ pain x 2 days. Pt says pain is nonradiating, constant, sharp in nature. Associated w/ chills. Denies N/V/D, fever, CP, SOB. Rest of ROS (-). While in the ED pt received vanc, zosyn, 1.95L NS bolus.    #Acute cholecystitis-sepsis present on admission-   HIDA scan + cystic duct/CBD obstruction. positive for acute cholecystitis, s/p cholecystostomy tube.1/13 by IR   cont zosyn per ID till 1/18- not eating well- still uncomfortable frail elderly - support with IVF  - 1/13 HIDA - acute chris   - 1/6 BCx - neg   Follow culture - E. faecium - DW ID - Plan continue piperacillin-tazobactam  Pain control w/ morphine prn  Blood cx negative    #Hx of limb ischemia  s/p left AKA, s/p b/l femoral thrombectomy  Vascular surgery consulted by ED, recommending local wound care to b/l groins and left AKA stump, vascular will do if patient is admitted, no vacular surgical intervention required at this moment.    #HTN-#Afib  Hold Eliquis changed to therapeutic lovenox - can switch back to Eliquis on discharge  On Metoprolol tartrate 25mg bid   On Irbesartan 150mg daily at home    #DM2  On Glargine 15u qhs and Lispro 5u TID w/ meals    #HLD  Crestor 5mg qhs    #Anemia- hgb8.9 - stable around   No signs of active bleed.  Monitor H/H  Transfuse to keep Hb>7, if clinically indicated    Dispo: active.  on IV zosyn till 1.18- poor oral intake - IVF maintenance- will increase to 100/hr

## 2025-01-18 NOTE — CHART NOTE - NSCHARTNOTEFT_GEN_A_CORE
Pt seen at bedside for 2 hour follow up. Patient states that the pain has significantly improved. She reports that he pain was hindering her from taking a deep breath previously. Breathing now improved w/ treatment of pain. Patient is without any other acute complaints.       Vital Signs Last 24 Hrs  T(C): 36.6 (18 Jan 2025 15:45), Max: 36.9 (18 Jan 2025 07:30)  T(F): 97.9 (18 Jan 2025 15:45), Max: 98.5 (18 Jan 2025 07:30)  HR: 94 (18 Jan 2025 15:45) (89 - 105)  BP: 130/63 (18 Jan 2025 15:45) (125/60 - 157/82)  BP(mean): --  RR: 18 (18 Jan 2025 15:45) (16 - 18)  SpO2: 98% (18 Jan 2025 15:45) (94% - 98%)    Parameters below as of 18 Jan 2025 15:45  Patient On (Oxygen Delivery Method): room air    PHYSICAL EXAM:  GENERAL: Patient moaning in pain, lying in bed; NAD  LUNGS: Good air entry bilaterally, clear to auscultation b/l, symmetric breath sounds; No wheezing or rhonchi appreciated, unlabored respirations  HEART: Regular rate and rhythm  GASTROINTESTINAL: Bowel sounds present, no guarding or rigidity; Abdomen is soft, nontender, and nondistended, negative grey ferreira/david sign  NEUROLOGIC: Awake, alert & oriented X 3, answers questions appropriately, no gross neuro deficits       A/P: 79y old Female s/p RRT called for CP/staff concern.   - Significantly improved  - Labs wnl. Cardiac enzymes (-). ECG w/o ischemic changes  - Mg supplemented  - c/w prn morphine for pain  - VSS  - Continue to monitor  - RN to notify for an changes    DISPOSITION:  - Maintain current level of care  - Continue to observe

## 2025-01-18 NOTE — PROGRESS NOTE ADULT - SUBJECTIVE AND OBJECTIVE BOX
DANNI LEAHY Patient is a 79y old  Female who presents with a chief complaint of Sepsis     (09 Jan 2025 12:52)     HPI:  78 y/o F w/ PMH of Afib (on eliquis), HTN, DM2, HLD, h/o stroke, limb ischemia (s/p left AKA, s/p b/l femoral thrombectomy), presented to ED from nursing home for evaluation of stump wound. Nursing home also reports she had a UA positive for UTI. At bedside pt c/o RUQ pain x 2 days. Pt says pain is nonradiating, constant, sharp in nature. Associated w/ chills. Denies N/V/D, fever, CP, SOB. Rest of ROS (-). While in the ED pt received vanc, zosyn, 1.95L NS bolus. (07 Jan 2025 04:43)    The patient was seen and evaluated - refused morning labs- spoke to her again and now she agrees to blood draw  The patient is in no acute distress.      I&O's Summary    17 Jan 2025 07:01  -  18 Jan 2025 07:00  --------------------------------------------------------  IN: 2374 mL / OUT: 1650 mL / NET: 724 mL      Allergies    No Known Allergies    Intolerances      HEALTH ISSUES - PROBLEM Dx:        PAST MEDICAL & SURGICAL HISTORY:  DM (diabetes mellitus)      HTN (hypertension)      CVA (cerebrovascular accident)      Afib              Vital Signs Last 24 Hrs  T(C): 36.9 (18 Jan 2025 07:30), Max: 37.1 (17 Jan 2025 16:35)  T(F): 98.5 (18 Jan 2025 07:30), Max: 98.7 (17 Jan 2025 16:35)  HR: 101 (18 Jan 2025 07:30) (89 - 101)  BP: 135/65 (18 Jan 2025 07:30) (125/60 - 147/75)  BP(mean): --  RR: 18 (18 Jan 2025 07:30) (16 - 18)  SpO2: 96% (18 Jan 2025 07:30) (95% - 98%)    Parameters below as of 18 Jan 2025 07:30  Patient On (Oxygen Delivery Method): room air    T(C): 36.9 (01-18-25 @ 07:30), Max: 37.1 (01-17-25 @ 16:35)  HR: 101 (01-18-25 @ 07:30) (89 - 101)  BP: 135/65 (01-18-25 @ 07:30) (125/60 - 147/75)  RR: 18 (01-18-25 @ 07:30) (16 - 18)  SpO2: 96% (01-18-25 @ 07:30) (95% - 98%)  Wt(kg): --    PHYSICAL EXAM:    GENERAL: NAD frail eldelry, bile dark draining  HEAD:  Atraumatic, Normocephalic  EYES: EOMI, , conjunctiva and sclera clear  ENMT:  Moist mucous membranes,   NERVOUS SYSTEM:  Alert & Oriented X2,  Moves upper and lower extremities; CNS-II-XII  CHEST/LUNG: Clear to auscultation bilaterally; No rales, rhonchi, wheezing,   HEART: Regular rate and rhythm;   ABDOMEN: Soft, Nontender, Nondistended; Bowel sounds present  EXTREMITIES:  Peripheral Pulses, No  cyanosis, or edema      acetaminophen     Tablet .. 650 milliGRAM(s) Oral every 6 hours PRN  aluminum hydroxide/magnesium hydroxide/simethicone Suspension 30 milliLiter(s) Oral every 4 hours PRN  ascorbic acid 500 milliGRAM(s) Oral daily  chlorhexidine 2% Cloths 1 Application(s) Topical <User Schedule>  collagenase Ointment 1 Application(s) Topical daily  collagenase Ointment 1 Application(s) Topical daily  dextrose 5%. 1000 milliLiter(s) IV Continuous <Continuous>  dextrose 5%. 1000 milliLiter(s) IV Continuous <Continuous>  dextrose 50% Injectable 25 Gram(s) IV Push once  dextrose 50% Injectable 12.5 Gram(s) IV Push once  dextrose 50% Injectable 25 Gram(s) IV Push once  dextrose Oral Gel 15 Gram(s) Oral once PRN  enoxaparin Injectable 60 milliGRAM(s) SubCutaneous every 12 hours  glucagon  Injectable 1 milliGRAM(s) IntraMuscular once  hydrocortisone 1% Cream 1 Application(s) Topical two times a day  insulin glargine Injectable (LANTUS) 10 Unit(s) SubCutaneous at bedtime  insulin lispro (ADMELOG) corrective regimen sliding scale   SubCutaneous three times a day before meals  insulin lispro Injectable (ADMELOG) 3 Unit(s) SubCutaneous three times a day before meals  melatonin 3 milliGRAM(s) Oral at bedtime PRN  metoprolol tartrate 25 milliGRAM(s) Oral two times a day  morphine  - Injectable 2.5 milliGRAM(s) IV Push once  multivitamin/minerals 1 Tablet(s) Oral daily  ondansetron Injectable 4 milliGRAM(s) IV Push every 8 hours PRN  piperacillin/tazobactam IVPB.. 3.375 Gram(s) IV Intermittent every 8 hours  polyethylene glycol 3350 17 Gram(s) Oral daily  rosuvastatin 5 milliGRAM(s) Oral at bedtime  senna 2 Tablet(s) Oral at bedtime  sodium bicarbonate  Infusion 0.118 mEq/kG/Hr IV Continuous <Continuous>  triamcinolone 0.1% Ointment 1 Application(s) Topical every 12 hours      LABS:                          8.9    3.38  )-----------( 150      ( 17 Jan 2025 05:15 )             28.7     01-17    136  |  104  |  7.8[L]  ----------------------------<  98  3.8   |  19.0[L]  |  0.54    Ca    7.1[L]      17 Jan 2025 05:15    TPro  5.0[L]  /  Alb  1.6[L]  /  TBili  0.7  /  DBili  x   /  AST  42[H]  /  ALT  17  /  AlkPhos  294[H]  01-17    LIVER FUNCTIONS - ( 17 Jan 2025 05:15 )  Alb: 1.6 g/dL / Pro: 5.0 g/dL / ALK PHOS: 294 U/L / ALT: 17 U/L / AST: 42 U/L / GGT: x                 Urinalysis Basic - ( 17 Jan 2025 05:15 )    Color: x / Appearance: x / SG: x / pH: x  Gluc: 98 mg/dL / Ketone: x  / Bili: x / Urobili: x   Blood: x / Protein: x / Nitrite: x   Leuk Esterase: x / RBC: x / WBC x   Sq Epi: x / Non Sq Epi: x / Bacteria: x      CAPILLARY BLOOD GLUCOSE      POCT Blood Glucose.: 103 mg/dL (18 Jan 2025 12:13)  POCT Blood Glucose.: 112 mg/dL (18 Jan 2025 08:13)  POCT Blood Glucose.: 99 mg/dL (18 Jan 2025 01:52)  POCT Blood Glucose.: 79 mg/dL (17 Jan 2025 22:58)  POCT Blood Glucose.: 102 mg/dL (17 Jan 2025 17:19)      RADIOLOGY & ADDITIONAL TESTS:      Consultant notes reviewed  I independently reviwed all images/ labarotory results /cultures/ telemetry/EKG and my findings are indicated above  and discussed care plan with consultants/nursing/ family /patient

## 2025-01-18 NOTE — RAPID RESPONSE TEAM SUMMARY - NSSITUATIONBACKGROUNDRRT_GEN_ALL_CORE
79F with Afib (on eliquis), HTN, DM2, HLD, h/o stroke, limb ischemia (s/p left AKA on 12/16, s/p b/l femoral thrombectomy), presented to ED from nursing home for evaluation of stump wound and RUQ pain. Found to have acute cholecystitis. IR consulted and placed cholecystostomy tube on 1/13/25.    RR called for CP. Pt seen at bedside moaning in pain. She is [place] speaking w/ grandson at bedside to help w/ communication. Pt c/o of sternal CP. Unable to gather further information in regard to the nature of her pain to pt moaning. Morphine 2mg IVP given w/ significant relief. She reports almost complete resolution of her pain, aside from a mild HA. She is follow commands and answers questions appropriately. ECG performed and w/o acute concern. Pain likely due to cholecystitis. Will f/u labs to r/o ACS and/or acute bleed.  .

## 2025-01-19 LAB
ALBUMIN SERPL ELPH-MCNC: 1.8 G/DL — LOW (ref 3.3–5.2)
ALP SERPL-CCNC: 243 U/L — HIGH (ref 40–120)
ALT FLD-CCNC: 14 U/L — SIGNIFICANT CHANGE UP
ANION GAP SERPL CALC-SCNC: 7 MMOL/L — SIGNIFICANT CHANGE UP (ref 5–17)
AST SERPL-CCNC: 30 U/L — SIGNIFICANT CHANGE UP
BILIRUB SERPL-MCNC: 0.7 MG/DL — SIGNIFICANT CHANGE UP (ref 0.4–2)
BUN SERPL-MCNC: 5 MG/DL — LOW (ref 8–20)
CALCIUM SERPL-MCNC: 7 MG/DL — LOW (ref 8.4–10.5)
CHLORIDE SERPL-SCNC: 104 MMOL/L — SIGNIFICANT CHANGE UP (ref 96–108)
CO2 SERPL-SCNC: 27 MMOL/L — SIGNIFICANT CHANGE UP (ref 22–29)
CREAT SERPL-MCNC: 0.5 MG/DL — SIGNIFICANT CHANGE UP (ref 0.5–1.3)
EGFR: 94 ML/MIN/1.73M2 — SIGNIFICANT CHANGE UP
GLUCOSE BLDC GLUCOMTR-MCNC: 102 MG/DL — HIGH (ref 70–99)
GLUCOSE BLDC GLUCOMTR-MCNC: 112 MG/DL — HIGH (ref 70–99)
GLUCOSE BLDC GLUCOMTR-MCNC: 126 MG/DL — HIGH (ref 70–99)
GLUCOSE BLDC GLUCOMTR-MCNC: 136 MG/DL — HIGH (ref 70–99)
GLUCOSE BLDC GLUCOMTR-MCNC: 90 MG/DL — SIGNIFICANT CHANGE UP (ref 70–99)
GLUCOSE SERPL-MCNC: 125 MG/DL — HIGH (ref 70–99)
HCT VFR BLD CALC: 33.3 % — LOW (ref 34.5–45)
HGB BLD-MCNC: 10.1 G/DL — LOW (ref 11.5–15.5)
MCHC RBC-ENTMCNC: 25.6 PG — LOW (ref 27–34)
MCHC RBC-ENTMCNC: 30.3 G/DL — LOW (ref 32–36)
MCV RBC AUTO: 84.3 FL — SIGNIFICANT CHANGE UP (ref 80–100)
PLATELET # BLD AUTO: 152 K/UL — SIGNIFICANT CHANGE UP (ref 150–400)
POTASSIUM SERPL-MCNC: 3.3 MMOL/L — LOW (ref 3.5–5.3)
POTASSIUM SERPL-SCNC: 3.3 MMOL/L — LOW (ref 3.5–5.3)
PROT SERPL-MCNC: 5.4 G/DL — LOW (ref 6.6–8.7)
RBC # BLD: 3.95 M/UL — SIGNIFICANT CHANGE UP (ref 3.8–5.2)
RBC # FLD: 17.8 % — HIGH (ref 10.3–14.5)
SODIUM SERPL-SCNC: 138 MMOL/L — SIGNIFICANT CHANGE UP (ref 135–145)
WBC # BLD: 5.05 K/UL — SIGNIFICANT CHANGE UP (ref 3.8–10.5)
WBC # FLD AUTO: 5.05 K/UL — SIGNIFICANT CHANGE UP (ref 3.8–10.5)

## 2025-01-19 PROCEDURE — 71045 X-RAY EXAM CHEST 1 VIEW: CPT | Mod: 26

## 2025-01-19 PROCEDURE — 99232 SBSQ HOSP IP/OBS MODERATE 35: CPT

## 2025-01-19 RX ORDER — ALPRAZOLAM 2 MG
0.25 TABLET ORAL ONCE
Refills: 0 | Status: DISCONTINUED | OUTPATIENT
Start: 2025-01-19 | End: 2025-01-19

## 2025-01-19 RX ADMIN — Medication 1 APPLICATION(S): at 17:40

## 2025-01-19 RX ADMIN — Medication 25 MILLIGRAM(S): at 17:38

## 2025-01-19 RX ADMIN — SODIUM BICARBONATE 100 MEQ/KG/HR: 42 INJECTION, SOLUTION INTRAVENOUS at 22:48

## 2025-01-19 RX ADMIN — Medication 2 TABLET(S): at 21:42

## 2025-01-19 RX ADMIN — ROSUVASTATIN CALCIUM 5 MILLIGRAM(S): 10 TABLET, FILM COATED ORAL at 21:43

## 2025-01-19 RX ADMIN — ANTISEPTIC SURGICAL SCRUB 1 APPLICATION(S): 0.04 SOLUTION TOPICAL at 06:41

## 2025-01-19 RX ADMIN — ENOXAPARIN SODIUM 60 MILLIGRAM(S): 100 INJECTION SUBCUTANEOUS at 06:41

## 2025-01-19 RX ADMIN — Medication 3 UNIT(S): at 08:42

## 2025-01-19 RX ADMIN — Medication 1 APPLICATION(S): at 06:41

## 2025-01-19 RX ADMIN — Medication 3 UNIT(S): at 12:33

## 2025-01-19 RX ADMIN — MORPHINE SULFATE 2 MILLIGRAM(S): 60 TABLET, FILM COATED, EXTENDED RELEASE ORAL at 21:42

## 2025-01-19 RX ADMIN — INSULIN GLARGINE-YFGN 10 UNIT(S): 100 INJECTION, SOLUTION SUBCUTANEOUS at 21:42

## 2025-01-19 RX ADMIN — Medication 0.25 MILLIGRAM(S): at 02:07

## 2025-01-19 RX ADMIN — PIPERACILLIN SODIUM AND TAZOBACTAM SODIUM 25 GRAM(S): 2; 250 INJECTION, POWDER, FOR SOLUTION INTRAVENOUS at 06:38

## 2025-01-19 RX ADMIN — Medication 25 MILLIGRAM(S): at 06:41

## 2025-01-19 RX ADMIN — MORPHINE SULFATE 2 MILLIGRAM(S): 60 TABLET, FILM COATED, EXTENDED RELEASE ORAL at 16:32

## 2025-01-19 RX ADMIN — Medication 1 TABLET(S): at 17:39

## 2025-01-19 RX ADMIN — Medication 1 APPLICATION(S): at 15:34

## 2025-01-19 RX ADMIN — Medication 1 APPLICATION(S): at 15:33

## 2025-01-19 RX ADMIN — Medication 500 MILLIGRAM(S): at 15:33

## 2025-01-19 RX ADMIN — MORPHINE SULFATE 2 MILLIGRAM(S): 60 TABLET, FILM COATED, EXTENDED RELEASE ORAL at 15:32

## 2025-01-19 RX ADMIN — Medication 3 UNIT(S): at 17:30

## 2025-01-19 RX ADMIN — ENOXAPARIN SODIUM 60 MILLIGRAM(S): 100 INJECTION SUBCUTANEOUS at 17:36

## 2025-01-19 NOTE — PROGRESS NOTE ADULT - SUBJECTIVE AND OBJECTIVE BOX
DANNI TOSIN    [id number]    79y      Female    Patient is a 79y old  Female who presents with a chief complaint of Sepsis     (09 Jan 2025 12:52)      INTERVAL HPI/OVERNIGHT EVENTS:    Patient is doing ok, denies fever, chills, chest pain, sob     Vital Signs Last 24 Hrs  T(C): 36.4 (19 Jan 2025 08:00), Max: 36.9 (18 Jan 2025 12:45)  T(F): 97.5 (19 Jan 2025 08:00), Max: 98.4 (18 Jan 2025 12:45)  HR: 67 (19 Jan 2025 08:00) (67 - 131)  BP: 126/63 (19 Jan 2025 08:00) (111/68 - 178/101)  RR: 18 (19 Jan 2025 08:00) (17 - 26)  SpO2: 96% (19 Jan 2025 08:00) (94% - 99%)    Parameters below as of 19 Jan 2025 08:00  Patient On (Oxygen Delivery Method): room air        PHYSICAL EXAM:    GENERAL: Elderly male looking comfortable   HEENT: atraumatic   NECK: soft, Supple, No JVD  CHEST/LUNG: Clear to auscultate bilaterally; No wheezing  HEART: S1S2+, Regular rate and rhythm; No murmurs  ABDOMEN: Soft, Nontender, Nondistended; Bowel sounds present, RUQ drain in place   EXTREMITIES:  Left BKA, stoma covered with dressings   SKIN: No rashes or lesions  NEURO: AAOX3  PSYCH: normal mood        LABS:                        10.6   4.75  )-----------( 171      ( 18 Jan 2025 16:57 )             34.3     01-18    140  |  104  |  5.8[L]  ----------------------------<  130[H]  3.5   |  24.0  |  0.54    Ca    7.3[L]      18 Jan 2025 16:39  Phos  2.5     01-18  Mg     1.5     01-18          I&O's Summary    18 Jan 2025 07:01  -  19 Jan 2025 07:00  --------------------------------------------------------  IN: 1200 mL / OUT: 1600 mL / NET: -400 mL        MEDICATIONS  (STANDING):  ascorbic acid 500 milliGRAM(s) Oral daily  chlorhexidine 2% Cloths 1 Application(s) Topical <User Schedule>  collagenase Ointment 1 Application(s) Topical daily  collagenase Ointment 1 Application(s) Topical daily  dextrose 5%. 1000 milliLiter(s) (50 mL/Hr) IV Continuous <Continuous>  dextrose 5%. 1000 milliLiter(s) (100 mL/Hr) IV Continuous <Continuous>  dextrose 50% Injectable 25 Gram(s) IV Push once  dextrose 50% Injectable 12.5 Gram(s) IV Push once  dextrose 50% Injectable 25 Gram(s) IV Push once  enoxaparin Injectable 60 milliGRAM(s) SubCutaneous every 12 hours  glucagon  Injectable 1 milliGRAM(s) IntraMuscular once  hydrocortisone 1% Cream 1 Application(s) Topical two times a day  insulin glargine Injectable (LANTUS) 10 Unit(s) SubCutaneous at bedtime  insulin lispro (ADMELOG) corrective regimen sliding scale   SubCutaneous three times a day before meals  insulin lispro Injectable (ADMELOG) 3 Unit(s) SubCutaneous three times a day before meals  metoprolol tartrate 25 milliGRAM(s) Oral two times a day  morphine  - Injectable 2.5 milliGRAM(s) IV Push once  multivitamin/minerals 1 Tablet(s) Oral daily  piperacillin/tazobactam IVPB.. 3.375 Gram(s) IV Intermittent every 8 hours  polyethylene glycol 3350 17 Gram(s) Oral daily  rosuvastatin 5 milliGRAM(s) Oral at bedtime  senna 2 Tablet(s) Oral at bedtime  sodium bicarbonate  Infusion 0.118 mEq/kG/Hr (100 mL/Hr) IV Continuous <Continuous>  triamcinolone 0.1% Ointment 1 Application(s) Topical every 12 hours    MEDICATIONS  (PRN):  acetaminophen     Tablet .. 650 milliGRAM(s) Oral every 6 hours PRN Temp greater or equal to 38C (100.4F), Mild Pain (1 - 3)  aluminum hydroxide/magnesium hydroxide/simethicone Suspension 30 milliLiter(s) Oral every 4 hours PRN Dyspepsia  dextrose Oral Gel 15 Gram(s) Oral once PRN Blood Glucose LESS THAN 70 milliGRAM(s)/deciliter  melatonin 3 milliGRAM(s) Oral at bedtime PRN Insomnia  morphine  - Injectable 2 milliGRAM(s) IV Push every 6 hours PRN Severe Pain (7 - 10)  ondansetron Injectable 4 milliGRAM(s) IV Push every 8 hours PRN Nausea and/or Vomiting

## 2025-01-19 NOTE — PROGRESS NOTE ADULT - ASSESSMENT
78 y/o F w/ PMH of Afib (on eliquis), HTN, DM2, HLD, h/o stroke, limb ischemia (s/p left AKA, s/p b/l femoral thrombectomy), presented to ED from nursing home for evaluation of stump wound. Nursing home also reports she had a UA positive for UTI. At bedside pt c/o RUQ pain x 2 days. Pt says pain is nonradiating, constant, sharp in nature. Associated w/ chills. Denies N/V/D, fever, CP, SOB. Rest of ROS (-). While in the ED pt received vanc, zosyn, 1.95L NS bolus.    Plan:     #Acute cholecystitis-sepsis present on admission   HIDA scan + cystic duct/CBD obstruction. positive for acute cholecystitis, s/p cholecystostomy tube.1/13 by IR   cont zosyn per ID till 1/18- not eating well- still uncomfortable frail elderly - support with IVF  - 1/13 HIDA - acute chris   - 1/6 BCx - neg   Follow culture - E. faecium - DW ID  Pain control w/ morphine prn  Blood cx negative  as per ID completed piperacillin-tazobactam  would check with surgery about the drain management         #Hx of limb ischemia  s/p left AKA, s/p b/l femoral thrombectomy  Vascular surgery consulted by ED, recommending local wound care to b/l groins and left AKA stump, vascular will do if patient is admitted, no vacular surgical intervention required at this moment.    #HTN-#Afib  Hold Eliquis changed to therapeutic lovenox - can switch back to Eliquis on discharge  On Metoprolol tartrate 25mg bid   On Irbesartan 150mg daily at home    #DM2  On Glargine 15u qhs and Lispro 5u TID w/ meals    #HLD  Crestor 5mg qhs    #Anemia- hgb8.9 - stable around   No signs of active bleed.  Monitor H/H  Transfuse to keep Hb>7, if clinically indicated.     she has no further chest pain, labs work came ok.     Dispo: active. D/c back to Rehab      78 y/o F w/ PMH of Afib (on eliquis), HTN, DM2, HLD, h/o stroke, limb ischemia (s/p left AKA, s/p b/l femoral thrombectomy), presented to ED from nursing home for evaluation of stump wound. Nursing home also reports she had a UA positive for UTI. At bedside pt c/o RUQ pain x 2 days. Pt says pain is nonradiating, constant, sharp in nature. Associated w/ chills. Denies N/V/D, fever, CP, SOB. Rest of ROS (-). While in the ED pt received vanc, zosyn, 1.95L NS bolus.    Plan:     #Acute cholecystitis-sepsis present on admission   HIDA scan + cystic duct/CBD obstruction. positive for acute cholecystitis, s/p cholecystostomy tube.1/13 by IR   cont zosyn per ID till 1/18  - 1/13 HIDA - acute chris   - 1/6 BCx - neg   Follow culture - E. faecium - DW ID  Pain control w/ morphine prn  Blood cx negative  as per ID completed piperacillin-tazobactam  As per last surgery note drain is working ok  will have him follow with surgery as out patient        #Hx of limb ischemia  s/p left AKA, s/p b/l femoral thrombectomy  Vascular surgery consulted by ED, recommending local wound care to b/l groins and left AKA stump, vascular will do if patient is admitted, no vacular surgical intervention required at this moment.    #HTN-#Afib  Hold Eliquis changed to therapeutic lovenox - can switch back to Eliquis on discharge  On Metoprolol tartrate 25mg bid   On Irbesartan 150mg daily at home    #DM2  On Glargine 15u qhs and Lispro 5u TID w/ meals    #HLD  Crestor 5mg qhs    #Anemia- hgb8.9 - stable around   No signs of active bleed.  Monitor H/H  Transfuse to keep Hb>7, if clinically indicated.     she has no further chest pain, labs work came ok.     Dispo: possible D/c back to Rehab once arrangements done

## 2025-01-19 NOTE — PROVIDER CONTACT NOTE (CHANGE IN STATUS NOTIFICATION) - ASSESSMENT
pt c/o anxiety, restless, agitated causing slight elevation in  bp and hr. BG WNL, neurological status unchanged, following commands, moving all 3 extremities with normal strength pt c/o anxiety, restless, agitated causing slight elevation in  bp and hr. BG WNL, neurological status unchanged, following commands, moving all 3 extremities with normal strength. pt denies pain/cp/palpitations/sob.

## 2025-01-20 LAB
ALBUMIN SERPL ELPH-MCNC: 1.9 G/DL — LOW (ref 3.3–5.2)
ALP SERPL-CCNC: 217 U/L — HIGH (ref 40–120)
ALT FLD-CCNC: 12 U/L — SIGNIFICANT CHANGE UP
ANION GAP SERPL CALC-SCNC: 9 MMOL/L — SIGNIFICANT CHANGE UP (ref 5–17)
AST SERPL-CCNC: 24 U/L — SIGNIFICANT CHANGE UP
BILIRUB SERPL-MCNC: 0.6 MG/DL — SIGNIFICANT CHANGE UP (ref 0.4–2)
BUN SERPL-MCNC: 4 MG/DL — LOW (ref 8–20)
CALCIUM SERPL-MCNC: 7.1 MG/DL — LOW (ref 8.4–10.5)
CHLORIDE SERPL-SCNC: 103 MMOL/L — SIGNIFICANT CHANGE UP (ref 96–108)
CO2 SERPL-SCNC: 29 MMOL/L — SIGNIFICANT CHANGE UP (ref 22–29)
CREAT SERPL-MCNC: 0.44 MG/DL — LOW (ref 0.5–1.3)
EGFR: 97 ML/MIN/1.73M2 — SIGNIFICANT CHANGE UP
FOLATE SERPL-MCNC: 11.1 NG/ML — SIGNIFICANT CHANGE UP
GLUCOSE BLDC GLUCOMTR-MCNC: 118 MG/DL — HIGH (ref 70–99)
GLUCOSE BLDC GLUCOMTR-MCNC: 66 MG/DL — LOW (ref 70–99)
GLUCOSE BLDC GLUCOMTR-MCNC: 82 MG/DL — SIGNIFICANT CHANGE UP (ref 70–99)
GLUCOSE BLDC GLUCOMTR-MCNC: 85 MG/DL — SIGNIFICANT CHANGE UP (ref 70–99)
GLUCOSE BLDC GLUCOMTR-MCNC: 85 MG/DL — SIGNIFICANT CHANGE UP (ref 70–99)
GLUCOSE SERPL-MCNC: 65 MG/DL — LOW (ref 70–99)
HCT VFR BLD CALC: 31.5 % — LOW (ref 34.5–45)
HGB BLD-MCNC: 9.7 G/DL — LOW (ref 11.5–15.5)
IRON SATN MFR SERPL: 22 UG/DL — LOW (ref 37–145)
MAGNESIUM SERPL-MCNC: 1.7 MG/DL — SIGNIFICANT CHANGE UP (ref 1.6–2.6)
MCHC RBC-ENTMCNC: 25.9 PG — LOW (ref 27–34)
MCHC RBC-ENTMCNC: 30.8 G/DL — LOW (ref 32–36)
MCV RBC AUTO: 84.2 FL — SIGNIFICANT CHANGE UP (ref 80–100)
PHOSPHATE SERPL-MCNC: 1.8 MG/DL — LOW (ref 2.4–4.7)
PLATELET # BLD AUTO: 159 K/UL — SIGNIFICANT CHANGE UP (ref 150–400)
POTASSIUM SERPL-MCNC: 3 MMOL/L — LOW (ref 3.5–5.3)
POTASSIUM SERPL-SCNC: 3 MMOL/L — LOW (ref 3.5–5.3)
PROT SERPL-MCNC: 5.4 G/DL — LOW (ref 6.6–8.7)
RBC # BLD: 3.74 M/UL — LOW (ref 3.8–5.2)
RBC # FLD: 17.5 % — HIGH (ref 10.3–14.5)
SODIUM SERPL-SCNC: 141 MMOL/L — SIGNIFICANT CHANGE UP (ref 135–145)
VIT B12 SERPL-MCNC: >2000 PG/ML — HIGH (ref 232–1245)
WBC # BLD: 5.45 K/UL — SIGNIFICANT CHANGE UP (ref 3.8–10.5)
WBC # FLD AUTO: 5.45 K/UL — SIGNIFICANT CHANGE UP (ref 3.8–10.5)

## 2025-01-20 PROCEDURE — 99233 SBSQ HOSP IP/OBS HIGH 50: CPT

## 2025-01-20 PROCEDURE — 71250 CT THORAX DX C-: CPT | Mod: 26

## 2025-01-20 RX ORDER — PANTOPRAZOLE 20 MG/1
40 TABLET, DELAYED RELEASE ORAL DAILY
Refills: 0 | Status: DISCONTINUED | OUTPATIENT
Start: 2025-01-20 | End: 2025-01-24

## 2025-01-20 RX ORDER — POTASSIUM PHOSPHATE, MONOBASIC POTASSIUM PHOSPHATE, DIBASIC 236; 224 MG/ML; MG/ML
15 INJECTION, SOLUTION INTRAVENOUS ONCE
Refills: 0 | Status: COMPLETED | OUTPATIENT
Start: 2025-01-20 | End: 2025-01-20

## 2025-01-20 RX ORDER — LACTULOSE 10 G/15 ML
20 SOLUTION, ORAL ORAL ONCE
Refills: 0 | Status: DISCONTINUED | OUTPATIENT
Start: 2025-01-20 | End: 2025-01-24

## 2025-01-20 RX ORDER — DM/PSEUDOEPHED/ACETAMINOPHEN 10-30-250
15 CAPSULE ORAL ONCE
Refills: 0 | Status: COMPLETED | OUTPATIENT
Start: 2025-01-20 | End: 2025-01-20

## 2025-01-20 RX ORDER — ASCORBIC ACID 500 MG/ML
500 VIAL (ML) INJECTION
Refills: 0 | Status: DISCONTINUED | OUTPATIENT
Start: 2025-01-20 | End: 2025-01-20

## 2025-01-20 RX ORDER — ACETAMINOPHEN 160 MG/5ML
650 SUSPENSION ORAL EVERY 8 HOURS
Refills: 0 | Status: COMPLETED | OUTPATIENT
Start: 2025-01-20 | End: 2025-01-22

## 2025-01-20 RX ORDER — FAMOTIDINE 10 MG/ML
20 INJECTION INTRAVENOUS AT BEDTIME
Refills: 0 | Status: DISCONTINUED | OUTPATIENT
Start: 2025-01-20 | End: 2025-01-24

## 2025-01-20 RX ORDER — ASCORBIC ACID 500 MG/ML
500 VIAL (ML) INJECTION DAILY
Refills: 0 | Status: DISCONTINUED | OUTPATIENT
Start: 2025-01-20 | End: 2025-01-24

## 2025-01-20 RX ORDER — FOLIC ACID 1 MG
1 TABLET ORAL DAILY
Refills: 0 | Status: DISCONTINUED | OUTPATIENT
Start: 2025-01-20 | End: 2025-01-24

## 2025-01-20 RX ORDER — IRON SUCROSE 20 MG/ML
100 INJECTION, SOLUTION INTRAVENOUS EVERY 24 HOURS
Refills: 0 | Status: COMPLETED | OUTPATIENT
Start: 2025-01-20 | End: 2025-01-21

## 2025-01-20 RX ORDER — MIRTAZAPINE 30 MG/1
7.5 TABLET, FILM COATED ORAL AT BEDTIME
Refills: 0 | Status: DISCONTINUED | OUTPATIENT
Start: 2025-01-20 | End: 2025-01-24

## 2025-01-20 RX ADMIN — INSULIN GLARGINE-YFGN 10 UNIT(S): 100 INJECTION, SOLUTION SUBCUTANEOUS at 23:28

## 2025-01-20 RX ADMIN — ENOXAPARIN SODIUM 60 MILLIGRAM(S): 100 INJECTION SUBCUTANEOUS at 06:49

## 2025-01-20 RX ADMIN — Medication 2 TABLET(S): at 22:42

## 2025-01-20 RX ADMIN — FAMOTIDINE 20 MILLIGRAM(S): 10 INJECTION INTRAVENOUS at 23:23

## 2025-01-20 RX ADMIN — Medication 1 APPLICATION(S): at 18:30

## 2025-01-20 RX ADMIN — MORPHINE SULFATE 2 MILLIGRAM(S): 60 TABLET, FILM COATED, EXTENDED RELEASE ORAL at 00:19

## 2025-01-20 RX ADMIN — ROSUVASTATIN CALCIUM 5 MILLIGRAM(S): 10 TABLET, FILM COATED ORAL at 22:42

## 2025-01-20 RX ADMIN — Medication 1 APPLICATION(S): at 07:21

## 2025-01-20 RX ADMIN — Medication 1 APPLICATION(S): at 08:23

## 2025-01-20 RX ADMIN — MIRTAZAPINE 7.5 MILLIGRAM(S): 30 TABLET, FILM COATED ORAL at 22:42

## 2025-01-20 RX ADMIN — ANTISEPTIC SURGICAL SCRUB 1 APPLICATION(S): 0.04 SOLUTION TOPICAL at 06:49

## 2025-01-20 RX ADMIN — ACETAMINOPHEN 650 MILLIGRAM(S): 160 SUSPENSION ORAL at 15:12

## 2025-01-20 RX ADMIN — ACETAMINOPHEN 650 MILLIGRAM(S): 160 SUSPENSION ORAL at 22:42

## 2025-01-20 RX ADMIN — ENOXAPARIN SODIUM 60 MILLIGRAM(S): 100 INJECTION SUBCUTANEOUS at 18:29

## 2025-01-20 RX ADMIN — POTASSIUM PHOSPHATE, MONOBASIC POTASSIUM PHOSPHATE, DIBASIC 62.5 MILLIMOLE(S): 236; 224 INJECTION, SOLUTION INTRAVENOUS at 10:41

## 2025-01-20 RX ADMIN — Medication 1 APPLICATION(S): at 19:29

## 2025-01-20 RX ADMIN — IRON SUCROSE 210 MILLIGRAM(S): 20 INJECTION, SOLUTION INTRAVENOUS at 15:12

## 2025-01-20 RX ADMIN — PANTOPRAZOLE 40 MILLIGRAM(S): 20 TABLET, DELAYED RELEASE ORAL at 10:38

## 2025-01-20 RX ADMIN — Medication 25 MILLIGRAM(S): at 19:30

## 2025-01-20 RX ADMIN — Medication 1 TABLET(S): at 15:12

## 2025-01-20 RX ADMIN — ACETAMINOPHEN 650 MILLIGRAM(S): 160 SUSPENSION ORAL at 16:12

## 2025-01-20 RX ADMIN — Medication 15 GRAM(S): at 23:22

## 2025-01-20 RX ADMIN — Medication 25 MILLIGRAM(S): at 06:50

## 2025-01-20 RX ADMIN — POLYETHYLENE GLYCOL 3350 17 GRAM(S): 17 POWDER, FOR SOLUTION ORAL at 15:13

## 2025-01-20 RX ADMIN — ACETAMINOPHEN 650 MILLIGRAM(S): 160 SUSPENSION ORAL at 23:50

## 2025-01-20 RX ADMIN — Medication 1 APPLICATION(S): at 18:29

## 2025-01-20 NOTE — PROGRESS NOTE ADULT - NUTRITIONAL ASSESSMENT
This patient has been assessed with a concern for Malnutrition and has been determined to have a diagnosis/diagnoses of Moderate protein-calorie malnutrition.    This patient is being managed with:   Diet DASH/TLC-  Sodium & Cholesterol Restricted  Consistent Carbohydrate {Evening Snack} (CSTCHOSN)  Supplement Feeding Modality:  Oral  Glucerna Shake Cans or Servings Per Day:  1       Frequency:  Two Times a day  Entered: Waqar 15 2025  8:57AM   This patient has been assessed with a concern for Malnutrition and has been determined to have a diagnosis/diagnoses of Moderate protein-calorie malnutrition.    This patient is being managed with:   Diet DASH/TLC-  Sodium & Cholesterol Restricted  Consistent Carbohydrate {Evening Snack} (CSTCHOSN)  Supplement Feeding Modality:  Oral  Glucerna Shake Cans or Servings Per Day:  1       Frequency:  Two Times a day  Entered: Waqar 15 2025  8:57AM      agree with above encourage oral intake

## 2025-01-20 NOTE — PROGRESS NOTE ADULT - ASSESSMENT
78 y/o F w/ PMH of Afib (on eliquis), HTN, DM2, HLD, h/o stroke, limb ischemia (s/p left AKA, s/p b/l femoral thrombectomy), presented to ED from nursing home for evaluation of stump wound. Nursing home also reports she had a UA positive for UTI. At bedside pt c/o RUQ pain x 2 days. Pt says pain is nonradiating, constant, sharp in nature. Associated w/ chills. Denies N/V/D, fever, CP, SOB. Rest of ROS (-). While in the ED pt received vanc, zosyn, 1.95L NS bolus.      1- SOB   CXR reviewed   pulmonary congestion   will give lasix iv , DC IVF bicarbonate infusion   repeat imaging CT of chest r/o infection   cough meds keep head elevated   2-Acute cholecystitis-sepsis present on admission   HIDA scan + cystic duct/CBD obstruction. positive for acute cholecystitis, s/p cholecystostomy tube.1/13 by IR   completed zosyn 7 days total   - 1/13 HIDA - acute chris   - 1/6 BCx - neg   Follow culture - E. faecium - DW ID  Blood cx negative  As per last surgery note drain is working ok    2-PAD Hx of limb ischemia  s/p left AKA, s/p b/l femoral thrombectomy in dec   Vascular surgery consulted by ED, recommending local wound care to b/l groins and left AKA stump    3-HTN-  4-Afib  Hold Eliquis changed to therapeutic lovenox - can switch back to Eliquis on discharge  On Metoprolol tartrate 25mg bid   On Irbesartan 150mg daily at home    5-DM2 tyoe 2 on insulin   poor oral intake   BG is low normal range   Decrease lantus to prevent hypoglycemia   cont ISS     6- Hypokalemia , hypomagnesemia   hypophosphatemia   likely due to poor oral intake   will supplement iv K phos and iv mg ordered   monitor lytes     7-HLD  Crestor 5mg qhs    8-Anemia-   low iron add iv venofer   folic acid   po iron on DC '  hb 9.7 no indication for tx at present   cont to monitor       Dc to [facility] in 2-3 days likely     d..w family at bedside

## 2025-01-20 NOTE — PROGRESS NOTE ADULT - SUBJECTIVE AND OBJECTIVE BOX
Patient is a 79y old  Female who presents with a chief complaint of Sepsis     (09 Jan 2025 12:52)    Follow up for cholecystitis , electroltye imbalance     spoke to  delmar and pt '  she is c.o dyspnea , cough x2 days   family at the bedside concern that pt does not eta no appetite   pt is also c/o sorethroat body aches   and weakness         ALLERGIES:  No Known Allergies    MEDICATIONS  (STANDING):  ascorbic acid 500 milliGRAM(s) Oral daily  chlorhexidine 2% Cloths 1 Application(s) Topical <User Schedule>  collagenase Ointment 1 Application(s) Topical daily  collagenase Ointment 1 Application(s) Topical daily  dextrose 5%. 1000 milliLiter(s) (50 mL/Hr) IV Continuous <Continuous>  dextrose 5%. 1000 milliLiter(s) (100 mL/Hr) IV Continuous <Continuous>  dextrose 50% Injectable 25 Gram(s) IV Push once  dextrose 50% Injectable 12.5 Gram(s) IV Push once  dextrose 50% Injectable 25 Gram(s) IV Push once  enoxaparin Injectable 60 milliGRAM(s) SubCutaneous every 12 hours  glucagon  Injectable 1 milliGRAM(s) IntraMuscular once  hydrocortisone 1% Cream 1 Application(s) Topical two times a day  insulin glargine Injectable (LANTUS) 10 Unit(s) SubCutaneous at bedtime  insulin lispro (ADMELOG) corrective regimen sliding scale   SubCutaneous three times a day before meals  insulin lispro Injectable (ADMELOG) 3 Unit(s) SubCutaneous three times a day before meals  metoprolol tartrate 25 milliGRAM(s) Oral two times a day  morphine  - Injectable 2.5 milliGRAM(s) IV Push once  multivitamin/minerals 1 Tablet(s) Oral daily  polyethylene glycol 3350 17 Gram(s) Oral daily  potassium phosphate IVPB 15 milliMole(s) IV Intermittent once  rosuvastatin 5 milliGRAM(s) Oral at bedtime  senna 2 Tablet(s) Oral at bedtime  sodium bicarbonate  Infusion 0.118 mEq/kG/Hr (100 mL/Hr) IV Continuous <Continuous>  triamcinolone 0.1% Ointment 1 Application(s) Topical every 12 hours    MEDICATIONS  (PRN):  acetaminophen     Tablet .. 650 milliGRAM(s) Oral every 6 hours PRN Temp greater or equal to 38C (100.4F), Mild Pain (1 - 3)  aluminum hydroxide/magnesium hydroxide/simethicone Suspension 30 milliLiter(s) Oral every 4 hours PRN Dyspepsia  dextrose Oral Gel 15 Gram(s) Oral once PRN Blood Glucose LESS THAN 70 milliGRAM(s)/deciliter  melatonin 3 milliGRAM(s) Oral at bedtime PRN Insomnia  morphine  - Injectable 2 milliGRAM(s) IV Push every 6 hours PRN Severe Pain (7 - 10)  ondansetron Injectable 4 milliGRAM(s) IV Push every 8 hours PRN Nausea and/or Vomiting    Vital Signs Last 24 Hrs  T(F): 98.2 (20 Jan 2025 08:02), Max: 98.4 (20 Jan 2025 05:30)  HR: 96 (20 Jan 2025 08:02) (73 - 96)  BP: 133/74 (20 Jan 2025 08:02) (128/80 - 153/72)  RR: 19 (20 Jan 2025 08:02) (17 - 19)  SpO2: 95% (20 Jan 2025 08:02) (95% - 100%)  I&O's Summary    19 Jan 2025 07:01  -  20 Jan 2025 07:00  --------------------------------------------------------  IN: 1560 mL / OUT: 900 mL / NET: 660 mL        PHYSICAL EXAM:  General: awake alert ,m pale ill looking   ENT:   Neck: supple    Lungs: bilateral rhonci diminished   Cardio: RRR, S1/S2, No murmur  Abdomen: Soft, Nontender, RUQ REBECCA drain , + tenderness; Bowel sounds present  Extremities: No calf tenderness, No pitting edema  left aka     LABS:                        9.7    5.45  )-----------( 159      ( 20 Jan 2025 05:54 )             31.5     01-20    141  |  103  |  4.0  ----------------------------<  65  3.0   |  29.0  |  0.44    Ca    7.1      20 Jan 2025 05:54  Phos  1.8     01-20  Mg     1.7     01-20    TPro  5.4  /  Alb  1.9  /  TBili  0.6  /  DBili  x   /  AST  24  /  ALT  12  /  AlkPhos  217  01-20            Lactate, Blood: 1.4 mmol/L (01-18 @ 16:39)                        POCT Blood Glucose.: 85 mg/dL (20 Jan 2025 08:18)  POCT Blood Glucose.: 112 mg/dL (19 Jan 2025 20:47)  POCT Blood Glucose.: 90 mg/dL (19 Jan 2025 17:11)  POCT Blood Glucose.: 102 mg/dL (19 Jan 2025 11:59)      Urinalysis Basic - ( 20 Jan 2025 05:54 )    Color: x / Appearance: x / SG: x / pH: x  Gluc: 65 mg/dL / Ketone: x  / Bili: x / Urobili: x   Blood: x / Protein: x / Nitrite: x   Leuk Esterase: x / RBC: x / WBC x   Sq Epi: x / Non Sq Epi: x / Bacteria: x        Culture - Body Fluid with Gram Stain (collected 13 Jan 2025 14:07)  Source: Bile  Gram Stain (13 Jan 2025 23:36):    No polymorphonuclear leukocytes per low power field    Few Gram Positive Cocci in Pairs and Chains per oil power field  Final Report (18 Jan 2025 15:58):    Few Enterococcus faecium  Organism: Enterococcus faecium (18 Jan 2025 15:58)  Organism: Enterococcus faecium (18 Jan 2025 15:58)      Method Type: BETH      -  Ampicillin: S 8 Predicts results to ampicillin/sulbactam, amoxacillin-clavulanate and  piperacillin-tazobactam.      -  Vancomycin: S 0.5        RADIOLOGY & ADDITIONAL TESTS:

## 2025-01-21 LAB
ALBUMIN SERPL ELPH-MCNC: 2 G/DL — LOW (ref 3.3–5.2)
ALP SERPL-CCNC: 223 U/L — HIGH (ref 40–120)
ALT FLD-CCNC: 11 U/L — SIGNIFICANT CHANGE UP
ANION GAP SERPL CALC-SCNC: 8 MMOL/L — SIGNIFICANT CHANGE UP (ref 5–17)
AST SERPL-CCNC: 24 U/L — SIGNIFICANT CHANGE UP
BILIRUB SERPL-MCNC: 0.6 MG/DL — SIGNIFICANT CHANGE UP (ref 0.4–2)
BUN SERPL-MCNC: 3.6 MG/DL — LOW (ref 8–20)
CALCIUM SERPL-MCNC: 7.6 MG/DL — LOW (ref 8.4–10.5)
CHLORIDE SERPL-SCNC: 107 MMOL/L — SIGNIFICANT CHANGE UP (ref 96–108)
CO2 SERPL-SCNC: 29 MMOL/L — SIGNIFICANT CHANGE UP (ref 22–29)
CREAT SERPL-MCNC: 0.46 MG/DL — LOW (ref 0.5–1.3)
EGFR: 96 ML/MIN/1.73M2 — SIGNIFICANT CHANGE UP
GLUCOSE BLDC GLUCOMTR-MCNC: 108 MG/DL — HIGH (ref 70–99)
GLUCOSE BLDC GLUCOMTR-MCNC: 123 MG/DL — HIGH (ref 70–99)
GLUCOSE BLDC GLUCOMTR-MCNC: 130 MG/DL — HIGH (ref 70–99)
GLUCOSE BLDC GLUCOMTR-MCNC: 182 MG/DL — HIGH (ref 70–99)
GLUCOSE SERPL-MCNC: 91 MG/DL — SIGNIFICANT CHANGE UP (ref 70–99)
NT-PROBNP SERPL-SCNC: 2416 PG/ML — HIGH (ref 0–300)
POTASSIUM SERPL-MCNC: 3.3 MMOL/L — LOW (ref 3.5–5.3)
POTASSIUM SERPL-SCNC: 3.3 MMOL/L — LOW (ref 3.5–5.3)
PROT SERPL-MCNC: 5.6 G/DL — LOW (ref 6.6–8.7)
SODIUM SERPL-SCNC: 144 MMOL/L — SIGNIFICANT CHANGE UP (ref 135–145)

## 2025-01-21 PROCEDURE — 99233 SBSQ HOSP IP/OBS HIGH 50: CPT

## 2025-01-21 RX ORDER — SODIUM CHLORIDE 9 G/ML
1000 INJECTION, SOLUTION INTRAVENOUS
Refills: 0 | Status: DISCONTINUED | OUTPATIENT
Start: 2025-01-21 | End: 2025-01-24

## 2025-01-21 RX ORDER — POTASSIUM PHOSPHATE, MONOBASIC POTASSIUM PHOSPHATE, DIBASIC 236; 224 MG/ML; MG/ML
15 INJECTION, SOLUTION INTRAVENOUS ONCE
Refills: 0 | Status: COMPLETED | OUTPATIENT
Start: 2025-01-21 | End: 2025-01-21

## 2025-01-21 RX ORDER — POTASSIUM CHLORIDE 750 MG/1
10 TABLET, EXTENDED RELEASE ORAL
Refills: 0 | Status: DISCONTINUED | OUTPATIENT
Start: 2025-01-21 | End: 2025-01-21

## 2025-01-21 RX ORDER — POTASSIUM CHLORIDE 750 MG/1
40 TABLET, EXTENDED RELEASE ORAL ONCE
Refills: 0 | Status: COMPLETED | OUTPATIENT
Start: 2025-01-21 | End: 2025-01-21

## 2025-01-21 RX ADMIN — Medication 20 MILLIGRAM(S): at 05:32

## 2025-01-21 RX ADMIN — POTASSIUM PHOSPHATE, MONOBASIC POTASSIUM PHOSPHATE, DIBASIC 62.5 MILLIMOLE(S): 236; 224 INJECTION, SOLUTION INTRAVENOUS at 09:37

## 2025-01-21 RX ADMIN — ENOXAPARIN SODIUM 60 MILLIGRAM(S): 100 INJECTION SUBCUTANEOUS at 05:32

## 2025-01-21 RX ADMIN — Medication 1 TABLET(S): at 09:41

## 2025-01-21 RX ADMIN — Medication 500 MILLIGRAM(S): at 09:41

## 2025-01-21 RX ADMIN — Medication 25 MILLIGRAM(S): at 05:31

## 2025-01-21 RX ADMIN — SODIUM CHLORIDE 60 MILLILITER(S): 9 INJECTION, SOLUTION INTRAVENOUS at 09:48

## 2025-01-21 RX ADMIN — SODIUM CHLORIDE 60 MILLILITER(S): 9 INJECTION, SOLUTION INTRAVENOUS at 23:54

## 2025-01-21 RX ADMIN — Medication 1 APPLICATION(S): at 05:31

## 2025-01-21 RX ADMIN — ANTISEPTIC SURGICAL SCRUB 1 APPLICATION(S): 0.04 SOLUTION TOPICAL at 05:42

## 2025-01-21 RX ADMIN — ROSUVASTATIN CALCIUM 5 MILLIGRAM(S): 10 TABLET, FILM COATED ORAL at 23:47

## 2025-01-21 RX ADMIN — Medication 1 APPLICATION(S): at 09:36

## 2025-01-21 RX ADMIN — Medication 1 APPLICATION(S): at 08:54

## 2025-01-21 RX ADMIN — ACETAMINOPHEN 650 MILLIGRAM(S): 160 SUSPENSION ORAL at 06:30

## 2025-01-21 RX ADMIN — Medication 1 APPLICATION(S): at 17:25

## 2025-01-21 RX ADMIN — Medication 2 TABLET(S): at 23:46

## 2025-01-21 RX ADMIN — PANTOPRAZOLE 40 MILLIGRAM(S): 20 TABLET, DELAYED RELEASE ORAL at 09:33

## 2025-01-21 RX ADMIN — ACETAMINOPHEN 650 MILLIGRAM(S): 160 SUSPENSION ORAL at 05:31

## 2025-01-21 RX ADMIN — POTASSIUM CHLORIDE 40 MILLIEQUIVALENT(S): 750 TABLET, EXTENDED RELEASE ORAL at 09:38

## 2025-01-21 RX ADMIN — Medication 1 MILLIGRAM(S): at 09:42

## 2025-01-21 RX ADMIN — Medication 40 MILLIGRAM(S): at 09:34

## 2025-01-21 RX ADMIN — Medication 1 APPLICATION(S): at 17:24

## 2025-01-21 RX ADMIN — Medication 25 MILLIGRAM(S): at 17:24

## 2025-01-21 RX ADMIN — MIRTAZAPINE 7.5 MILLIGRAM(S): 30 TABLET, FILM COATED ORAL at 23:47

## 2025-01-21 RX ADMIN — ACETAMINOPHEN 650 MILLIGRAM(S): 160 SUSPENSION ORAL at 23:47

## 2025-01-21 RX ADMIN — IRON SUCROSE 210 MILLIGRAM(S): 20 INJECTION, SOLUTION INTRAVENOUS at 14:02

## 2025-01-21 RX ADMIN — FAMOTIDINE 20 MILLIGRAM(S): 10 INJECTION INTRAVENOUS at 23:47

## 2025-01-21 RX ADMIN — ENOXAPARIN SODIUM 60 MILLIGRAM(S): 100 INJECTION SUBCUTANEOUS at 17:23

## 2025-01-21 NOTE — PROGRESS NOTE ADULT - SUBJECTIVE AND OBJECTIVE BOX
Patient is a 79y old  Female who presents with a chief complaint of Sepsis     (09 Jan 2025 12:52)      Pt is seen in am with Azerbaijani speaking nurse Cirilo forde for her today   pt is feeling better , still not eating well per family   she denies SOB , no abd pain       ALLERGIES:  No Known Allergies    MEDICATIONS  (STANDING):  acetaminophen     Tablet .. 650 milliGRAM(s) Oral every 8 hours  ascorbic acid 500 milliGRAM(s) Oral daily  chlorhexidine 2% Cloths 1 Application(s) Topical <User Schedule>  collagenase Ointment 1 Application(s) Topical daily  collagenase Ointment 1 Application(s) Topical daily  dextrose 5%. 1000 milliLiter(s) (100 mL/Hr) IV Continuous <Continuous>  dextrose 5%. 1000 milliLiter(s) (50 mL/Hr) IV Continuous <Continuous>  dextrose 50% Injectable 25 Gram(s) IV Push once  dextrose 50% Injectable 12.5 Gram(s) IV Push once  dextrose 50% Injectable 25 Gram(s) IV Push once  enoxaparin Injectable 60 milliGRAM(s) SubCutaneous every 12 hours  famotidine Injectable 20 milliGRAM(s) IV Push at bedtime  folic acid 1 milliGRAM(s) Oral daily  furosemide   Injectable 40 milliGRAM(s) IV Push once  glucagon  Injectable 1 milliGRAM(s) IntraMuscular once  hydrocortisone 1% Cream 1 Application(s) Topical two times a day  insulin lispro (ADMELOG) corrective regimen sliding scale   SubCutaneous three times a day before meals  iron sucrose IVPB 100 milliGRAM(s) IV Intermittent every 24 hours  metoprolol tartrate 25 milliGRAM(s) Oral two times a day  mirtazapine 7.5 milliGRAM(s) Oral at bedtime  morphine  - Injectable 2.5 milliGRAM(s) IV Push once  multivitamin/minerals 1 Tablet(s) Oral daily  pantoprazole  Injectable 40 milliGRAM(s) IV Push daily  polyethylene glycol 3350 17 Gram(s) Oral daily  potassium chloride   Powder 40 milliEquivalent(s) Oral once  potassium chloride  10 mEq/100 mL IVPB 10 milliEquivalent(s) IV Intermittent every 1 hour  rosuvastatin 5 milliGRAM(s) Oral at bedtime  senna 2 Tablet(s) Oral at bedtime  triamcinolone 0.1% Ointment 1 Application(s) Topical every 12 hours    MEDICATIONS  (PRN):  acetaminophen     Tablet .. 650 milliGRAM(s) Oral every 6 hours PRN Temp greater or equal to 38C (100.4F), Mild Pain (1 - 3)  aluminum hydroxide/magnesium hydroxide/simethicone Suspension 30 milliLiter(s) Oral every 4 hours PRN Dyspepsia  dextrose Oral Gel 15 Gram(s) Oral once PRN Blood Glucose LESS THAN 70 milliGRAM(s)/deciliter  lactulose Syrup 20 Gram(s) Oral once PRN if no BM for 2 days  melatonin 3 milliGRAM(s) Oral at bedtime PRN Insomnia  morphine  - Injectable 2 milliGRAM(s) IV Push every 6 hours PRN Severe Pain (7 - 10)  ondansetron Injectable 4 milliGRAM(s) IV Push every 8 hours PRN Nausea and/or Vomiting      Vital Signs Last 24 Hrs  T(C): 36.7 (21 Jan 2025 05:00), Max: 36.7 (20 Jan 2025 16:37)  T(F): 98 (21 Jan 2025 05:00), Max: 98.1 (20 Jan 2025 16:37)  HR: 88 (21 Jan 2025 05:00) (85 - 92)  BP: 135/75 (21 Jan 2025 05:00) (131/64 - 147/77)  BP(mean): --  RR: 18 (21 Jan 2025 05:00) (18 - 19)  SpO2: 100% (21 Jan 2025 05:00) (99% - 100%)    Parameters below as of 21 Jan 2025 05:00  Patient On (Oxygen Delivery Method): nasal cannula          PHYSICAL EXAM:  General: awake   Neck: supple   Lungs: CTA diminished BS poor insp effort   Cardio: RRR, S1/S2, No murmur  Abdomen: Soft, Nontender, Nondistended; Bowel sounds present  Extremities: No calf tenderness, No pitting edema    LABS:                        9.7    5.45  )-----------( 159      ( 20 Jan 2025 05:54 )             31.5     01-21    144  |  107  |  3.6  ----------------------------<  91  3.3   |  29.0  |  0.46    Ca    7.6      21 Jan 2025 05:55  Phos  1.8     01-20  Mg     1.7     01-20    TPro  5.6  /  Alb  2.0  /  TBili  0.6  /  DBili  x   /  AST  24  /  ALT  11  /  AlkPhos  223  01-21            Lactate, Blood: 1.4 mmol/L (01-18 @ 16:39)                        POCT Blood Glucose.: 108 mg/dL (21 Jan 2025 08:11)  POCT Blood Glucose.: 118 mg/dL (20 Jan 2025 23:45)  POCT Blood Glucose.: 82 mg/dL (20 Jan 2025 23:09)  POCT Blood Glucose.: 66 mg/dL (20 Jan 2025 22:52)  POCT Blood Glucose.: 82 mg/dL (20 Jan 2025 20:59)  POCT Blood Glucose.: 85 mg/dL (20 Jan 2025 17:03)  POCT Blood Glucose.: 82 mg/dL (20 Jan 2025 12:11)      Urinalysis Basic - ( 21 Jan 2025 05:55 )    Color: x / Appearance: x / SG: x / pH: x  Gluc: 91 mg/dL / Ketone: x  / Bili: x / Urobili: x   Blood: x / Protein: x / Nitrite: x   Leuk Esterase: x / RBC: x / WBC x   Sq Epi: x / Non Sq Epi: x / Bacteria: x          RADIOLOGY & ADDITIONAL TESTS:cc< from: CT Chest No Cont (01.20.25 @ 11:01) >  IMPRESSION:  Cholecystostomy catheter in the gallbladder. The gallbladder is not   collapsed on the catheter. There are persistence of pericholecystic   inflammatory changes and a small amount of perihepatic ascites.    Bilateral pleural effusions right greater than left with subjacent   atelectasis, similar to the prior study.        --- End of Report ---            < end of copied text >         Patient is a 79y old  Female who presents with a chief complaint of Sepsis, cholecystitis       Pt is seen in am with Uzbek speaking nurse Cirilo forde for her today   pt is feeling better , still not eating well per family   she denies SOB , no abd pain       ALLERGIES:  No Known Allergies    MEDICATIONS  (STANDING):  acetaminophen     Tablet .. 650 milliGRAM(s) Oral every 8 hours  ascorbic acid 500 milliGRAM(s) Oral daily  chlorhexidine 2% Cloths 1 Application(s) Topical <User Schedule>  collagenase Ointment 1 Application(s) Topical daily  collagenase Ointment 1 Application(s) Topical daily  dextrose 5%. 1000 milliLiter(s) (100 mL/Hr) IV Continuous <Continuous>  dextrose 5%. 1000 milliLiter(s) (50 mL/Hr) IV Continuous <Continuous>  dextrose 50% Injectable 25 Gram(s) IV Push once  dextrose 50% Injectable 12.5 Gram(s) IV Push once  dextrose 50% Injectable 25 Gram(s) IV Push once  enoxaparin Injectable 60 milliGRAM(s) SubCutaneous every 12 hours  famotidine Injectable 20 milliGRAM(s) IV Push at bedtime  folic acid 1 milliGRAM(s) Oral daily  furosemide   Injectable 40 milliGRAM(s) IV Push once  glucagon  Injectable 1 milliGRAM(s) IntraMuscular once  hydrocortisone 1% Cream 1 Application(s) Topical two times a day  insulin lispro (ADMELOG) corrective regimen sliding scale   SubCutaneous three times a day before meals  iron sucrose IVPB 100 milliGRAM(s) IV Intermittent every 24 hours  metoprolol tartrate 25 milliGRAM(s) Oral two times a day  mirtazapine 7.5 milliGRAM(s) Oral at bedtime  morphine  - Injectable 2.5 milliGRAM(s) IV Push once  multivitamin/minerals 1 Tablet(s) Oral daily  pantoprazole  Injectable 40 milliGRAM(s) IV Push daily  polyethylene glycol 3350 17 Gram(s) Oral daily  potassium chloride   Powder 40 milliEquivalent(s) Oral once  potassium chloride  10 mEq/100 mL IVPB 10 milliEquivalent(s) IV Intermittent every 1 hour  rosuvastatin 5 milliGRAM(s) Oral at bedtime  senna 2 Tablet(s) Oral at bedtime  triamcinolone 0.1% Ointment 1 Application(s) Topical every 12 hours    MEDICATIONS  (PRN):  acetaminophen     Tablet .. 650 milliGRAM(s) Oral every 6 hours PRN Temp greater or equal to 38C (100.4F), Mild Pain (1 - 3)  aluminum hydroxide/magnesium hydroxide/simethicone Suspension 30 milliLiter(s) Oral every 4 hours PRN Dyspepsia  dextrose Oral Gel 15 Gram(s) Oral once PRN Blood Glucose LESS THAN 70 milliGRAM(s)/deciliter  lactulose Syrup 20 Gram(s) Oral once PRN if no BM for 2 days  melatonin 3 milliGRAM(s) Oral at bedtime PRN Insomnia  morphine  - Injectable 2 milliGRAM(s) IV Push every 6 hours PRN Severe Pain (7 - 10)  ondansetron Injectable 4 milliGRAM(s) IV Push every 8 hours PRN Nausea and/or Vomiting      Vital Signs Last 24 Hrs  T(C): 36.7 (21 Jan 2025 05:00), Max: 36.7 (20 Jan 2025 16:37)  T(F): 98 (21 Jan 2025 05:00), Max: 98.1 (20 Jan 2025 16:37)  HR: 88 (21 Jan 2025 05:00) (85 - 92)  BP: 135/75 (21 Jan 2025 05:00) (131/64 - 147/77)  BP(mean): --  RR: 18 (21 Jan 2025 05:00) (18 - 19)  SpO2: 100% (21 Jan 2025 05:00) (99% - 100%)    Parameters below as of 21 Jan 2025 05:00  Patient On (Oxygen Delivery Method): nasal cannula          PHYSICAL EXAM:  General: awake   Neck: supple   Lungs: CTA diminished BS poor insp effort   Cardio: RRR, S1/S2, No murmur  Abdomen: Soft, Nontender, Nondistended; Bowel sounds present  Extremities: No calf tenderness, No pitting edema    LABS:                        9.7    5.45  )-----------( 159      ( 20 Jan 2025 05:54 )             31.5     01-21    144  |  107  |  3.6  ----------------------------<  91  3.3   |  29.0  |  0.46    Ca    7.6      21 Jan 2025 05:55  Phos  1.8     01-20  Mg     1.7     01-20    TPro  5.6  /  Alb  2.0  /  TBili  0.6  /  DBili  x   /  AST  24  /  ALT  11  /  AlkPhos  223  01-21            Lactate, Blood: 1.4 mmol/L (01-18 @ 16:39)                        POCT Blood Glucose.: 108 mg/dL (21 Jan 2025 08:11)  POCT Blood Glucose.: 118 mg/dL (20 Jan 2025 23:45)  POCT Blood Glucose.: 82 mg/dL (20 Jan 2025 23:09)  POCT Blood Glucose.: 66 mg/dL (20 Jan 2025 22:52)  POCT Blood Glucose.: 82 mg/dL (20 Jan 2025 20:59)  POCT Blood Glucose.: 85 mg/dL (20 Jan 2025 17:03)  POCT Blood Glucose.: 82 mg/dL (20 Jan 2025 12:11)      Urinalysis Basic - ( 21 Jan 2025 05:55 )    Color: x / Appearance: x / SG: x / pH: x  Gluc: 91 mg/dL / Ketone: x  / Bili: x / Urobili: x   Blood: x / Protein: x / Nitrite: x   Leuk Esterase: x / RBC: x / WBC x   Sq Epi: x / Non Sq Epi: x / Bacteria: x          RADIOLOGY & ADDITIONAL TESTS:cc< from: CT Chest No Cont (01.20.25 @ 11:01) >  IMPRESSION:  Cholecystostomy catheter in the gallbladder. The gallbladder is not   collapsed on the catheter. There are persistence of pericholecystic   inflammatory changes and a small amount of perihepatic ascites.    Bilateral pleural effusions right greater than left with subjacent   atelectasis, similar to the prior study.        --- End of Report ---            < end of copied text >

## 2025-01-21 NOTE — PROGRESS NOTE ADULT - ASSESSMENT
78 y/o F w/ PMH of Afib (on eliquis), HTN, DM2, HLD, h/o stroke, limb ischemia (s/p left AKA, s/p b/l femoral thrombectomy), presented to ED from nursing home for evaluation of stump wound. Nursing home also reports she had a UA positive for UTI. At bedside pt c/o RUQ pain x 2 days. Pt says pain is nonradiating, constant, sharp in nature. Associated w/ chills. Denies N/V/D, fever, CP, SOB. Rest of ROS (-). While in the ED pt received vanc, zosyn, 1.95L NS bolus.      1- SOB   CXR reviewed   pulmonary congestion   will give lasix iv , DC IVF bicarbonate infusion   repeat imaging CT of chest r/o infection   cough meds keep head elevated   2-Acute cholecystitis-sepsis present on admission   HIDA scan + cystic duct/CBD obstruction. positive for acute cholecystitis, s/p cholecystostomy tube.1/13 by IR   completed zosyn 7 days total   - 1/13 HIDA - acute chris   - 1/6 BCx - neg   Follow culture - E. faecium - DW ID  Blood cx negative  As per last surgery note drain is working ok    2-PAD Hx of limb ischemia  s/p left AKA, s/p b/l femoral thrombectomy in dec   Vascular surgery consulted by ED, recommending local wound care to b/l groins and left AKA stump    3-HTN-  4-Afib  Hold Eliquis changed to therapeutic lovenox - can switch back to Eliquis on discharge  On Metoprolol tartrate 25mg bid   On Irbesartan 150mg daily at home    5-DM2 tyoe 2 on insulin   poor oral intake   BG is low normal range   Decrease lantus to prevent hypoglycemia   cont ISS     6- Hypokalemia , hypomagnesemia   hypophosphatemia   likely due to poor oral intake   will supplement iv K phos and iv mg ordered   monitor lytes     7-HLD  Crestor 5mg qhs    8-Anemia-   low iron add iv venofer   folic acid   po iron on DC '  hb 9.7 no indication for tx at present   cont to monitor       Dc to [facility] in 2-3 days likely     d..w family at bedside    78 y/o F w/ PMH of Afib (on eliquis), HTN, DM2, HLD, h/o stroke, limb ischemia (s/p left AKA, s/p b/l femoral thrombectomy), presented to ED from nursing home for evaluation of stump wound. Nursing home also reports she had a UA positive for UTI. At bedside pt c/o RUQ pain x 2 days. Pt says pain is nonradiating, constant, sharp in nature. Associated w/ chills. Denies N/V/D, fever, CP, SOB. Rest of ROS (-). While in the ED pt received vanc, zosyn, 1.95L NS bolus.      1- SOB   CXR reviewed   pulmonary congestion   lasix iv x2 days   CT of chest neg penumonia , atelectasis and effusion   incentive spirometry   OOB   IFV stopped   keep head elevated     2-Acute cholecystitis-sepsis present on admission   HIDA scan + cystic duct/CBD obstruction. positive for acute cholecystitis, s/p cholecystostomy tube.1/13 by IR   completed zosyn 7 days total  - 1/6 BCx - neg and Follow culture - E. faecium - seen by ID   completed IV abx course 7 days   As per last surgery note drain is working ok follow up in the office     3--PAD Hx of limb ischemia  s/p left AKA, s/p b/l femoral thrombectomy in dec   Vascular surgery consulted by ED, recommending local wound care to b/l groins and left AKA stump    4--HTN-/ Atrial fib   AC on  therapeutic lovenox - can switch back to Eliquis on discharge  On Metoprolol tartrate 25mg bid   On Irbesartan 150mg daily at home    5-DM2 type 2 on insulin   poor oral intake   BG is low normal range   Decreased lantus to prevent hypoglycemia   cont ISS     6- Hypokalemia , hypomagnesemia   hypophosphatemia   likely due to poor oral intake   supplement lytes as needed   monitor lytes     7-HLD  Crestor 5mg qhs    8-Anemia  low iron ; added iv venofer   cont folic acid   po iron on DC   hb 9.7 no indication for tx at present   cont to monitor       Dc to [facility] in 2-3 days likely pending insurance auth     d..w family at bedside in [facility]

## 2025-01-22 LAB
ANION GAP SERPL CALC-SCNC: 9 MMOL/L — SIGNIFICANT CHANGE UP (ref 5–17)
BUN SERPL-MCNC: 3.9 MG/DL — LOW (ref 8–20)
CALCIUM SERPL-MCNC: 7.5 MG/DL — LOW (ref 8.4–10.5)
CHLORIDE SERPL-SCNC: 104 MMOL/L — SIGNIFICANT CHANGE UP (ref 96–108)
CO2 SERPL-SCNC: 29 MMOL/L — SIGNIFICANT CHANGE UP (ref 22–29)
CREAT SERPL-MCNC: 0.57 MG/DL — SIGNIFICANT CHANGE UP (ref 0.5–1.3)
EGFR: 91 ML/MIN/1.73M2 — SIGNIFICANT CHANGE UP
GLUCOSE BLDC GLUCOMTR-MCNC: 147 MG/DL — HIGH (ref 70–99)
GLUCOSE BLDC GLUCOMTR-MCNC: 154 MG/DL — HIGH (ref 70–99)
GLUCOSE BLDC GLUCOMTR-MCNC: 89 MG/DL — SIGNIFICANT CHANGE UP (ref 70–99)
GLUCOSE BLDC GLUCOMTR-MCNC: 96 MG/DL — SIGNIFICANT CHANGE UP (ref 70–99)
GLUCOSE SERPL-MCNC: 151 MG/DL — HIGH (ref 70–99)
HCT VFR BLD CALC: 31 % — LOW (ref 34.5–45)
HGB BLD-MCNC: 9.5 G/DL — LOW (ref 11.5–15.5)
IRON SATN MFR SERPL: 38 % — SIGNIFICANT CHANGE UP (ref 14–50)
IRON SATN MFR SERPL: 62 UG/DL — SIGNIFICANT CHANGE UP (ref 37–145)
MCHC RBC-ENTMCNC: 25.3 PG — LOW (ref 27–34)
MCHC RBC-ENTMCNC: 30.6 G/DL — LOW (ref 32–36)
MCV RBC AUTO: 82.7 FL — SIGNIFICANT CHANGE UP (ref 80–100)
PLATELET # BLD AUTO: 159 K/UL — SIGNIFICANT CHANGE UP (ref 150–400)
POTASSIUM SERPL-MCNC: 3.3 MMOL/L — LOW (ref 3.5–5.3)
POTASSIUM SERPL-SCNC: 3.3 MMOL/L — LOW (ref 3.5–5.3)
RBC # BLD: 3.75 M/UL — LOW (ref 3.8–5.2)
RBC # FLD: 17.7 % — HIGH (ref 10.3–14.5)
SODIUM SERPL-SCNC: 142 MMOL/L — SIGNIFICANT CHANGE UP (ref 135–145)
TIBC SERPL-MCNC: 163 UG/DL — LOW (ref 220–430)
TRANSFERRIN SERPL-MCNC: 114 MG/DL — LOW (ref 192–382)
WBC # BLD: 4.28 K/UL — SIGNIFICANT CHANGE UP (ref 3.8–10.5)
WBC # FLD AUTO: 4.28 K/UL — SIGNIFICANT CHANGE UP (ref 3.8–10.5)

## 2025-01-22 PROCEDURE — 99232 SBSQ HOSP IP/OBS MODERATE 35: CPT

## 2025-01-22 RX ORDER — POTASSIUM PHOSPHATE, MONOBASIC POTASSIUM PHOSPHATE, DIBASIC 236; 224 MG/ML; MG/ML
15 INJECTION, SOLUTION INTRAVENOUS ONCE
Refills: 0 | Status: COMPLETED | OUTPATIENT
Start: 2025-01-22 | End: 2025-01-22

## 2025-01-22 RX ORDER — POTASSIUM CHLORIDE 750 MG/1
10 TABLET, EXTENDED RELEASE ORAL
Refills: 0 | Status: COMPLETED | OUTPATIENT
Start: 2025-01-22 | End: 2025-01-22

## 2025-01-22 RX ORDER — APIXABAN 5 MG/1
5 TABLET, FILM COATED ORAL
Refills: 0 | Status: DISCONTINUED | OUTPATIENT
Start: 2025-01-22 | End: 2025-01-24

## 2025-01-22 RX ORDER — POTASSIUM CHLORIDE 750 MG/1
40 TABLET, EXTENDED RELEASE ORAL EVERY 4 HOURS
Refills: 0 | Status: COMPLETED | OUTPATIENT
Start: 2025-01-22 | End: 2025-01-22

## 2025-01-22 RX ORDER — IRON SUCROSE 20 MG/ML
100 INJECTION, SOLUTION INTRAVENOUS ONCE
Refills: 0 | Status: COMPLETED | OUTPATIENT
Start: 2025-01-22 | End: 2025-01-22

## 2025-01-22 RX ADMIN — ANTISEPTIC SURGICAL SCRUB 1 APPLICATION(S): 0.04 SOLUTION TOPICAL at 05:35

## 2025-01-22 RX ADMIN — POTASSIUM CHLORIDE 100 MILLIEQUIVALENT(S): 750 TABLET, EXTENDED RELEASE ORAL at 10:03

## 2025-01-22 RX ADMIN — Medication 1 APPLICATION(S): at 17:38

## 2025-01-22 RX ADMIN — Medication 500 MILLIGRAM(S): at 08:48

## 2025-01-22 RX ADMIN — Medication 1 APPLICATION(S): at 08:49

## 2025-01-22 RX ADMIN — Medication 25 MILLIGRAM(S): at 05:43

## 2025-01-22 RX ADMIN — POTASSIUM PHOSPHATE, MONOBASIC POTASSIUM PHOSPHATE, DIBASIC 62.5 MILLIMOLE(S): 236; 224 INJECTION, SOLUTION INTRAVENOUS at 14:00

## 2025-01-22 RX ADMIN — Medication 1 APPLICATION(S): at 05:44

## 2025-01-22 RX ADMIN — IRON SUCROSE 210 MILLIGRAM(S): 20 INJECTION, SOLUTION INTRAVENOUS at 11:00

## 2025-01-22 RX ADMIN — Medication 2: at 12:31

## 2025-01-22 RX ADMIN — MIRTAZAPINE 7.5 MILLIGRAM(S): 30 TABLET, FILM COATED ORAL at 22:02

## 2025-01-22 RX ADMIN — PANTOPRAZOLE 40 MILLIGRAM(S): 20 TABLET, DELAYED RELEASE ORAL at 08:47

## 2025-01-22 RX ADMIN — POTASSIUM CHLORIDE 40 MILLIEQUIVALENT(S): 750 TABLET, EXTENDED RELEASE ORAL at 08:47

## 2025-01-22 RX ADMIN — POTASSIUM CHLORIDE 100 MILLIEQUIVALENT(S): 750 TABLET, EXTENDED RELEASE ORAL at 09:01

## 2025-01-22 RX ADMIN — Medication 1 APPLICATION(S): at 17:37

## 2025-01-22 RX ADMIN — APIXABAN 5 MILLIGRAM(S): 5 TABLET, FILM COATED ORAL at 17:33

## 2025-01-22 RX ADMIN — Medication 1 MILLIGRAM(S): at 08:47

## 2025-01-22 RX ADMIN — POTASSIUM CHLORIDE 100 MILLIEQUIVALENT(S): 750 TABLET, EXTENDED RELEASE ORAL at 10:58

## 2025-01-22 RX ADMIN — ACETAMINOPHEN 650 MILLIGRAM(S): 160 SUSPENSION ORAL at 05:44

## 2025-01-22 RX ADMIN — ROSUVASTATIN CALCIUM 5 MILLIGRAM(S): 10 TABLET, FILM COATED ORAL at 22:03

## 2025-01-22 RX ADMIN — Medication 1 TABLET(S): at 08:48

## 2025-01-22 RX ADMIN — Medication 25 MILLIGRAM(S): at 17:33

## 2025-01-22 RX ADMIN — ENOXAPARIN SODIUM 60 MILLIGRAM(S): 100 INJECTION SUBCUTANEOUS at 05:43

## 2025-01-22 RX ADMIN — FAMOTIDINE 20 MILLIGRAM(S): 10 INJECTION INTRAVENOUS at 22:03

## 2025-01-22 NOTE — PROGRESS NOTE ADULT - SUBJECTIVE AND OBJECTIVE BOX
Patient is a 79y old  Female who presents with cholecystitis , pulmonary congestion   hypokalemia     seen in am with Malawian speaking Cirilo at the bedside   Pt is feeling better , no pain in abd , son daughter at the bedside concern about her poor oral intake   encourage intake     t is on room air less SOB since yesterday     ALLERGIES:  No Known Allergies    MEDICATIONS  (STANDING):  ascorbic acid 500 milliGRAM(s) Oral daily  chlorhexidine 2% Cloths 1 Application(s) Topical <User Schedule>  collagenase Ointment 1 Application(s) Topical daily  collagenase Ointment 1 Application(s) Topical daily  dextrose 5%. 1000 milliLiter(s) (50 mL/Hr) IV Continuous <Continuous>  dextrose 5%. 1000 milliLiter(s) (100 mL/Hr) IV Continuous <Continuous>  dextrose 5%. 1000 milliLiter(s) (60 mL/Hr) IV Continuous <Continuous>  dextrose 50% Injectable 25 Gram(s) IV Push once  dextrose 50% Injectable 12.5 Gram(s) IV Push once  dextrose 50% Injectable 25 Gram(s) IV Push once  enoxaparin Injectable 60 milliGRAM(s) SubCutaneous every 12 hours  famotidine Injectable 20 milliGRAM(s) IV Push at bedtime  folic acid 1 milliGRAM(s) Oral daily  glucagon  Injectable 1 milliGRAM(s) IntraMuscular once  hydrocortisone 1% Cream 1 Application(s) Topical two times a day  insulin lispro (ADMELOG) corrective regimen sliding scale   SubCutaneous three times a day before meals  metoprolol tartrate 25 milliGRAM(s) Oral two times a day  mirtazapine 7.5 milliGRAM(s) Oral at bedtime  morphine  - Injectable 2.5 milliGRAM(s) IV Push once  multivitamin/minerals 1 Tablet(s) Oral daily  pantoprazole  Injectable 40 milliGRAM(s) IV Push daily  polyethylene glycol 3350 17 Gram(s) Oral daily  potassium chloride    Tablet ER 40 milliEquivalent(s) Oral every 4 hours  rosuvastatin 5 milliGRAM(s) Oral at bedtime  senna 2 Tablet(s) Oral at bedtime  triamcinolone 0.1% Ointment 1 Application(s) Topical every 12 hours    MEDICATIONS  (PRN):  acetaminophen     Tablet .. 650 milliGRAM(s) Oral every 6 hours PRN Temp greater or equal to 38C (100.4F), Mild Pain (1 - 3)  aluminum hydroxide/magnesium hydroxide/simethicone Suspension 30 milliLiter(s) Oral every 4 hours PRN Dyspepsia  dextrose Oral Gel 15 Gram(s) Oral once PRN Blood Glucose LESS THAN 70 milliGRAM(s)/deciliter  lactulose Syrup 20 Gram(s) Oral once PRN if no BM for 2 days  melatonin 3 milliGRAM(s) Oral at bedtime PRN Insomnia  morphine  - Injectable 2 milliGRAM(s) IV Push every 6 hours PRN Severe Pain (7 - 10)  ondansetron Injectable 4 milliGRAM(s) IV Push every 8 hours PRN Nausea and/or Vomiting    Vital Signs Last 24 Hrs  T(C): 36.4 (22 Jan 2025 08:02), Max: 37.2 (21 Jan 2025 23:00)  T(F): 97.5 (22 Jan 2025 08:02), Max: 98.9 (21 Jan 2025 23:00)  HR: 69 (22 Jan 2025 08:02) (69 - 94)  BP: 124/84 (22 Jan 2025 08:02) (106/66 - 136/72)  BP(mean): --  RR: 17 (22 Jan 2025 08:02) (17 - 20)  SpO2: 95% (22 Jan 2025 08:02) (93% - 98%)    Parameters below as of 22 Jan 2025 08:02  Patient On (Oxygen Delivery Method): room air      PHYSICAL EXAM:  General: awake   Neck: supple   Lungs: cta   Cardio: RRR, S1/S2, No murmur  Abdomen: Soft, Nontender, Nondistended; Bowel sounds present  Extremities: No calf tenderness, No pitting edema    LABS:                        9.5    4.28  )-----------( 159      ( 22 Jan 2025 06:05 )             31.0     01-22    142  |  104  |  3.9  ----------------------------<  151  3.3   |  29.0  |  0.57    Ca    7.5      22 Jan 2025 06:05  Phos  1.8     01-20  Mg     1.7     01-20    TPro  5.6  /  Alb  2.0  /  TBili  0.6  /  DBili  x   /  AST  24  /  ALT  11  /  AlkPhos  223  01-21                                  POCT Blood Glucose.: 147 mg/dL (22 Jan 2025 08:19)  POCT Blood Glucose.: 182 mg/dL (21 Jan 2025 23:51)  POCT Blood Glucose.: 130 mg/dL (21 Jan 2025 17:22)  POCT Blood Glucose.: 123 mg/dL (21 Jan 2025 12:09)      Urinalysis Basic - ( 22 Jan 2025 06:05 )    Color: x / Appearance: x / SG: x / pH: x  Gluc: 151 mg/dL / Ketone: x  / Bili: x / Urobili: x   Blood: x / Protein: x / Nitrite: x   Leuk Esterase: x / RBC: x / WBC x   Sq Epi: x / Non Sq Epi: x / Bacteria: x          RADIOLOGY & ADDITIONAL TESTS:

## 2025-01-22 NOTE — PROGRESS NOTE ADULT - ASSESSMENT
80 y/o F w/ PMH of Afib (on eliquis), HTN, DM2, HLD, h/o stroke, limb ischemia (s/p left AKA, s/p b/l femoral thrombectomy), presented to ED from nursing home for evaluation of stump wound. Nursing home also reports she had a UA positive for UTI. At bedside pt c/o RUQ pain x 2 days. Pt says pain is nonradiating, constant, sharp in nature. Associated w/ chills. Denies N/V/D, fever, CP, SOB. Rest of ROS (-). While in the ED pt received vanc, zosyn, 1.95L NS bolus.      1- SOB improved   pulmonary congestion with atelectasis   off oxygen   s/p Iv lasix x 2 days   TTE normal EF   OOB , incentive spirometry   encourage breathing exercise   keep head elevated     2- Electrolyte imbalance   cont to replace K , mag and phos   poor oral intake likely cause with Ov lasix causing hypokalemia     3-Acte cholecystitis-sepsis present on admission   HIDA scan + cystic duct/CBD obstruction. positive for acute cholecystitis, s/p cholecystostomy tube.1/13 by IR   completed zosyn 7 days total  - 1/6 BCx - neg and Follow culture - E. faecium - seen by ID   completed IV abx course 7 days   As per last surgery note drain is working ok follow up in the office     4--PAD Hx of limb ischemia  s/p left AKA, s/p b/l femoral thrombectomy in dec   Vascular surgery consulted by ED, recommending local wound care to b/l groins and left AKA stump    5---HTN-/ Atrial fib   AC on  therapeutic lovenox - change to po eliquis today   On Metoprolol tartrate 25mg bid   On Irbesartan 150mg daily at home    6--DM2 type 2 on insulin   poor oral intake   cont to monitor   off lantus Bg is better 'if goes up will add lantus   cont ISS     7-HLD  Crestor 5mg qhs    8-Anemia  low iron ;  cont iron and folic acid   Vit C   stable HB         Dc to [facility] in 1-2 days  pending insurance auth     d..w family at bedside in [facility]

## 2025-01-22 NOTE — CHART NOTE - NSCHARTNOTEFT_GEN_A_CORE
Source: Staff, family, chart    Current Diet: Diet, DASH/TLC:   Sodium & Cholesterol Restricted  Consistent Carbohydrate {Evening Snack} (CSTCHOSN)  Supplement Feeding Modality:  Oral  Glucerna Shake Cans or Servings Per Day:  1       Frequency:  Two Times a day (01-15-25 @ 08:58)    PO intake:  < 50%    Source for PO intake: Family, staff, chart    Current Weight:   1/18 172.1 lbs  1/19 171.2 lbs  1/20 170.1 lbs    Pertinent Medications: MEDICATIONS  (STANDING):  ascorbic acid 500 milliGRAM(s) Oral daily  dextrose 5%. 1000 milliLiter(s) (60 mL/Hr) IV Continuous <Continuous>  dextrose 50% Injectable 25 Gram(s) IV Push once  enoxaparin Injectable 60 milliGRAM(s) SubCutaneous every 12 hours  famotidine Injectable 20 milliGRAM(s) IV Push at bedtime  folic acid 1 milliGRAM(s) Oral daily  glucagon  Injectable 1 milliGRAM(s) IntraMuscular once  insulin lispro (ADMELOG) corrective regimen sliding scale   SubCutaneous three times a day before meals  iron sucrose IVPB 100 milliGRAM(s) IV Intermittent once  metoprolol tartrate 25 milliGRAM(s) Oral two times a day  mirtazapine 7.5 milliGRAM(s) Oral at bedtime  morphine  - Injectable 2.5 milliGRAM(s) IV Push once  multivitamin/minerals 1 Tablet(s) Oral daily  pantoprazole  Injectable 40 milliGRAM(s) IV Push daily  polyethylene glycol 3350 17 Gram(s) Oral daily  potassium chloride  10 mEq/100 mL IVPB 10 milliEquivalent(s) IV Intermittent every 1 hour  senna 2 Tablet(s) Oral at bedtime    MEDICATIONS  (PRN):  dextrose Oral Gel 15 Gram(s) Oral once PRN Blood Glucose LESS THAN 70 milliGRAM(s)/deciliter  lactulose Syrup 20 Gram(s) Oral once PRN if no BM for 2 days  ondansetron Injectable 4 milliGRAM(s) IV Push every 8 hours PRN Nausea and/or Vomiting    Pertinent Labs: 01-22 Na142 mmol/L Glu 151 mg/dL[H] K+ 3.3 mmol/L[L] Cr  0.57 mg/dL BUN 3.9 mg/dL[L]     Skin:   Right heel DTI  Left above knee amputation     Nutrition focused physical exam conducted previously with signs of malnutrition present    Subcutaneous fat loss: [x] Orbital fat pads region, [x]Buccal fat region, [ ]Triceps region,  [ ]Ribs region    Muscle wasting: [x]Temples region, [x]Clavicle region, [x]Shoulder region, [ ]Scapula region, [ ]Interosseous region,  [ ]thigh region, [ ]Calf region    Estimated Needs:   [x] recalculated:   1052-3815 kcal (22-27 kcal/kg 63.5kg)  76-88 g protein (1.2-1.4g/kg 63.5kg)    Hospital Course: Per chart "80 y/o F w/ PMH of Afib (on eliquis), HTN, DM2, HLD, h/o stroke, limb ischemia (s/p left AKA, s/p b/l femoral thrombectomy), presented to ED from nursing home for evaluation of stump wound."    Current Nutrition Diagnosis: Chronic moderate malnutrition related to inadequate protein energy intake with persistent lack of appetite in setting of advanced age, recent AKA, now Acute cholecystitis.    Patient A/OX1-2 - spoke with nursing staff and family at bedside. Pt with poor appetite/PO intake and eating <50% of all meals. Pt receiving Glucerna BID, but no longer drinking at this time. Suggested addition of Magic Cup with meals - family receptive to have pt try. Informed nursing staff to provide medication with Magic Cup to increase protein and calorie consumption - receptive of the same. Obtained pt's food preferences as well to hopefully increase PO intake. No reported c/o N/V/C/D at this time. Will continue to monitor and follow up as needed. RD remains available.     Recommendations:   1) Continue diet as tolerated.   2) Continue Glucerna BID as tolerated to optimize PO intake. Continue Magic Cup TID.   3) Encourage po intake, monitor diet tolerance, and provide assistance at meals as needed.   4) Continue Rx: MVI daily.   5) Monitor BG levels, correct prn.   6) Obtain weekly weights to monitor trends.     Monitoring and Evaluation:   [x] PO intake [x] Tolerance to diet prescription [X] Weights  [X] Follow up per protocol [X] Labs: chem 8, POCT glucose.

## 2025-01-23 LAB
ANION GAP SERPL CALC-SCNC: 9 MMOL/L — SIGNIFICANT CHANGE UP (ref 5–17)
BASOPHILS # BLD AUTO: 0.02 K/UL — SIGNIFICANT CHANGE UP (ref 0–0.2)
BASOPHILS NFR BLD AUTO: 0.4 % — SIGNIFICANT CHANGE UP (ref 0–2)
BUN SERPL-MCNC: 5.1 MG/DL — LOW (ref 8–20)
CALCIUM SERPL-MCNC: 7.7 MG/DL — LOW (ref 8.4–10.5)
CHLORIDE SERPL-SCNC: 105 MMOL/L — SIGNIFICANT CHANGE UP (ref 96–108)
CO2 SERPL-SCNC: 28 MMOL/L — SIGNIFICANT CHANGE UP (ref 22–29)
CREAT SERPL-MCNC: 0.59 MG/DL — SIGNIFICANT CHANGE UP (ref 0.5–1.3)
EGFR: 90 ML/MIN/1.73M2 — SIGNIFICANT CHANGE UP
EOSINOPHIL # BLD AUTO: 0.03 K/UL — SIGNIFICANT CHANGE UP (ref 0–0.5)
EOSINOPHIL NFR BLD AUTO: 0.5 % — SIGNIFICANT CHANGE UP (ref 0–6)
GLUCOSE BLDC GLUCOMTR-MCNC: 110 MG/DL — HIGH (ref 70–99)
GLUCOSE BLDC GLUCOMTR-MCNC: 127 MG/DL — HIGH (ref 70–99)
GLUCOSE BLDC GLUCOMTR-MCNC: 139 MG/DL — HIGH (ref 70–99)
GLUCOSE BLDC GLUCOMTR-MCNC: 154 MG/DL — HIGH (ref 70–99)
GLUCOSE SERPL-MCNC: 85 MG/DL — SIGNIFICANT CHANGE UP (ref 70–99)
HCT VFR BLD CALC: 32.7 % — LOW (ref 34.5–45)
HGB BLD-MCNC: 10 G/DL — LOW (ref 11.5–15.5)
IMM GRANULOCYTES NFR BLD AUTO: 0.4 % — SIGNIFICANT CHANGE UP (ref 0–0.9)
LYMPHOCYTES # BLD AUTO: 2.33 K/UL — SIGNIFICANT CHANGE UP (ref 1–3.3)
LYMPHOCYTES # BLD AUTO: 42.1 % — SIGNIFICANT CHANGE UP (ref 13–44)
MCHC RBC-ENTMCNC: 25.4 PG — LOW (ref 27–34)
MCHC RBC-ENTMCNC: 30.6 G/DL — LOW (ref 32–36)
MCV RBC AUTO: 83 FL — SIGNIFICANT CHANGE UP (ref 80–100)
MONOCYTES # BLD AUTO: 0.35 K/UL — SIGNIFICANT CHANGE UP (ref 0–0.9)
MONOCYTES NFR BLD AUTO: 6.3 % — SIGNIFICANT CHANGE UP (ref 2–14)
NEUTROPHILS # BLD AUTO: 2.78 K/UL — SIGNIFICANT CHANGE UP (ref 1.8–7.4)
NEUTROPHILS NFR BLD AUTO: 50.3 % — SIGNIFICANT CHANGE UP (ref 43–77)
PLATELET # BLD AUTO: 147 K/UL — LOW (ref 150–400)
POTASSIUM SERPL-MCNC: 4 MMOL/L — SIGNIFICANT CHANGE UP (ref 3.5–5.3)
POTASSIUM SERPL-SCNC: 4 MMOL/L — SIGNIFICANT CHANGE UP (ref 3.5–5.3)
RBC # BLD: 3.94 M/UL — SIGNIFICANT CHANGE UP (ref 3.8–5.2)
RBC # FLD: 18 % — HIGH (ref 10.3–14.5)
SODIUM SERPL-SCNC: 142 MMOL/L — SIGNIFICANT CHANGE UP (ref 135–145)
WBC # BLD: 5.53 K/UL — SIGNIFICANT CHANGE UP (ref 3.8–10.5)
WBC # FLD AUTO: 5.53 K/UL — SIGNIFICANT CHANGE UP (ref 3.8–10.5)

## 2025-01-23 PROCEDURE — 99232 SBSQ HOSP IP/OBS MODERATE 35: CPT

## 2025-01-23 PROCEDURE — 74018 RADEX ABDOMEN 1 VIEW: CPT | Mod: 26

## 2025-01-23 RX ADMIN — Medication 2 TABLET(S): at 22:30

## 2025-01-23 RX ADMIN — MIRTAZAPINE 7.5 MILLIGRAM(S): 30 TABLET, FILM COATED ORAL at 22:30

## 2025-01-23 RX ADMIN — PANTOPRAZOLE 40 MILLIGRAM(S): 20 TABLET, DELAYED RELEASE ORAL at 11:25

## 2025-01-23 RX ADMIN — Medication 1 APPLICATION(S): at 05:32

## 2025-01-23 RX ADMIN — Medication 500 MILLIGRAM(S): at 11:24

## 2025-01-23 RX ADMIN — Medication 1 APPLICATION(S): at 11:26

## 2025-01-23 RX ADMIN — POLYETHYLENE GLYCOL 3350 17 GRAM(S): 17 POWDER, FOR SOLUTION ORAL at 11:24

## 2025-01-23 RX ADMIN — Medication 25 MILLIGRAM(S): at 05:32

## 2025-01-23 RX ADMIN — Medication 1 APPLICATION(S): at 17:53

## 2025-01-23 RX ADMIN — Medication 1 APPLICATION(S): at 05:31

## 2025-01-23 RX ADMIN — Medication 1 TABLET(S): at 11:23

## 2025-01-23 RX ADMIN — FAMOTIDINE 20 MILLIGRAM(S): 10 INJECTION INTRAVENOUS at 22:30

## 2025-01-23 RX ADMIN — Medication 25 MILLIGRAM(S): at 17:53

## 2025-01-23 RX ADMIN — Medication 1 MILLIGRAM(S): at 11:25

## 2025-01-23 RX ADMIN — ROSUVASTATIN CALCIUM 5 MILLIGRAM(S): 10 TABLET, FILM COATED ORAL at 22:30

## 2025-01-23 RX ADMIN — ANTISEPTIC SURGICAL SCRUB 1 APPLICATION(S): 0.04 SOLUTION TOPICAL at 05:28

## 2025-01-23 RX ADMIN — APIXABAN 5 MILLIGRAM(S): 5 TABLET, FILM COATED ORAL at 17:53

## 2025-01-23 RX ADMIN — Medication 1 APPLICATION(S): at 17:54

## 2025-01-23 RX ADMIN — APIXABAN 5 MILLIGRAM(S): 5 TABLET, FILM COATED ORAL at 05:33

## 2025-01-23 NOTE — PROGRESS NOTE ADULT - ASSESSMENT
80 y/o F w/ PMH of Afib (on eliquis), HTN, DM2, HLD, h/o stroke, limb ischemia (s/p left AKA, s/p b/l femoral thrombectomy), presented to ED from nursing home for evaluation of stump wound. Nursing home also reports she had a UA positive for UTI. At bedside pt c/o RUQ pain x 2 days. Pt says pain is nonradiating, constant, sharp in nature. Associated w/ chills. Denies N/V/D, fever, CP, SOB. Rest of ROS (-). While in the ED pt received vanc, zosyn, 1.95L NS bolus.    SOB improved   pulmonary congestion with atelectasis   off oxygen   s/p Iv lasix x 2 days   TTE normal EF   OOB , incentive spirometry   encourage breathing exercise   keep head elevated     Abdominal pain, poor oral intake  Acte cholecystitis-sepsis present on admission   HIDA scan + cystic duct/CBD obstruction. positive for acute cholecystitis, s/p cholecystostomy tube.1/13 by IR   completed zosyn 7 days total  1/6 BCx - neg and Follow culture - E. faecium - seen by ID   completed IV abx course 7 days   As per last surgery note drain is working ok follow up in the office   will check AXR due to hypoactive bowel sounds  trend lfts      Electrolyte imbalance   cont to replace K , mag and phos   poor oral intake likely cause with Ov lasix causing hypokalemia     PAD Hx of limb ischemia  s/p left AKA, s/p b/l femoral thrombectomy in dec   Vascular surgery consulted by ED, recommending local wound care to b/l groins and left AKA stump    HTN-/ Atrial fib   Eliquis for afib  On Metoprolol tartrate 25mg bid   On Irbesartan 150mg daily at home    DM2 type 2 on insulin   poor oral intake   cont to monitor   now off lantus  cont ISS     HLD  Crestor 5mg qhs    Anemia  low iron ;  cont iron and folic acid   Vit C   stable HB         dc to [facility] on hold pending axr  discussed with  family at bedside

## 2025-01-23 NOTE — PROGRESS NOTE ADULT - SUBJECTIVE AND OBJECTIVE BOX
[facility] Division of Hospital Medicine    Chief Complaint:  Sepsis, cholecystitis    SUBJECTIVE / OVERNIGHT EVENTS: Patient seen and examined, plan for dc to [facility] today auth obtained but patient and family state patient is not tolerating diet, she was rethcing this morning. last bm documented 1/22. Still with abdominal pain on the right.     Patient denies chest pain, SOB, fever, chills, dysuria or any other complaints. All remainder ROS negative.     MEDICATIONS  (STANDING):  apixaban 5 milliGRAM(s) Oral two times a day  ascorbic acid 500 milliGRAM(s) Oral daily  chlorhexidine 2% Cloths 1 Application(s) Topical <User Schedule>  collagenase Ointment 1 Application(s) Topical daily  collagenase Ointment 1 Application(s) Topical daily  dextrose 5%. 1000 milliLiter(s) (100 mL/Hr) IV Continuous <Continuous>  dextrose 5%. 1000 milliLiter(s) (50 mL/Hr) IV Continuous <Continuous>  dextrose 5%. 1000 milliLiter(s) (60 mL/Hr) IV Continuous <Continuous>  dextrose 50% Injectable 25 Gram(s) IV Push once  dextrose 50% Injectable 12.5 Gram(s) IV Push once  dextrose 50% Injectable 25 Gram(s) IV Push once  famotidine Injectable 20 milliGRAM(s) IV Push at bedtime  folic acid 1 milliGRAM(s) Oral daily  glucagon  Injectable 1 milliGRAM(s) IntraMuscular once  hydrocortisone 1% Cream 1 Application(s) Topical two times a day  insulin lispro (ADMELOG) corrective regimen sliding scale   SubCutaneous three times a day before meals  metoprolol tartrate 25 milliGRAM(s) Oral two times a day  mirtazapine 7.5 milliGRAM(s) Oral at bedtime  morphine  - Injectable 2.5 milliGRAM(s) IV Push once  multivitamin/minerals 1 Tablet(s) Oral daily  pantoprazole  Injectable 40 milliGRAM(s) IV Push daily  polyethylene glycol 3350 17 Gram(s) Oral daily  rosuvastatin 5 milliGRAM(s) Oral at bedtime  senna 2 Tablet(s) Oral at bedtime  triamcinolone 0.1% Ointment 1 Application(s) Topical every 12 hours    MEDICATIONS  (PRN):  acetaminophen     Tablet .. 650 milliGRAM(s) Oral every 6 hours PRN Temp greater or equal to 38C (100.4F), Mild Pain (1 - 3)  aluminum hydroxide/magnesium hydroxide/simethicone Suspension 30 milliLiter(s) Oral every 4 hours PRN Dyspepsia  dextrose Oral Gel 15 Gram(s) Oral once PRN Blood Glucose LESS THAN 70 milliGRAM(s)/deciliter  lactulose Syrup 20 Gram(s) Oral once PRN if no BM for 2 days  melatonin 3 milliGRAM(s) Oral at bedtime PRN Insomnia  morphine  - Injectable 2 milliGRAM(s) IV Push every 6 hours PRN Severe Pain (7 - 10)  ondansetron Injectable 4 milliGRAM(s) IV Push every 8 hours PRN Nausea and/or Vomiting        I&O's Summary    22 Jan 2025 07:01  -  23 Jan 2025 07:00  --------------------------------------------------------  IN: 520 mL / OUT: 2225 mL / NET: -1705 mL        PHYSICAL EXAM:  Vital Signs Last 24 Hrs  T(C): 36.3 (23 Jan 2025 07:34), Max: 37 (23 Jan 2025 00:32)  T(F): 97.4 (23 Jan 2025 07:34), Max: 98.6 (23 Jan 2025 00:32)  HR: 83 (23 Jan 2025 07:34) (83 - 106)  BP: 100/60 (23 Jan 2025 07:34) (100/60 - 134/85)  BP(mean): --  RR: 18 (23 Jan 2025 07:34) (18 - 20)  SpO2: 93% (23 Jan 2025 07:34) (93% - 96%)    Parameters below as of 23 Jan 2025 07:34  Patient On (Oxygen Delivery Method): room air            CONSTITUTIONAL: NAD  ENMT: Moist oral mucosa, no pharyngeal injection or exudates  RESPIRATORY: Normal respiratory effort; lungs are clear to auscultation bilaterally  CARDIOVASCULAR: Regular rate and rhythm, normal S1 and S2, No lower extremity edema  ABDOMEN: hypoactive bowel sounds, + drain, nontender  MUSCLOSKELETAL: no joint swelling or tenderness to palpation  PSYCH: A+O to person, place, and time  NEUROLOGY: CN 2-12 are intact and symmetric  SKIN: No rashes; no palpable lesions    LABS:                        10.0   5.53  )-----------( 147      ( 23 Jan 2025 03:52 )             32.7     01-23    142  |  105  |  5.1[L]  ----------------------------<  85  4.0   |  28.0  |  0.59    Ca    7.7[L]      23 Jan 2025 03:52            Urinalysis Basic - ( 23 Jan 2025 03:52 )    Color: x / Appearance: x / SG: x / pH: x  Gluc: 85 mg/dL / Ketone: x  / Bili: x / Urobili: x   Blood: x / Protein: x / Nitrite: x   Leuk Esterase: x / RBC: x / WBC x   Sq Epi: x / Non Sq Epi: x / Bacteria: x        CAPILLARY BLOOD GLUCOSE      POCT Blood Glucose.: 139 mg/dL (23 Jan 2025 12:09)  POCT Blood Glucose.: 110 mg/dL (23 Jan 2025 08:05)  POCT Blood Glucose.: 96 mg/dL (22 Jan 2025 21:30)  POCT Blood Glucose.: 89 mg/dL (22 Jan 2025 17:18)        RADIOLOGY & ADDITIONAL TESTS:  Results Reviewed:   Imaging Personally Reviewed:  Electrocardiogram Personally Reviewed:

## 2025-01-23 NOTE — PROGRESS NOTE ADULT - PROVIDER SPECIALTY LIST ADULT
Hospitalist
Infectious Disease
Hospitalist
Infectious Disease
Internal Medicine
Intervent Radiology
Hospitalist
Internal Medicine
Intervent Radiology
Surgery
Hospitalist
Infectious Disease
Infectious Disease

## 2025-01-24 ENCOUNTER — TRANSCRIPTION ENCOUNTER (OUTPATIENT)
Age: 80
End: 2025-01-24

## 2025-01-24 VITALS
RESPIRATION RATE: 18 BRPM | HEART RATE: 80 BPM | OXYGEN SATURATION: 96 % | SYSTOLIC BLOOD PRESSURE: 126 MMHG | TEMPERATURE: 98 F | DIASTOLIC BLOOD PRESSURE: 68 MMHG

## 2025-01-24 LAB
ALBUMIN SERPL ELPH-MCNC: 1.7 G/DL — LOW (ref 3.3–5.2)
ALP SERPL-CCNC: 199 U/L — HIGH (ref 40–120)
ALT FLD-CCNC: 10 U/L — SIGNIFICANT CHANGE UP
ANION GAP SERPL CALC-SCNC: 10 MMOL/L — SIGNIFICANT CHANGE UP (ref 5–17)
AST SERPL-CCNC: 23 U/L — SIGNIFICANT CHANGE UP
BASOPHILS # BLD AUTO: 0.02 K/UL — SIGNIFICANT CHANGE UP (ref 0–0.2)
BASOPHILS NFR BLD AUTO: 0.4 % — SIGNIFICANT CHANGE UP (ref 0–2)
BILIRUB SERPL-MCNC: 0.6 MG/DL — SIGNIFICANT CHANGE UP (ref 0.4–2)
BUN SERPL-MCNC: 6.8 MG/DL — LOW (ref 8–20)
CALCIUM SERPL-MCNC: 7.4 MG/DL — LOW (ref 8.4–10.5)
CHLORIDE SERPL-SCNC: 106 MMOL/L — SIGNIFICANT CHANGE UP (ref 96–108)
CO2 SERPL-SCNC: 25 MMOL/L — SIGNIFICANT CHANGE UP (ref 22–29)
CREAT SERPL-MCNC: 0.63 MG/DL — SIGNIFICANT CHANGE UP (ref 0.5–1.3)
EGFR: 89 ML/MIN/1.73M2 — SIGNIFICANT CHANGE UP
EOSINOPHIL # BLD AUTO: 0.08 K/UL — SIGNIFICANT CHANGE UP (ref 0–0.5)
EOSINOPHIL NFR BLD AUTO: 1.7 % — SIGNIFICANT CHANGE UP (ref 0–6)
GLUCOSE BLDC GLUCOMTR-MCNC: 121 MG/DL — HIGH (ref 70–99)
GLUCOSE BLDC GLUCOMTR-MCNC: 136 MG/DL — HIGH (ref 70–99)
GLUCOSE SERPL-MCNC: 109 MG/DL — HIGH (ref 70–99)
HCT VFR BLD CALC: 31 % — LOW (ref 34.5–45)
HGB BLD-MCNC: 9.5 G/DL — LOW (ref 11.5–15.5)
IMM GRANULOCYTES NFR BLD AUTO: 0.2 % — SIGNIFICANT CHANGE UP (ref 0–0.9)
LYMPHOCYTES # BLD AUTO: 1.81 K/UL — SIGNIFICANT CHANGE UP (ref 1–3.3)
LYMPHOCYTES # BLD AUTO: 38 % — SIGNIFICANT CHANGE UP (ref 13–44)
MAGNESIUM SERPL-MCNC: 1.7 MG/DL — SIGNIFICANT CHANGE UP (ref 1.6–2.6)
MCHC RBC-ENTMCNC: 25.6 PG — LOW (ref 27–34)
MCHC RBC-ENTMCNC: 30.6 G/DL — LOW (ref 32–36)
MCV RBC AUTO: 83.6 FL — SIGNIFICANT CHANGE UP (ref 80–100)
MONOCYTES # BLD AUTO: 0.45 K/UL — SIGNIFICANT CHANGE UP (ref 0–0.9)
MONOCYTES NFR BLD AUTO: 9.5 % — SIGNIFICANT CHANGE UP (ref 2–14)
NEUTROPHILS # BLD AUTO: 2.39 K/UL — SIGNIFICANT CHANGE UP (ref 1.8–7.4)
NEUTROPHILS NFR BLD AUTO: 50.2 % — SIGNIFICANT CHANGE UP (ref 43–77)
PLATELET # BLD AUTO: 160 K/UL — SIGNIFICANT CHANGE UP (ref 150–400)
POTASSIUM SERPL-MCNC: 3.8 MMOL/L — SIGNIFICANT CHANGE UP (ref 3.5–5.3)
POTASSIUM SERPL-SCNC: 3.8 MMOL/L — SIGNIFICANT CHANGE UP (ref 3.5–5.3)
PROT SERPL-MCNC: 5.3 G/DL — LOW (ref 6.6–8.7)
RBC # BLD: 3.71 M/UL — LOW (ref 3.8–5.2)
RBC # FLD: 18.2 % — HIGH (ref 10.3–14.5)
SODIUM SERPL-SCNC: 141 MMOL/L — SIGNIFICANT CHANGE UP (ref 135–145)
WBC # BLD: 4.76 K/UL — SIGNIFICANT CHANGE UP (ref 3.8–10.5)
WBC # FLD AUTO: 4.76 K/UL — SIGNIFICANT CHANGE UP (ref 3.8–10.5)

## 2025-01-24 PROCEDURE — 93005 ELECTROCARDIOGRAM TRACING: CPT

## 2025-01-24 PROCEDURE — 86850 RBC ANTIBODY SCREEN: CPT

## 2025-01-24 PROCEDURE — 84484 ASSAY OF TROPONIN QUANT: CPT

## 2025-01-24 PROCEDURE — 82435 ASSAY OF BLOOD CHLORIDE: CPT

## 2025-01-24 PROCEDURE — 83550 IRON BINDING TEST: CPT

## 2025-01-24 PROCEDURE — 85610 PROTHROMBIN TIME: CPT

## 2025-01-24 PROCEDURE — 74018 RADEX ABDOMEN 1 VIEW: CPT

## 2025-01-24 PROCEDURE — 96374 THER/PROPH/DIAG INJ IV PUSH: CPT

## 2025-01-24 PROCEDURE — 71250 CT THORAX DX C-: CPT | Mod: MC

## 2025-01-24 PROCEDURE — 87641 MR-STAPH DNA AMP PROBE: CPT

## 2025-01-24 PROCEDURE — A9537: CPT

## 2025-01-24 PROCEDURE — 87015 SPECIMEN INFECT AGNT CONCNTJ: CPT

## 2025-01-24 PROCEDURE — 99285 EMERGENCY DEPT VISIT HI MDM: CPT

## 2025-01-24 PROCEDURE — C1729: CPT

## 2025-01-24 PROCEDURE — 84132 ASSAY OF SERUM POTASSIUM: CPT

## 2025-01-24 PROCEDURE — 83540 ASSAY OF IRON: CPT

## 2025-01-24 PROCEDURE — 82947 ASSAY GLUCOSE BLOOD QUANT: CPT

## 2025-01-24 PROCEDURE — T1013: CPT

## 2025-01-24 PROCEDURE — 82803 BLOOD GASES ANY COMBINATION: CPT

## 2025-01-24 PROCEDURE — 87075 CULTR BACTERIA EXCEPT BLOOD: CPT

## 2025-01-24 PROCEDURE — 85014 HEMATOCRIT: CPT

## 2025-01-24 PROCEDURE — 82330 ASSAY OF CALCIUM: CPT

## 2025-01-24 PROCEDURE — 87077 CULTURE AEROBIC IDENTIFY: CPT

## 2025-01-24 PROCEDURE — 85025 COMPLETE CBC W/AUTO DIFF WBC: CPT

## 2025-01-24 PROCEDURE — 76705 ECHO EXAM OF ABDOMEN: CPT

## 2025-01-24 PROCEDURE — 87045 FECES CULTURE AEROBIC BACT: CPT

## 2025-01-24 PROCEDURE — 82550 ASSAY OF CK (CPK): CPT

## 2025-01-24 PROCEDURE — 82728 ASSAY OF FERRITIN: CPT

## 2025-01-24 PROCEDURE — 82607 VITAMIN B-12: CPT

## 2025-01-24 PROCEDURE — 87507 IADNA-DNA/RNA PROBE TQ 12-25: CPT

## 2025-01-24 PROCEDURE — 87637 SARSCOV2&INF A&B&RSV AMP PRB: CPT

## 2025-01-24 PROCEDURE — 85027 COMPLETE CBC AUTOMATED: CPT

## 2025-01-24 PROCEDURE — C1769: CPT

## 2025-01-24 PROCEDURE — 73701 CT LOWER EXTREMITY W/DYE: CPT | Mod: MC

## 2025-01-24 PROCEDURE — 82962 GLUCOSE BLOOD TEST: CPT

## 2025-01-24 PROCEDURE — 85730 THROMBOPLASTIN TIME PARTIAL: CPT

## 2025-01-24 PROCEDURE — 76942 ECHO GUIDE FOR BIOPSY: CPT

## 2025-01-24 PROCEDURE — 97530 THERAPEUTIC ACTIVITIES: CPT

## 2025-01-24 PROCEDURE — 74174 CTA ABD&PLVS W/CONTRAST: CPT | Mod: MC

## 2025-01-24 PROCEDURE — 87640 STAPH A DNA AMP PROBE: CPT

## 2025-01-24 PROCEDURE — 84466 ASSAY OF TRANSFERRIN: CPT

## 2025-01-24 PROCEDURE — 77002 NEEDLE LOCALIZATION BY XRAY: CPT

## 2025-01-24 PROCEDURE — 83735 ASSAY OF MAGNESIUM: CPT

## 2025-01-24 PROCEDURE — 85018 HEMOGLOBIN: CPT

## 2025-01-24 PROCEDURE — 36415 COLL VENOUS BLD VENIPUNCTURE: CPT

## 2025-01-24 PROCEDURE — 83880 ASSAY OF NATRIURETIC PEPTIDE: CPT

## 2025-01-24 PROCEDURE — 74176 CT ABD & PELVIS W/O CONTRAST: CPT | Mod: MC

## 2025-01-24 PROCEDURE — 82746 ASSAY OF FOLIC ACID SERUM: CPT

## 2025-01-24 PROCEDURE — 87070 CULTURE OTHR SPECIMN AEROBIC: CPT

## 2025-01-24 PROCEDURE — 80076 HEPATIC FUNCTION PANEL: CPT

## 2025-01-24 PROCEDURE — 83605 ASSAY OF LACTIC ACID: CPT

## 2025-01-24 PROCEDURE — 96375 TX/PRO/DX INJ NEW DRUG ADDON: CPT

## 2025-01-24 PROCEDURE — 78226 HEPATOBILIARY SYSTEM IMAGING: CPT | Mod: MC

## 2025-01-24 PROCEDURE — 86901 BLOOD TYPING SEROLOGIC RH(D): CPT

## 2025-01-24 PROCEDURE — 99239 HOSP IP/OBS DSCHRG MGMT >30: CPT

## 2025-01-24 PROCEDURE — 86900 BLOOD TYPING SEROLOGIC ABO: CPT

## 2025-01-24 PROCEDURE — 87040 BLOOD CULTURE FOR BACTERIA: CPT

## 2025-01-24 PROCEDURE — 87186 SC STD MICRODIL/AGAR DIL: CPT

## 2025-01-24 PROCEDURE — 80048 BASIC METABOLIC PNL TOTAL CA: CPT

## 2025-01-24 PROCEDURE — 81001 URINALYSIS AUTO W/SCOPE: CPT

## 2025-01-24 PROCEDURE — 71045 X-RAY EXAM CHEST 1 VIEW: CPT

## 2025-01-24 PROCEDURE — 87205 SMEAR GRAM STAIN: CPT

## 2025-01-24 PROCEDURE — 84295 ASSAY OF SERUM SODIUM: CPT

## 2025-01-24 PROCEDURE — 84100 ASSAY OF PHOSPHORUS: CPT

## 2025-01-24 PROCEDURE — 80053 COMPREHEN METABOLIC PANEL: CPT

## 2025-01-24 RX ORDER — GLIPIZIDE 10 MG/1
1 TABLET, FILM COATED, EXTENDED RELEASE ORAL
Refills: 0 | DISCHARGE

## 2025-01-24 RX ORDER — SENNOSIDES 8.6 MG
2 TABLET ORAL
Qty: 0 | Refills: 0 | DISCHARGE
Start: 2025-01-24

## 2025-01-24 RX ORDER — PANTOPRAZOLE 20 MG/1
1 TABLET, DELAYED RELEASE ORAL
Qty: 1 | Refills: 0
Start: 2025-01-24 | End: 2025-02-22

## 2025-01-24 RX ORDER — DAPAGLIFLOZIN 5 MG/1
1 TABLET, FILM COATED ORAL
Refills: 0 | DISCHARGE

## 2025-01-24 RX ORDER — ASCORBIC ACID 500 MG/ML
1 VIAL (ML) INJECTION
Qty: 0 | Refills: 0 | DISCHARGE
Start: 2025-01-24

## 2025-01-24 RX ORDER — INSULIN GLARGINE-YFGN 100 [IU]/ML
15 INJECTION, SOLUTION SUBCUTANEOUS
Refills: 0 | DISCHARGE

## 2025-01-24 RX ORDER — ACETAMINOPHEN 160 MG/5ML
2 SUSPENSION ORAL
Qty: 0 | Refills: 0 | DISCHARGE
Start: 2025-01-24

## 2025-01-24 RX ORDER — LACTULOSE 10 G/15 ML
30 SOLUTION, ORAL ORAL
Qty: 0 | Refills: 0 | DISCHARGE
Start: 2025-01-24

## 2025-01-24 RX ORDER — FOLIC ACID 1 MG
1 TABLET ORAL
Qty: 0 | Refills: 0 | DISCHARGE
Start: 2025-01-24

## 2025-01-24 RX ORDER — POLYETHYLENE GLYCOL 3350 17 G/17G
17 POWDER, FOR SOLUTION ORAL
Qty: 0 | Refills: 0 | DISCHARGE
Start: 2025-01-24

## 2025-01-24 RX ORDER — INSULIN LISPRO 100/ML
5 VIAL (ML) SUBCUTANEOUS
Refills: 0 | DISCHARGE

## 2025-01-24 RX ORDER — TRIAMCINOLONE ACETONIDE 0.025 %
1 CREAM (GRAM) TOPICAL
Qty: 0 | Refills: 0 | DISCHARGE
Start: 2025-01-24

## 2025-01-24 RX ORDER — IRON/FOLIC ACID/C/B6/B12/ZINC 150-1.25MG
1 TABLET ORAL
Qty: 0 | Refills: 0 | DISCHARGE
Start: 2025-01-24

## 2025-01-24 RX ORDER — HYDROCORTISONE 1 %
1 CREAM (GRAM) TOPICAL
Qty: 0 | Refills: 0 | DISCHARGE
Start: 2025-01-24

## 2025-01-24 RX ORDER — MIRTAZAPINE 30 MG/1
1 TABLET, FILM COATED ORAL
Qty: 0 | Refills: 0 | DISCHARGE
Start: 2025-01-24

## 2025-01-24 RX ADMIN — Medication 1 APPLICATION(S): at 06:24

## 2025-01-24 RX ADMIN — Medication 1 APPLICATION(S): at 12:31

## 2025-01-24 RX ADMIN — Medication 1 APPLICATION(S): at 12:32

## 2025-01-24 RX ADMIN — Medication 500 MILLIGRAM(S): at 11:20

## 2025-01-24 RX ADMIN — POLYETHYLENE GLYCOL 3350 17 GRAM(S): 17 POWDER, FOR SOLUTION ORAL at 11:18

## 2025-01-24 RX ADMIN — ANTISEPTIC SURGICAL SCRUB 1 APPLICATION(S): 0.04 SOLUTION TOPICAL at 06:25

## 2025-01-24 RX ADMIN — APIXABAN 5 MILLIGRAM(S): 5 TABLET, FILM COATED ORAL at 06:23

## 2025-01-24 RX ADMIN — Medication 1 TABLET(S): at 11:19

## 2025-01-24 RX ADMIN — Medication 25 MILLIGRAM(S): at 06:23

## 2025-01-24 RX ADMIN — Medication 1 MILLIGRAM(S): at 11:19

## 2025-01-24 RX ADMIN — PANTOPRAZOLE 40 MILLIGRAM(S): 20 TABLET, DELAYED RELEASE ORAL at 11:18

## 2025-01-24 NOTE — DISCHARGE NOTE PROVIDER - ATTENDING DISCHARGE PHYSICAL EXAMINATION:
PHYSICAL EXAM:  Vital Signs Last 24 Hrs  T(F): 98.7 (24 Jan 2025 06:21), Max: 98.7 (24 Jan 2025 06:21)  HR: 83 (24 Jan 2025 06:21) (83 - 87)  BP: 122/67 (24 Jan 2025 06:21) (121/77 - 122/70)  RR: 18 (24 Jan 2025 06:21) (18 - 18)  SpO2: 93% (24 Jan 2025 06:21) (93% - 98%)    GENERAL: NAD, Resting in bed  Eyes: EOMI, PERRLA  ENMT: Conjunctiva and sclera clear; supple neck, No JVD  Cardiovascular: S1,S2, RRR, No murmur  Respiratory: CTA B/L, Non-labored breathing  GI: Bowel sounds present; Soft, Nontender, Nondistended. No hepatomegaly  Genitourinary: Deferred  Skin:  Left aka, wrapped  Neurology: Alert & Oriented X3, non-focal and spontaneous movements of all extremities, CN 2 to 12 grossly intact   Psych: Appropriate mood and affect, calm, pleasant, Responds appropriately to questions   PHYSICAL EXAM:  Vital Signs Last 24 Hrs  T(F): 98.7 (24 Jan 2025 06:21), Max: 98.7 (24 Jan 2025 06:21)  HR: 83 (24 Jan 2025 06:21) (83 - 87)  BP: 122/67 (24 Jan 2025 06:21) (121/77 - 122/70)  RR: 18 (24 Jan 2025 06:21) (18 - 18)  SpO2: 93% (24 Jan 2025 06:21) (93% - 98%)    GENERAL: NAD, Resting in bed  Eyes: EOMI, PERRLA  ENMT: Conjunctiva and sclera clear; supple neck, No JVD  Cardiovascular: S1,S2, RRR, No murmur  Respiratory: CTA B/L, Non-labored breathing  GI: Bowel sounds present; Soft, Nontender, Nondistended. No hepatomegaly, chris tube  Genitourinary: Deferred  Skin:  Left aka, wrapped  Neurology: Alert & Oriented X3, non-focal and spontaneous movements of all extremities, CN 2 to 12 grossly intact   Psych: Appropriate mood and affect, calm, pleasant, Responds appropriately to questions

## 2025-01-24 NOTE — DISCHARGE NOTE NURSING/CASE MANAGEMENT/SOCIAL WORK - NSDCVIVACCINE_GEN_ALL_CORE_FT
Tdap; 09-Jun-2024 18:10; Grzegorz Lagos (ROSA); [employer]; [id number] (Exp. Date: 01-Dec-2025); IntraMuscular; Deltoid Right.; 0.5 milliLiter(s); VIS (VIS Published: 09-May-2013, VIS Presented: 09-Jun-2024);

## 2025-01-24 NOTE — DISCHARGE NOTE PROVIDER - CARE PROVIDERS DIRECT ADDRESSES
[email or web address]

## 2025-01-24 NOTE — DISCHARGE NOTE PROVIDER - CARE PROVIDER_API CALL
Gulshan Tubbs  Vascular Surgery  [address]  [place] [id number]  Phone: [phone]  Fax: [phone]  Follow Up Time: 2 weeks    Radha Harris  Interventional Radiology and Diagnostic Radiology  [address], [place] [address]  Phone: [phone]  Fax: [phone]  Follow Up Time: 1 week    Gabriela Doan  Infectious Disease  [address]  Phone: [phone]  Fax: [phone]  Follow Up Time: 2 weeks    Candace Petersen [facility]  Surgery  [address] [place] [id number]  Phone: [phone]  Fax: [phone]  Follow Up Time: 2 weeks

## 2025-01-24 NOTE — DISCHARGE NOTE PROVIDER - HOSPITAL COURSE
79-year-old female with a medical history significant for atrial fibrillation (on Eliquis), hypertension, type 2 diabetes mellitus, hyperlipidemia, a history of stroke, and limb ischemia status post left above-knee amputation and bilateral femoral thrombectomy. She was brought to the emergency department (ED) from her nursing home for the evaluation of a stump wound. The nursing home also reported a urinalysis positive for a urinary tract infection. At the bedside, the patient complained of right upper quadrant pain lasting for two days, describing it as non-radiating, constant, and sharp, and associated with chills. She denied nausea, vomiting, diarrhea, fever, chest pain, or shortness of breath. The remainder of the review of systems was negative. In the ED, she received Vancomycin, Zosyn, and a 1.95L normal saline bolus.During her admission, the patient experienced shortness of breath related to pulmonary congestion and atelectasis, which improved following two days of IV Lasix administration. She was taken off oxygen, and a transthoracic echocardiogram revealed a normal ejection fraction. The patient was encouraged to be out of bed, use incentive spirometry, and engage in breathing exercises while keeping her head elevated. Regarding her abdominal pain and poor oral intake, acute cholecystitis with sepsis was diagnosed on admission. A HIDA scan showed positive findings of cystic duct/common bile duct obstruction and acute cholecystitis, for which she underwent a cholecystostomy tube insertion on January 13th by interventional radiology. She completed a seven-day course of Zosyn, and blood cultures taken on January 6th were negative. Follow-up cultures revealed Enterococcus faecium, and she completed a seven-day IV antibiotic course. According to the latest surgical note, the drain is functioning well with follow-up scheduled in the office.  The patient also presented with an electrolyte imbalance, notably hypokalemia, likely due to poor oral intake compounded by Lasix use. Potassium, magnesium, and phosphate replacements were continued. The patient has peripheral artery disease, with a history of limb ischemia and previous surgeries, including a left AKA and bilateral femoral thrombectomy in December. Vascular surgery was consulted by the ED and recommended local wound care for bilateral groins and the left AKA stump.... 79-year-old female with a medical history significant for atrial fibrillation (on Eliquis), hypertension, type 2 diabetes mellitus, hyperlipidemia, a history of stroke, and limb ischemia status post left above-knee amputation and bilateral femoral thrombectomy. She was brought to the emergency department (ED) from her nursing home for the evaluation of a stump wound. The nursing home also reported a urinalysis positive for a urinary tract infection. At the bedside, the patient complained of right upper quadrant pain lasting for two days, describing it as non-radiating, constant, and sharp, and associated with chills. She denied nausea, vomiting, diarrhea, fever, chest pain, or shortness of breath. The remainder of the review of systems was negative. In the ED, she received Vancomycin, Zosyn, and a 1.95L normal saline bolus.During her admission, the patient experienced shortness of breath related to pulmonary congestion and atelectasis, which improved following two days of IV Lasix administration. She was taken off oxygen, and a transthoracic echocardiogram revealed a normal ejection fraction. The patient was encouraged to be out of bed, use incentive spirometry, and engage in breathing exercises while keeping her head elevated. Regarding her abdominal pain and poor oral intake, acute cholecystitis with sepsis was diagnosed on admission. A HIDA scan showed positive findings of cystic duct/common bile duct obstruction and acute cholecystitis, for which she underwent a cholecystostomy tube insertion on January 13th by interventional radiology. She completed a seven-day course of Zosyn, and blood cultures taken on January 6th were negative. Follow-up cultures revealed Enterococcus faecium, and she completed a seven-day IV antibiotic course. According to the latest surgical note, the drain is functioning well with follow-up scheduled in the office.  The patient also presented with an electrolyte imbalance, notably hypokalemia, likely due to poor oral intake compounded by Lasix use. Potassium, magnesium, and phosphate replacements were continued. The patient has peripheral artery disease, with a history of limb ischemia and previous surgeries, including a left AKA and bilateral femoral thrombectomy in December. Vascular surgery was consulted by the ED and recommended local wound care for bilateral groins and the left AKA stump. Abd x-ray was performed and results reviewed. Medically stable for ISA. Family agrees.

## 2025-01-24 NOTE — DISCHARGE NOTE NURSING/CASE MANAGEMENT/SOCIAL WORK - PATIENT PORTAL LINK FT
You can access the FollowMyHealth Patient Portal offered by [facility] by registering at the following website: [email or web address]. By joining [facility]’s FollowMyHealth portal, you will also be able to view your health information using other applications (apps) compatible with our system.

## 2025-01-24 NOTE — DISCHARGE NOTE PROVIDER - NSDCFUSCHEDAPPT_GEN_ALL_CORE_FT
[facility] Physician [facility] [address]  Scheduled Appointment: 01/31/2025

## 2025-01-24 NOTE — DISCHARGE NOTE PROVIDER - PROVIDER TOKENS
PROVIDER:[TOKEN:[[id number]],FOLLOWUP:[2 weeks]],PROVIDER:[TOKEN:[[id number]],FOLLOWUP:[1 week]],PROVIDER:[TOKEN:[[id number]],FOLLOWUP:[2 weeks]],PROVIDER:[TOKEN:[[id number]],FOLLOWUP:[2 weeks]]

## 2025-01-24 NOTE — DISCHARGE NOTE NURSING/CASE MANAGEMENT/SOCIAL WORK - FINANCIAL ASSISTANCE
[facility] provides services at a reduced cost to those who are determined to be eligible through [facility]’s financial assistance program. Information regarding [facility]’s financial assistance program can be found by going to [email or web address] or by calling [phone].

## 2025-01-24 NOTE — DISCHARGE NOTE PROVIDER - NSDCMRMEDTOKEN_GEN_ALL_CORE_FT
Eliquis 5 mg oral tablet: 1 tab(s) orally 2 times a day  ergocalciferol: 1,250 microgram(s) orally once a day  Farxiga 5 mg oral tablet: 1 tab(s) orally once a day  glipiZIDE 5 mg oral tablet: 1 tab(s) orally 2 times a day  Insulin Glargine: 15 unit(s) subcutaneous once a day (at bedtime)  Insulin Lispro: 5 unit(s) subcutaneous 3 times a day (with meals)  irbesartan 150 mg oral tablet: 1 tab(s) orally once a day  Metoprolol Tartrate 25 mg oral tablet: 1 tab(s) orally 2 times a day  rosuvastatin 5 mg oral capsule: 1 cap(s) orally once a day (at bedtime)   acetaminophen 325 mg oral tablet: 2 tab(s) orally every 6 hours As needed Temp greater or equal to 38C (100.4F), Mild Pain (1 - 3)  ascorbic acid 500 mg oral tablet: 1 tab(s) orally once a day  collagenase 250 units/g topical ointment: 1 Apply topically to affected area once a day  Eliquis 5 mg oral tablet: 1 tab(s) orally 2 times a day  ergocalciferol: 1,250 microgram(s) orally once a day  folic acid 1 mg oral tablet: 1 tab(s) orally once a day  hydrocortisone 1% topical cream: 1 Apply topically to affected area 2 times a day  lactulose 10 g/15 mL oral syrup: 30 milliliter(s) orally once As needed if no BM for 2 days  Metoprolol Tartrate 25 mg oral tablet: 1 tab(s) orally 2 times a day  mirtazapine 7.5 mg oral tablet: 1 tab(s) orally once a day (at bedtime)  Multiple Vitamins with Minerals oral tablet: 1 tab(s) orally once a day  polyethylene glycol 3350 oral powder for reconstitution: 17 gram(s) orally once a day  Protonix 40 mg oral granule, delayed release: 1 packet(s) orally once a day  rosuvastatin 5 mg oral capsule: 1 cap(s) orally once a day (at bedtime)  senna leaf extract oral tablet: 2 tab(s) orally once a day (at bedtime)  triamcinolone 0.1% topical ointment: 1 Apply topically to affected area every 12 hours

## 2025-01-24 NOTE — DISCHARGE NOTE NURSING/CASE MANAGEMENT/SOCIAL WORK - NSDCFUADDAPPT_GEN_ALL_CORE_FT
DANIELA spoke with Mendy, admissions coordinator for [facility] ([phone]). Mendy confirmed they can accept the patient today at 12:30pm for subacute rehab. DANIELA met with patient, patient's daughter, Jennifer Graham ([phone]), and patient's daughter, Minnie Zaragoza ([phone]), at bedside. Language line , Raul ([phone] ID [id number]), interpretted as patient and patient's daughter are Salvadorean speaking. Patient's daughter, Jennifer, and granddaughter, Minnie, reported they are in agreement for patient to attend [facility] today at 12:30pm for subacute rehab.  DANIELA spoke with Mendy, admissions coordinator for [facility] ([phone]). Mendy confirmed they can accept the patient today at 12:30pm for subacute rehab. DANIELA met with patient, patient's daughter, Jennifer Graham ([phone]), and patient's granddaughter, Minnie Zaragoza ([phone]), at bedside. Language line , Raul ([phone] ID [id number]), interpretted as patient and patient's daughter are Northern Irish speaking. Patient's daughter, Jennifer, and granddaughter, Minnie, reported they are in agreement for patient to attend [facility] today at 12:30pm for subacute rehab.

## 2025-01-24 NOTE — DISCHARGE NOTE PROVIDER - NSDCCPCAREPLAN_GEN_ALL_CORE_FT
PRINCIPAL DISCHARGE DIAGNOSIS  Diagnosis: Sepsis  Assessment and Plan of Treatment: Secondary to acute chris      SECONDARY DISCHARGE DIAGNOSES  Diagnosis: Acute cholecystitis  Assessment and Plan of Treatment: C/w drain   Follow up with IR, Surgery    Diagnosis: S/P AKA (above knee amputation), left  Assessment and Plan of Treatment: C/w local wound care  Follow up with vascular surgery

## 2025-01-24 NOTE — DISCHARGE NOTE PROVIDER - NSDCFUADDINST_GEN_ALL_CORE_FT
Please perform daily wound care to Left AKA stump    - apply collagenase to ulcer/scabs   - cover with 4x4, ABD, Kerlix Roll

## 2025-01-27 ENCOUNTER — INPATIENT (INPATIENT)
Facility: HOSPITAL | Age: 80
LOS: 9 days | Discharge: NURSING, REHAB OR LONG-TERM CARE | DRG: 474 | End: 2025-02-06
Attending: SURGERY | Admitting: SURGERY
Payer: COMMERCIAL

## 2025-01-27 VITALS
OXYGEN SATURATION: 95 % | HEART RATE: 79 BPM | HEIGHT: 65 IN | TEMPERATURE: 98 F | DIASTOLIC BLOOD PRESSURE: 72 MMHG | RESPIRATION RATE: 18 BRPM | SYSTOLIC BLOOD PRESSURE: 132 MMHG

## 2025-01-27 DIAGNOSIS — Z98.890 OTHER SPECIFIED POSTPROCEDURAL STATES: Chronic | ICD-10-CM

## 2025-01-27 DIAGNOSIS — Z89.619 ACQUIRED ABSENCE OF UNSPECIFIED LEG ABOVE KNEE: Chronic | ICD-10-CM

## 2025-01-27 LAB
ALBUMIN SERPL ELPH-MCNC: 2.2 G/DL — LOW (ref 3.3–5.2)
ALP SERPL-CCNC: 213 U/L — HIGH (ref 40–120)
ALT FLD-CCNC: 11 U/L — SIGNIFICANT CHANGE UP
ANION GAP SERPL CALC-SCNC: 11 MMOL/L — SIGNIFICANT CHANGE UP (ref 5–17)
APTT BLD: 36.9 SEC — HIGH (ref 24.5–35.6)
AST SERPL-CCNC: 22 U/L — SIGNIFICANT CHANGE UP
BASOPHILS # BLD AUTO: 0.02 K/UL — SIGNIFICANT CHANGE UP (ref 0–0.2)
BASOPHILS NFR BLD AUTO: 0.4 % — SIGNIFICANT CHANGE UP (ref 0–2)
BILIRUB SERPL-MCNC: 0.6 MG/DL — SIGNIFICANT CHANGE UP (ref 0.4–2)
BLD GP AB SCN SERPL QL: SIGNIFICANT CHANGE UP
BUN SERPL-MCNC: 13.6 MG/DL — SIGNIFICANT CHANGE UP (ref 8–20)
CALCIUM SERPL-MCNC: 8.2 MG/DL — LOW (ref 8.4–10.5)
CHLORIDE SERPL-SCNC: 107 MMOL/L — SIGNIFICANT CHANGE UP (ref 96–108)
CO2 SERPL-SCNC: 25 MMOL/L — SIGNIFICANT CHANGE UP (ref 22–29)
CREAT SERPL-MCNC: 0.76 MG/DL — SIGNIFICANT CHANGE UP (ref 0.5–1.3)
EGFR: 79 ML/MIN/1.73M2 — SIGNIFICANT CHANGE UP
EOSINOPHIL # BLD AUTO: 0.1 K/UL — SIGNIFICANT CHANGE UP (ref 0–0.5)
EOSINOPHIL NFR BLD AUTO: 1.9 % — SIGNIFICANT CHANGE UP (ref 0–6)
GLUCOSE SERPL-MCNC: 162 MG/DL — HIGH (ref 70–99)
HCT VFR BLD CALC: 33.3 % — LOW (ref 34.5–45)
HGB BLD-MCNC: 10.2 G/DL — LOW (ref 11.5–15.5)
IMM GRANULOCYTES NFR BLD AUTO: 0.2 % — SIGNIFICANT CHANGE UP (ref 0–0.9)
INR BLD: 1.72 RATIO — HIGH (ref 0.85–1.16)
LYMPHOCYTES # BLD AUTO: 1.58 K/UL — SIGNIFICANT CHANGE UP (ref 1–3.3)
LYMPHOCYTES # BLD AUTO: 30.5 % — SIGNIFICANT CHANGE UP (ref 13–44)
MCHC RBC-ENTMCNC: 26 PG — LOW (ref 27–34)
MCHC RBC-ENTMCNC: 30.6 G/DL — LOW (ref 32–36)
MCV RBC AUTO: 84.7 FL — SIGNIFICANT CHANGE UP (ref 80–100)
MONOCYTES # BLD AUTO: 0.51 K/UL — SIGNIFICANT CHANGE UP (ref 0–0.9)
MONOCYTES NFR BLD AUTO: 9.8 % — SIGNIFICANT CHANGE UP (ref 2–14)
NEUTROPHILS # BLD AUTO: 2.96 K/UL — SIGNIFICANT CHANGE UP (ref 1.8–7.4)
NEUTROPHILS NFR BLD AUTO: 57.2 % — SIGNIFICANT CHANGE UP (ref 43–77)
PLATELET # BLD AUTO: 213 K/UL — SIGNIFICANT CHANGE UP (ref 150–400)
POTASSIUM SERPL-MCNC: 4.2 MMOL/L — SIGNIFICANT CHANGE UP (ref 3.5–5.3)
POTASSIUM SERPL-SCNC: 4.2 MMOL/L — SIGNIFICANT CHANGE UP (ref 3.5–5.3)
PROT SERPL-MCNC: 6.1 G/DL — LOW (ref 6.6–8.7)
PROTHROM AB SERPL-ACNC: 19.8 SEC — HIGH (ref 9.9–13.4)
RBC # BLD: 3.93 M/UL — SIGNIFICANT CHANGE UP (ref 3.8–5.2)
RBC # FLD: 18.6 % — HIGH (ref 10.3–14.5)
SODIUM SERPL-SCNC: 142 MMOL/L — SIGNIFICANT CHANGE UP (ref 135–145)
WBC # BLD: 5.18 K/UL — SIGNIFICANT CHANGE UP (ref 3.8–10.5)
WBC # FLD AUTO: 5.18 K/UL — SIGNIFICANT CHANGE UP (ref 3.8–10.5)

## 2025-01-27 PROCEDURE — 71045 X-RAY EXAM CHEST 1 VIEW: CPT | Mod: 26

## 2025-01-27 PROCEDURE — 73551 X-RAY EXAM OF FEMUR 1: CPT | Mod: 26,LT

## 2025-01-27 PROCEDURE — 99285 EMERGENCY DEPT VISIT HI MDM: CPT

## 2025-01-27 RX ORDER — IPRATROPIUM BROMIDE AND ALBUTEROL SULFATE .5; 2.5 MG/3ML; MG/3ML
3 SOLUTION RESPIRATORY (INHALATION) ONCE
Refills: 0 | Status: COMPLETED | OUTPATIENT
Start: 2025-01-27 | End: 2025-01-27

## 2025-01-27 RX ADMIN — IPRATROPIUM BROMIDE AND ALBUTEROL SULFATE 3 MILLILITER(S): .5; 2.5 SOLUTION RESPIRATORY (INHALATION) at 18:15

## 2025-01-27 NOTE — ED ADULT NURSE NOTE - OBJECTIVE STATEMENT
c/o purulent drainage from left AKA amputations site. Pt endorses cough x 1 months as well. PMH afib, VA, HTN, and DM. Pt oriented to self, disoriented to date and place, as per family at bedside this is normal, respirations even and unlabored on RA, skin warm and dry.

## 2025-01-27 NOTE — ED PROVIDER NOTE - OBJECTIVE STATEMENT
78 yo F with PMH of Afib on Eliquis, HTN, T2DM, HLD, CVA, limb ischemia s/p L AKA bilateral femoral thrombectomy, perc chris tube placement on 1/13/25, presenting with L AKA wound infection. Pt was told to present to ED for her poorly healing wound to be evaluated by vascular surgeon. She endorses purulent discharge. Pt endorses chronic cough for the last month, not worsening. She denies fevers, headache, changes in vision, chest pain, sob, abdominal pain, dysuria, hematuria.

## 2025-01-27 NOTE — ED ADULT NURSE NOTE - HIV OFFER
With generally good sugars at home.  Will check A1c today.   Previously Declined (within the last year)

## 2025-01-27 NOTE — CONSULT NOTE ADULT - SUBJECTIVE AND OBJECTIVE BOX
Vascular Attending:  Arley CORRAL      HPI:  79 year old female presents to the ED, sent from the nursing home due to concerns for possible wound infection.  Patient is known to our service s/p bilateral femoral cutdown, embolectomies, left AKA 12/16.  Post operative course complicated due to acute cholecystitis and failure to thrive.  Family reports appetite has improved.  No recent fever or chills.  No abdominal pain, CP, SOb fever or chills.      PAST MEDICAL & SURGICAL HISTORY:  DM (diabetes mellitus)      HTN (hypertension)      CVA (cerebrovascular accident)      Afib          REVIEW OF SYSTEMS:  limited , see hpi       General:	    Skin/Breast:  	  Ophthalmologic:  	  ENMT:	    Respiratory and Thorax:  	  Cardiovascular:	    Gastrointestinal:	    Genitourinary:	    Musculoskeletal:	    Neurological:	    Psychiatric:	    Hematology/Lymphatics:	    Endocrine:	    Allergic/Immunologic:	    MEDICATIONS  (STANDING):    MEDICATIONS  (PRN):      Allergies    No Known Allergies    Intolerances        SOCIAL HISTORY: non contributory       Vital Signs Last 24 Hrs  T(C): 36.7 (27 Jan 2025 14:58), Max: 36.7 (27 Jan 2025 14:58)  T(F): 98 (27 Jan 2025 14:58), Max: 98 (27 Jan 2025 14:58)  HR: 79 (27 Jan 2025 14:58) (79 - 79)  BP: 132/72 (27 Jan 2025 14:58) (132/72 - 132/72)  BP(mean): --  RR: 18 (27 Jan 2025 14:58) (18 - 18)  SpO2: 95% (27 Jan 2025 14:58) (95% - 95%)    Parameters below as of 27 Jan 2025 14:58  Patient On (Oxygen Delivery Method): room air        PHYSICAL EXAM:      Constitutional: no distress     Eyes: no scleral icterus     ENMT: atraumatic     Neck: no access catheters     Respiratory: non labored breathing     Cardiovascular: rrr via peripheral palpation     Gastrointestinal: right drainage tube, humza brown fluid, abd non tender     Genitourinary: not examined     Rectal: not examined     Extremities: staples to the bilateral groins sites,  right groin with a small area of dermal erosion, no active drainage, unstagable right heel ulcer, left AKA with large area of necrotic skin near the wound edge and bullae remnants with residing fibrinous exudates     Vascular: palpable right pedals, palpable femorals       Psychiatric: appropriate affect       Pulses:   Right:                                                                          Left:  FEM [ ]2+ [ ]1+ [ ]doppler                                             FEM [ ]2+ [ ]1+ [ ]doppler    POP [ ]2+ [ ]1+ [ ]doppler                                             POP [ ]2+ [ ]1+ [ ]doppler    DP [ ]2+ [x ]1+ [ ]doppler                                                DP [ ]2+ [ ]1+ [ ]doppler  PT[ ]2+ [x ]1+ [ ]doppler                                                  PT [ ]2+ [ ]1+ [ ]doppler      LABS:    Pending               RADIOLOGY & ADDITIONAL STUDIES    Impression and Plan:    S/p bilateral femoral cutdown and embolectomies  Left AKA performed due to unsalvageable ext  patient with acute cholecystitis and failure to thrive in the post operative period hindering healing efforts  Poorly healing left AKA, requiring surgical debridement,  no gross infections    -- Will remove groin staples tomorrow  \- plan for operative debridement of the left aka stump this Thursday  - recommend admit to medicine.  will also need cardiology risk stratification  Vascular Attending:  Arley CORRAL      HPI:  79 year old female presents to the ED, sent from the nursing home due to concerns for possible wound infection.  Patient is known to our service s/p bilateral femoral cutdown, embolectomies, left AKA 12/16.  Post operative course complicated due to acute cholecystitis and failure to thrive.  Family reports appetite has improved.  No recent fever or chills.  No abdominal pain, CP, SOb fever or chills.      PAST MEDICAL & SURGICAL HISTORY:  DM (diabetes mellitus)      HTN (hypertension)      CVA (cerebrovascular accident)      Afib          REVIEW OF SYSTEMS:  limited , see hpi       General:	    Skin/Breast:  	  Ophthalmologic:  	  ENMT:	    Respiratory and Thorax:  	  Cardiovascular:	    Gastrointestinal:	    Genitourinary:	    Musculoskeletal:	    Neurological:	    Psychiatric:	    Hematology/Lymphatics:	    Endocrine:	    Allergic/Immunologic:	    MEDICATIONS  (STANDING):    MEDICATIONS  (PRN):      Allergies    No Known Allergies    Intolerances        SOCIAL HISTORY: non contributory       Vital Signs Last 24 Hrs  T(C): 36.7 (27 Jan 2025 14:58), Max: 36.7 (27 Jan 2025 14:58)  T(F): 98 (27 Jan 2025 14:58), Max: 98 (27 Jan 2025 14:58)  HR: 79 (27 Jan 2025 14:58) (79 - 79)  BP: 132/72 (27 Jan 2025 14:58) (132/72 - 132/72)  BP(mean): --  RR: 18 (27 Jan 2025 14:58) (18 - 18)  SpO2: 95% (27 Jan 2025 14:58) (95% - 95%)    Parameters below as of 27 Jan 2025 14:58  Patient On (Oxygen Delivery Method): room air        PHYSICAL EXAM:      Constitutional: no distress     Eyes: no scleral icterus     ENMT: atraumatic     Neck: no access catheters     Respiratory: non labored breathing     Cardiovascular: rrr via peripheral palpation     Gastrointestinal: right drainage tube, humza brown fluid, abd non tender     Genitourinary: not examined     Rectal: not examined     Extremities: staples to the bilateral groins sites,  right groin with a small area of dermal erosion, no active drainage, unstagable right heel ulcer, left AKA with large area of necrotic skin near the wound edge and bullae remnants with residing fibrinous exudates     Vascular: palpable right pedals, palpable femorals       Psychiatric: appropriate affect       Pulses:   Right:                                                                          Left:  FEM [ ]2+ [ ]1+ [ ]doppler                                             FEM [ ]2+ [ ]1+ [ ]doppler    POP [ ]2+ [ ]1+ [ ]doppler                                             POP [ ]2+ [ ]1+ [ ]doppler    DP [ ]2+ [x ]1+ [ ]doppler                                                DP [ ]2+ [ ]1+ [ ]doppler  PT[ ]2+ [x ]1+ [ ]doppler                                                  PT [ ]2+ [ ]1+ [ ]doppler      LABS:    Pending               RADIOLOGY & ADDITIONAL STUDIES    Impression and Plan:    S/p bilateral femoral cutdown and embolectomies  Left AKA performed due to unsalvageable ext  patient with acute cholecystitis and failure to thrive in the post operative period hindering healing efforts  Poorly healing left AKA, requiring surgical debridement,  no gross infections    -- Will remove groin staples in next 24-48hours   - plan for future operative debridement of the left aka stump, no emergent intervention warranted given no evidence of sepsis or leukocytosis will plan for thursday if patient admitted

## 2025-01-27 NOTE — ED ADULT NURSE REASSESSMENT NOTE - NS ED NURSE REASSESS COMMENT FT1
Pt remains baseline mental status. Respirations even and unlabored. Family member at bedside. Offering no complaints at this time. Pending plan.

## 2025-01-27 NOTE — ED ADULT NURSE NOTE - NSFALLHARMRISKINTERV_ED_ALL_ED
Assistance OOB with selected safe patient handling equipment if applicable/Communicate risk of Fall with Harm to all staff, patient, and family/Monitor gait and stability/Provide patient with walking aids/Provide visual cue: red socks, yellow wristband, yellow gown, etc/Reinforce activity limits and safety measures with patient and family/Bed in lowest position, wheels locked, appropriate side rails in place/Call bell, personal items and telephone in reach/Instruct patient to call for assistance before getting out of bed/chair/stretcher/Non-slip footwear applied when patient is off stretcher/Hardy to call system/Physically safe environment - no spills, clutter or unnecessary equipment/Purposeful Proactive Rounding/Room/bathroom lighting operational, light cord in reach

## 2025-01-27 NOTE — ED PROVIDER NOTE - CLINICAL SUMMARY MEDICAL DECISION MAKING FREE TEXT BOX
78 y/o F w/ PMH of Afib (on Eliquis), HTN, DM2, HLD, h/o stroke, limb ischemia (s/p left AKA, s/p b/l femoral thrombectomy), presenting for wound infection. On exam pt HDS, breath sounds rhonchorous bilaterally, chris tube in place with yellow drainage, L AKA with dry necrotic skin wounds, surrounding erythema, tender to palpation. Ordered basic labs, CXR, L femur Xray. Vascular aware recommending admission.

## 2025-01-27 NOTE — ED PROVIDER NOTE - ATTENDING CONTRIBUTION TO CARE
79-year-old female past medical history of A-fib on Eliquis, hypertension, diabetes, recent left-sided above-the-knee amputation presenting with dry wounds along the AKA site.  Does not appear infected to me though does have some tissue maceration, no lymphangitis and not significantly swollen.  Vascular surgery was aware that the patient was coming in, provided prompt consultation plan is for operative debridement of the left AKA stump likely in a couple of days.  Will workup the patient and admit, hold off on antibiotics given does not appear infectious, follow-up studies reassess admit.

## 2025-01-27 NOTE — ED PROVIDER NOTE - PHYSICAL EXAMINATION
General: NAD, well appearing  HEENT: Normocephalic, atraumatic, PERRL, EOM intact  Neck: No apparent stiffness or JVD  Pulm: Chest wall symmetric and nontender, lungs clear to ascultation, no wheezes, rhales, ADALI  Cardiovascular: Regular rate and regular rhythm, bilateral groins with staples, c/d/i   Abdomen: Soft, nontender, chris tube with yellow drainage in bag, covered in bandage   Skin: Skin is warm, dry and intact without rashes or lesions.  Neuro: No motor or sensory deficits above reported baseline, AOx3, no facial droop, no dysarthria, moves all extremities, symmetric sensation to bilateral extremities   MSK: L AKA with stables , with areas of dry necrotic skin, fibrinous exudate, no purulent drainage able to be expressed, surrounding erythema, tender to palpation

## 2025-01-27 NOTE — ED PROVIDER NOTE - PROGRESS NOTE DETAILS
Samreen Knox MD, Attending  Pt received at signout pending vascular surgery attending evaluation in the AM.   SPoke to AM vascular team, will admit patient and start heparin. Will obtain weight.

## 2025-01-27 NOTE — ED ADULT NURSE NOTE - NSICDXPASTSURGICALHX_GEN_ALL_CORE_FT
PAST SURGICAL HISTORY:  H/O insertion of cholecystostomy tube     S/P AKA (above knee amputation) L

## 2025-01-27 NOTE — ED ADULT NURSE NOTE - CAS TRG GEN SKIN CONDITION
52 yo male, PMHx of schizophrenia, seizures, HTN, DM, presenting for evaluation.  He states he was getting vaccines and felt unwell so the staff called the ambulance but he states he feels well and denies any symptoms at this time.  Denies fevers, chills, chest pain, shortness of breath, nausea, vomiting, abdominal pain, weakness, headache.  Physical exam benign.  Patient requesting night medications but states if he returns to his facility they will provide it for him.  Given return precautions and follow up outpatient.  Patient comfortable with plan.
Warm/Dry

## 2025-01-28 ENCOUNTER — RESULT REVIEW (OUTPATIENT)
Age: 80
End: 2025-01-28

## 2025-01-28 DIAGNOSIS — T14.8XXA OTHER INJURY OF UNSPECIFIED BODY REGION, INITIAL ENCOUNTER: ICD-10-CM

## 2025-01-28 LAB
APTT BLD: 110.1 SEC — HIGH (ref 24.5–35.6)
GLUCOSE BLDC GLUCOMTR-MCNC: 175 MG/DL — HIGH (ref 70–99)
GLUCOSE BLDC GLUCOMTR-MCNC: 203 MG/DL — HIGH (ref 70–99)
HCT VFR BLD CALC: 32.7 % — LOW (ref 34.5–45)
HGB BLD-MCNC: 10.1 G/DL — LOW (ref 11.5–15.5)
MCHC RBC-ENTMCNC: 25.8 PG — LOW (ref 27–34)
MCHC RBC-ENTMCNC: 30.9 G/DL — LOW (ref 32–36)
MCV RBC AUTO: 83.6 FL — SIGNIFICANT CHANGE UP (ref 80–100)
PLATELET # BLD AUTO: 204 K/UL — SIGNIFICANT CHANGE UP (ref 150–400)
RBC # BLD: 3.91 M/UL — SIGNIFICANT CHANGE UP (ref 3.8–5.2)
RBC # FLD: 18.7 % — HIGH (ref 10.3–14.5)
WBC # BLD: 4.91 K/UL — SIGNIFICANT CHANGE UP (ref 3.8–10.5)
WBC # FLD AUTO: 4.91 K/UL — SIGNIFICANT CHANGE UP (ref 3.8–10.5)

## 2025-01-28 PROCEDURE — 99223 1ST HOSP IP/OBS HIGH 75: CPT

## 2025-01-28 PROCEDURE — 88307 TISSUE EXAM BY PATHOLOGIST: CPT | Mod: 26

## 2025-01-28 PROCEDURE — 99223 1ST HOSP IP/OBS HIGH 75: CPT | Mod: 57,25

## 2025-01-28 RX ORDER — SODIUM CHLORIDE 9 G/ML
1000 INJECTION, SOLUTION INTRAVENOUS
Refills: 0 | Status: DISCONTINUED | OUTPATIENT
Start: 2025-01-28 | End: 2025-01-30

## 2025-01-28 RX ORDER — FOLIC ACID 1 MG
1 TABLET ORAL DAILY
Refills: 0 | Status: DISCONTINUED | OUTPATIENT
Start: 2025-01-28 | End: 2025-01-30

## 2025-01-28 RX ORDER — METOPROLOL SUCCINATE 25 MG
25 TABLET, EXTENDED RELEASE 24 HR ORAL
Refills: 0 | Status: DISCONTINUED | OUTPATIENT
Start: 2025-01-28 | End: 2025-01-30

## 2025-01-28 RX ORDER — DM/PSEUDOEPHED/ACETAMINOPHEN 10-30-250
15 CAPSULE ORAL ONCE
Refills: 0 | Status: DISCONTINUED | OUTPATIENT
Start: 2025-01-28 | End: 2025-01-30

## 2025-01-28 RX ORDER — PANTOPRAZOLE 20 MG/1
40 TABLET, DELAYED RELEASE ORAL DAILY
Refills: 0 | Status: DISCONTINUED | OUTPATIENT
Start: 2025-01-28 | End: 2025-01-30

## 2025-01-28 RX ORDER — GLUCAGON 3 MG/1
1 POWDER NASAL ONCE
Refills: 0 | Status: DISCONTINUED | OUTPATIENT
Start: 2025-01-28 | End: 2025-01-30

## 2025-01-28 RX ORDER — HEPARIN SODIUM,PORCINE 10000/ML
3000 VIAL (ML) INJECTION EVERY 6 HOURS
Refills: 0 | Status: DISCONTINUED | OUTPATIENT
Start: 2025-01-28 | End: 2025-01-30

## 2025-01-28 RX ORDER — ASCORBIC ACID 500 MG/ML
500 VIAL (ML) INJECTION DAILY
Refills: 0 | Status: DISCONTINUED | OUTPATIENT
Start: 2025-01-28 | End: 2025-01-30

## 2025-01-28 RX ORDER — HEPARIN SODIUM,PORCINE 10000/ML
6000 VIAL (ML) INJECTION EVERY 6 HOURS
Refills: 0 | Status: DISCONTINUED | OUTPATIENT
Start: 2025-01-28 | End: 2025-01-30

## 2025-01-28 RX ORDER — OXYCODONE HYDROCHLORIDE 30 MG/1
10 TABLET ORAL EVERY 4 HOURS
Refills: 0 | Status: DISCONTINUED | OUTPATIENT
Start: 2025-01-28 | End: 2025-01-30

## 2025-01-28 RX ORDER — INSULIN LISPRO 100/ML
VIAL (ML) SUBCUTANEOUS
Refills: 0 | Status: DISCONTINUED | OUTPATIENT
Start: 2025-01-28 | End: 2025-01-30

## 2025-01-28 RX ORDER — ACETAMINOPHEN 160 MG/5ML
650 SUSPENSION ORAL EVERY 6 HOURS
Refills: 0 | Status: DISCONTINUED | OUTPATIENT
Start: 2025-01-28 | End: 2025-01-30

## 2025-01-28 RX ORDER — TRIAMCINOLONE ACETONIDE 0.025 %
1 CREAM (GRAM) TOPICAL DAILY
Refills: 0 | Status: DISCONTINUED | OUTPATIENT
Start: 2025-01-28 | End: 2025-01-30

## 2025-01-28 RX ORDER — DM/PSEUDOEPHED/ACETAMINOPHEN 10-30-250
12.5 CAPSULE ORAL ONCE
Refills: 0 | Status: DISCONTINUED | OUTPATIENT
Start: 2025-01-28 | End: 2025-01-30

## 2025-01-28 RX ORDER — DM/PSEUDOEPHED/ACETAMINOPHEN 10-30-250
25 CAPSULE ORAL ONCE
Refills: 0 | Status: DISCONTINUED | OUTPATIENT
Start: 2025-01-28 | End: 2025-01-30

## 2025-01-28 RX ORDER — ROSUVASTATIN CALCIUM 10 MG/1
5 TABLET, FILM COATED ORAL AT BEDTIME
Refills: 0 | Status: DISCONTINUED | OUTPATIENT
Start: 2025-01-28 | End: 2025-01-30

## 2025-01-28 RX ORDER — ONDANSETRON 4 MG/1
4 TABLET, ORALLY DISINTEGRATING ORAL EVERY 6 HOURS
Refills: 0 | Status: DISCONTINUED | OUTPATIENT
Start: 2025-01-28 | End: 2025-01-30

## 2025-01-28 RX ORDER — OXYCODONE HYDROCHLORIDE 30 MG/1
5 TABLET ORAL EVERY 4 HOURS
Refills: 0 | Status: DISCONTINUED | OUTPATIENT
Start: 2025-01-28 | End: 2025-01-30

## 2025-01-28 RX ORDER — SENNOSIDES 8.6 MG
2 TABLET ORAL AT BEDTIME
Refills: 0 | Status: DISCONTINUED | OUTPATIENT
Start: 2025-01-28 | End: 2025-01-30

## 2025-01-28 RX ORDER — IRON/FOLIC ACID/C/B6/B12/ZINC 150-1.25MG
1 TABLET ORAL DAILY
Refills: 0 | Status: DISCONTINUED | OUTPATIENT
Start: 2025-01-28 | End: 2025-01-30

## 2025-01-28 RX ORDER — MIRTAZAPINE 30 MG/1
7.5 TABLET, FILM COATED ORAL DAILY
Refills: 0 | Status: DISCONTINUED | OUTPATIENT
Start: 2025-01-28 | End: 2025-01-30

## 2025-01-28 RX ORDER — HEPARIN SODIUM,PORCINE 10000/ML
VIAL (ML) INJECTION
Qty: 25000 | Refills: 0 | Status: DISCONTINUED | OUTPATIENT
Start: 2025-01-28 | End: 2025-01-30

## 2025-01-28 RX ADMIN — PANTOPRAZOLE 40 MILLIGRAM(S): 20 TABLET, DELAYED RELEASE ORAL at 13:20

## 2025-01-28 RX ADMIN — Medication 25 MILLIGRAM(S): at 17:28

## 2025-01-28 RX ADMIN — OXYCODONE HYDROCHLORIDE 5 MILLIGRAM(S): 30 TABLET ORAL at 14:19

## 2025-01-28 RX ADMIN — Medication 1 MILLIGRAM(S): at 13:19

## 2025-01-28 RX ADMIN — Medication 1100 UNIT(S)/HR: at 19:26

## 2025-01-28 RX ADMIN — MIRTAZAPINE 7.5 MILLIGRAM(S): 30 TABLET, FILM COATED ORAL at 13:19

## 2025-01-28 RX ADMIN — Medication 1300 UNIT(S)/HR: at 09:53

## 2025-01-28 RX ADMIN — Medication 500 MILLIGRAM(S): at 13:19

## 2025-01-28 RX ADMIN — Medication 1 APPLICATION(S): at 13:30

## 2025-01-28 RX ADMIN — Medication 1100 UNIT(S)/HR: at 16:52

## 2025-01-28 RX ADMIN — OXYCODONE HYDROCHLORIDE 5 MILLIGRAM(S): 30 TABLET ORAL at 13:19

## 2025-01-28 RX ADMIN — Medication 1: at 17:28

## 2025-01-28 RX ADMIN — ROSUVASTATIN CALCIUM 5 MILLIGRAM(S): 10 TABLET, FILM COATED ORAL at 21:23

## 2025-01-28 RX ADMIN — Medication 1 TABLET(S): at 13:30

## 2025-01-28 NOTE — CONSULT NOTE ADULT - SUBJECTIVE AND OBJECTIVE BOX
80 y/o F with Hx of Afib (on eliquis), HTN, DM2, HLD, h/o stroke, limb ischemia (s/p left AKA, s/p b/l femoral thrombectomy), she was recently discharge from stump wound, she has  s/p bilateral femoral cutdown, embolectomies, left AKA 12/16.  Post operative course complicated due to acute cholecystitis and failure to thrive, she was sent back form the nursing home due to concerns for possible wound infection, she has no recent fever or chills.  No abdominal pain, CP, SOb fever or chills. admitted under Vascular surgery for wound management, Medicine consulted for Medical Management.       PAST MEDICAL & SURGICAL HISTORY:    DM (diabetes mellitus)  HTN (hypertension)  CVA (cerebrovascular accident)  Afib    Allergies:   No Known Allergies    SOCIAL HISTORY:   Not a smoker, drinker or using any drugs       Vital Signs Last 24 Hrs  T(C): 36.8 (28 Jan 2025 11:41), Max: 36.9 (27 Jan 2025 19:29)  T(F): 98.3 (28 Jan 2025 11:41), Max: 98.5 (27 Jan 2025 19:29)  HR: 101 (28 Jan 2025 11:41) (79 - 111)  BP: 123/75 (28 Jan 2025 11:41) (112/62 - 132/72)  BP(mean): 83 (28 Jan 2025 04:03) (83 - 85)  RR: 18 (28 Jan 2025 11:41) (16 - 20)  SpO2: 98% (28 Jan 2025 11:41) (94% - 98%)    Parameters below as of 28 Jan 2025 11:41  Patient On (Oxygen Delivery Method): room air        PHYSICAL EXAM:    GENERAL: Elderly female looking comfortable   HEENT: PERRL, +EOMI  NECK: soft, Supple, No JVD  CHEST/LUNG: Clear to auscultate bilaterally; No wheezing  HEART: S1S2+, Regular rate and rhythm; No murmurs  ABDOMEN: Soft, Nontender, Nondistended; Bowel sounds present  EXTREMITIES:  Left Leg AKA, wound covered with clean dressings on   SKIN: No rashes or lesions  NEURO: AAOX3  PSYCH: normal mood      LABS:                        10.2   5.18  )-----------( 213      ( 27 Jan 2025 18:09 )             33.3     01-27    142  |  107  |  13.6  ----------------------------<  162[H]  4.2   |  25.0  |  0.76    Ca    8.2[L]      27 Jan 2025 18:09    TPro  6.1[L]  /  Alb  2.2[L]  /  TBili  0.6  /  DBili  x   /  AST  22  /  ALT  11  /  AlkPhos  213[H]  01-27    PT/INR - ( 27 Jan 2025 19:30 )   PT: 19.8 sec;   INR: 1.72 ratio         PTT - ( 27 Jan 2025 19:30 )  PTT:36.9 sec    MEDICATIONS  (STANDING):  ascorbic acid 500 milliGRAM(s) Oral daily  collagenase Ointment 1 Application(s) Topical daily  folic acid 1 milliGRAM(s) Oral daily  heparin  Infusion.  Unit(s)/Hr (13 mL/Hr) IV Continuous <Continuous>  metoprolol tartrate 25 milliGRAM(s) Oral two times a day  mirtazapine 7.5 milliGRAM(s) Oral daily  multivitamin/minerals 1 Tablet(s) Oral daily  pantoprazole   Suspension 40 milliGRAM(s) Oral daily  rosuvastatin 5 milliGRAM(s) Oral at bedtime  triamcinolone 0.1% Oral Paste 1 Application(s) Topical daily    MEDICATIONS  (PRN):  acetaminophen     Tablet .. 650 milliGRAM(s) Oral every 6 hours PRN Temp greater or equal to 38C (100.4F), Mild Pain (1 - 3)  heparin   Injectable 6000 Unit(s) IV Push every 6 hours PRN For aPTT less than 40  heparin   Injectable 3000 Unit(s) IV Push every 6 hours PRN For aPTT between 40 - 57  ondansetron Injectable 4 milliGRAM(s) IV Push every 6 hours PRN Nausea  oxyCODONE    IR 5 milliGRAM(s) Oral every 4 hours PRN Moderate Pain (4 - 6)  oxyCODONE    IR 10 milliGRAM(s) Oral every 4 hours PRN Severe Pain (7 - 10)  senna 2 Tablet(s) Oral at bedtime PRN Constipation

## 2025-01-28 NOTE — H&P ADULT - ASSESSMENT
Poorly healing left AKA stump  requires admission for debridement and likely vac placement   patient with extensive comorbidities poor nutrition and failure to thrive     - Default admission to vascular surgery (Dr. Tubbs)  -- Needs medical co-management  - Nutrition consult  - Pulmonary consult (persistent cough)  - Paliative consult  - Cardiology consult for operative risk stratification     - Will plan for surgical debridement/revision Thursday

## 2025-01-28 NOTE — PATIENT PROFILE ADULT - VISION (WITH CORRECTIVE LENSES IF THE PATIENT USUALLY WEARS THEM):
L eye partial vision loss/Partially impaired: cannot see medication labels or newsprint, but can see obstacles in path, and the surrounding layout; can count fingers at arm's length

## 2025-01-28 NOTE — PATIENT PROFILE ADULT - LANGUAGE ASSISTANCE NEEDED
family at bedside to translate at this time/No-Patient/Caregiver offered and refused free interpretation services.

## 2025-01-28 NOTE — ED ADULT NURSE REASSESSMENT NOTE - NS ED NURSE REASSESS COMMENT FT1
Assumed care of pt at 0715 from Guillermina LEE.  Pt resting in bed with family at bedside. Pt denies chest pain/sob, respirations are even and unlabored. Vascular MD at bedside.

## 2025-01-28 NOTE — ED ADULT NURSE REASSESSMENT NOTE - NS ED NURSE REASSESS COMMENT FT1
Called MD and MD directed this RN to call vascular PA. Upon speaking with vascular PA, informed no initial heparin bolus to be admin this time and to proceed with heparin drip orders.

## 2025-01-28 NOTE — H&P ADULT - NS ATTEND AMEND GEN_ALL_CORE FT
79-year-old female essentially failure to thrive originally presented with an ischemic left leg for unknown period of time nonfunctional.  Underwent a left AKA secondary to reperfusion and some blisters as well as a very low albumin and poor nutrition she has breakdown.  She was sent from the rehab because she would not eat and concerns for infection.  The left AKA is not infected.  I explained this to the patient and particularly her daughter via  in the emergency room.  Given the continued back-and-forth we will plan for debridements but she needs to increase her nutrition otherwise she will have breakdown of the surgical site as well her groin surgical sites are clean with no infection small solitary centimeter dehiscence superficial on right.  She continues to have the PERC cholecystostomy drain.  She reports no abdominal pain just does not want to eat.  Her albumin has increased slightly from 1.7-2.2 will need a nutrition consult will try Megace to stimulate appetite.  Medicine consult for treatment of her medical needs which are multiple.  Also will consult palliative care given patient's clinical declining status even prior to presentation following a bronchitis episode she has not been doing well and has slowly declined.  Will transition to heparin drip plan for debridement on Thursday.    Patient has no white count or fever would not start antibiotics

## 2025-01-29 DIAGNOSIS — I48.91 UNSPECIFIED ATRIAL FIBRILLATION: ICD-10-CM

## 2025-01-29 LAB
APTT BLD: 103.2 SEC — HIGH (ref 24.5–35.6)
APTT BLD: 108.5 SEC — HIGH (ref 24.5–35.6)
APTT BLD: 122 SEC — CRITICAL HIGH (ref 24.5–35.6)
APTT BLD: 64.4 SEC — HIGH (ref 24.5–35.6)
GLUCOSE BLDC GLUCOMTR-MCNC: 116 MG/DL — HIGH (ref 70–99)
GLUCOSE BLDC GLUCOMTR-MCNC: 121 MG/DL — HIGH (ref 70–99)
GLUCOSE BLDC GLUCOMTR-MCNC: 129 MG/DL — HIGH (ref 70–99)
GLUCOSE BLDC GLUCOMTR-MCNC: 130 MG/DL — HIGH (ref 70–99)
HCT VFR BLD CALC: 31.1 % — LOW (ref 34.5–45)
HCT VFR BLD CALC: 32 % — LOW (ref 34.5–45)
HGB BLD-MCNC: 9.6 G/DL — LOW (ref 11.5–15.5)
HGB BLD-MCNC: 9.8 G/DL — LOW (ref 11.5–15.5)
MCHC RBC-ENTMCNC: 25.6 PG — LOW (ref 27–34)
MCHC RBC-ENTMCNC: 25.8 PG — LOW (ref 27–34)
MCHC RBC-ENTMCNC: 30.6 G/DL — LOW (ref 32–36)
MCHC RBC-ENTMCNC: 30.9 G/DL — LOW (ref 32–36)
MCV RBC AUTO: 83.6 FL — SIGNIFICANT CHANGE UP (ref 80–100)
MCV RBC AUTO: 83.6 FL — SIGNIFICANT CHANGE UP (ref 80–100)
PLATELET # BLD AUTO: 163 K/UL — SIGNIFICANT CHANGE UP (ref 150–400)
PLATELET # BLD AUTO: 163 K/UL — SIGNIFICANT CHANGE UP (ref 150–400)
RBC # BLD: 3.72 M/UL — LOW (ref 3.8–5.2)
RBC # BLD: 3.83 M/UL — SIGNIFICANT CHANGE UP (ref 3.8–5.2)
RBC # FLD: 18.5 % — HIGH (ref 10.3–14.5)
RBC # FLD: 18.7 % — HIGH (ref 10.3–14.5)
WBC # BLD: 4.71 K/UL — SIGNIFICANT CHANGE UP (ref 3.8–10.5)
WBC # BLD: 5.45 K/UL — SIGNIFICANT CHANGE UP (ref 3.8–10.5)
WBC # FLD AUTO: 4.71 K/UL — SIGNIFICANT CHANGE UP (ref 3.8–10.5)
WBC # FLD AUTO: 5.45 K/UL — SIGNIFICANT CHANGE UP (ref 3.8–10.5)

## 2025-01-29 PROCEDURE — 99232 SBSQ HOSP IP/OBS MODERATE 35: CPT

## 2025-01-29 PROCEDURE — 99223 1ST HOSP IP/OBS HIGH 75: CPT

## 2025-01-29 PROCEDURE — 99222 1ST HOSP IP/OBS MODERATE 55: CPT

## 2025-01-29 RX ORDER — LOSARTAN POTASSIUM 100 MG
50 TABLET ORAL DAILY
Refills: 0 | Status: DISCONTINUED | OUTPATIENT
Start: 2025-01-29 | End: 2025-01-30

## 2025-01-29 RX ORDER — SODIUM CHLORIDE 9 G/ML
1000 INJECTION, SOLUTION INTRAVENOUS
Refills: 0 | Status: DISCONTINUED | OUTPATIENT
Start: 2025-01-29 | End: 2025-01-30

## 2025-01-29 RX ADMIN — Medication 500 MILLIGRAM(S): at 12:42

## 2025-01-29 RX ADMIN — MIRTAZAPINE 7.5 MILLIGRAM(S): 30 TABLET, FILM COATED ORAL at 12:42

## 2025-01-29 RX ADMIN — ROSUVASTATIN CALCIUM 5 MILLIGRAM(S): 10 TABLET, FILM COATED ORAL at 21:23

## 2025-01-29 RX ADMIN — Medication 700 UNIT(S)/HR: at 23:15

## 2025-01-29 RX ADMIN — Medication 1 APPLICATION(S): at 12:34

## 2025-01-29 RX ADMIN — Medication 25 MILLIGRAM(S): at 21:24

## 2025-01-29 RX ADMIN — Medication 900 UNIT(S)/HR: at 00:31

## 2025-01-29 RX ADMIN — Medication 700 UNIT(S)/HR: at 19:55

## 2025-01-29 RX ADMIN — Medication 1 APPLICATION(S): at 12:42

## 2025-01-29 RX ADMIN — Medication 700 UNIT(S)/HR: at 08:03

## 2025-01-29 RX ADMIN — Medication 700 UNIT(S)/HR: at 07:32

## 2025-01-29 RX ADMIN — PANTOPRAZOLE 40 MILLIGRAM(S): 20 TABLET, DELAYED RELEASE ORAL at 12:42

## 2025-01-29 RX ADMIN — Medication 1 TABLET(S): at 12:33

## 2025-01-29 RX ADMIN — Medication 1 MILLIGRAM(S): at 12:32

## 2025-01-29 NOTE — ADVANCED PRACTICE NURSE CONSULT - REASON FOR CONSULT
Wound Care was consulted for evaluation of the following:  -    Pt seen at bedside today:  -  Wound Care was consulted for evaluation of the following:  -L AKA     Pt seen at bedside today:  -speaking Turkmen but not fully oriented. per family at bedside supplementing hx, pt brought in for evaluation of possible infected wound bed. seen by vascular in house with plans for tentative debridement. chronically wheelchair bound. no recent fevers or chills.

## 2025-01-29 NOTE — CONSULT NOTE ADULT - SUBJECTIVE AND OBJECTIVE BOX
HPI:  refer to consultation note  (28 Jan 2025 08:37)      PERTINENT PMH REVIEWED: Yes No    PAST MEDICAL & SURGICAL HISTORY:  DM (diabetes mellitus)      HTN (hypertension)      CVA (cerebrovascular accident)      Afib      S/P AKA (above knee amputation)  L      H/O insertion of cholecystostomy tube          SOCIAL HISTORY:                      Substance history:                    Admitted from:  home  SNF  [facility]                     [employer]/spirituality:                    Cultural concerns:    Baseline ADLs (prior to admission):  Independent/ Dependent                        Surrogate/HCP/Guardian:     FAMILY HISTORY:      Allergies    No Known Allergies    Intolerances        http://npcrc.org/files/[email or web address]    Present Symptoms:   Dyspnea:  No Yes  Nausea/Vomiting:  No  Yes  Anxiety/ Agitation    No  Yes  Unable to assess  Fatigue: Yes No Unable to assess   Loss of appetite: Yes  No   Fair   unable to assess NPO  Constipation:  Not reported   Yes  Pain:  No  No signs            Location            Character            Duration            Factors            Severity            Effect    Pain AD Score:  [email or web address] (press ctrl + left click to view)    Review of Systems: Reviewed    All other ROS negative  Unable  Limited  to obtain due to poor mentation   historian    MEDICATIONS  (STANDING):  ascorbic acid 500 milliGRAM(s) Oral daily  collagenase Ointment 1 Application(s) Topical daily  dextrose 5%. 1000 milliLiter(s) (50 mL/Hr) IV Continuous <Continuous>  dextrose 5%. 1000 milliLiter(s) (100 mL/Hr) IV Continuous <Continuous>  dextrose 50% Injectable 25 Gram(s) IV Push once  dextrose 50% Injectable 12.5 Gram(s) IV Push once  dextrose 50% Injectable 25 Gram(s) IV Push once  folic acid 1 milliGRAM(s) Oral daily  glucagon  Injectable 1 milliGRAM(s) IntraMuscular once  heparin  Infusion.  Unit(s)/Hr (13 mL/Hr) IV Continuous <Continuous>  insulin lispro (ADMELOG) corrective regimen sliding scale   SubCutaneous three times a day before meals  lactated ringers. 1000 milliLiter(s) (115 mL/Hr) IV Continuous <Continuous>  losartan 50 milliGRAM(s) Oral daily  metoprolol tartrate 25 milliGRAM(s) Oral two times a day  mirtazapine 7.5 milliGRAM(s) Oral daily  multivitamin/minerals 1 Tablet(s) Oral daily  pantoprazole   Suspension 40 milliGRAM(s) Oral daily  rosuvastatin 5 milliGRAM(s) Oral at bedtime  triamcinolone 0.1% Oral Paste 1 Application(s) Topical daily    MEDICATIONS  (PRN):  acetaminophen     Tablet .. 650 milliGRAM(s) Oral every 6 hours PRN Temp greater or equal to 38C (100.4F), Mild Pain (1 - 3)  dextrose Oral Gel 15 Gram(s) Oral once PRN Blood Glucose LESS THAN 70 milliGRAM(s)/deciliter  heparin   Injectable 6000 Unit(s) IV Push every 6 hours PRN For aPTT less than 40  heparin   Injectable 3000 Unit(s) IV Push every 6 hours PRN For aPTT between 40 - 57  ondansetron Injectable 4 milliGRAM(s) IV Push every 6 hours PRN Nausea  oxyCODONE    IR 5 milliGRAM(s) Oral every 4 hours PRN Moderate Pain (4 - 6)  oxyCODONE    IR 10 milliGRAM(s) Oral every 4 hours PRN Severe Pain (7 - 10)  senna 2 Tablet(s) Oral at bedtime PRN Constipation    PHYSICAL EXAM:  Vital Signs Last 24 Hrs  T(C): 36.8 (29 Jan 2025 11:12), Max: 37.3 (28 Jan 2025 19:34)  T(F): 98.3 (29 Jan 2025 11:12), Max: 99.1 (28 Jan 2025 19:34)  HR: 100 (29 Jan 2025 11:12) (80 - 102)  BP: 104/71 (29 Jan 2025 11:12) (102/55 - 124/80)  BP(mean): --  RR: 18 (29 Jan 2025 08:20) (18 - 18)  SpO2: 100% (29 Jan 2025 11:12) (93% - 100%)    Parameters below as of 29 Jan 2025 08:20  Patient On (Oxygen Delivery Method): room air      Karnofsky     %  General:    HEENT: NCAT      (  )  ET tube   (    ) NGT  Lungs: comfortable  CV:   GI:           (  )  PEG  MSK: normal   weak  bedbound  Neuro:   Skin:   Psych:    LABS:                        9.8    4.71  )-----------( 163      ( 29 Jan 2025 06:10 )             32.0     01-27    142  |  107  |  13.6  ----------------------------<  162[H]  4.2   |  25.0  |  0.76    Ca    8.2[L]      27 Jan 2025 18:09    TPro  6.1[L]  /  Alb  2.2[L]  /  TBili  0.6  /  DBili  x   /  AST  22  /  ALT  11  /  AlkPhos  213[H]  01-27    PT/INR - ( 27 Jan 2025 19:30 )   PT: 19.8 sec;   INR: 1.72 ratio         PTT - ( 29 Jan 2025 06:10 )  PTT:108.5 sec  Urinalysis Basic - ( 27 Jan 2025 18:09 )    Color: x / Appearance: x / SG: x / pH: x  Gluc: 162 mg/dL / Ketone: x  / Bili: x / Urobili: x   Blood: x / Protein: x / Nitrite: x   Leuk Esterase: x / RBC: x / WBC x   Sq Epi: x / Non Sq Epi: x / Bacteria: x      I&O's Summary    28 Jan 2025 07:01  -  29 Jan 2025 07:00  --------------------------------------------------------  IN: 98 mL / OUT: 1250 mL / NET: -1152 mL        RADIOLOGY & ADDITIONAL STUDIES:  Imaging reviewed         ADVANCE DIRECTIVES/TREATMENT PREFERENCES:  Currently Full code  DNR with Trial of Intubation, continuing medical treatments  DNR/DNI -  continuing medical treatments  DNR/DNI -  comfort measures only            HPI:  refer to consultation note  (28 Jan 2025 08:37)      PERTINENT PMH REVIEWED: Yes No    PAST MEDICAL & SURGICAL HISTORY:  DM (diabetes mellitus)      HTN (hypertension)      CVA (cerebrovascular accident)      Afib      S/P AKA (above knee amputation)  L      H/O insertion of cholecystostomy tube        SOCIAL HISTORY:                      Substance history: no                     Admitted from:  home                      Gnosticism/spirituality: Congregation                    Cultural concerns:    Baseline ADLs (prior to admission):  Dependent                        Surrogate/HCP/Guardian:  5 children   3 in [place]    FAMILY HISTORY: poor historian      Allergies    No Known Allergies    Intolerances        http://npcrc.org/files/[email or web address]    Present Symptoms:   Dyspnea:  No   Nausea/Vomiting:   Anxiety/ Agitation      Fatigue: Yes   Loss of appetite:    Fair  Constipation:  Not reported   Pain: back             Location            Character            Duration            Factors            Severity            Effect    Pain AD Score:  [email or web address] (press ctrl + left click to view)    Review of Systems: Reviewed    All other ROS negative    MEDICATIONS  (STANDING):  ascorbic acid 500 milliGRAM(s) Oral daily  collagenase Ointment 1 Application(s) Topical daily  dextrose 5%. 1000 milliLiter(s) (50 mL/Hr) IV Continuous <Continuous>  dextrose 5%. 1000 milliLiter(s) (100 mL/Hr) IV Continuous <Continuous>  dextrose 50% Injectable 25 Gram(s) IV Push once  dextrose 50% Injectable 12.5 Gram(s) IV Push once  dextrose 50% Injectable 25 Gram(s) IV Push once  folic acid 1 milliGRAM(s) Oral daily  glucagon  Injectable 1 milliGRAM(s) IntraMuscular once  heparin  Infusion.  Unit(s)/Hr (13 mL/Hr) IV Continuous <Continuous>  insulin lispro (ADMELOG) corrective regimen sliding scale   SubCutaneous three times a day before meals  lactated ringers. 1000 milliLiter(s) (115 mL/Hr) IV Continuous <Continuous>  losartan 50 milliGRAM(s) Oral daily  metoprolol tartrate 25 milliGRAM(s) Oral two times a day  mirtazapine 7.5 milliGRAM(s) Oral daily  multivitamin/minerals 1 Tablet(s) Oral daily  pantoprazole   Suspension 40 milliGRAM(s) Oral daily  rosuvastatin 5 milliGRAM(s) Oral at bedtime  triamcinolone 0.1% Oral Paste 1 Application(s) Topical daily    MEDICATIONS  (PRN):  acetaminophen     Tablet .. 650 milliGRAM(s) Oral every 6 hours PRN Temp greater or equal to 38C (100.4F), Mild Pain (1 - 3)  dextrose Oral Gel 15 Gram(s) Oral once PRN Blood Glucose LESS THAN 70 milliGRAM(s)/deciliter  heparin   Injectable 6000 Unit(s) IV Push every 6 hours PRN For aPTT less than 40  heparin   Injectable 3000 Unit(s) IV Push every 6 hours PRN For aPTT between 40 - 57  ondansetron Injectable 4 milliGRAM(s) IV Push every 6 hours PRN Nausea  oxyCODONE    IR 5 milliGRAM(s) Oral every 4 hours PRN Moderate Pain (4 - 6)  oxyCODONE    IR 10 milliGRAM(s) Oral every 4 hours PRN Severe Pain (7 - 10)  senna 2 Tablet(s) Oral at bedtime PRN Constipation    PHYSICAL EXAM:  Vital Signs Last 24 Hrs  T(C): 36.8 (29 Jan 2025 11:12), Max: 37.3 (28 Jan 2025 19:34)  T(F): 98.3 (29 Jan 2025 11:12), Max: 99.1 (28 Jan 2025 19:34)  HR: 100 (29 Jan 2025 11:12) (80 - 102)  BP: 104/71 (29 Jan 2025 11:12) (102/55 - 124/80)  BP(mean): --  RR: 18 (29 Jan 2025 08:20) (18 - 18)  SpO2: 100% (29 Jan 2025 11:12) (93% - 100%)    Parameters below as of 29 Jan 2025 08:20  Patient On (Oxygen Delivery Method): room air    Patient in stretcher in ER  Karnofsky  30   %  General:  Frail elderly woman NAD -   HEENT: NCAT       Lungs: comfortable  CV: RR  GI:  soft NTND  + PCT drain intact  MSK: weak   L stump bandages clean dry  Neuro: AOx3  Skin:  warm  Psych:  calm    LABS:                        9.8    4.71  )-----------( 163      ( 29 Jan 2025 06:10 )             32.0     01-27    142  |  107  |  13.6  ----------------------------<  162[H]  4.2   |  25.0  |  0.76    Ca    8.2[L]      27 Jan 2025 18:09    TPro  6.1[L]  /  Alb  2.2[L]  /  TBili  0.6  /  DBili  x   /  AST  22  /  ALT  11  /  AlkPhos  213[H]  01-27    PT/INR - ( 27 Jan 2025 19:30 )   PT: 19.8 sec;   INR: 1.72 ratio         PTT - ( 29 Jan 2025 06:10 )  PTT:108.5 sec  Urinalysis Basic - ( 27 Jan 2025 18:09 )    Color: x / Appearance: x / SG: x / pH: x  Gluc: 162 mg/dL / Ketone: x  / Bili: x / Urobili: x   Blood: x / Protein: x / Nitrite: x   Leuk Esterase: x / RBC: x / WBC x   Sq Epi: x / Non Sq Epi: x / Bacteria: x      I&O's Summary    28 Jan 2025 07:01  -  29 Jan 2025 07:00  --------------------------------------------------------  IN: 98 mL / OUT: 1250 mL / NET: -1152 mL        RADIOLOGY & ADDITIONAL STUDIES:  Imaging reviewed   < from: Xray Femur 1 View, Left (01.27.25 @ 18:58) >    ACC: [id number] EXAM:  XR FEMUR 1 VIEW LT   ORDERED BY: MARTA STANTON     PROCEDURE DATE:  01/27/2025          INTERPRETATION:  Radiographs of the LEFT femur    CLINICAL INFORMATION: Status post LEFT femur AK amputation. Infection.   Subcutaneous air?.    TECHNIQUE:  Frontal and lateral views of the femur.    FINDINGS:   No previous examinations are available for review.    Status post AK amputation. Mottled of subcutaneous air seen distal to   distal end the femur adjacent to surgical skin clips. No evidence of   osteomyelitis.    IMPRESSION:   Status post LEFT femur AK amputation.  Subcutaneous air adjacent to the distal skin suture clips.  No radiographic evidence of osteomyelitis. Continued radiographic   surveillance recommended.    --- End of Report ---            DARIANA DOMINGUEZ MD; Attending Radiologist  This document has been electronically signed. Jan 28 2025  7:19AM    < end of copied text >          ADVANCE DIRECTIVES/TREATMENT PREFERENCES:  Currently Full code

## 2025-01-29 NOTE — PROGRESS NOTE ADULT - SUBJECTIVE AND OBJECTIVE BOX
DANNI TOSIN    [id number]    79y      Female    Patient is a 79y old  Female who presents with a chief complaint of     INTERVAL HPI/OVERNIGHT EVENTS:      patient has some pain in her Stoma, denies fever, chills, chest pain, sob       Vital Signs Last 24 Hrs  T(C): 36.8 (29 Jan 2025 11:12), Max: 37.3 (28 Jan 2025 19:34)  T(F): 98.3 (29 Jan 2025 11:12), Max: 99.1 (28 Jan 2025 19:34)  HR: 100 (29 Jan 2025 11:12) (80 - 102)  BP: 104/71 (29 Jan 2025 11:12) (102/55 - 124/80)  RR: 18 (29 Jan 2025 08:20) (18 - 18)  SpO2: 100% (29 Jan 2025 11:12) (93% - 100%)    Parameters below as of 29 Jan 2025 08:20  Patient On (Oxygen Delivery Method): room air        PHYSICAL EXAM:    GENERAL: Elderly female looking comfortable   HEENT: PERRL, +EOMI  NECK: soft, Supple, No JVD  CHEST/LUNG: Clear to auscultate bilaterally; No wheezing  HEART: S1S2+, Regular rate and rhythm; No murmurs  ABDOMEN: Soft, Nontender, Nondistended; Bowel sounds present  EXTREMITIES:  Left Leg AKA, wound covered with clean dressings on   SKIN: No rashes or lesions  NEURO: AAOX3  PSYCH: normal mood    LABS:                        9.8    4.71  )-----------( 163      ( 29 Jan 2025 06:10 )             32.0     01-27    142  |  107  |  13.6  ----------------------------<  162[H]  4.2   |  25.0  |  0.76    Ca    8.2[L]      27 Jan 2025 18:09    TPro  6.1[L]  /  Alb  2.2[L]  /  TBili  0.6  /  DBili  x   /  AST  22  /  ALT  11  /  AlkPhos  213[H]  01-27    PT/INR - ( 27 Jan 2025 19:30 )   PT: 19.8 sec;   INR: 1.72 ratio         PTT - ( 29 Jan 2025 06:10 )  PTT:108.5 sec      I&O's Summary    28 Jan 2025 07:01  -  29 Jan 2025 07:00  --------------------------------------------------------  IN: 98 mL / OUT: 1250 mL / NET: -1152 mL        MEDICATIONS  (STANDING):  ascorbic acid 500 milliGRAM(s) Oral daily  collagenase Ointment 1 Application(s) Topical daily  dextrose 5%. 1000 milliLiter(s) (50 mL/Hr) IV Continuous <Continuous>  dextrose 5%. 1000 milliLiter(s) (100 mL/Hr) IV Continuous <Continuous>  dextrose 50% Injectable 25 Gram(s) IV Push once  dextrose 50% Injectable 12.5 Gram(s) IV Push once  dextrose 50% Injectable 25 Gram(s) IV Push once  folic acid 1 milliGRAM(s) Oral daily  glucagon  Injectable 1 milliGRAM(s) IntraMuscular once  heparin  Infusion.  Unit(s)/Hr (13 mL/Hr) IV Continuous <Continuous>  insulin lispro (ADMELOG) corrective regimen sliding scale   SubCutaneous three times a day before meals  lactated ringers. 1000 milliLiter(s) (115 mL/Hr) IV Continuous <Continuous>  losartan 50 milliGRAM(s) Oral daily  metoprolol tartrate 25 milliGRAM(s) Oral two times a day  mirtazapine 7.5 milliGRAM(s) Oral daily  multivitamin/minerals 1 Tablet(s) Oral daily  pantoprazole   Suspension 40 milliGRAM(s) Oral daily  rosuvastatin 5 milliGRAM(s) Oral at bedtime  triamcinolone 0.1% Oral Paste 1 Application(s) Topical daily    MEDICATIONS  (PRN):  acetaminophen     Tablet .. 650 milliGRAM(s) Oral every 6 hours PRN Temp greater or equal to 38C (100.4F), Mild Pain (1 - 3)  dextrose Oral Gel 15 Gram(s) Oral once PRN Blood Glucose LESS THAN 70 milliGRAM(s)/deciliter  heparin   Injectable 6000 Unit(s) IV Push every 6 hours PRN For aPTT less than 40  heparin   Injectable 3000 Unit(s) IV Push every 6 hours PRN For aPTT between 40 - 57  ondansetron Injectable 4 milliGRAM(s) IV Push every 6 hours PRN Nausea  oxyCODONE    IR 5 milliGRAM(s) Oral every 4 hours PRN Moderate Pain (4 - 6)  oxyCODONE    IR 10 milliGRAM(s) Oral every 4 hours PRN Severe Pain (7 - 10)  senna 2 Tablet(s) Oral at bedtime PRN Constipation

## 2025-01-29 NOTE — PROGRESS NOTE ADULT - SUBJECTIVE AND OBJECTIVE BOX
DANNI ADRIENNE    [id number]    History:      Patient seen this am  no acute overnight events    no reports of nausea, vomiting, chest pain, shortness of breath, abdominal pain or fever    Vital Signs Last 24 Hrs  T(C): 36.8 (29 Jan 2025 05:35), Max: 37.3 (28 Jan 2025 19:34)  T(F): 98.2 (29 Jan 2025 05:35), Max: 99.1 (28 Jan 2025 19:34)  HR: 80 (29 Jan 2025 05:35) (80 - 106)  BP: 123/73 (29 Jan 2025 05:35) (108/70 - 124/80)  BP(mean): --  RR: 18 (29 Jan 2025 05:35) (18 - 18)  SpO2: 97% (29 Jan 2025 05:35) (93% - 100%)    Parameters below as of 29 Jan 2025 05:35  Patient On (Oxygen Delivery Method): room air      I&O's Summary    28 Jan 2025 07:01  -  29 Jan 2025 07:00  --------------------------------------------------------  IN: 98 mL / OUT: 1250 mL / NET: -1152 mL                              9.8    4.71  )-----------( 163      ( 29 Jan 2025 06:10 )             32.0     01-27    142  |  107  |  13.6  ----------------------------<  162[H]  4.2   |  25.0  |  0.76    Ca    8.2[L]      27 Jan 2025 18:09    TPro  6.1[L]  /  Alb  2.2[L]  /  TBili  0.6  /  DBili  x   /  AST  22  /  ALT  11  /  AlkPhos  213[H]  01-27      MEDICATIONS  (STANDING):  ascorbic acid 500 milliGRAM(s) Oral daily  collagenase Ointment 1 Application(s) Topical daily  dextrose 5%. 1000 milliLiter(s) (50 mL/Hr) IV Continuous <Continuous>  dextrose 5%. 1000 milliLiter(s) (100 mL/Hr) IV Continuous <Continuous>  dextrose 50% Injectable 25 Gram(s) IV Push once  dextrose 50% Injectable 12.5 Gram(s) IV Push once  dextrose 50% Injectable 25 Gram(s) IV Push once  folic acid 1 milliGRAM(s) Oral daily  glucagon  Injectable 1 milliGRAM(s) IntraMuscular once  heparin  Infusion.  Unit(s)/Hr (13 mL/Hr) IV Continuous <Continuous>  insulin lispro (ADMELOG) corrective regimen sliding scale   SubCutaneous three times a day before meals  losartan 50 milliGRAM(s) Oral daily  metoprolol tartrate 25 milliGRAM(s) Oral two times a day  mirtazapine 7.5 milliGRAM(s) Oral daily  multivitamin/minerals 1 Tablet(s) Oral daily  pantoprazole   Suspension 40 milliGRAM(s) Oral daily  rosuvastatin 5 milliGRAM(s) Oral at bedtime  triamcinolone 0.1% Oral Paste 1 Application(s) Topical daily    MEDICATIONS  (PRN):  acetaminophen     Tablet .. 650 milliGRAM(s) Oral every 6 hours PRN Temp greater or equal to 38C (100.4F), Mild Pain (1 - 3)  dextrose Oral Gel 15 Gram(s) Oral once PRN Blood Glucose LESS THAN 70 milliGRAM(s)/deciliter  heparin   Injectable 6000 Unit(s) IV Push every 6 hours PRN For aPTT less than 40  heparin   Injectable 3000 Unit(s) IV Push every 6 hours PRN For aPTT between 40 - 57  ondansetron Injectable 4 milliGRAM(s) IV Push every 6 hours PRN Nausea  oxyCODONE    IR 5 milliGRAM(s) Oral every 4 hours PRN Moderate Pain (4 - 6)  oxyCODONE    IR 10 milliGRAM(s) Oral every 4 hours PRN Severe Pain (7 - 10)  senna 2 Tablet(s) Oral at bedtime PRN Constipation      Physical exam:     no distress  non labored breathing  abdomen is soft, non tender, Biliary drain to the right upper quadrant  left groin intact with staples  right groin with slight dermal erosion at wound site   Left AKA stump with clean and dry ace wrap  right foot warm with palpable PT/dp. No calf tenderness        Primary Assessment:     • poorly healing left AKA requiring debridement.. possible revision  Plan:   •   • intervention planned for tomorrow  - needs cardiology risk assessment   follow up nutrition/cardiology/paliative consultations

## 2025-01-29 NOTE — PROGRESS NOTE ADULT - ASSESSMENT
80 y/o F with Hx of Afib (on eliquis), HTN, DM2, HLD, h/o stroke, limb ischemia (s/p left AKA, s/p b/l femoral thrombectomy), she was recently discharge from stump wound, she has  s/p bilateral femoral cutdown, embolectomies, left AKA 12/16.  Post operative course complicated due to acute cholecystitis and failure to thrive, she was sent back form the nursing home due to concerns for possible wound infection, she has no recent fever or chills.  No abdominal pain, CP, SOb fever or chills. admitted under Vascular surgery for wound management, Medicine consulted for Medical Management.     Plan:     PAD Hx of limb ischemia Stump wound:   s/p left AKA, s/p b/l femoral thrombectomy in dec   wound care  antibiotics   Pain meds   Bowel meds   IS  further management as per Primary team       HTN-/ Atrial fib:   Eliquis on hold   On Metoprolol tartrate 25mg bid   On Irbesartan 150mg daily at home, will resume with losartan    DM2 type 2:   cont ISS     HLD:   Crestor 5mg qhs    Anemia:   low iron   cont iron and folic acid   Vit C   Monitor CBC    DVT prophylaxis: as per primary team.          80 y/o F with Hx of Afib (on eliquis), HTN, DM2, HLD, h/o stroke, limb ischemia (s/p left AKA, s/p b/l femoral thrombectomy), she was recently discharge from stump wound, she has  s/p bilateral femoral cutdown, embolectomies, left AKA 12/16.  Post operative course complicated due to acute cholecystitis and failure to thrive, she was sent back form the nursing home due to concerns for possible wound infection, she has no recent fever or chills.  No abdominal pain, CP, SOb fever or chills. admitted under Vascular surgery for wound management, Medicine consulted for Medical Management.     Plan:     PAD Hx of limb ischemia Stump wound:   s/p left AKA, s/p b/l femoral thrombectomy in dec   wound care  antibiotics   Pain meds   Bowel meds   IS  further management as per Primary team       HTN-/ Atrial fib:   Eliquis on hold   On Metoprolol tartrate 25mg bid   On Irbesartan 150mg daily at home, will resume with losartan    DM2 type 2:   cont ISS     HLD:   Crestor 5mg qhs    Anemia:   Hb has been stable  low iron   cont iron and folic acid   Vit C   Monitor CBC    DVT prophylaxis: as per primary team.

## 2025-01-29 NOTE — CONSULT NOTE ADULT - PROBLEM SELECTOR RECOMMENDATION 9
-  -s/p AKA 12/2024  - plan for possible debridement this visit  - Pre-operative cardiovascular risk evaluation and management.   No cardiac symptoms. Non-ischemic ECG. TTE WNL.   RCRI (revised cardiac risk index score) 10%.   No further cardiac work up is needed. Further cardiac work up will not change risk of the patient. Proceed for surgery as indicated.  - Pt is elevated risk without modifiable cardiac risk factors

## 2025-01-29 NOTE — ADVANCED PRACTICE NURSE CONSULT - ASSESSMENT
CAROLINA SCORE  Last carolina score is : ---12  19+: pt is NOT at risk for developing pressure injuries  15-18: pt is AT RISK for developing pressure injuries   13-14: pt is AT MODERATE RISK for developing pressure injuries   10-12: pt is AT HIGH RISK for developing pressure injuries   6-9: pt is AT VERY HIGH RISK for developing pressure injuries     PHYSICAL EXAM  Is pt AOx?: x3 in nad resting in stretcher  Bed bound?: yes  Incontinent?: yes   Mattress/ bed active?: no   Can pt assist with turn or dependent?: totally dependent  Active pressure injury prevention measures? : heel pillows, offloading heel boot    SKIN CHECK  -thin appearing elderly female.   -over the buttocks and primarily near the karuna cleft if hypopgimented skin amongst macular hyperpigmentation likely IAD. no active satellite lesions nor PI at this time   -over the LLE-active L AKA, dressing left undisturbed   -over the R posterior heel are 2 approximated areas of purple/maroon disocloration over bony prominence suggetive of DTI, the more central portion remains just discolored and skin intact, the more lateral area may be beginning to form adherent layer of eschar. pulses palpable though somewhat diminished, pt with known h/o PAD.    CAROLINA SCORE  Last carolina score is : ---12  19+: pt is NOT at risk for developing pressure injuries  15-18: pt is AT RISK for developing pressure injuries   13-14: pt is AT MODERATE RISK for developing pressure injuries   10-12: pt is AT HIGH RISK for developing pressure injuries   6-9: pt is AT VERY HIGH RISK for developing pressure injuries     PHYSICAL EXAM  Is pt AOx?: x3 in nad resting in stretcher  Bed bound?: yes  Incontinent?: yes   Mattress/ bed active?: no   Can pt assist with turn or dependent?: totally dependent  Active pressure injury prevention measures? : heel pillows, offloading heel boot    SKIN CHECK  -thin appearing elderly female.   -over the buttocks and primarily near the karuna cleft if hypopgimented skin amongst macular hyperpigmentation likely IAD. no active satellite lesions nor PI at this time   -over the LLE-active L AKA, dressing left undisturbed   -over the R posterior heel are 2 approximated areas of purple/maroon disocloration over bony prominence suggetive of DTI, the more central portion remains just discolored and skin intact, the more lateral area may be beginning to form adherent layer of eschar. pulses palpable though somewhat diminished, pt with known h/o PAD. scant 1+ pitting pedal edema   -over the b/l inguinla regions nera femoral access sites are two surgical areas with staple closures, healing well, no obvious indication of infection at this time i.e. no purulence, periwound cellulitis/erythema, no dehiscence nor increased warmth on temperature gradient.

## 2025-01-29 NOTE — CONSULT NOTE ADULT - SUBJECTIVE AND OBJECTIVE BOX
[facility]                                              CARDIOLOGY AT [address], [place] [address]                                             Telephone: [phone]. Fax:[phone]                                                       CARDIOLOGY CONSULTATION NOTE                                                                                             History obtained by: Patient and medical record  Community Cardiologist: Has not followed up (Berna)(   obtained: Yes [  ] No [  ]  Reason for Consultation:   Available out pt records reviewed: Yes [  ] No [  ]    Chief complaint:    Patient is a 79y old  Female who presents with a chief complaint of     HPI:  78 y/o F with PMH Afib on Eliquis, HTN, DM, CVA, AKA 12/16/24, femoral thobectomy, cholecysitis, FTT presents to ED from rehab for fatigue, concern for infection. Vascular recommending debridement. Pt c/o feeling tired. Denies chest pain, shortness of breath.       CARDIAC TESTING   ECHO:  < from: TTE Limited W or WO Ultrasound Enhancing Agent (12.17.24 @ 20:10) >  _______________________________________________________________________________________     CONCLUSIONS:      1. Technically difficult image quality.   2. Left ventricular systolic function is normal with an ejection fraction of 60 % by Monterroso's method of disks.   3. Analysis of left ventricular diastolic function and filling pressure is made challenging by the presence of atrial fibrillation.   4. Normal right ventricular cavity size and normal right ventricular systolic function.   5. Trace mitral regurgitation.   6. No pericardial effusion seen.   7. Compared to the transthoracic echocardiogram performed on 9/27/2024, there have been no significant interval changes.    ________________________________________________________________________________________    < end of copied text >        PAST MEDICAL HISTORY  DM (diabetes mellitus)  HTN (hypertension)  CVA (cerebrovascular accident)  Afib        PAST SURGICAL HISTORY  S/P AKA (above knee amputation)  H/O insertion of cholecystostomy tube        SOCIAL HISTORY:  Denies smoking/alcohol/drugs      FAMILY HISTORY:        HOME MEDICATIONS:  acetaminophen 325 mg oral tablet: 2 tab(s) orally every 6 hours As needed Temp greater or equal to 38C (100.4F), Mild Pain (1 - 3) (24 Jan 2025 11:14)  ascorbic acid 500 mg oral tablet: 1 tab(s) orally once a day (24 Jan 2025 11:14)  collagenase 250 units/g topical ointment: 1 Apply topically to affected area once a day (24 Jan 2025 11:14)  Eliquis 5 mg oral tablet: 1 tab(s) orally 2 times a day (07 Jan 2025 04:57)  ergocalciferol: 1,250 microgram(s) orally once a day (16 Dec 2024 15:59)  folic acid 1 mg oral tablet: 1 tab(s) orally once a day (24 Jan 2025 11:14)  hydrocortisone 1% topical cream: 1 Apply topically to affected area 2 times a day (24 Jan 2025 11:14)  lactulose 10 g/15 mL oral syrup: 30 milliliter(s) orally once As needed if no BM for 2 days (24 Jan 2025 11:14)  Metoprolol Tartrate 25 mg oral tablet: 1 tab(s) orally 2 times a day (07 Jan 2025 05:00)  mirtazapine 7.5 mg oral tablet: 1 tab(s) orally once a day (at bedtime) (24 Jan 2025 11:14)  Multiple Vitamins with Minerals oral tablet: 1 tab(s) orally once a day (24 Jan 2025 11:14)  polyethylene glycol 3350 oral powder for reconstitution: 17 gram(s) orally once a day (24 Jan 2025 11:14)  rosuvastatin 5 mg oral capsule: 1 cap(s) orally once a day (at bedtime) (16 Dec 2024 15:59)  senna leaf extract oral tablet: 2 tab(s) orally once a day (at bedtime) (24 Jan 2025 11:14)  triamcinolone 0.1% topical ointment: 1 Apply topically to affected area every 12 hours (24 Jan 2025 11:14)        CURRENT CARDIAC MEDICATIONS:  losartan 50 milliGRAM(s) Oral daily  metoprolol tartrate 25 milliGRAM(s) Oral two times a day        CURRENT OTHER MEDICATIONS:  acetaminophen     Tablet .. 650 milliGRAM(s) Oral every 6 hours PRN Temp greater or equal to 38C (100.4F), Mild Pain (1 - 3)  mirtazapine 7.5 milliGRAM(s) Oral daily  ondansetron Injectable 4 milliGRAM(s) IV Push every 6 hours PRN Nausea  oxyCODONE    IR 5 milliGRAM(s) Oral every 4 hours PRN Moderate Pain (4 - 6)  oxyCODONE    IR 10 milliGRAM(s) Oral every 4 hours PRN Severe Pain (7 - 10)  pantoprazole   Suspension 40 milliGRAM(s) Oral daily  senna 2 Tablet(s) Oral at bedtime PRN Constipation  ascorbic acid 500 milliGRAM(s) Oral daily  collagenase Ointment 1 Application(s) Topical daily  dextrose Oral Gel 15 Gram(s) Oral once, Stop order after: 1 Doses PRN Blood Glucose LESS THAN 70 milliGRAM(s)/deciliter  folic acid 1 milliGRAM(s) Oral daily  glucagon  Injectable 1 milliGRAM(s) IntraMuscular once, Stop order after: 1 Doses  heparin   Injectable 6000 Unit(s) IV Push every 6 hours PRN For aPTT less than 40  heparin   Injectable 3000 Unit(s) IV Push every 6 hours PRN For aPTT between 40 - 57  heparin  Infusion.  Unit(s)/Hr (13 mL/Hr) IV Continuous <Continuous>  insulin lispro (ADMELOG) corrective regimen sliding scale   SubCutaneous three times a day before meals  lactated ringers. 1000 milliLiter(s) (115 mL/Hr) IV Continuous <Continuous>  multivitamin/minerals 1 Tablet(s) Oral daily  rosuvastatin 5 milliGRAM(s) Oral at bedtime  triamcinolone 0.1% Oral Paste 1 Application(s) Topical daily      ALLERGIES:   No Known Allergies        REVIEW OF SYMPTOMS:   CONSTITUTIONAL: No fever, no chills, no weight loss, no weight gain, + fatigue   ENMT:  No vertigo; No sinus or throat pain  NECK: No pain or stiffness  CARDIOVASCULAR: No chest pain, no dyspnea, no syncope/presyncope, no palpitations, no dizziness, no Orthopnea, no Paroxsymal nocturnal dyspnea  RESPIRATORY: no Shortness of breath, no cough, no wheezing  : No dysuria, no hematuria   GI: No dark color stool, no nausea, no diarrhea, no constipation, no abdominal pain   NEURO: No headache, no slurred speech   MUSCULOSKELETAL: No joint pain or swelling; No muscle, back, or extremity pain  PSYCH: No agitation, no anxiety.    ALL OTHER REVIEW OF SYSTEMS ARE NEGATIVE.        VITAL SIGNS:  T(C): 36.8 (01-29-25 @ 11:12), Max: 37.3 (01-28-25 @ 19:34)  T(F): 98.3 (01-29-25 @ 11:12), Max: 99.1 (01-28-25 @ 19:34)  HR: 100 (01-29-25 @ 11:12) (80 - 102)  BP: 104/71 (01-29-25 @ 11:12) (102/55 - 124/80)  RR: 18 (01-29-25 @ 08:20) (18 - 18)  SpO2: 100% (01-29-25 @ 11:12) (93% - 100%)         01-28 @ 07:01  -  01-29 @ 07:00  --------------------------------------------------------  IN: 98 mL / OUT: 1250 mL / NET: -1152 mL        PHYSICAL EXAM:  Constitutional: Comfortable . No acute distress.   HEENT: Atraumatic and normocephalic , neck is supple . no JVD. No carotid bruit.  CNS: A&Ox3. No focal deficits.   Respiratory: CTAB, unlabored   Cardiovascular: RRR normal s1 s2. No murmur. No rubs or gallop.  Gastrointestinal: Soft, non-tender. +Bowel sounds.   Extremities: 2+ Peripheral Pulses, No clubbing, cyanosis, or edema  Psychiatric: Calm . no agitation.   Skin: Warm and dry, no ulcers on extremities         LABS:                            9.8    4.71  )-----------( 163      ( 29 Jan 2025 06:10 )             32.0     01-27    142  |  107  |  13.6  ----------------------------<  162[H]  4.2   |  25.0  |  0.76    Ca    8.2[L]      27 Jan 2025 18:09    TPro  6.1[L]  /  Alb  2.2[L]  /  TBili  0.6  /  DBili  x   /  AST  22  /  ALT  11  /  AlkPhos  213[H]  01-27    PT/INR - ( 27 Jan 2025 19:30 )   PT: 19.8 sec;   INR: 1.72 ratio         PTT - ( 29 Jan 2025 06:10 )  PTT:108.5 sec  Urinalysis Basic - ( 27 Jan 2025 18:09 )    Color: x / Appearance: x / SG: x / pH: x  Gluc: 162 mg/dL / Ketone: x  / Bili: x / Urobili: x   Blood: x / Protein: x / Nitrite: x   Leuk Esterase: x / RBC: x / WBC x   Sq Epi: x / Non Sq Epi: x / Bacteria: x      INTERPRETATION OF TELEMETRY: Not on telemetry     ECG: Afib       RADIOLOGY & ADDITIONAL STUDIES:    X-ray:    CT scan:   MRI:   US:

## 2025-01-29 NOTE — CONSULT NOTE ADULT - ASSESSMENT
80 y/o F with PMH Afib on Eliquis, HTN, DM, CVA, AKA 12/16/24, femoral thobectomy, cholecysitis, FTT presents to ED from rehab for fatigue, concern for infection. Vascular recommending debridement. Pt c/o feeling tired. Denies chest pain, shortness of breath. 
80 y/o F with Hx of Afib (on eliquis), HTN, DM2, HLD, h/o stroke, limb ischemia (s/p left AKA, s/p b/l femoral thrombectomy), she was recently discharge from stump wound, she has  s/p bilateral femoral cutdown, embolectomies, left AKA 12/16.  Post operative course complicated due to acute cholecystitis and failure to thrive, she was sent back form the nursing home due to concerns for possible wound infection, she has no recent fever or chills.  No abdominal pain, CP, SOb fever or chills. admitted under Vascular surgery for wound management, Medicine consulted for Medical Management.     Plan:     PAD Hx of limb ischemia Stump wound:   s/p left AKA, s/p b/l femoral thrombectomy in dec   wound care  antibiotics   Pain meds   Bowel meds   IS      HTN-/ Atrial fib :   Eliquis on hold   On Metoprolol tartrate 25mg bid   On Irbesartan 150mg daily at home, will resume with losartan    DM2 type 2:   cont ISS     HLD:   Crestor 5mg qhs    Anemia:   low iron   cont iron and folic acid   Vit C   Monitor CBC    DVT prophylaxis: as per primary team        
79yr woman  hx Afib, CVA, DM, HTN with  recent L AKA cholecystitis with PCT admitted with poorly healing L stump    L AKA  Planning for debridement tomorrow  Medicine, Pulmonary, Cardio consulted  monitor for pain    on Oxycodone 5/10 q 4hr PRN  bowel regimen    Encounter for Palliative Care  Palliative Care consulted to assist with goals of care.   Patient seen in ER with family. Son Adarsh, Daughter and Granddaughter Minnie at bedside.  They were aware of planned debridement procedure tomorrow and are in agreement.  Granddaughter states grandmother was told of procedure but does get confused.     Perlita CHRISTIAN  Patient AOx3. Tried to illicit patient's understanding of her condition.   Informed of proposed debridement procedure for which she was in agreement.    Patient in agreement for her children to make decisions on her behalf when needed.      Plan for further GOC after debridement.                    
addendum: discussed case and plan with vascular surgeon   attending will re evaluate wound in am, no plan for emergent surgical intervention

## 2025-01-29 NOTE — ADVANCED PRACTICE NURSE CONSULT - RECOMMEDATIONS
80 y/o F with Hx of Afib (on eliquis), HTN, DM2, HLD, h/o stroke, limb ischemia (s/p left AKA, s/p b/l femoral thrombectomy), she was recently discharge from facility, has stump wound with h/o  s/p bilateral femoral cutdown, embolectomies, left AKA 12/16/25.  Post operative course complicated due to acute cholecystitis and failure to thrive, she was sent back form the nursing home due to concerns for possible wound infection, she has no recent fever or chills.  No abdominal pain, CP, SOb fever or chills. admitted under Vascular surgery for wound management, palliative care is following     for bowel regimen monitor for s&s of skin breakdown to buttocks, IAD is already present continue with barrier ointment and moisture management   defer L AKA debridement to vascular, dressing left intact today.   maintain offloading to R heel, approximated as a single wound bed. may consider pods consult if it continues to evolve and open/develop necrosis. consider xray r/o underlying process.   Pt would benefit from low airloss bed, will be available once on floors.     continue turning Q 2, maintain aspiration precautions, elevate HOB when eating, 20 mins after, then can lower. elevate knees/legs via bed prior to elevating HOB to reduce friction and shear.     1. R posterior heel DTI (2 areas approximated as one wound)-CAPI  2. L AKA with dressing managed by vascular-not assessed by writer today  3. Biliary drain to RLQ   4. MASD iso IAD over buttocks-POA    WOUNDS:  =======  buttocks  -cleanse the wound and periwound area appropriately per policy   -apply critic aid moisture clear barrier ointment in nickel thick layer to affected area, until barely visible  -DO NOT cover with foam or external dressing  -Apply Q 12 hr and again with each episode of incontinence/cleansing.    R posterior heel  paint with betadine QOD , continue to maintain offloading with boot AT ALL TIMES    L stump  care per vacsular, pending tentative debridement 1/30/25.     GENERAL GUIDELINES, AND RECOMMENDATIONS  ==============================  -Carefully check that no tubes are entangled with the bed linens or have contact to patient’s skin. Keep bed sheets smooth and wrinkle free to prevent injury to the skin   - Assure to position pt feet at 20 degrees, then HOB at a 30 degree angle  to limit sliding/friction/shearing, especially in bed bound populations.       Turn and position the ptient Q2 hours. Use wedges when turning and positioning to the left/right, notably if patient can only assist minimally.  Place wedges / moldable pillows above the waist for effective sacral offloading. Keep shoulders and hips properly aligned for greater comfort. Place pillow between legs to support bony prominences and reduce friction/rubbing.  Apply waffle/sacral cushion for sacral offloading, both in bed and in chair. if limited mobility, maintain full fowlers chair seated position for <2 hours at a time.   Keep the pt’s heels protected and elevated off the surface of the bed. Place what the patient can tolerate: pillows, moldable pillows, offloading heel boots      This was a 20  minute visit, with greater than 50% counseling. My findings and suggestions that may benefit the pt were disscussed with the family/caregiver if present at bedside and with pt permission,  shift RN at bedside, in addition to overseeing team/provider via chat and or telephone.     Attending on file for patient notified was SHELDON Tubbs.     Wound Care will continue to follow the patient? : no    Continue to monitor skin integrity as per policy,  and call or re-consult Wound Care with any acute changes or lack of progression of current recommendations. Wound care rceommendations are generally for shift RN dressing changes, unless otherwise specified. Wound Care IS NOT continuing to follow the patient unless otherwise specified as noted above.     Vladislav JAMESN, RN, CWCN  Wound Ostomy Nurse Educator   [facility]  Please call/message over Teams and/or Email for clarification/contact.  (E): [email or web address]

## 2025-01-30 ENCOUNTER — TRANSCRIPTION ENCOUNTER (OUTPATIENT)
Age: 80
End: 2025-01-30

## 2025-01-30 LAB
ANION GAP SERPL CALC-SCNC: 10 MMOL/L — SIGNIFICANT CHANGE UP (ref 5–17)
APTT BLD: 57.9 SEC — HIGH (ref 24.5–35.6)
BASOPHILS # BLD AUTO: 0.03 K/UL — SIGNIFICANT CHANGE UP (ref 0–0.2)
BASOPHILS NFR BLD AUTO: 0.7 % — SIGNIFICANT CHANGE UP (ref 0–2)
BLD GP AB SCN SERPL QL: SIGNIFICANT CHANGE UP
BUN SERPL-MCNC: 10 MG/DL — SIGNIFICANT CHANGE UP (ref 8–20)
CALCIUM SERPL-MCNC: 7.7 MG/DL — LOW (ref 8.4–10.5)
CHLORIDE SERPL-SCNC: 109 MMOL/L — HIGH (ref 96–108)
CO2 SERPL-SCNC: 22 MMOL/L — SIGNIFICANT CHANGE UP (ref 22–29)
CREAT SERPL-MCNC: 0.6 MG/DL — SIGNIFICANT CHANGE UP (ref 0.5–1.3)
EGFR: 90 ML/MIN/1.73M2 — SIGNIFICANT CHANGE UP
EOSINOPHIL # BLD AUTO: 0.06 K/UL — SIGNIFICANT CHANGE UP (ref 0–0.5)
EOSINOPHIL NFR BLD AUTO: 1.5 % — SIGNIFICANT CHANGE UP (ref 0–6)
GLUCOSE BLDC GLUCOMTR-MCNC: 100 MG/DL — HIGH (ref 70–99)
GLUCOSE BLDC GLUCOMTR-MCNC: 106 MG/DL — HIGH (ref 70–99)
GLUCOSE BLDC GLUCOMTR-MCNC: 108 MG/DL — HIGH (ref 70–99)
GLUCOSE BLDC GLUCOMTR-MCNC: 136 MG/DL — HIGH (ref 70–99)
GLUCOSE BLDC GLUCOMTR-MCNC: 97 MG/DL — SIGNIFICANT CHANGE UP (ref 70–99)
GLUCOSE SERPL-MCNC: 111 MG/DL — HIGH (ref 70–99)
HCT VFR BLD CALC: 32.5 % — LOW (ref 34.5–45)
HCT VFR BLD CALC: 34.3 % — LOW (ref 34.5–45)
HGB BLD-MCNC: 10.1 G/DL — LOW (ref 11.5–15.5)
HGB BLD-MCNC: 9.8 G/DL — LOW (ref 11.5–15.5)
IMM GRANULOCYTES NFR BLD AUTO: 0.2 % — SIGNIFICANT CHANGE UP (ref 0–0.9)
INR BLD: 1.51 RATIO — HIGH (ref 0.85–1.16)
LYMPHOCYTES # BLD AUTO: 1.29 K/UL — SIGNIFICANT CHANGE UP (ref 1–3.3)
LYMPHOCYTES # BLD AUTO: 31.4 % — SIGNIFICANT CHANGE UP (ref 13–44)
MAGNESIUM SERPL-MCNC: 1.5 MG/DL — LOW (ref 1.6–2.6)
MCHC RBC-ENTMCNC: 25.6 PG — LOW (ref 27–34)
MCHC RBC-ENTMCNC: 25.7 PG — LOW (ref 27–34)
MCHC RBC-ENTMCNC: 29.4 G/DL — LOW (ref 32–36)
MCHC RBC-ENTMCNC: 30.2 G/DL — LOW (ref 32–36)
MCV RBC AUTO: 85.1 FL — SIGNIFICANT CHANGE UP (ref 80–100)
MCV RBC AUTO: 87.1 FL — SIGNIFICANT CHANGE UP (ref 80–100)
MONOCYTES # BLD AUTO: 0.38 K/UL — SIGNIFICANT CHANGE UP (ref 0–0.9)
MONOCYTES NFR BLD AUTO: 9.2 % — SIGNIFICANT CHANGE UP (ref 2–14)
NEUTROPHILS # BLD AUTO: 2.34 K/UL — SIGNIFICANT CHANGE UP (ref 1.8–7.4)
NEUTROPHILS NFR BLD AUTO: 57 % — SIGNIFICANT CHANGE UP (ref 43–77)
PHOSPHATE SERPL-MCNC: 2.9 MG/DL — SIGNIFICANT CHANGE UP (ref 2.4–4.7)
PLATELET # BLD AUTO: 163 K/UL — SIGNIFICANT CHANGE UP (ref 150–400)
PLATELET # BLD AUTO: 188 K/UL — SIGNIFICANT CHANGE UP (ref 150–400)
POTASSIUM SERPL-MCNC: 4 MMOL/L — SIGNIFICANT CHANGE UP (ref 3.5–5.3)
POTASSIUM SERPL-SCNC: 4 MMOL/L — SIGNIFICANT CHANGE UP (ref 3.5–5.3)
PROTHROM AB SERPL-ACNC: 17 SEC — HIGH (ref 9.9–13.4)
RBC # BLD: 3.82 M/UL — SIGNIFICANT CHANGE UP (ref 3.8–5.2)
RBC # BLD: 3.94 M/UL — SIGNIFICANT CHANGE UP (ref 3.8–5.2)
RBC # FLD: 18.4 % — HIGH (ref 10.3–14.5)
RBC # FLD: 18.6 % — HIGH (ref 10.3–14.5)
SODIUM SERPL-SCNC: 141 MMOL/L — SIGNIFICANT CHANGE UP (ref 135–145)
WBC # BLD: 4.11 K/UL — SIGNIFICANT CHANGE UP (ref 3.8–10.5)
WBC # BLD: 7.69 K/UL — SIGNIFICANT CHANGE UP (ref 3.8–10.5)
WBC # FLD AUTO: 4.11 K/UL — SIGNIFICANT CHANGE UP (ref 3.8–10.5)
WBC # FLD AUTO: 7.69 K/UL — SIGNIFICANT CHANGE UP (ref 3.8–10.5)

## 2025-01-30 PROCEDURE — 27592 AMPUTATE LEG AT THIGH: CPT | Mod: GC,78,LT

## 2025-01-30 PROCEDURE — 99233 SBSQ HOSP IP/OBS HIGH 50: CPT

## 2025-01-30 DEVICE — CLIP APPLIER ETHICON LIGACLIP 11.5" MEDIUM: Type: IMPLANTABLE DEVICE | Status: FUNCTIONAL

## 2025-01-30 DEVICE — CLIP APPLIER COVIDIEN SURGICLIP III 9" SM: Type: IMPLANTABLE DEVICE | Status: FUNCTIONAL

## 2025-01-30 RX ORDER — ASCORBIC ACID 500 MG/ML
500 VIAL (ML) INJECTION DAILY
Refills: 0 | Status: DISCONTINUED | OUTPATIENT
Start: 2025-01-30 | End: 2025-02-06

## 2025-01-30 RX ORDER — PANTOPRAZOLE 20 MG/1
40 TABLET, DELAYED RELEASE ORAL DAILY
Refills: 0 | Status: DISCONTINUED | OUTPATIENT
Start: 2025-01-30 | End: 2025-01-30

## 2025-01-30 RX ORDER — OXYCODONE HYDROCHLORIDE 30 MG/1
10 TABLET ORAL EVERY 4 HOURS
Refills: 0 | Status: DISCONTINUED | OUTPATIENT
Start: 2025-01-30 | End: 2025-02-04

## 2025-01-30 RX ORDER — IRON/FOLIC ACID/C/B6/B12/ZINC 150-1.25MG
1 TABLET ORAL DAILY
Refills: 0 | Status: DISCONTINUED | OUTPATIENT
Start: 2025-01-30 | End: 2025-02-06

## 2025-01-30 RX ORDER — FENTANYL CITRATE 50 UG/ML
25 INJECTION INTRAMUSCULAR; INTRAVENOUS
Refills: 0 | Status: DISCONTINUED | OUTPATIENT
Start: 2025-01-30 | End: 2025-01-30

## 2025-01-30 RX ORDER — FOLIC ACID 1 MG
1 TABLET ORAL DAILY
Refills: 0 | Status: DISCONTINUED | OUTPATIENT
Start: 2025-01-30 | End: 2025-01-30

## 2025-01-30 RX ORDER — METOPROLOL SUCCINATE 25 MG
5 TABLET, EXTENDED RELEASE 24 HR ORAL ONCE
Refills: 0 | Status: COMPLETED | OUTPATIENT
Start: 2025-01-30 | End: 2025-01-30

## 2025-01-30 RX ORDER — OXYCODONE HYDROCHLORIDE 30 MG/1
5 TABLET ORAL EVERY 4 HOURS
Refills: 0 | Status: DISCONTINUED | OUTPATIENT
Start: 2025-01-30 | End: 2025-01-30

## 2025-01-30 RX ORDER — LOSARTAN POTASSIUM 100 MG
50 TABLET ORAL DAILY
Refills: 0 | Status: DISCONTINUED | OUTPATIENT
Start: 2025-01-30 | End: 2025-02-03

## 2025-01-30 RX ORDER — PANTOPRAZOLE 20 MG/1
40 TABLET, DELAYED RELEASE ORAL DAILY
Refills: 0 | Status: DISCONTINUED | OUTPATIENT
Start: 2025-01-30 | End: 2025-02-06

## 2025-01-30 RX ORDER — SENNOSIDES 8.6 MG
2 TABLET ORAL AT BEDTIME
Refills: 0 | Status: DISCONTINUED | OUTPATIENT
Start: 2025-01-30 | End: 2025-02-06

## 2025-01-30 RX ORDER — FENTANYL CITRATE 50 UG/ML
50 INJECTION INTRAMUSCULAR; INTRAVENOUS
Refills: 0 | Status: DISCONTINUED | OUTPATIENT
Start: 2025-01-30 | End: 2025-01-30

## 2025-01-30 RX ORDER — SODIUM CHLORIDE 9 G/ML
1000 INJECTION, SOLUTION INTRAVENOUS
Refills: 0 | Status: DISCONTINUED | OUTPATIENT
Start: 2025-01-30 | End: 2025-02-06

## 2025-01-30 RX ORDER — ONDANSETRON 4 MG/1
4 TABLET, ORALLY DISINTEGRATING ORAL EVERY 6 HOURS
Refills: 0 | Status: DISCONTINUED | OUTPATIENT
Start: 2025-01-30 | End: 2025-02-06

## 2025-01-30 RX ORDER — OXYCODONE HYDROCHLORIDE 30 MG/1
5 TABLET ORAL EVERY 4 HOURS
Refills: 0 | Status: DISCONTINUED | OUTPATIENT
Start: 2025-01-30 | End: 2025-02-04

## 2025-01-30 RX ORDER — SODIUM CHLORIDE 9 G/ML
1000 INJECTION, SOLUTION INTRAVENOUS
Refills: 0 | Status: DISCONTINUED | OUTPATIENT
Start: 2025-01-30 | End: 2025-01-31

## 2025-01-30 RX ORDER — FOLIC ACID 1 MG
1 TABLET ORAL DAILY
Refills: 0 | Status: DISCONTINUED | OUTPATIENT
Start: 2025-01-30 | End: 2025-02-06

## 2025-01-30 RX ORDER — MIRTAZAPINE 30 MG/1
7.5 TABLET, FILM COATED ORAL DAILY
Refills: 0 | Status: DISCONTINUED | OUTPATIENT
Start: 2025-01-30 | End: 2025-02-06

## 2025-01-30 RX ORDER — SODIUM CHLORIDE 9 G/ML
500 INJECTION, SOLUTION INTRAVENOUS ONCE
Refills: 0 | Status: COMPLETED | OUTPATIENT
Start: 2025-01-30 | End: 2025-01-30

## 2025-01-30 RX ORDER — METOPROLOL SUCCINATE 25 MG
25 TABLET, EXTENDED RELEASE 24 HR ORAL EVERY 12 HOURS
Refills: 0 | Status: DISCONTINUED | OUTPATIENT
Start: 2025-01-30 | End: 2025-02-03

## 2025-01-30 RX ORDER — DM/PSEUDOEPHED/ACETAMINOPHEN 10-30-250
25 CAPSULE ORAL ONCE
Refills: 0 | Status: DISCONTINUED | OUTPATIENT
Start: 2025-01-30 | End: 2025-02-06

## 2025-01-30 RX ORDER — LOSARTAN POTASSIUM 100 MG
50 TABLET ORAL DAILY
Refills: 0 | Status: DISCONTINUED | OUTPATIENT
Start: 2025-01-30 | End: 2025-01-30

## 2025-01-30 RX ORDER — ROSUVASTATIN CALCIUM 10 MG/1
5 TABLET, FILM COATED ORAL AT BEDTIME
Refills: 0 | Status: DISCONTINUED | OUTPATIENT
Start: 2025-01-30 | End: 2025-01-30

## 2025-01-30 RX ORDER — MAGNESIUM SULFATE 0.8 MEQ/ML
2 AMPUL (ML) INJECTION ONCE
Refills: 0 | Status: COMPLETED | OUTPATIENT
Start: 2025-01-30 | End: 2025-01-30

## 2025-01-30 RX ORDER — DM/PSEUDOEPHED/ACETAMINOPHEN 10-30-250
15 CAPSULE ORAL ONCE
Refills: 0 | Status: DISCONTINUED | OUTPATIENT
Start: 2025-01-30 | End: 2025-02-06

## 2025-01-30 RX ORDER — CEFAZOLIN SODIUM IN 0.9 % NACL 2 G/10 ML
2000 SYRINGE (ML) INTRAVENOUS ONCE
Refills: 0 | Status: DISCONTINUED | OUTPATIENT
Start: 2025-01-30 | End: 2025-01-30

## 2025-01-30 RX ORDER — DM/PSEUDOEPHED/ACETAMINOPHEN 10-30-250
12.5 CAPSULE ORAL ONCE
Refills: 0 | Status: DISCONTINUED | OUTPATIENT
Start: 2025-01-30 | End: 2025-02-06

## 2025-01-30 RX ORDER — IRON/FOLIC ACID/C/B6/B12/ZINC 150-1.25MG
1 TABLET ORAL DAILY
Refills: 0 | Status: DISCONTINUED | OUTPATIENT
Start: 2025-01-30 | End: 2025-01-30

## 2025-01-30 RX ORDER — ONDANSETRON 4 MG/1
4 TABLET, ORALLY DISINTEGRATING ORAL EVERY 6 HOURS
Refills: 0 | Status: DISCONTINUED | OUTPATIENT
Start: 2025-01-30 | End: 2025-01-30

## 2025-01-30 RX ORDER — ACETAMINOPHEN 160 MG/5ML
650 SUSPENSION ORAL EVERY 6 HOURS
Refills: 0 | Status: DISCONTINUED | OUTPATIENT
Start: 2025-01-30 | End: 2025-02-06

## 2025-01-30 RX ORDER — MIRTAZAPINE 30 MG/1
7.5 TABLET, FILM COATED ORAL DAILY
Refills: 0 | Status: DISCONTINUED | OUTPATIENT
Start: 2025-01-30 | End: 2025-01-30

## 2025-01-30 RX ORDER — SODIUM CHLORIDE 9 G/ML
1000 INJECTION, SOLUTION INTRAVENOUS
Refills: 0 | Status: DISCONTINUED | OUTPATIENT
Start: 2025-01-30 | End: 2025-01-30

## 2025-01-30 RX ORDER — OXYCODONE HYDROCHLORIDE 30 MG/1
10 TABLET ORAL EVERY 4 HOURS
Refills: 0 | Status: DISCONTINUED | OUTPATIENT
Start: 2025-01-30 | End: 2025-01-30

## 2025-01-30 RX ORDER — ASCORBIC ACID 500 MG/ML
500 VIAL (ML) INJECTION DAILY
Refills: 0 | Status: DISCONTINUED | OUTPATIENT
Start: 2025-01-30 | End: 2025-01-30

## 2025-01-30 RX ORDER — ROSUVASTATIN CALCIUM 10 MG/1
5 TABLET, FILM COATED ORAL AT BEDTIME
Refills: 0 | Status: DISCONTINUED | OUTPATIENT
Start: 2025-01-30 | End: 2025-02-06

## 2025-01-30 RX ORDER — ONDANSETRON 4 MG/1
4 TABLET, ORALLY DISINTEGRATING ORAL ONCE
Refills: 0 | Status: DISCONTINUED | OUTPATIENT
Start: 2025-01-30 | End: 2025-01-30

## 2025-01-30 RX ORDER — SENNOSIDES 8.6 MG
2 TABLET ORAL AT BEDTIME
Refills: 0 | Status: DISCONTINUED | OUTPATIENT
Start: 2025-01-30 | End: 2025-01-30

## 2025-01-30 RX ORDER — GLUCAGON 3 MG/1
1 POWDER NASAL ONCE
Refills: 0 | Status: DISCONTINUED | OUTPATIENT
Start: 2025-01-30 | End: 2025-02-06

## 2025-01-30 RX ORDER — INSULIN LISPRO 100/ML
VIAL (ML) SUBCUTANEOUS
Refills: 0 | Status: DISCONTINUED | OUTPATIENT
Start: 2025-01-30 | End: 2025-01-30

## 2025-01-30 RX ORDER — ACETAMINOPHEN 160 MG/5ML
650 SUSPENSION ORAL EVERY 6 HOURS
Refills: 0 | Status: DISCONTINUED | OUTPATIENT
Start: 2025-01-30 | End: 2025-01-30

## 2025-01-30 RX ORDER — METOPROLOL SUCCINATE 25 MG
25 TABLET, EXTENDED RELEASE 24 HR ORAL
Refills: 0 | Status: DISCONTINUED | OUTPATIENT
Start: 2025-01-30 | End: 2025-01-30

## 2025-01-30 RX ORDER — INSULIN LISPRO 100/ML
VIAL (ML) SUBCUTANEOUS
Refills: 0 | Status: DISCONTINUED | OUTPATIENT
Start: 2025-01-30 | End: 2025-02-06

## 2025-01-30 RX ADMIN — FENTANYL CITRATE 50 MICROGRAM(S): 50 INJECTION INTRAMUSCULAR; INTRAVENOUS at 18:00

## 2025-01-30 RX ADMIN — FENTANYL CITRATE 25 MICROGRAM(S): 50 INJECTION INTRAMUSCULAR; INTRAVENOUS at 21:44

## 2025-01-30 RX ADMIN — Medication 700 UNIT(S)/HR: at 07:49

## 2025-01-30 RX ADMIN — Medication UNIT(S)/HR: at 07:51

## 2025-01-30 RX ADMIN — Medication 25 GRAM(S): at 09:20

## 2025-01-30 RX ADMIN — SODIUM CHLORIDE 500 MILLILITER(S): 9 INJECTION, SOLUTION INTRAVENOUS at 20:00

## 2025-01-30 RX ADMIN — Medication 50 MILLIGRAM(S): at 05:03

## 2025-01-30 RX ADMIN — OXYCODONE HYDROCHLORIDE 10 MILLIGRAM(S): 30 TABLET ORAL at 22:18

## 2025-01-30 RX ADMIN — ROSUVASTATIN CALCIUM 5 MILLIGRAM(S): 10 TABLET, FILM COATED ORAL at 22:18

## 2025-01-30 RX ADMIN — FENTANYL CITRATE 50 MICROGRAM(S): 50 INJECTION INTRAMUSCULAR; INTRAVENOUS at 19:15

## 2025-01-30 RX ADMIN — FENTANYL CITRATE 25 MICROGRAM(S): 50 INJECTION INTRAMUSCULAR; INTRAVENOUS at 21:11

## 2025-01-30 RX ADMIN — FENTANYL CITRATE 50 MICROGRAM(S): 50 INJECTION INTRAMUSCULAR; INTRAVENOUS at 18:46

## 2025-01-30 RX ADMIN — FENTANYL CITRATE 50 MICROGRAM(S): 50 INJECTION INTRAMUSCULAR; INTRAVENOUS at 17:50

## 2025-01-30 RX ADMIN — Medication 5 MILLIGRAM(S): at 20:58

## 2025-01-30 RX ADMIN — OXYCODONE HYDROCHLORIDE 10 MILLIGRAM(S): 30 TABLET ORAL at 23:18

## 2025-01-30 RX ADMIN — Medication 25 MILLIGRAM(S): at 09:52

## 2025-01-30 RX ADMIN — Medication 5 MILLIGRAM(S): at 19:37

## 2025-01-30 NOTE — DISCHARGE NOTE PROVIDER - NSDCFUSCHEDAPPT_GEN_ALL_CORE_FT
Health system Physician [facility] [address]  Scheduled Appointment: 01/31/2025

## 2025-01-30 NOTE — DIETITIAN INITIAL EVALUATION ADULT - CONTINUE CURRENT NUTRITION CARE PLAN
- Resume po diet as medically feasible/tolerable and add Glucerna TID (220 kcal, 10g protein per serving)./yes

## 2025-01-30 NOTE — DIETITIAN INITIAL EVALUATION ADULT - PERTINENT LABORATORY DATA
01-30    141  |  109[H]  |  10.0  ----------------------------<  111[H]  4.0   |  22.0  |  0.60    Ca    7.7[L]      30 Jan 2025 06:52  Phos  2.9     01-30  Mg     1.5     01-30    POCT Blood Glucose.: 106 mg/dL (01-30-25 @ 12:31)  A1C with Estimated Average Glucose Result: 11.5 % (12-17-24 @ 06:50)

## 2025-01-30 NOTE — DIETITIAN INITIAL EVALUATION ADULT - ORAL INTAKE PTA/DIET HISTORY
Nutrition assessment completed. Pt was recently admitted for cholecystitis earlier this month; per EMR review, met criteria for malnutrition. Pt also had another admission 12/2024 for L AKA. Spoke with pts granddaughter at bedside. States pt discharged ~1 week ago and had poor po intake entire hospitalization. Uncertain of UBW but able to confirm pt has lost weight. Pt currently NPO for L AKA revision. Granddaughter reports that pt stated this morning she felt hungry and appetite overall has slightly improved over the last couple of days. Noted with suspected DTI to right heel. Educated on need for HBV protein sources to aid in healing. Receptive to receive oral nutrition supplement once diet resumed. Diet education deferred at this time. RD to follow up as feasible.

## 2025-01-30 NOTE — DISCHARGE NOTE PROVIDER - HOSPITAL COURSE
Patient is a 78 yo F who was sent to the ED from the nursing home for concern of L AKA site wound infection. 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 Patient was admitted to the vascular surgery service for L AKA wound. She was started on a hep gtt, not started on abx as afebrile, no leukocytosis. The medicine team was consulted to help manage her medical comorbidities. Nutrition was also consulted for her malnutrition. Patient was also consulted with cardiologist who cleared her for surgery although she is elevated risk and palliative was consulted as well. On 1/30 patient underwent .................INCOMPLETE         Patient is advised to return to the ED for any of the following: fever/ chills, nausea/ vomiting, chest pain, sob, altered mental status, or any other new or worsening symptoms.                      Patient is a 80 yo F who was sent to the ED from the nursing home for concern of L AKA site wound infection. 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 Patient was admitted to the vascular surgery service for L AKA wound. She was started on a hep gtt, not started on abx as afebrile, no leukocytosis. The medicine team was consulted to help manage her medical comorbidities. Nutrition was also consulted for her malnutrition. Patient was also consulted with cardiologist who cleared her for surgery although she is elevated risk and palliative was consulted as well. On 1/30 patient underwent AKA debridement and VAC placement. After surgery patient remained HDN normal, painwas controlled and stable for DC back to nursing home.       Patient is advised to return to the ED for any of the following: fever/ chills, nausea/ vomiting, chest pain, sob, altered mental status, or any other new or worsening symptoms.                      Patient is a 78 yo F who was sent to the ED from the nursing home for concern of L AKA site wound infection. 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 Patient was admitted to the vascular surgery service for L AKA wound. She was started on a hep gtt, not started on abx as afebrile, no leukocytosis. The medicine team was consulted to help manage her medical comorbidities. Nutrition was also consulted for her malnutrition. Patient was also consulted with cardiologist who cleared her for surgery although she is elevated risk and palliative was consulted as well. On 1/30 patient underwent AKA debridement and VAC placement. After surgery patient remained HDN normal, pain was controlled and stable for DC back to nursing home. During the hospital course the patient experience urinary retention which required muñoz placement.  The Muñoz will remain and a repeat Trial of void should take place at the nursing facility post discharge      Patient is advised to return to the ED for any of the following: fever/ chills, nausea/ vomiting, chest pain, sob, altered mental status, or any other new or worsening symptoms.                      Patient is a 80 yo F who was sent to the ED from the nursing home for concern of L AKA site wound infection. 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 Patient was admitted to the vascular surgery service for L AKA wound. She was started on a hep gtt, not started on abx as afebrile, no leukocytosis. The medicine team was consulted to help manage her medical comorbidities. Nutrition was also consulted for her malnutrition. Patient was also consulted with cardiologist who cleared her for surgery although she is elevated risk and palliative was consulted as well. On 1/30 patient underwent AKA debridement and VAC placement. After surgery patient remained HDN normal, pain was controlled and stable for DC back to nursing home. During the hospital course the patient experience urinary retention which required muñoz placement. The Muñoz will remain and a repeat Trial of void should take place at the nursing facility post discharge      Patient is advised to return to the ED for any of the following: fever/ chills, nausea/ vomiting, chest pain, sob, altered mental status, or any other new or worsening symptoms.                      Patient is a 80 yo F who was sent to the ED from the nursing home for concern of L AKA site wound infection. 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 Patient was admitted to the vascular surgery service for L AKA wound. She was started on a hep gtt, not started on abx as afebrile, no leukocytosis. The medicine team was consulted to help manage her medical comorbidities. Nutrition was also consulted for her malnutrition. Patient was also consulted with cardiologist who cleared her for surgery although she is elevated risk and palliative was consulted as well. On 1/30 patient underwent AKA debridement and VAC placement. After surgery patient remained HDN normal, pain was controlled and stable for DC back to nursing home. During the hospital course the patient experience urinary retention which required muñoz placement. The Muñoz will remain and a repeat Trial of void should take place at the nursing facility post discharge.     Wound care: pt LLE wound 17cm x 5cm x 1cm-  continuous negative pressure wound vac placed with black sponge @125mmhg. To be changed every Monday and Thursday.     PCT: flush with 5cc normal saline drain. Monitor drain output and empty drain bag as needed.     Patient is advised to return to the ED for any of the following: fever/ chills, nausea/ vomiting, chest pain, sob, altered mental status, or any other new or worsening symptoms.                      Patient is a 78 yo F who was sent to the ED from the nursing home for concern of L AKA site wound infection. 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 Patient was admitted to the vascular surgery service for L AKA wound. She was started on a hep gtt, not started on abx as afebrile, no leukocytosis. The medicine team was consulted to help manage her medical comorbidities. Nutrition was also consulted for her malnutrition. Patient was also consulted with cardiologist who cleared her for surgery although she is elevated risk and palliative was consulted as well. On 1/30 patient underwent AKA debridement and VAC placement. After surgery patient remained HDN normal, pain was controlled and stable for DC back to nursing home. During the hospital course the patient experience urinary retention which required muñoz placement. The Muñoz will remain and a repeat Trial of void should take place at the nursing facility post discharge.     Wound care: pt LLE wound 17cm x 5cm x 1cm-  continuous negative pressure wound vac placed with black sponge @125mmhg. To be changed every Monday and Thursday.     PCT: Monitor drain output and empty drain bag as needed. Follow up with [facility] for further management and plan.     Patient is advised to return to the ED for any of the following: fever/ chills, nausea/ vomiting, chest pain, sob, altered mental status, or any other new or worsening symptoms.

## 2025-01-30 NOTE — BRIEF OPERATIVE NOTE - NSICDXBRIEFPROCEDURE_GEN_ALL_CORE_FT
PROCEDURES:  Revision, amputation scar, lower extremity, above knee 30-Jan-2025 17:56:34  Rosie Mora  Negative pressure wound therapy, 50 sq cm or less 30-Jan-2025 17:58:31  Rosie Mora

## 2025-01-30 NOTE — PROGRESS NOTE ADULT - ASSESSMENT
78 y/o F with Hx of Afib (on eliquis), HTN, DM2, HLD, h/o stroke, limb ischemia (s/p left AKA, s/p b/l femoral thrombectomy), she was recently discharge from stump wound, she has  s/p bilateral femoral cutdown, embolectomies, left AKA 12/16.  Post operative course complicated due to acute cholecystitis and failure to thrive, she was sent back form the nursing home due to concerns for possible wound infection, she has no recent fever or chills.  No abdominal pain, CP, SOb fever or chills. admitted under Vascular surgery for wound management, Medicine consulted for Medical Management.     Plan:     PAD Hx of limb ischemia Stump wound:   s/p left AKA, s/p b/l femoral thrombectomy in dec   wound care  antibiotics   Pain meds   Bowel meds   IS  further management as per Primary team   on Heparin   likely going for AKA revision         HTN-/ Atrial fib:   Eliquis on hold   On Metoprolol tartrate 25mg bid   On Irbesartan 150mg daily at home, will resume with losartan    DM2 type 2:   cont ISS     HLD:   Crestor 5mg qhs    Anemia:   Hb has been stable  low iron   cont iron and folic acid   Vit C   Monitor CBC    DVT prophylaxis: as per primary team.

## 2025-01-30 NOTE — CHART NOTE - NSCHARTNOTEFT_GEN_A_CORE
Post-op check:    Subjective: Patient resting in bed. Pt endorses pain at the left AKA site.     Vitals: Vital Signs Last 24 Hrs  T(C): 36.8 (30 Jan 2025 22:05), Max: 37.1 (30 Jan 2025 04:45)  T(F): 98.2 (30 Jan 2025 22:05), Max: 98.7 (30 Jan 2025 04:45)  HR: 95 (30 Jan 2025 22:05) (81 - 121)  BP: 133/88 (30 Jan 2025 22:05) (81/54 - 140/73)  BP(mean): 93 (30 Jan 2025 21:30) (63 - 111)  RR: 18 (30 Jan 2025 22:05) (12 - 24)  SpO2: 100% (30 Jan 2025 22:05) (96% - 100%)    Parameters below as of 30 Jan 2025 22:05  Patient On (Oxygen Delivery Method): room air      Physical exam:  General: alert, NAD  Resp: equal chest rise bilaterally, nonlabored breathing  Abdomen: Nondistended  Extremities: L AKA site c/d/i, with wound vac in place to suction, minimal serosanguinous output in wound vac    A: Pt is an 78 yo female s/p revision of prior L AKA site. Patient tachycardic post-op s/p 2 doses of Lopressor 5mg. HR was < 100 after lopressor now up to 108. Was also hypotensive post-op now s/p 500cc bolus of NS. SBP currently 130s. Will monitor.     Plan:   -pain control  -strict I&O's  -monitor wound vac output  -monitor tachycardia

## 2025-01-30 NOTE — DISCHARGE NOTE PROVIDER - CARE PROVIDER_API CALL
Gulshan Tubbs  Vascular Surgery  [address], [address]  [place] [address]  Phone: [phone]  Fax: [phone]  Follow Up Time: 2 weeks   Gulshan Tubbs  Vascular Surgery  [address], [address]  [place] [address]  Phone: [phone]  Fax: [phone]  Follow Up Time: 2 weeks    Radha Harris  Interventional Radiology and Diagnostic Radiology  [address], [place] [address]  Phone: [phone]  Fax: [phone]  Follow Up Time: 1 week

## 2025-01-30 NOTE — DIETITIAN INITIAL EVALUATION ADULT - PERTINENT MEDS FT
MEDICATIONS  (STANDING):  ascorbic acid 500 milliGRAM(s) Oral daily  dextrose 5%. 1000 milliLiter(s) (50 mL/Hr) IV Continuous <Continuous>  dextrose 5%. 1000 milliLiter(s) (100 mL/Hr) IV Continuous <Continuous>  folic acid 1 milliGRAM(s) Oral daily  insulin lispro (ADMELOG) corrective regimen sliding scale   SubCutaneous three times a day before meals  lactated ringers. 1000 milliLiter(s) (115 mL/Hr) IV Continuous <Continuous>  losartan 50 milliGRAM(s) Oral daily  metoprolol tartrate 25 milliGRAM(s) Oral two times a day  mirtazapine 7.5 milliGRAM(s) Oral daily  multivitamin/minerals 1 Tablet(s) Oral daily  pantoprazole   Suspension 40 milliGRAM(s) Oral daily  rosuvastatin 5 milliGRAM(s) Oral at bedtime    MEDICATIONS  (PRN):  ondansetron Injectable 4 milliGRAM(s) IV Push every 6 hours PRN Nausea  oxyCODONE    IR 5 milliGRAM(s) Oral every 4 hours PRN Moderate Pain (4 - 6)  oxyCODONE    IR 10 milliGRAM(s) Oral every 4 hours PRN Severe Pain (7 - 10)  senna 2 Tablet(s) Oral at bedtime PRN Constipation

## 2025-01-30 NOTE — DISCHARGE NOTE PROVIDER - CARE PROVIDERS DIRECT ADDRESSES
,[email or web address]

## 2025-01-30 NOTE — PROGRESS NOTE ADULT - SUBJECTIVE AND OBJECTIVE BOX
DANNI TOSIN    [id number]    79y      Female    Patient is a 79y old  Female who presents with a chief complaint of     INTERVAL HPI/OVERNIGHT EVENTS:    patient is doing ok, denies fever, chills, chest pain, sob, dizziness       Vital Signs Last 24 Hrs  T(C): 37.1 (30 Jan 2025 04:45), Max: 37.4 (29 Jan 2025 20:00)  T(F): 98.7 (30 Jan 2025 04:45), Max: 99.4 (29 Jan 2025 20:00)  HR: 81 (30 Jan 2025 09:18) (81 - 102)  BP: 132/81 (30 Jan 2025 09:18) (116/73 - 140/73)  RR: 19 (30 Jan 2025 09:18) (17 - 19)  SpO2: 96% (30 Jan 2025 09:18) (96% - 100%)    Parameters below as of 30 Jan 2025 09:18  Patient On (Oxygen Delivery Method): room air        PHYSICAL EXAM:    GENERAL: Elderly female looking comfortable   HEENT: PERRL, +EOMI  NECK: soft, Supple, No JVD  CHEST/LUNG: Clear to auscultate bilaterally; No wheezing  HEART: S1S2+, Regular rate and rhythm; No murmurs  ABDOMEN: Soft, Nontender, Nondistended; Bowel sounds present  EXTREMITIES:  Left Leg AKA, wound covered with clean dressings on   SKIN: No rashes or lesions  NEURO: AAOX3  PSYCH: normal mood        LABS:                        9.8    4.11  )-----------( 163      ( 30 Jan 2025 06:52 )             32.5     01-30    141  |  109[H]  |  10.0  ----------------------------<  111[H]  4.0   |  22.0  |  0.60    Ca    7.7[L]      30 Jan 2025 06:52  Phos  2.9     01-30  Mg     1.5     01-30      PT/INR - ( 30 Jan 2025 06:52 )   PT: 17.0 sec;   INR: 1.51 ratio         PTT - ( 30 Jan 2025 06:52 )  PTT:57.9 sec      I&O's Summary    29 Jan 2025 07:01  -  30 Jan 2025 07:00  --------------------------------------------------------  IN: 922 mL / OUT: 1275 mL / NET: -353 mL        MEDICATIONS  (STANDING):  ascorbic acid 500 milliGRAM(s) Oral daily  collagenase Ointment 1 Application(s) Topical daily  dextrose 5%. 1000 milliLiter(s) (50 mL/Hr) IV Continuous <Continuous>  dextrose 5%. 1000 milliLiter(s) (100 mL/Hr) IV Continuous <Continuous>  dextrose 50% Injectable 25 Gram(s) IV Push once  dextrose 50% Injectable 12.5 Gram(s) IV Push once  dextrose 50% Injectable 25 Gram(s) IV Push once  folic acid 1 milliGRAM(s) Oral daily  glucagon  Injectable 1 milliGRAM(s) IntraMuscular once  heparin  Infusion.  Unit(s)/Hr (13 mL/Hr) IV Continuous <Continuous>  insulin lispro (ADMELOG) corrective regimen sliding scale   SubCutaneous three times a day before meals  lactated ringers. 1000 milliLiter(s) (115 mL/Hr) IV Continuous <Continuous>  losartan 50 milliGRAM(s) Oral daily  metoprolol tartrate 25 milliGRAM(s) Oral two times a day  mirtazapine 7.5 milliGRAM(s) Oral daily  multivitamin/minerals 1 Tablet(s) Oral daily  pantoprazole   Suspension 40 milliGRAM(s) Oral daily  rosuvastatin 5 milliGRAM(s) Oral at bedtime  triamcinolone 0.1% Oral Paste 1 Application(s) Topical daily    MEDICATIONS  (PRN):  acetaminophen     Tablet .. 650 milliGRAM(s) Oral every 6 hours PRN Temp greater or equal to 38C (100.4F), Mild Pain (1 - 3)  dextrose Oral Gel 15 Gram(s) Oral once PRN Blood Glucose LESS THAN 70 milliGRAM(s)/deciliter  heparin   Injectable 6000 Unit(s) IV Push every 6 hours PRN For aPTT less than 40  heparin   Injectable 3000 Unit(s) IV Push every 6 hours PRN For aPTT between 40 - 57  ondansetron Injectable 4 milliGRAM(s) IV Push every 6 hours PRN Nausea  oxyCODONE    IR 5 milliGRAM(s) Oral every 4 hours PRN Moderate Pain (4 - 6)  oxyCODONE    IR 10 milliGRAM(s) Oral every 4 hours PRN Severe Pain (7 - 10)  senna 2 Tablet(s) Oral at bedtime PRN Constipation

## 2025-01-30 NOTE — DIETITIAN INITIAL EVALUATION ADULT - NS FNS DIET ORDER
Diet, NPO after Midnight:      NPO Start Date: 29-Jan-2025,   NPO Start Time: 23:59  Except Medications (01-29-25 @ 08:56)

## 2025-01-30 NOTE — DIETITIAN INITIAL EVALUATION ADULT - ETIOLOGY
related to inability to meet sufficient protein-energy in setting of recent L AKA and cholecystitis, poor appetite, failure to thrive, poor skin integrity

## 2025-01-30 NOTE — DIETITIAN INITIAL EVALUATION ADULT - ADD RECOMMEND
Continue MVI, folic acid, and vitamin C supplementation. Continue Remeron to potentially increase appetite. Encourage po intake, monitor diet tolerance, and provide assistance at meals as needed. Obtain daily weights to monitor trends.

## 2025-01-30 NOTE — DISCHARGE NOTE PROVIDER - NSDCCPTREATMENT_GEN_ALL_CORE_FT
PRINCIPAL PROCEDURE  Procedure: Revision, amputation scar, lower extremity, above knee  Findings and Treatment:

## 2025-01-30 NOTE — DISCHARGE NOTE PROVIDER - NSDCMRMEDTOKEN_GEN_ALL_CORE_FT
acetaminophen 325 mg oral tablet: 2 tab(s) orally every 6 hours As needed Temp greater or equal to 38C (100.4F), Mild Pain (1 - 3)  ascorbic acid 500 mg oral tablet: 1 tab(s) orally once a day  collagenase 250 units/g topical ointment: 1 Apply topically to affected area once a day  Eliquis 5 mg oral tablet: 1 tab(s) orally 2 times a day  ergocalciferol: 1,250 microgram(s) orally once a day  folic acid 1 mg oral tablet: 1 tab(s) orally once a day  hydrocortisone 1% topical cream: 1 Apply topically to affected area 2 times a day  lactulose 10 g/15 mL oral syrup: 30 milliliter(s) orally once As needed if no BM for 2 days  Metoprolol Tartrate 25 mg oral tablet: 1 tab(s) orally 2 times a day  mirtazapine 7.5 mg oral tablet: 1 tab(s) orally once a day (at bedtime)  Multiple Vitamins with Minerals oral tablet: 1 tab(s) orally once a day  polyethylene glycol 3350 oral powder for reconstitution: 17 gram(s) orally once a day  Protonix 40 mg oral granule, delayed release: 1 packet(s) orally once a day  rosuvastatin 5 mg oral capsule: 1 cap(s) orally once a day (at bedtime)  senna leaf extract oral tablet: 2 tab(s) orally once a day (at bedtime)  triamcinolone 0.1% topical ointment: 1 Apply topically to affected area every 12 hours   acetaminophen 325 mg oral tablet: 2 tab(s) orally every 6 hours As needed Temp greater or equal to 38C (100.4F), Mild Pain (1 - 3)  ascorbic acid 500 mg oral tablet: 1 tab(s) orally once a day  collagenase 250 units/g topical ointment: 1 Apply topically to affected area once a day  Eliquis 2.5 mg oral tablet: 1 tab(s) orally every 12 hours  ergocalciferol: 1,250 microgram(s) orally once a day  folic acid 1 mg oral tablet: 1 tab(s) orally once a day  hydrocortisone 1% topical cream: 1 Apply topically to affected area 2 times a day  lactulose 10 g/15 mL oral syrup: 30 milliliter(s) orally once As needed if no BM for 2 days  metoprolol: 12.5 orally 3 times a day 12.5 mg every 8 hours  mirtazapine 7.5 mg oral tablet: 1 tab(s) orally once a day (at bedtime)  Multiple Vitamins with Minerals oral tablet: 1 tab(s) orally once a day  oxyCODONE 5 mg oral tablet: 1 tab(s) orally every 4 hours As needed Moderate Pain (4 - 6)  polyethylene glycol 3350 oral powder for reconstitution: 17 gram(s) orally once a day  Protonix 40 mg oral granule, delayed release: 1 packet(s) orally once a day  rosuvastatin 5 mg oral capsule: 1 cap(s) orally once a day (at bedtime)  senna leaf extract oral tablet: 2 tab(s) orally once a day (at bedtime)  triamcinolone 0.1% topical ointment: 1 Apply topically to affected area every 12 hours

## 2025-01-30 NOTE — PROGRESS NOTE ADULT - SUBJECTIVE AND OBJECTIVE BOX
Subjective: Patient seen and examined at bedside, bedside nurse reports diarrhea. No events overnight.    MEDICATIONS  (STANDING):  ascorbic acid 500 milliGRAM(s) Oral daily  collagenase Ointment 1 Application(s) Topical daily  dextrose 5%. 1000 milliLiter(s) (100 mL/Hr) IV Continuous <Continuous>  dextrose 5%. 1000 milliLiter(s) (50 mL/Hr) IV Continuous <Continuous>  dextrose 50% Injectable 25 Gram(s) IV Push once  dextrose 50% Injectable 12.5 Gram(s) IV Push once  dextrose 50% Injectable 25 Gram(s) IV Push once  folic acid 1 milliGRAM(s) Oral daily  glucagon  Injectable 1 milliGRAM(s) IntraMuscular once  heparin  Infusion.  Unit(s)/Hr (13 mL/Hr) IV Continuous <Continuous>  insulin lispro (ADMELOG) corrective regimen sliding scale   SubCutaneous three times a day before meals  lactated ringers. 1000 milliLiter(s) (115 mL/Hr) IV Continuous <Continuous>  losartan 50 milliGRAM(s) Oral daily  metoprolol tartrate 25 milliGRAM(s) Oral two times a day  mirtazapine 7.5 milliGRAM(s) Oral daily  multivitamin/minerals 1 Tablet(s) Oral daily  pantoprazole   Suspension 40 milliGRAM(s) Oral daily  rosuvastatin 5 milliGRAM(s) Oral at bedtime  triamcinolone 0.1% Oral Paste 1 Application(s) Topical daily    MEDICATIONS  (PRN):  acetaminophen     Tablet .. 650 milliGRAM(s) Oral every 6 hours PRN Temp greater or equal to 38C (100.4F), Mild Pain (1 - 3)  dextrose Oral Gel 15 Gram(s) Oral once PRN Blood Glucose LESS THAN 70 milliGRAM(s)/deciliter  heparin   Injectable 6000 Unit(s) IV Push every 6 hours PRN For aPTT less than 40  heparin   Injectable 3000 Unit(s) IV Push every 6 hours PRN For aPTT between 40 - 57  ondansetron Injectable 4 milliGRAM(s) IV Push every 6 hours PRN Nausea  oxyCODONE    IR 5 milliGRAM(s) Oral every 4 hours PRN Moderate Pain (4 - 6)  oxyCODONE    IR 10 milliGRAM(s) Oral every 4 hours PRN Severe Pain (7 - 10)  senna 2 Tablet(s) Oral at bedtime PRN Constipation    Vital Signs Last 24 Hrs  T(C): 37.1 (30 Jan 2025 04:45), Max: 37.4 (29 Jan 2025 20:00)  T(F): 98.7 (30 Jan 2025 04:45), Max: 99.4 (29 Jan 2025 20:00)  HR: 90 (30 Jan 2025 04:45) (90 - 102)  BP: 140/73 (30 Jan 2025 04:45) (102/55 - 140/73)  RR: 17 (30 Jan 2025 04:45) (17 - 18)  SpO2: 100% (30 Jan 2025 04:45) (96% - 100%)  Parameters below as of 30 Jan 2025 04:45  Patient On (Oxygen Delivery Method): room air    Physical Exam:  Constitutional: NAD  HEENT: PERRL, EOMI  Respiratory: Respirations non-labored, no accessory muscle use  Gastrointestinal: Soft, non-tender, non-distended, RUQ chris tube in place   Extremities: bilateral groin sites with staples, L groin c/d/i, R groin with some dermal erosion, L AKA stump poorly healing, does not look infected, ace wrap c/d/i, R foot warm     LABS: 1/30 labs pending                   A: Patient is an 78 yo F with multiple comorbidities poor nutrition and failure to thrive, 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     Plan:   OR today for AKA revision   nutrition consult   cardiac cleared for OR, although elevated risk   palliative consulted   continue medicine recs   hep gtt   NPO, IVFs in prep for OR today  Subjective: Patient seen and examined at bedside, bedside nurse reports diarrhea. No events overnight.    MEDICATIONS  (STANDING):  ascorbic acid 500 milliGRAM(s) Oral daily  collagenase Ointment 1 Application(s) Topical daily  dextrose 5%. 1000 milliLiter(s) (100 mL/Hr) IV Continuous <Continuous>  dextrose 5%. 1000 milliLiter(s) (50 mL/Hr) IV Continuous <Continuous>  dextrose 50% Injectable 25 Gram(s) IV Push once  dextrose 50% Injectable 12.5 Gram(s) IV Push once  dextrose 50% Injectable 25 Gram(s) IV Push once  folic acid 1 milliGRAM(s) Oral daily  glucagon  Injectable 1 milliGRAM(s) IntraMuscular once  heparin  Infusion.  Unit(s)/Hr (13 mL/Hr) IV Continuous <Continuous>  insulin lispro (ADMELOG) corrective regimen sliding scale   SubCutaneous three times a day before meals  lactated ringers. 1000 milliLiter(s) (115 mL/Hr) IV Continuous <Continuous>  losartan 50 milliGRAM(s) Oral daily  metoprolol tartrate 25 milliGRAM(s) Oral two times a day  mirtazapine 7.5 milliGRAM(s) Oral daily  multivitamin/minerals 1 Tablet(s) Oral daily  pantoprazole   Suspension 40 milliGRAM(s) Oral daily  rosuvastatin 5 milliGRAM(s) Oral at bedtime  triamcinolone 0.1% Oral Paste 1 Application(s) Topical daily    MEDICATIONS  (PRN):  acetaminophen     Tablet .. 650 milliGRAM(s) Oral every 6 hours PRN Temp greater or equal to 38C (100.4F), Mild Pain (1 - 3)  dextrose Oral Gel 15 Gram(s) Oral once PRN Blood Glucose LESS THAN 70 milliGRAM(s)/deciliter  heparin   Injectable 6000 Unit(s) IV Push every 6 hours PRN For aPTT less than 40  heparin   Injectable 3000 Unit(s) IV Push every 6 hours PRN For aPTT between 40 - 57  ondansetron Injectable 4 milliGRAM(s) IV Push every 6 hours PRN Nausea  oxyCODONE    IR 5 milliGRAM(s) Oral every 4 hours PRN Moderate Pain (4 - 6)  oxyCODONE    IR 10 milliGRAM(s) Oral every 4 hours PRN Severe Pain (7 - 10)  senna 2 Tablet(s) Oral at bedtime PRN Constipation    Vital Signs Last 24 Hrs  T(C): 37.1 (30 Jan 2025 04:45), Max: 37.4 (29 Jan 2025 20:00)  T(F): 98.7 (30 Jan 2025 04:45), Max: 99.4 (29 Jan 2025 20:00)  HR: 90 (30 Jan 2025 04:45) (90 - 102)  BP: 140/73 (30 Jan 2025 04:45) (102/55 - 140/73)  RR: 17 (30 Jan 2025 04:45) (17 - 18)  SpO2: 100% (30 Jan 2025 04:45) (96% - 100%)  Parameters below as of 30 Jan 2025 04:45  Patient On (Oxygen Delivery Method): room air    Physical Exam:  Constitutional: NAD  HEENT: PERRL, EOMI  Respiratory: Respirations non-labored, no accessory muscle use  Gastrointestinal: Soft, non-tender, non-distended, RUQ chris tube in place   Extremities: bilateral groin sites with staples, L groin c/d/i, R groin site with small superficial wound, L AKA stump poorly healing, does not look infected, but multiple small necrotic areas, ace wrap c/d/i, R foot warm but R heal DTI     LABS: 1/30 labs pending                   A: Patient is an 78 yo F with multiple comorbidities poor nutrition and failure to thrive, 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     Plan:   OR today for AKA revision   nutrition consult   cardiac cleared for OR, although elevated risk   palliative consulted   continue medicine recs   local care to R heal DTI   hep gtt   NPO, IVFs in prep for OR today  Subjective: Patient seen and examined at bedside, bedside nurse reports diarrhea. No events overnight.    MEDICATIONS  (STANDING):  ascorbic acid 500 milliGRAM(s) Oral daily  collagenase Ointment 1 Application(s) Topical daily  dextrose 5%. 1000 milliLiter(s) (100 mL/Hr) IV Continuous <Continuous>  dextrose 5%. 1000 milliLiter(s) (50 mL/Hr) IV Continuous <Continuous>  dextrose 50% Injectable 25 Gram(s) IV Push once  dextrose 50% Injectable 12.5 Gram(s) IV Push once  dextrose 50% Injectable 25 Gram(s) IV Push once  folic acid 1 milliGRAM(s) Oral daily  glucagon  Injectable 1 milliGRAM(s) IntraMuscular once  heparin  Infusion.  Unit(s)/Hr (13 mL/Hr) IV Continuous <Continuous>  insulin lispro (ADMELOG) corrective regimen sliding scale   SubCutaneous three times a day before meals  lactated ringers. 1000 milliLiter(s) (115 mL/Hr) IV Continuous <Continuous>  losartan 50 milliGRAM(s) Oral daily  metoprolol tartrate 25 milliGRAM(s) Oral two times a day  mirtazapine 7.5 milliGRAM(s) Oral daily  multivitamin/minerals 1 Tablet(s) Oral daily  pantoprazole   Suspension 40 milliGRAM(s) Oral daily  rosuvastatin 5 milliGRAM(s) Oral at bedtime  triamcinolone 0.1% Oral Paste 1 Application(s) Topical daily    MEDICATIONS  (PRN):  acetaminophen     Tablet .. 650 milliGRAM(s) Oral every 6 hours PRN Temp greater or equal to 38C (100.4F), Mild Pain (1 - 3)  dextrose Oral Gel 15 Gram(s) Oral once PRN Blood Glucose LESS THAN 70 milliGRAM(s)/deciliter  heparin   Injectable 6000 Unit(s) IV Push every 6 hours PRN For aPTT less than 40  heparin   Injectable 3000 Unit(s) IV Push every 6 hours PRN For aPTT between 40 - 57  ondansetron Injectable 4 milliGRAM(s) IV Push every 6 hours PRN Nausea  oxyCODONE    IR 5 milliGRAM(s) Oral every 4 hours PRN Moderate Pain (4 - 6)  oxyCODONE    IR 10 milliGRAM(s) Oral every 4 hours PRN Severe Pain (7 - 10)  senna 2 Tablet(s) Oral at bedtime PRN Constipation    Vital Signs Last 24 Hrs  T(C): 37.1 (30 Jan 2025 04:45), Max: 37.4 (29 Jan 2025 20:00)  T(F): 98.7 (30 Jan 2025 04:45), Max: 99.4 (29 Jan 2025 20:00)  HR: 90 (30 Jan 2025 04:45) (90 - 102)  BP: 140/73 (30 Jan 2025 04:45) (102/55 - 140/73)  RR: 17 (30 Jan 2025 04:45) (17 - 18)  SpO2: 100% (30 Jan 2025 04:45) (96% - 100%)  Parameters below as of 30 Jan 2025 04:45  Patient On (Oxygen Delivery Method): room air    Physical Exam:  Constitutional: NAD  HEENT: PERRL, EOMI  Respiratory: Respirations non-labored, no accessory muscle use  Gastrointestinal: Soft, non-tender, non-distended, RUQ chris tube in place   Extremities: bilateral groin sites with staples, L groin c/d/i, R groin site with small superficial wound, L AKA stump poorly healing, does not look infected, but multiple small necrotic areas, ace wrap c/d/i, R foot warm but R heal DTI     LABS: 1/30 labs pending                   A: Patient is an 80 yo F with multiple comorbidities poor nutrition and failure to thrive, 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     Plan:   OR today for AKA revision   nutrition consult   cardiac cleared for OR, although elevated risk   palliative consulted   continue medicine recs   local wound care to R heal DTI, wound care nurse saw pt yesterday   hep gtt   NPO, IVFs in prep for OR today

## 2025-01-30 NOTE — DIETITIAN NUTRITION RISK NOTIFICATION - TREATMENT: THE FOLLOWING DIET HAS BEEN RECOMMENDED
Diet, NPO after Midnight:      NPO Start Date: 29-Jan-2025,   NPO Start Time: 23:59  Except Medications (01-29-25 @ 08:56) [Active]

## 2025-01-30 NOTE — DIETITIAN NUTRITION RISK NOTIFICATION - ADDITIONAL COMMENTS/DIETITIAN RECOMMENDATIONS
Resume po diet as medically feasible/tolerable and add Glucerna TID (220 kcal, 10g protein per serving).  Continue MVI, folic acid, and vitamin C supplementation. Continue Remeron to potentially increase appetite. Encourage po intake, monitor diet tolerance, and provide assistance at meals as needed. Obtain daily weights to monitor trends.

## 2025-01-30 NOTE — DISCHARGE NOTE PROVIDER - DETAILS OF MALNUTRITION DIAGNOSIS/DIAGNOSES
This patient has been assessed with a concern for Malnutrition and was treated during this hospitalization for the following Nutrition diagnosis/diagnoses:     -  01/30/2025: Severe protein-calorie malnutrition

## 2025-01-30 NOTE — CHART NOTE - NSCHARTNOTEFT_GEN_A_CORE
PA NOTE-MEDICINE    Called by Dr Wing to check on Pt currently in PACU due to AFib RVR & Hypotension.  Pt is Post OP Day 0 (Still in PACU) for Revision of amputation scar from AKA of Left lower extremity originally performed 30-Jan-2025   Pt with + H/O AFib with HR now into 130s Vacillating to Low 100s.  RN states Pt vomited up her PO Metoprolol     Pt Denies:   CP SOB N/V/Dizziness     T(C): 36.4 (30 Jan 2025 21:00), Max: 37.1 (30 Jan 2025 04:45)  T(F): 97.6 (30 Jan 2025 21:00), Max: 98.7 (30 Jan 2025 04:45)  HR: 92 (30 Jan 2025 21:30) (81 - 121)  BP: 130/76 (30 Jan 2025 21:30) (81/54 - 140/73)  BP(mean): 93 (30 Jan 2025 21:30) (63 - 111)  RR: 12 (30 Jan 2025 21:30) (12 - 24)  SpO2: 100% (30 Jan 2025 21:30) (96% - 100%)ra     Cardiac: S1S2 + Irreg/Irreg + RVR   Lungs: CTA No R/R/W or Crackles B/L A-B  Abd: NDNT  Soft No Rigidity + BS x 4 Q  Ext: + LLE Stump with Drain in Place wrapped with CDI Dressing   RLE: No C/C/E   Integument: No Pallor Warm/Dry     A/P Eval Pt due to RVR with Know AFib and Hypotension   Pt is S/P Anesthesia and Multiple Doses of Fentanyl in PACU    ccs IVPB x 1 Stat Hypotension Resolved   Lopressor 5 Mg IVP x 1 Stat   HR decreased to approx 119  Repeat /73  Additional Dose Lopressor 5 Mg IVP x 1 Given  /76 with HR now < 100     Continue to monitor Pt  Pt Stable to return to 2 BKT   Recall PA for any changes in Pt status  Will sign out to AM Team PA NOTE-MEDICINE    Called by Dr Wing to check on Pt currently in PACU due to AFib RVR & Hypotension.  Pt is Post OP Day 0 (Still in PACU) for Revision of amputation scar from AKA of Left lower extremity originally performed 30-Jan-2025   Pt with + H/O AFib with HR now into 130s Vacillating to Low 100s.  RN states Pt vomited up her PO Metoprolol     Pt Denies:   CP SOB N/V/Dizziness     T(C): 36.4 (30 Jan 2025 21:00), Max: 37.1 (30 Jan 2025 04:45)  T(F): 97.6 (30 Jan 2025 21:00), Max: 98.7 (30 Jan 2025 04:45)  HR: 92 (30 Jan 2025 21:30) (81 - 121)  BP: 130/76 (30 Jan 2025 21:30) (81/54 - 140/73)  BP(mean): 93 (30 Jan 2025 21:30) (63 - 111)  RR: 12 (30 Jan 2025 21:30) (12 - 24)  SpO2: 100% (30 Jan 2025 21:30) (96% - 100%)ra     Cardiac: S1S2 + Irreg/Irreg + RVR   Lungs: CTA No R/R/W or Crackles B/L A-B  Abd: NDNT  Soft No Rigidity + BS x 4 Q  Ext: + LLE Stump with Drain in Place wrapped with CDI Dressing   RLE: No C/C/E   Integument: No Pallor Warm/Dry     A/P Eval Pt due to RVR with Know AFib and Hypotension   Pt is S/P Anesthesia and Multiple Doses of Fentanyl in PACU   Stat Labs: CBC MAG PHOS BMP    ccs IVPB x 1 Stat Hypotension Resolved   Lopressor 5 Mg IVP x 1 Stat   HR decreased to approx 119  Repeat /73  Additional Dose Lopressor 5 Mg IVP x 1 Given  /76 with HR now < 100     Pt refuses Lab Draw-states she'll do it in AM   Continue to monitor Pt  Pt Stable to return to 2 BKT   Recall PA for any changes in Pt status  Will sign out to AM Team

## 2025-01-30 NOTE — DISCHARGE NOTE PROVIDER - PROVIDER TOKENS
PROVIDER:[TOKEN:[[id number]],FOLLOWUP:[2 weeks]] PROVIDER:[TOKEN:[[id number]],FOLLOWUP:[2 weeks]],PROVIDER:[TOKEN:[[id number]],FOLLOWUP:[1 week]]

## 2025-01-30 NOTE — DIETITIAN INITIAL EVALUATION ADULT - OTHER INFO
79yr woman  hx Afib, CVA, DM, HTN with  recent L AKA cholecystitis with PCT admitted with poorly healing L stump.

## 2025-01-30 NOTE — DISCHARGE NOTE PROVIDER - NSDCCPCAREPLAN_GEN_ALL_CORE_FT
PRINCIPAL DISCHARGE DIAGNOSIS  Diagnosis: Infected wound  Assessment and Plan of Treatment:   BATHING: Please do not submerge wound underwater. You may shower and/or sponge bathe.   DIET: Return to your usual diet.  NOTIFY YOUR SURGEON IF: You have any bleeding that does not stop, any pus draining from your wound(s), any fever (over 100.4 F) or chills, persistent nausea/vomiting, persistent diarrhea, or if your pain is not controlled on your discharge pain medications.  FOLLOW-UP: Please follow up with your primary care physician and vascular surgeon [phone] in 10-14 days regarding your hospitalization. Call for appointment upon discharge.

## 2025-01-31 ENCOUNTER — APPOINTMENT (OUTPATIENT)
Dept: ENDOCRINOLOGY | Facility: CLINIC | Age: 80
End: 2025-01-31

## 2025-01-31 LAB
ANION GAP SERPL CALC-SCNC: 12 MMOL/L — SIGNIFICANT CHANGE UP (ref 5–17)
ANION GAP SERPL CALC-SCNC: 13 MMOL/L — SIGNIFICANT CHANGE UP (ref 5–17)
APTT BLD: 145.4 SEC — CRITICAL HIGH (ref 24.5–35.6)
APTT BLD: 35.4 SEC — SIGNIFICANT CHANGE UP (ref 24.5–35.6)
BUN SERPL-MCNC: 10.6 MG/DL — SIGNIFICANT CHANGE UP (ref 8–20)
BUN SERPL-MCNC: 12 MG/DL — SIGNIFICANT CHANGE UP (ref 8–20)
CALCIUM SERPL-MCNC: 7.5 MG/DL — LOW (ref 8.4–10.5)
CALCIUM SERPL-MCNC: 7.6 MG/DL — LOW (ref 8.4–10.5)
CHLORIDE SERPL-SCNC: 100 MMOL/L — SIGNIFICANT CHANGE UP (ref 96–108)
CHLORIDE SERPL-SCNC: 103 MMOL/L — SIGNIFICANT CHANGE UP (ref 96–108)
CO2 SERPL-SCNC: 17 MMOL/L — LOW (ref 22–29)
CO2 SERPL-SCNC: 20 MMOL/L — LOW (ref 22–29)
CREAT SERPL-MCNC: 0.56 MG/DL — SIGNIFICANT CHANGE UP (ref 0.5–1.3)
CREAT SERPL-MCNC: 0.65 MG/DL — SIGNIFICANT CHANGE UP (ref 0.5–1.3)
EGFR: 88 ML/MIN/1.73M2 — SIGNIFICANT CHANGE UP
EGFR: 92 ML/MIN/1.73M2 — SIGNIFICANT CHANGE UP
GLUCOSE BLDC GLUCOMTR-MCNC: 159 MG/DL — HIGH (ref 70–99)
GLUCOSE BLDC GLUCOMTR-MCNC: 183 MG/DL — HIGH (ref 70–99)
GLUCOSE BLDC GLUCOMTR-MCNC: 199 MG/DL — HIGH (ref 70–99)
GLUCOSE BLDC GLUCOMTR-MCNC: 212 MG/DL — HIGH (ref 70–99)
GLUCOSE SERPL-MCNC: 158 MG/DL — HIGH (ref 70–99)
GLUCOSE SERPL-MCNC: 176 MG/DL — HIGH (ref 70–99)
HCT VFR BLD CALC: 27.8 % — LOW (ref 34.5–45)
HCT VFR BLD CALC: 30 % — LOW (ref 34.5–45)
HGB BLD-MCNC: 8.7 G/DL — LOW (ref 11.5–15.5)
HGB BLD-MCNC: 9.2 G/DL — LOW (ref 11.5–15.5)
MAGNESIUM SERPL-MCNC: 1.7 MG/DL — SIGNIFICANT CHANGE UP (ref 1.6–2.6)
MAGNESIUM SERPL-MCNC: 1.8 MG/DL — SIGNIFICANT CHANGE UP (ref 1.6–2.6)
MCHC RBC-ENTMCNC: 25.7 PG — LOW (ref 27–34)
MCHC RBC-ENTMCNC: 25.9 PG — LOW (ref 27–34)
MCHC RBC-ENTMCNC: 30.7 G/DL — LOW (ref 32–36)
MCHC RBC-ENTMCNC: 31.3 G/DL — LOW (ref 32–36)
MCV RBC AUTO: 82 FL — SIGNIFICANT CHANGE UP (ref 80–100)
MCV RBC AUTO: 84.5 FL — SIGNIFICANT CHANGE UP (ref 80–100)
PHOSPHATE SERPL-MCNC: 3.4 MG/DL — SIGNIFICANT CHANGE UP (ref 2.4–4.7)
PHOSPHATE SERPL-MCNC: 3.4 MG/DL — SIGNIFICANT CHANGE UP (ref 2.4–4.7)
PLATELET # BLD AUTO: 161 K/UL — SIGNIFICANT CHANGE UP (ref 150–400)
PLATELET # BLD AUTO: 205 K/UL — SIGNIFICANT CHANGE UP (ref 150–400)
POTASSIUM SERPL-MCNC: 4.4 MMOL/L — SIGNIFICANT CHANGE UP (ref 3.5–5.3)
POTASSIUM SERPL-MCNC: 4.5 MMOL/L — SIGNIFICANT CHANGE UP (ref 3.5–5.3)
POTASSIUM SERPL-SCNC: 4.4 MMOL/L — SIGNIFICANT CHANGE UP (ref 3.5–5.3)
POTASSIUM SERPL-SCNC: 4.5 MMOL/L — SIGNIFICANT CHANGE UP (ref 3.5–5.3)
RBC # BLD: 3.39 M/UL — LOW (ref 3.8–5.2)
RBC # BLD: 3.55 M/UL — LOW (ref 3.8–5.2)
RBC # FLD: 18.3 % — HIGH (ref 10.3–14.5)
RBC # FLD: 18.4 % — HIGH (ref 10.3–14.5)
SODIUM SERPL-SCNC: 132 MMOL/L — LOW (ref 135–145)
SODIUM SERPL-SCNC: 133 MMOL/L — LOW (ref 135–145)
WBC # BLD: 7.73 K/UL — SIGNIFICANT CHANGE UP (ref 3.8–10.5)
WBC # BLD: 8.24 K/UL — SIGNIFICANT CHANGE UP (ref 3.8–10.5)
WBC # FLD AUTO: 7.73 K/UL — SIGNIFICANT CHANGE UP (ref 3.8–10.5)
WBC # FLD AUTO: 8.24 K/UL — SIGNIFICANT CHANGE UP (ref 3.8–10.5)

## 2025-01-31 PROCEDURE — 99232 SBSQ HOSP IP/OBS MODERATE 35: CPT

## 2025-01-31 PROCEDURE — 93010 ELECTROCARDIOGRAM REPORT: CPT

## 2025-01-31 RX ORDER — MAGNESIUM SULFATE 0.8 MEQ/ML
2 AMPUL (ML) INJECTION ONCE
Refills: 0 | Status: DISCONTINUED | OUTPATIENT
Start: 2025-01-31 | End: 2025-01-31

## 2025-01-31 RX ORDER — SODIUM CHLORIDE 9 G/ML
500 INJECTION, SOLUTION INTRAVENOUS ONCE
Refills: 0 | Status: COMPLETED | OUTPATIENT
Start: 2025-01-31 | End: 2025-01-31

## 2025-01-31 RX ORDER — MAGNESIUM SULFATE 0.8 MEQ/ML
2 AMPUL (ML) INJECTION ONCE
Refills: 0 | Status: COMPLETED | OUTPATIENT
Start: 2025-01-31 | End: 2025-01-31

## 2025-01-31 RX ORDER — HEPARIN SODIUM,PORCINE 10000/ML
3000 VIAL (ML) INJECTION EVERY 6 HOURS
Refills: 0 | Status: DISCONTINUED | OUTPATIENT
Start: 2025-01-31 | End: 2025-02-02

## 2025-01-31 RX ORDER — BACTERIOSTATIC SODIUM CHLORIDE 0.9 %
1000 VIAL (ML) INJECTION ONCE
Refills: 0 | Status: COMPLETED | OUTPATIENT
Start: 2025-01-31 | End: 2025-01-31

## 2025-01-31 RX ORDER — HEPARIN SODIUM,PORCINE 10000/ML
VIAL (ML) INJECTION
Qty: 25000 | Refills: 0 | Status: DISCONTINUED | OUTPATIENT
Start: 2025-01-31 | End: 2025-02-02

## 2025-01-31 RX ORDER — SODIUM CHLORIDE 9 G/ML
1000 INJECTION, SOLUTION INTRAVENOUS
Refills: 0 | Status: DISCONTINUED | OUTPATIENT
Start: 2025-01-31 | End: 2025-02-03

## 2025-01-31 RX ORDER — SODIUM CHLORIDE 9 G/ML
500 INJECTION, SOLUTION INTRAVENOUS
Refills: 0 | Status: DISCONTINUED | OUTPATIENT
Start: 2025-01-31 | End: 2025-01-31

## 2025-01-31 RX ORDER — SODIUM CHLORIDE 9 G/ML
500 INJECTION, SOLUTION INTRAVENOUS
Refills: 0 | Status: COMPLETED | OUTPATIENT
Start: 2025-01-31 | End: 2025-01-31

## 2025-01-31 RX ORDER — HEPARIN SODIUM,PORCINE 10000/ML
6000 VIAL (ML) INJECTION EVERY 6 HOURS
Refills: 0 | Status: DISCONTINUED | OUTPATIENT
Start: 2025-01-31 | End: 2025-02-02

## 2025-01-31 RX ADMIN — Medication 1 MILLIGRAM(S): at 13:40

## 2025-01-31 RX ADMIN — SODIUM CHLORIDE 500 MILLILITER(S): 9 INJECTION, SOLUTION INTRAVENOUS at 06:39

## 2025-01-31 RX ADMIN — PANTOPRAZOLE 40 MILLIGRAM(S): 20 TABLET, DELAYED RELEASE ORAL at 13:40

## 2025-01-31 RX ADMIN — Medication 0 UNIT(S)/HR: at 18:11

## 2025-01-31 RX ADMIN — Medication 1100 UNIT(S)/HR: at 20:07

## 2025-01-31 RX ADMIN — ROSUVASTATIN CALCIUM 5 MILLIGRAM(S): 10 TABLET, FILM COATED ORAL at 22:21

## 2025-01-31 RX ADMIN — Medication 1 TABLET(S): at 13:39

## 2025-01-31 RX ADMIN — Medication 25 MILLIGRAM(S): at 05:01

## 2025-01-31 RX ADMIN — Medication 25 GRAM(S): at 20:09

## 2025-01-31 RX ADMIN — Medication 2: at 17:49

## 2025-01-31 RX ADMIN — Medication 1: at 09:11

## 2025-01-31 RX ADMIN — Medication 1100 UNIT(S)/HR: at 18:13

## 2025-01-31 RX ADMIN — Medication 500 MILLIGRAM(S): at 13:39

## 2025-01-31 RX ADMIN — MIRTAZAPINE 7.5 MILLIGRAM(S): 30 TABLET, FILM COATED ORAL at 13:39

## 2025-01-31 RX ADMIN — Medication 1300 UNIT(S)/HR: at 09:00

## 2025-01-31 RX ADMIN — SODIUM CHLORIDE 500 MILLILITER(S): 9 INJECTION, SOLUTION INTRAVENOUS at 17:09

## 2025-01-31 RX ADMIN — Medication 1000 MILLILITER(S): at 22:20

## 2025-01-31 RX ADMIN — Medication 0 UNIT(S)/HR: at 17:09

## 2025-01-31 RX ADMIN — Medication 1: at 13:38

## 2025-01-31 RX ADMIN — ACETAMINOPHEN 650 MILLIGRAM(S): 160 SUSPENSION ORAL at 22:20

## 2025-01-31 NOTE — PROGRESS NOTE ADULT - SUBJECTIVE AND OBJECTIVE BOX
DANNI TOSIN    [id number]    79y      Female    Patient is a 79y old  Female who presents with a chief complaint of Left AKA wound dehiscence (31 Jan 2025 07:07)      INTERVAL HPI/OVERNIGHT EVENTS:    patient reports feeling okay, denies pain, sob, dizziness, chills, chest pain. Afib RVR overnight with rates as high as 120 per documentation, on Metoprolol and Heparin gtt.      Vital Signs Last 24 Hrs  T(C): 37.3 (31 Jan 2025 08:51), Max: 37.3 (31 Jan 2025 08:51)  T(F): 99.1 (31 Jan 2025 08:51), Max: 99.1 (31 Jan 2025 08:51)  HR: 78 (31 Jan 2025 08:51) (78 - 121)  BP: 96/63 (31 Jan 2025 08:51) (81/54 - 139/96)  BP(mean): 93 (30 Jan 2025 21:30) (63 - 111)  RR: 18 (31 Jan 2025 08:51) (12 - 24)  SpO2: 100% (31 Jan 2025 08:51) (97% - 100%)    Parameters below as of 31 Jan 2025 08:51  Patient On (Oxygen Delivery Method): room air        PHYSICAL EXAM:    GENERAL: Elderly female, appears comfortable, sitting up in bed having breakfast with assistance of nursing assistant  HEENT: PERRL, +EOMI  NECK: soft, Supple, No JVD  CHEST/LUNG: Clear to auscultate bilaterally; No wheezing  HEART: S1S2+, Regular rate and rhythm; No murmurs  ABDOMEN: Soft, Nontender, Nondistended; Bowel sounds present  EXTREMITIES:  Left Leg AKA, wound vac in place with intact dressing  SKIN: No rashes or lesions, left leg AKA wound vac in place  NEURO: AAOX3  PSYCH: normal mood    LABS:                        9.2    7.73  )-----------( 205      ( 31 Jan 2025 05:54 )             30.0     01-31    132[L]  |  100  |  12.0  ----------------------------<  176[H]  4.4   |  20.0[L]  |  0.65    Ca    7.5[L]      31 Jan 2025 05:54  Phos  3.4     01-31  Mg     1.7     01-31      PT/INR - ( 30 Jan 2025 06:52 )   PT: 17.0 sec;   INR: 1.51 ratio        I&O's Summary    30 Jan 2025 07:01  -  31 Jan 2025 07:00  --------------------------------------------------------  IN: 1765 mL / OUT: 1325 mL / NET: 440 mL        MEDICATIONS  (STANDING):  ascorbic acid 500 milliGRAM(s) Oral daily  dextrose 5%. 1000 milliLiter(s) (100 mL/Hr) IV Continuous <Continuous>  dextrose 5%. 1000 milliLiter(s) (50 mL/Hr) IV Continuous <Continuous>  dextrose 5%. 1000 milliLiter(s) (100 mL/Hr) IV Continuous <Continuous>  dextrose 50% Injectable 25 Gram(s) IV Push once  dextrose 50% Injectable 12.5 Gram(s) IV Push once  dextrose 50% Injectable 25 Gram(s) IV Push once  folic acid 1 milliGRAM(s) Oral daily  glucagon  Injectable 1 milliGRAM(s) IntraMuscular once  glucagon  Injectable 1 milliGRAM(s) IntraMuscular once  heparin  Infusion.  Unit(s)/Hr (13 mL/Hr) IV Continuous <Continuous>  insulin lispro (ADMELOG) corrective regimen sliding scale   SubCutaneous three times a day before meals  lactated ringers. 1000 milliLiter(s) (115 mL/Hr) IV Continuous <Continuous>  losartan 50 milliGRAM(s) Oral daily  magnesium sulfate  IVPB 2 Gram(s) IV Intermittent once  metoprolol tartrate 25 milliGRAM(s) Oral every 12 hours  mirtazapine 7.5 milliGRAM(s) Oral daily  multivitamin/minerals 1 Tablet(s) Oral daily  pantoprazole   Suspension 40 milliGRAM(s) Oral daily  rosuvastatin 5 milliGRAM(s) Oral at bedtime    MEDICATIONS  (PRN):  acetaminophen     Tablet .. 650 milliGRAM(s) Oral every 6 hours PRN Temp greater or equal to 38C (100.4F), Mild Pain (1 - 3)  dextrose Oral Gel 15 Gram(s) Oral once PRN Blood Glucose LESS THAN 70 milliGRAM(s)/deciliter  heparin   Injectable 6000 Unit(s) IV Push every 6 hours PRN For aPTT less than 40  heparin   Injectable 3000 Unit(s) IV Push every 6 hours PRN For aPTT between 40 - 57  ondansetron Injectable 4 milliGRAM(s) IV Push every 6 hours PRN Nausea  oxyCODONE    IR 5 milliGRAM(s) Oral every 4 hours PRN Moderate Pain (4 - 6)  oxyCODONE    IR 10 milliGRAM(s) Oral every 4 hours PRN Severe Pain (7 - 10)  senna 2 Tablet(s) Oral at bedtime PRN Constipation

## 2025-01-31 NOTE — PROGRESS NOTE ADULT - ASSESSMENT
A: 79F POD1 from RAZ AKA revision 2/2 wound dehiscence. Tachycardic overnight, pending EKG, remains normotensive SBPs 100s. Pending GOC discussion with palliative care.     Plan:   - 12 lead ECG for new tachycardia this AM  - F/u AM labs, replete lytes as necessary  - Continued multimodal pain control  - Strict Is and Os  - Antiemetics PRN  - Anticoagulation - hep gtt to restart this AM, plan to transition to DOAC at later date  - Diet: carb controlled.   - Pending palliative reassessment

## 2025-01-31 NOTE — PROGRESS NOTE ADULT - ASSESSMENT
78 y/o F with Hx of Afib (on eliquis), HTN, DM2, HLD, CVA, limb ischemia s/p left AKA 12/16/24 & b/l femoral thrombectomy and cutdowns. Patient was recently discharged to rehab post AKA, returns with poorly healing stump wound and concern for infection. Admitted to vascular surgery,  Medicine following for medical management.    Plan:     PAD Hx of left limb ischemia & stump wound  s/p left AKA & b/l femoral thrombectomy and cutdown 12/16/24  wound vac in place to left stump wound  Pain medication PRN/ antiemetics PRN  continue bowel regimen   IS- HOB 30-45  further management as per Primary team   on Heparin infusion- eliquis remains on hold- plan for DOAC at later date        HTN-/ Atrial fib:   Eliquis on hold - on heparin infusion  On Metoprolol tartrate 25mg bid   On Irbesartan 150mg daily at home, will resume with losartan    DM2 type 2:   cont ISS     HLD:   Crestor 5mg qhs    Anemia:   Hgb has been stable- 9.1 today  low iron   cont iron and folic acid   Vit C   Monitor CBC    DVT prophylaxis: on heparin infusion

## 2025-01-31 NOTE — PROGRESS NOTE ADULT - SUBJECTIVE AND OBJECTIVE BOX
INTERVAL HPI/OVERNIGHT EVENTS:    Patient evaluated at bedside. Reports continued pain in left AKA site. Tachycardic this AM, normotensive.     MEDICATIONS  (STANDING):  ascorbic acid 500 milliGRAM(s) Oral daily  dextrose 5%. 1000 milliLiter(s) (100 mL/Hr) IV Continuous <Continuous>  dextrose 5%. 1000 milliLiter(s) (50 mL/Hr) IV Continuous <Continuous>  dextrose 5%. 1000 milliLiter(s) (100 mL/Hr) IV Continuous <Continuous>  dextrose 50% Injectable 25 Gram(s) IV Push once  dextrose 50% Injectable 12.5 Gram(s) IV Push once  dextrose 50% Injectable 25 Gram(s) IV Push once  folic acid 1 milliGRAM(s) Oral daily  glucagon  Injectable 1 milliGRAM(s) IntraMuscular once  glucagon  Injectable 1 milliGRAM(s) IntraMuscular once  insulin lispro (ADMELOG) corrective regimen sliding scale   SubCutaneous three times a day before meals  lactated ringers. 1000 milliLiter(s) (115 mL/Hr) IV Continuous <Continuous>  losartan 50 milliGRAM(s) Oral daily  metoprolol tartrate 25 milliGRAM(s) Oral every 12 hours  mirtazapine 7.5 milliGRAM(s) Oral daily  multivitamin/minerals 1 Tablet(s) Oral daily  pantoprazole   Suspension 40 milliGRAM(s) Oral daily  rosuvastatin 5 milliGRAM(s) Oral at bedtime    MEDICATIONS  (PRN):  acetaminophen     Tablet .. 650 milliGRAM(s) Oral every 6 hours PRN Temp greater or equal to 38C (100.4F), Mild Pain (1 - 3)  dextrose Oral Gel 15 Gram(s) Oral once PRN Blood Glucose LESS THAN 70 milliGRAM(s)/deciliter  ondansetron Injectable 4 milliGRAM(s) IV Push every 6 hours PRN Nausea  oxyCODONE    IR 5 milliGRAM(s) Oral every 4 hours PRN Moderate Pain (4 - 6)  oxyCODONE    IR 10 milliGRAM(s) Oral every 4 hours PRN Severe Pain (7 - 10)  senna 2 Tablet(s) Oral at bedtime PRN Constipation      Vital Signs Last 24 Hrs  T(C): 36.7 (31 Jan 2025 05:00), Max: 36.9 (30 Jan 2025 14:53)  T(F): 98 (31 Jan 2025 05:00), Max: 98.4 (30 Jan 2025 14:53)  HR: 119 (31 Jan 2025 05:00) (81 - 121)  BP: 101/65 (31 Jan 2025 05:00) (81/54 - 139/96)  BP(mean): 93 (30 Jan 2025 21:30) (63 - 111)  RR: 17 (31 Jan 2025 05:00) (12 - 24)  SpO2: 100% (31 Jan 2025 05:00) (96% - 100%)    Parameters below as of 30 Jan 2025 22:05  Patient On (Oxygen Delivery Method): room air        PHYSICAL EXAM:  GENERAL: NAD, well-groomed, well-developed  HEAD:  Atraumatic, Normocephalic  EYES: EOMI, PERRLA, conjunctiva and sclera clear  NECK: Supple, No JVD  CHEST/LUNG: non-labored breathing on room air.   HEART: Regular rate and rhythm  ABDOMEN: Soft, Nontender, Nondistended  VASCULAR/EXTREMITIES:    - L AKA site with wound VAC in place, dry, <100cc of serosang output collected in VAC chamber.   - Warm RLE with palp dp        I&O's Detail    30 Jan 2025 07:01  -  31 Jan 2025 07:00  --------------------------------------------------------  IN:    Lactated Ringers: 1265 mL    Lactated Ringers Bolus: 500 mL  Total IN: 1765 mL    OUT:    Drain (mL): 275 mL    Intermittent Catheterization - Urethral (mL): 950 mL    Voided (mL): 100 mL  Total OUT: 1325 mL    Total NET: 440 mL          LABS:                        10.1   7.69  )-----------( 188      ( 30 Jan 2025 23:36 )             34.3     01-30    133[L]  |  103  |  10.6  ----------------------------<  158[H]  4.5   |  17.0[L]  |  0.56    Ca    7.6[L]      30 Jan 2025 23:36  Phos  3.4     01-30  Mg     1.8     01-30      PT/INR - ( 30 Jan 2025 06:52 )   PT: 17.0 sec;   INR: 1.51 ratio         PTT - ( 30 Jan 2025 06:52 )  PTT:57.9 sec  Urinalysis Basic - ( 30 Jan 2025 23:36 )    Color: x / Appearance: x / SG: x / pH: x  Gluc: 158 mg/dL / Ketone: x  / Bili: x / Urobili: x   Blood: x / Protein: x / Nitrite: x   Leuk Esterase: x / RBC: x / WBC x   Sq Epi: x / Non Sq Epi: x / Bacteria: x        RADIOLOGY & ADDITIONAL STUDIES:

## 2025-02-01 LAB
ANION GAP SERPL CALC-SCNC: 12 MMOL/L — SIGNIFICANT CHANGE UP (ref 5–17)
APPEARANCE UR: ABNORMAL
APTT BLD: 114.3 SEC — HIGH (ref 24.5–35.6)
APTT BLD: 121.3 SEC — CRITICAL HIGH (ref 24.5–35.6)
APTT BLD: 92 SEC — HIGH (ref 24.5–35.6)
BILIRUB UR-MCNC: ABNORMAL
BUN SERPL-MCNC: 13.4 MG/DL — SIGNIFICANT CHANGE UP (ref 8–20)
CALCIUM SERPL-MCNC: 7.3 MG/DL — LOW (ref 8.4–10.5)
CHLORIDE SERPL-SCNC: 105 MMOL/L — SIGNIFICANT CHANGE UP (ref 96–108)
CO2 SERPL-SCNC: 19 MMOL/L — LOW (ref 22–29)
COLOR SPEC: SIGNIFICANT CHANGE UP
CREAT SERPL-MCNC: 0.79 MG/DL — SIGNIFICANT CHANGE UP (ref 0.5–1.3)
DIFF PNL FLD: ABNORMAL
EGFR: 75 ML/MIN/1.73M2 — SIGNIFICANT CHANGE UP
GLUCOSE BLDC GLUCOMTR-MCNC: 155 MG/DL — HIGH (ref 70–99)
GLUCOSE BLDC GLUCOMTR-MCNC: 156 MG/DL — HIGH (ref 70–99)
GLUCOSE BLDC GLUCOMTR-MCNC: 184 MG/DL — HIGH (ref 70–99)
GLUCOSE SERPL-MCNC: 140 MG/DL — HIGH (ref 70–99)
GLUCOSE UR QL: NEGATIVE MG/DL — SIGNIFICANT CHANGE UP
HCT VFR BLD CALC: 27.1 % — LOW (ref 34.5–45)
HCT VFR BLD CALC: 28.1 % — LOW (ref 34.5–45)
HGB BLD-MCNC: 8.3 G/DL — LOW (ref 11.5–15.5)
HGB BLD-MCNC: 8.5 G/DL — LOW (ref 11.5–15.5)
KETONES UR-MCNC: 15 MG/DL
LEUKOCYTE ESTERASE UR-ACNC: ABNORMAL
MAGNESIUM SERPL-MCNC: 1.8 MG/DL — SIGNIFICANT CHANGE UP (ref 1.8–2.6)
MCHC RBC-ENTMCNC: 25.4 PG — LOW (ref 27–34)
MCHC RBC-ENTMCNC: 25.6 PG — LOW (ref 27–34)
MCHC RBC-ENTMCNC: 30.2 G/DL — LOW (ref 32–36)
MCHC RBC-ENTMCNC: 30.6 G/DL — LOW (ref 32–36)
MCV RBC AUTO: 83.6 FL — SIGNIFICANT CHANGE UP (ref 80–100)
MCV RBC AUTO: 83.9 FL — SIGNIFICANT CHANGE UP (ref 80–100)
NITRITE UR-MCNC: POSITIVE
PH UR: 5.5 — SIGNIFICANT CHANGE UP (ref 5–8)
PHOSPHATE SERPL-MCNC: 3.1 MG/DL — SIGNIFICANT CHANGE UP (ref 2.4–4.7)
PLATELET # BLD AUTO: 150 K/UL — SIGNIFICANT CHANGE UP (ref 150–400)
PLATELET # BLD AUTO: 152 K/UL — SIGNIFICANT CHANGE UP (ref 150–400)
POTASSIUM SERPL-MCNC: 3.5 MMOL/L — SIGNIFICANT CHANGE UP (ref 3.5–5.3)
POTASSIUM SERPL-SCNC: 3.5 MMOL/L — SIGNIFICANT CHANGE UP (ref 3.5–5.3)
PROT UR-MCNC: 100 MG/DL
RBC # BLD: 3.24 M/UL — LOW (ref 3.8–5.2)
RBC # BLD: 3.35 M/UL — LOW (ref 3.8–5.2)
RBC # FLD: 17.9 % — HIGH (ref 10.3–14.5)
RBC # FLD: 18.1 % — HIGH (ref 10.3–14.5)
SODIUM SERPL-SCNC: 136 MMOL/L — SIGNIFICANT CHANGE UP (ref 135–145)
SP GR SPEC: 1.02 — SIGNIFICANT CHANGE UP (ref 1–1.03)
UROBILINOGEN FLD QL: 1 MG/DL — SIGNIFICANT CHANGE UP (ref 0.2–1)
WBC # BLD: 7 K/UL — SIGNIFICANT CHANGE UP (ref 3.8–10.5)
WBC # BLD: 7.35 K/UL — SIGNIFICANT CHANGE UP (ref 3.8–10.5)
WBC # FLD AUTO: 7 K/UL — SIGNIFICANT CHANGE UP (ref 3.8–10.5)
WBC # FLD AUTO: 7.35 K/UL — SIGNIFICANT CHANGE UP (ref 3.8–10.5)

## 2025-02-01 PROCEDURE — 99232 SBSQ HOSP IP/OBS MODERATE 35: CPT

## 2025-02-01 RX ORDER — POTASSIUM CHLORIDE 750 MG/1
40 TABLET, EXTENDED RELEASE ORAL ONCE
Refills: 0 | Status: COMPLETED | OUTPATIENT
Start: 2025-02-01 | End: 2025-02-01

## 2025-02-01 RX ORDER — POTASSIUM CHLORIDE 750 MG/1
20 TABLET, EXTENDED RELEASE ORAL
Refills: 0 | Status: DISCONTINUED | OUTPATIENT
Start: 2025-02-01 | End: 2025-02-01

## 2025-02-01 RX ORDER — ANTISEPTIC SURGICAL SCRUB 0.04 MG/ML
1 SOLUTION TOPICAL
Refills: 0 | Status: DISCONTINUED | OUTPATIENT
Start: 2025-02-01 | End: 2025-02-06

## 2025-02-01 RX ORDER — MAGNESIUM SULFATE 0.8 MEQ/ML
2 AMPUL (ML) INJECTION ONCE
Refills: 0 | Status: COMPLETED | OUTPATIENT
Start: 2025-02-01 | End: 2025-02-01

## 2025-02-01 RX ORDER — POTASSIUM CHLORIDE 750 MG/1
20 TABLET, EXTENDED RELEASE ORAL
Refills: 0 | Status: COMPLETED | OUTPATIENT
Start: 2025-02-01 | End: 2025-02-01

## 2025-02-01 RX ORDER — ERTAPENEM SODIUM 1 G/1
1000 INJECTION, POWDER, LYOPHILIZED, FOR SOLUTION INTRAMUSCULAR; INTRAVENOUS ONCE
Refills: 0 | Status: COMPLETED | OUTPATIENT
Start: 2025-02-01 | End: 2025-02-01

## 2025-02-01 RX ORDER — ERTAPENEM SODIUM 1 G/1
1000 INJECTION, POWDER, LYOPHILIZED, FOR SOLUTION INTRAMUSCULAR; INTRAVENOUS EVERY 24 HOURS
Refills: 0 | Status: COMPLETED | OUTPATIENT
Start: 2025-02-02 | End: 2025-02-04

## 2025-02-01 RX ORDER — ERTAPENEM SODIUM 1 G/1
INJECTION, POWDER, LYOPHILIZED, FOR SOLUTION INTRAMUSCULAR; INTRAVENOUS
Refills: 0 | Status: COMPLETED | OUTPATIENT
Start: 2025-02-01 | End: 2025-02-05

## 2025-02-01 RX ADMIN — ERTAPENEM SODIUM 120 MILLIGRAM(S): 1 INJECTION, POWDER, LYOPHILIZED, FOR SOLUTION INTRAMUSCULAR; INTRAVENOUS at 08:08

## 2025-02-01 RX ADMIN — POTASSIUM CHLORIDE 50 MILLIEQUIVALENT(S): 750 TABLET, EXTENDED RELEASE ORAL at 17:46

## 2025-02-01 RX ADMIN — OXYCODONE HYDROCHLORIDE 10 MILLIGRAM(S): 30 TABLET ORAL at 17:03

## 2025-02-01 RX ADMIN — ANTISEPTIC SURGICAL SCRUB 1 APPLICATION(S): 0.04 SOLUTION TOPICAL at 07:27

## 2025-02-01 RX ADMIN — Medication 900 UNIT(S)/HR: at 08:02

## 2025-02-01 RX ADMIN — Medication 1: at 17:46

## 2025-02-01 RX ADMIN — SODIUM CHLORIDE 84 MILLILITER(S): 9 INJECTION, SOLUTION INTRAVENOUS at 16:01

## 2025-02-01 RX ADMIN — Medication 1: at 12:20

## 2025-02-01 RX ADMIN — OXYCODONE HYDROCHLORIDE 10 MILLIGRAM(S): 30 TABLET ORAL at 21:10

## 2025-02-01 RX ADMIN — SODIUM CHLORIDE 84 MILLILITER(S): 9 INJECTION, SOLUTION INTRAVENOUS at 00:47

## 2025-02-01 RX ADMIN — PANTOPRAZOLE 40 MILLIGRAM(S): 20 TABLET, DELAYED RELEASE ORAL at 11:53

## 2025-02-01 RX ADMIN — Medication 25 GRAM(S): at 11:53

## 2025-02-01 RX ADMIN — Medication 700 UNIT(S)/HR: at 18:42

## 2025-02-01 RX ADMIN — Medication 900 UNIT(S)/HR: at 03:19

## 2025-02-01 RX ADMIN — MIRTAZAPINE 7.5 MILLIGRAM(S): 30 TABLET, FILM COATED ORAL at 11:52

## 2025-02-01 RX ADMIN — OXYCODONE HYDROCHLORIDE 10 MILLIGRAM(S): 30 TABLET ORAL at 22:10

## 2025-02-01 RX ADMIN — Medication 900 UNIT(S)/HR: at 10:04

## 2025-02-01 RX ADMIN — ROSUVASTATIN CALCIUM 5 MILLIGRAM(S): 10 TABLET, FILM COATED ORAL at 21:11

## 2025-02-01 RX ADMIN — Medication 900 UNIT(S)/HR: at 03:15

## 2025-02-01 RX ADMIN — OXYCODONE HYDROCHLORIDE 10 MILLIGRAM(S): 30 TABLET ORAL at 16:01

## 2025-02-01 RX ADMIN — OXYCODONE HYDROCHLORIDE 10 MILLIGRAM(S): 30 TABLET ORAL at 12:51

## 2025-02-01 RX ADMIN — OXYCODONE HYDROCHLORIDE 10 MILLIGRAM(S): 30 TABLET ORAL at 11:53

## 2025-02-01 RX ADMIN — Medication 700 UNIT(S)/HR: at 19:36

## 2025-02-01 RX ADMIN — POTASSIUM CHLORIDE 50 MILLIEQUIVALENT(S): 750 TABLET, EXTENDED RELEASE ORAL at 15:23

## 2025-02-01 NOTE — PROGRESS NOTE ADULT - SUBJECTIVE AND OBJECTIVE BOX
Subjective: Patient seen and examined at bedside, no current complaints, no overnight events.       MEDICATIONS  (STANDING):  ascorbic acid 500 milliGRAM(s) Oral daily  chlorhexidine 2% Cloths 1 Application(s) Topical <User Schedule>  dextrose 5%. 1000 milliLiter(s) (100 mL/Hr) IV Continuous <Continuous>  dextrose 5%. 1000 milliLiter(s) (50 mL/Hr) IV Continuous <Continuous>  dextrose 5%. 1000 milliLiter(s) (100 mL/Hr) IV Continuous <Continuous>  dextrose 50% Injectable 25 Gram(s) IV Push once  dextrose 50% Injectable 12.5 Gram(s) IV Push once  dextrose 50% Injectable 25 Gram(s) IV Push once  ertapenem  IVPB      folic acid 1 milliGRAM(s) Oral daily  glucagon  Injectable 1 milliGRAM(s) IntraMuscular once  glucagon  Injectable 1 milliGRAM(s) IntraMuscular once  heparin  Infusion.  Unit(s)/Hr (13 mL/Hr) IV Continuous <Continuous>  insulin lispro (ADMELOG) corrective regimen sliding scale   SubCutaneous three times a day before meals  lactated ringers. 1000 milliLiter(s) (84 mL/Hr) IV Continuous <Continuous>  losartan 50 milliGRAM(s) Oral daily  metoprolol tartrate 25 milliGRAM(s) Oral every 12 hours  mirtazapine 7.5 milliGRAM(s) Oral daily  multivitamin/minerals 1 Tablet(s) Oral daily  pantoprazole   Suspension 40 milliGRAM(s) Oral daily  rosuvastatin 5 milliGRAM(s) Oral at bedtime    MEDICATIONS  (PRN):  acetaminophen     Tablet .. 650 milliGRAM(s) Oral every 6 hours PRN Temp greater or equal to 38C (100.4F), Mild Pain (1 - 3)  dextrose Oral Gel 15 Gram(s) Oral once PRN Blood Glucose LESS THAN 70 milliGRAM(s)/deciliter  heparin   Injectable 6000 Unit(s) IV Push every 6 hours PRN For aPTT less than 40  heparin   Injectable 3000 Unit(s) IV Push every 6 hours PRN For aPTT between 40 - 57  ondansetron Injectable 4 milliGRAM(s) IV Push every 6 hours PRN Nausea  oxyCODONE    IR 5 milliGRAM(s) Oral every 4 hours PRN Moderate Pain (4 - 6)  oxyCODONE    IR 10 milliGRAM(s) Oral every 4 hours PRN Severe Pain (7 - 10)  senna 2 Tablet(s) Oral at bedtime PRN Constipation      Vital Signs Last 24 Hrs  T(C): 36.7 (01 Feb 2025 16:27), Max: 37.1 (31 Jan 2025 20:30)  T(F): 98.1 (01 Feb 2025 16:27), Max: 98.8 (31 Jan 2025 20:30)  HR: 107 (01 Feb 2025 16:27) (83 - 107)  BP: 94/50 (01 Feb 2025 16:27) (94/50 - 121/60)  BP(mean): --  RR: 16 (01 Feb 2025 16:27) (16 - 18)  SpO2: 96% (01 Feb 2025 16:27) (94% - 98%)    Parameters below as of 01 Feb 2025 16:27  Patient On (Oxygen Delivery Method): room air        Physical Exam:  Constitutional: NAD  HEENT: EOMI  Respiratory: Respirations non-labored, no accessory muscle use  Gastrointestinal: non-distended  Neurological: A&O x 3  Vascular: L AKA site with wound VAC in place, dry. Warm RLE with palp dp    LABS:                        8.5    7.00  )-----------( 152      ( 01 Feb 2025 05:51 )             28.1     02-01    136  |  105  |  13.4  ----------------------------<  140[H]  3.5   |  19.0[L]  |  0.79    Ca    7.3[L]      01 Feb 2025 05:51  Phos  3.1     02-01  Mg     1.8     02-01      PTT - ( 01 Feb 2025 17:50 )  PTT:114.3 sec  Urinalysis Basic - ( 01 Feb 2025 05:51 )    Color: x / Appearance: x / SG: x / pH: x  Gluc: 140 mg/dL / Ketone: x  / Bili: x / Urobili: x   Blood: x / Protein: x / Nitrite: x   Leuk Esterase: x / RBC: x / WBC x   Sq Epi: x / Non Sq Epi: x / Bacteria: x        A: 79F POD2 from L AKA revision 2/2 wound dehiscence. Tachycardic post-op, which has mostly resolved. Pending GOC discussion with palliative care.     Plan:   - F/u AM labs, replete lytes as necessary  - Continued multimodal pain control  - Strict Is and Os  - Antiemetics PRN  - Anticoagulation - hep gtt, plan to transition to DOAC at later date  - Diet: carb controlled.   - Pending palliative reassessment

## 2025-02-01 NOTE — PROGRESS NOTE ADULT - SUBJECTIVE AND OBJECTIVE BOX
DANNI TOSIN    [id number]    79y      Female    Patient is a 79y old  Female who presents with a chief complaint of Left AKA wound dehiscence (31 Jan 2025 07:07)      INTERVAL HPI/OVERNIGHT EVENTS:    Patient is doing ok, her pain is well controlled, denies fever, chills, chest pain, sob       Vital Signs Last 24 Hrs  T(C): 36.9 (01 Feb 2025 04:00), Max: 37.4 (31 Jan 2025 13:55)  T(F): 98.4 (01 Feb 2025 04:00), Max: 99.4 (31 Jan 2025 13:55)  HR: 83 (01 Feb 2025 04:00) (78 - 99)  BP: 107/61 (01 Feb 2025 04:00) (85/50 - 107/61)  RR: 17 (01 Feb 2025 04:00) (17 - 18)  SpO2: 95% (01 Feb 2025 04:00) (94% - 100%)    Parameters below as of 01 Feb 2025 04:00  Patient On (Oxygen Delivery Method): room air        PHYSICAL EXAM:      GENERAL: Elderly female looking comfortable   HEENT: PERRL, +EOMI  NECK: soft, Supple, No JVD  CHEST/LUNG: Clear to auscultate bilaterally; No wheezing  HEART: S1S2+, Regular rate and rhythm; No murmurs  ABDOMEN: Soft, Nontender, Nondistended; Bowel sounds present  EXTREMITIES:  Left Leg AKA, wound covered with clean dressings on with wound Vac   SKIN: No rashes or lesions  NEURO: AAOX3  PSYCH: normal mood      LABS:                        8.5    7.00  )-----------( 152      ( 01 Feb 2025 05:51 )             28.1     02-01    136  |  105  |  13.4  ----------------------------<  140[H]  3.5   |  19.0[L]  |  0.79    Ca    7.3[L]      01 Feb 2025 05:51  Phos  3.1     02-01  Mg     1.8     02-01      PTT - ( 01 Feb 2025 00:59 )  PTT:121.3 sec        I&O's Summary    31 Jan 2025 07:01 - 01 Feb 2025 07:00  --------------------------------------------------------  IN: 1549 mL / OUT: 540 mL / NET: 1009 mL        MEDICATIONS  (STANDING):  ascorbic acid 500 milliGRAM(s) Oral daily  chlorhexidine 2% Cloths 1 Application(s) Topical <User Schedule>  dextrose 5%. 1000 milliLiter(s) (100 mL/Hr) IV Continuous <Continuous>  dextrose 5%. 1000 milliLiter(s) (50 mL/Hr) IV Continuous <Continuous>  dextrose 5%. 1000 milliLiter(s) (100 mL/Hr) IV Continuous <Continuous>  dextrose 50% Injectable 25 Gram(s) IV Push once  dextrose 50% Injectable 12.5 Gram(s) IV Push once  dextrose 50% Injectable 25 Gram(s) IV Push once  ertapenem  IVPB      folic acid 1 milliGRAM(s) Oral daily  glucagon  Injectable 1 milliGRAM(s) IntraMuscular once  glucagon  Injectable 1 milliGRAM(s) IntraMuscular once  heparin  Infusion.  Unit(s)/Hr (13 mL/Hr) IV Continuous <Continuous>  insulin lispro (ADMELOG) corrective regimen sliding scale   SubCutaneous three times a day before meals  lactated ringers. 1000 milliLiter(s) (84 mL/Hr) IV Continuous <Continuous>  losartan 50 milliGRAM(s) Oral daily  metoprolol tartrate 25 milliGRAM(s) Oral every 12 hours  mirtazapine 7.5 milliGRAM(s) Oral daily  multivitamin/minerals 1 Tablet(s) Oral daily  pantoprazole   Suspension 40 milliGRAM(s) Oral daily  rosuvastatin 5 milliGRAM(s) Oral at bedtime    MEDICATIONS  (PRN):  acetaminophen     Tablet .. 650 milliGRAM(s) Oral every 6 hours PRN Temp greater or equal to 38C (100.4F), Mild Pain (1 - 3)  dextrose Oral Gel 15 Gram(s) Oral once PRN Blood Glucose LESS THAN 70 milliGRAM(s)/deciliter  heparin   Injectable 6000 Unit(s) IV Push every 6 hours PRN For aPTT less than 40  heparin   Injectable 3000 Unit(s) IV Push every 6 hours PRN For aPTT between 40 - 57  ondansetron Injectable 4 milliGRAM(s) IV Push every 6 hours PRN Nausea  oxyCODONE    IR 5 milliGRAM(s) Oral every 4 hours PRN Moderate Pain (4 - 6)  oxyCODONE    IR 10 milliGRAM(s) Oral every 4 hours PRN Severe Pain (7 - 10)  senna 2 Tablet(s) Oral at bedtime PRN Constipation

## 2025-02-01 NOTE — PHYSICAL THERAPY INITIAL EVALUATION ADULT - PERTINENT HX OF CURRENT PROBLEM, REHAB EVAL
Pt is 80 y/o F with Hx of Afib (on eliquis), HTN, DM2, HLD, CVA, limb ischemia s/p left AKA 12/16/24 & b/l femoral thrombectomy and cutdowns. Patient was recently discharged to rehab post AKA, returns with poorly healing stump wound and concern for infection. Now s/p left AKA stump revision 1/30/25

## 2025-02-01 NOTE — PHYSICAL THERAPY INITIAL EVALUATION ADULT - ADDITIONAL COMMENTS
As per chart, pt transferred from [facility]. Prior to initial admission for AKA, pt lives with daughter and family in private home, 3 [address] with rail, no stairs inside. Pt primarily uses wheelchair, one person assist from transfers, dependent for ADLs. (Info obtained from chart)

## 2025-02-01 NOTE — PROGRESS NOTE ADULT - ASSESSMENT
78 y/o F with Hx of Afib (on eliquis), HTN, DM2, HLD, CVA, limb ischemia s/p left AKA 12/16/24 & b/l femoral thrombectomy and cutdowns. Patient was recently discharged to rehab post AKA, returns with poorly healing stump wound and concern for infection. Admitted to vascular surgery,  Medicine following for medical management.    Plan:     PAD Hx of left limb ischemia & stump wound  s/p left AKA & b/l femoral thrombectomy and cutdown 12/16/24  wound vac in place to left stump wound  Pain medication PRN/ antiemetics PRN  continue bowel regimen   IS- HOB 30-45  further management as per Primary team   on Heparin infusion- eliquis remains on hold- plan for DOAC at later date  IV antibiotics         HTN-/ Atrial fib:   Eliquis on hold - on heparin infusion  On Metoprolol tartrate 25mg bid   On Irbesartan 150mg daily at home, will resume with losartan    DM2 type 2:   cont ISS     HLD:   Crestor 5mg qhs    Anemia:   Hgb has been stable- 9.1   low iron   cont iron and folic acid   Vit C   Monitor CBC    DVT prophylaxis: on heparin infusion.

## 2025-02-02 LAB
ANION GAP SERPL CALC-SCNC: 8 MMOL/L — SIGNIFICANT CHANGE UP (ref 5–17)
APTT BLD: 68.6 SEC — HIGH (ref 24.5–35.6)
APTT BLD: 87.9 SEC — HIGH (ref 24.5–35.6)
BASOPHILS # BLD AUTO: 0.03 K/UL — SIGNIFICANT CHANGE UP (ref 0–0.2)
BASOPHILS NFR BLD AUTO: 0.5 % — SIGNIFICANT CHANGE UP (ref 0–2)
BUN SERPL-MCNC: 14.3 MG/DL — SIGNIFICANT CHANGE UP (ref 8–20)
CALCIUM SERPL-MCNC: 7.3 MG/DL — LOW (ref 8.4–10.5)
CHLORIDE SERPL-SCNC: 106 MMOL/L — SIGNIFICANT CHANGE UP (ref 96–108)
CO2 SERPL-SCNC: 19 MMOL/L — LOW (ref 22–29)
CREAT SERPL-MCNC: 0.75 MG/DL — SIGNIFICANT CHANGE UP (ref 0.5–1.3)
EGFR: 80 ML/MIN/1.73M2 — SIGNIFICANT CHANGE UP
EOSINOPHIL # BLD AUTO: 0.05 K/UL — SIGNIFICANT CHANGE UP (ref 0–0.5)
EOSINOPHIL NFR BLD AUTO: 0.8 % — SIGNIFICANT CHANGE UP (ref 0–6)
GLUCOSE BLDC GLUCOMTR-MCNC: 124 MG/DL — HIGH (ref 70–99)
GLUCOSE BLDC GLUCOMTR-MCNC: 128 MG/DL — HIGH (ref 70–99)
GLUCOSE BLDC GLUCOMTR-MCNC: 131 MG/DL — HIGH (ref 70–99)
GLUCOSE BLDC GLUCOMTR-MCNC: 150 MG/DL — HIGH (ref 70–99)
GLUCOSE SERPL-MCNC: 129 MG/DL — HIGH (ref 70–99)
HCT VFR BLD CALC: 25 % — LOW (ref 34.5–45)
HCT VFR BLD CALC: 26 % — LOW (ref 34.5–45)
HGB BLD-MCNC: 7.8 G/DL — LOW (ref 11.5–15.5)
HGB BLD-MCNC: 8.1 G/DL — LOW (ref 11.5–15.5)
IMM GRANULOCYTES NFR BLD AUTO: 0.5 % — SIGNIFICANT CHANGE UP (ref 0–0.9)
LYMPHOCYTES # BLD AUTO: 1.69 K/UL — SIGNIFICANT CHANGE UP (ref 1–3.3)
LYMPHOCYTES # BLD AUTO: 26.7 % — SIGNIFICANT CHANGE UP (ref 13–44)
MAGNESIUM SERPL-MCNC: 2.1 MG/DL — SIGNIFICANT CHANGE UP (ref 1.6–2.6)
MCHC RBC-ENTMCNC: 25.7 PG — LOW (ref 27–34)
MCHC RBC-ENTMCNC: 25.9 PG — LOW (ref 27–34)
MCHC RBC-ENTMCNC: 31.2 G/DL — LOW (ref 32–36)
MCHC RBC-ENTMCNC: 31.2 G/DL — LOW (ref 32–36)
MCV RBC AUTO: 82.5 FL — SIGNIFICANT CHANGE UP (ref 80–100)
MCV RBC AUTO: 83.1 FL — SIGNIFICANT CHANGE UP (ref 80–100)
MONOCYTES # BLD AUTO: 0.5 K/UL — SIGNIFICANT CHANGE UP (ref 0–0.9)
MONOCYTES NFR BLD AUTO: 7.9 % — SIGNIFICANT CHANGE UP (ref 2–14)
NEUTROPHILS # BLD AUTO: 4.03 K/UL — SIGNIFICANT CHANGE UP (ref 1.8–7.4)
NEUTROPHILS NFR BLD AUTO: 63.6 % — SIGNIFICANT CHANGE UP (ref 43–77)
PHOSPHATE SERPL-MCNC: 2.9 MG/DL — SIGNIFICANT CHANGE UP (ref 2.4–4.7)
PLATELET # BLD AUTO: 142 K/UL — LOW (ref 150–400)
PLATELET # BLD AUTO: 151 K/UL — SIGNIFICANT CHANGE UP (ref 150–400)
POTASSIUM SERPL-MCNC: 4.7 MMOL/L — SIGNIFICANT CHANGE UP (ref 3.5–5.3)
POTASSIUM SERPL-SCNC: 4.7 MMOL/L — SIGNIFICANT CHANGE UP (ref 3.5–5.3)
RBC # BLD: 3.01 M/UL — LOW (ref 3.8–5.2)
RBC # BLD: 3.15 M/UL — LOW (ref 3.8–5.2)
RBC # FLD: 17.8 % — HIGH (ref 10.3–14.5)
RBC # FLD: 18.2 % — HIGH (ref 10.3–14.5)
SODIUM SERPL-SCNC: 133 MMOL/L — LOW (ref 135–145)
WBC # BLD: 6.33 K/UL — SIGNIFICANT CHANGE UP (ref 3.8–10.5)
WBC # BLD: 6.44 K/UL — SIGNIFICANT CHANGE UP (ref 3.8–10.5)
WBC # FLD AUTO: 6.33 K/UL — SIGNIFICANT CHANGE UP (ref 3.8–10.5)
WBC # FLD AUTO: 6.44 K/UL — SIGNIFICANT CHANGE UP (ref 3.8–10.5)

## 2025-02-02 PROCEDURE — 99232 SBSQ HOSP IP/OBS MODERATE 35: CPT

## 2025-02-02 RX ADMIN — Medication 700 UNIT(S)/HR: at 09:21

## 2025-02-02 RX ADMIN — ANTISEPTIC SURGICAL SCRUB 1 APPLICATION(S): 0.04 SOLUTION TOPICAL at 05:48

## 2025-02-02 RX ADMIN — OXYCODONE HYDROCHLORIDE 5 MILLIGRAM(S): 30 TABLET ORAL at 22:21

## 2025-02-02 RX ADMIN — PANTOPRAZOLE 40 MILLIGRAM(S): 20 TABLET, DELAYED RELEASE ORAL at 11:47

## 2025-02-02 RX ADMIN — ACETAMINOPHEN 650 MILLIGRAM(S): 160 SUSPENSION ORAL at 21:21

## 2025-02-02 RX ADMIN — MIRTAZAPINE 7.5 MILLIGRAM(S): 30 TABLET, FILM COATED ORAL at 11:44

## 2025-02-02 RX ADMIN — ERTAPENEM SODIUM 120 MILLIGRAM(S): 1 INJECTION, POWDER, LYOPHILIZED, FOR SOLUTION INTRAMUSCULAR; INTRAVENOUS at 05:42

## 2025-02-02 RX ADMIN — OXYCODONE HYDROCHLORIDE 10 MILLIGRAM(S): 30 TABLET ORAL at 06:42

## 2025-02-02 RX ADMIN — OXYCODONE HYDROCHLORIDE 10 MILLIGRAM(S): 30 TABLET ORAL at 05:42

## 2025-02-02 RX ADMIN — ROSUVASTATIN CALCIUM 5 MILLIGRAM(S): 10 TABLET, FILM COATED ORAL at 21:21

## 2025-02-02 RX ADMIN — Medication 1 MILLIGRAM(S): at 11:44

## 2025-02-02 RX ADMIN — Medication 25 MILLIGRAM(S): at 17:38

## 2025-02-02 RX ADMIN — Medication 1 TABLET(S): at 11:49

## 2025-02-02 RX ADMIN — Medication 700 UNIT(S)/HR: at 02:25

## 2025-02-02 RX ADMIN — OXYCODONE HYDROCHLORIDE 10 MILLIGRAM(S): 30 TABLET ORAL at 11:55

## 2025-02-02 RX ADMIN — Medication 700 UNIT(S)/HR: at 10:31

## 2025-02-02 RX ADMIN — Medication 500 MILLIGRAM(S): at 11:44

## 2025-02-02 RX ADMIN — ACETAMINOPHEN 650 MILLIGRAM(S): 160 SUSPENSION ORAL at 22:21

## 2025-02-02 RX ADMIN — OXYCODONE HYDROCHLORIDE 5 MILLIGRAM(S): 30 TABLET ORAL at 21:21

## 2025-02-02 RX ADMIN — Medication 700 UNIT(S)/HR: at 07:25

## 2025-02-02 NOTE — PROGRESS NOTE ADULT - ASSESSMENT
79F POD3 from L AKA revision due to wound dehiscence on 1/30. Tachycardic post-op, which has mostly resolved.     Plan:   - F/u AM labs, replete lytes as necessary  - Continued multimodal pain control  - Strict Is and Os  - Antiemetics PRN  - Anticoagulation - holding Hep gtt due to low Hgb and hypotension  - f/u 8 pm CBC  - Diet: carb controlled.   - f/u urine cultures - E coli on prelim   - Pending palliative reassessment

## 2025-02-02 NOTE — PROGRESS NOTE ADULT - ASSESSMENT
80 y/o F with Hx of Afib (on eliquis), HTN, DM2, HLD, CVA, limb ischemia s/p left AKA 12/16/24 & b/l femoral thrombectomy and cutdowns. Patient was recently discharged to rehab post AKA, returns with poorly healing stump wound and concern for infection. Admitted to vascular surgery,  Medicine following for medical management.    Plan:     PAD Hx of left limb ischemia & stump wound  s/p left AKA & b/l femoral thrombectomy and cutdown 12/16/24  wound vac in place to left stump wound  Pain medication PRN/ antiemetics PRN  continue bowel regimen   IS- HOB 30-45  further management as per Primary team   on Heparin infusion- Eliquis remains on hold- plan for DOAC at later date  IV antibiotics         HTN-/ Atrial fib:   Eliquis on hold - on heparin infusion  On Metoprolol tartrate 25mg bid   On Irbesartan 150mg daily at home, will resume with losartan    DM2 type 2:   cont ISS     HLD:   Crestor 5mg qhs    Anemia:   Hgb is 7.8, will monitor, If trends further down, will transfuse  low iron   cont iron and folic acid   Vit C   Monitor CBC    DVT prophylaxis: on heparin infusion.

## 2025-02-02 NOTE — PROGRESS NOTE ADULT - SUBJECTIVE AND OBJECTIVE BOX
INTERVAL HPI/OVERNIGHT EVENTS:    Patient evaluated at bedside. No acute distress. Hypotensive overnight.    MEDICATIONS  (STANDING):  ascorbic acid 500 milliGRAM(s) Oral daily  chlorhexidine 2% Cloths 1 Application(s) Topical <User Schedule>  dextrose 5%. 1000 milliLiter(s) (100 mL/Hr) IV Continuous <Continuous>  dextrose 5%. 1000 milliLiter(s) (50 mL/Hr) IV Continuous <Continuous>  dextrose 5%. 1000 milliLiter(s) (100 mL/Hr) IV Continuous <Continuous>  dextrose 50% Injectable 25 Gram(s) IV Push once  dextrose 50% Injectable 12.5 Gram(s) IV Push once  dextrose 50% Injectable 25 Gram(s) IV Push once  ertapenem  IVPB      ertapenem  IVPB 1000 milliGRAM(s) IV Intermittent every 24 hours  folic acid 1 milliGRAM(s) Oral daily  glucagon  Injectable 1 milliGRAM(s) IntraMuscular once  glucagon  Injectable 1 milliGRAM(s) IntraMuscular once  insulin lispro (ADMELOG) corrective regimen sliding scale   SubCutaneous three times a day before meals  lactated ringers. 1000 milliLiter(s) (84 mL/Hr) IV Continuous <Continuous>  losartan 50 milliGRAM(s) Oral daily  metoprolol tartrate 25 milliGRAM(s) Oral every 12 hours  mirtazapine 7.5 milliGRAM(s) Oral daily  multivitamin/minerals 1 Tablet(s) Oral daily  pantoprazole   Suspension 40 milliGRAM(s) Oral daily  rosuvastatin 5 milliGRAM(s) Oral at bedtime    MEDICATIONS  (PRN):  acetaminophen     Tablet .. 650 milliGRAM(s) Oral every 6 hours PRN Temp greater or equal to 38C (100.4F), Mild Pain (1 - 3)  dextrose Oral Gel 15 Gram(s) Oral once PRN Blood Glucose LESS THAN 70 milliGRAM(s)/deciliter  ondansetron Injectable 4 milliGRAM(s) IV Push every 6 hours PRN Nausea  oxyCODONE    IR 5 milliGRAM(s) Oral every 4 hours PRN Moderate Pain (4 - 6)  oxyCODONE    IR 10 milliGRAM(s) Oral every 4 hours PRN Severe Pain (7 - 10)  senna 2 Tablet(s) Oral at bedtime PRN Constipation      Vital Signs Last 24 Hrs  T(C): 37.3 (02 Feb 2025 17:36), Max: 37.3 (02 Feb 2025 17:36)  T(F): 99.2 (02 Feb 2025 17:36), Max: 99.2 (02 Feb 2025 17:36)  HR: 100 (02 Feb 2025 17:36) (84 - 100)  BP: 109/77 (02 Feb 2025 17:36) (95/62 - 109/77)  RR: 16 (02 Feb 2025 17:36) (16 - 18)  SpO2: 94% (02 Feb 2025 17:36) (93% - 98%)    Parameters below as of 02 Feb 2025 17:36  Patient On (Oxygen Delivery Method): room air        PHYSICAL EXAM:  GENERAL: NAD  HEAD:  Atraumatic, Normocephalic  CHEST/LUNG: non-labored breathing on room air.   ABDOMEN: Soft, Nontender, Nondistended  VASCULAR: L AKA w/ wound va in place, edematous      I&O's Detail    01 Feb 2025 07:01  -  02 Feb 2025 07:00  --------------------------------------------------------  IN:    Heparin Infusion: 84 mL    Lactated Ringers: 1008 mL    Oral Fluid: 180 mL  Total IN: 1272 mL    OUT:    Drain (mL): 130 mL    Indwelling Catheter - Urethral (mL): 375 mL    VAC (Vacuum Assisted Closure) System (mL): 50 mL  Total OUT: 555 mL    Total NET: 717 mL      02 Feb 2025 07:01  -  02 Feb 2025 18:52  --------------------------------------------------------  IN:    Heparin Infusion: 35 mL    Lactated Ringers: 924 mL  Total IN: 959 mL    OUT:    Drain (mL): 170 mL    Indwelling Catheter - Urethral (mL): 550 mL  Total OUT: 720 mL    Total NET: 239 mL          LABS:                        7.8    6.44  )-----------( 142      ( 02 Feb 2025 01:41 )             25.0     02-02    133[L]  |  106  |  14.3  ----------------------------<  129[H]  4.7   |  19.0[L]  |  0.75    Ca    7.3[L]      02 Feb 2025 01:41  Phos  2.9     02-02  Mg     2.1     02-02      PTT - ( 02 Feb 2025 08:35 )  PTT:68.6 sec  Urinalysis Basic - ( 02 Feb 2025 01:41 )    Color: x / Appearance: x / SG: x / pH: x  Gluc: 129 mg/dL / Ketone: x  / Bili: x / Urobili: x   Blood: x / Protein: x / Nitrite: x   Leuk Esterase: x / RBC: x / WBC x   Sq Epi: x / Non Sq Epi: x / Bacteria: x

## 2025-02-02 NOTE — PROGRESS NOTE ADULT - SUBJECTIVE AND OBJECTIVE BOX
DANNI LEAHY    [id number]    79y      Female    Patient is a 79y old  Female who presents with a chief complaint of Left AKA wound dehiscence (31 Jan 2025 07:07)      INTERVAL HPI/OVERNIGHT EVENTS:    Patient is doing ok, her pain is well controlled, denies fever, chills, chest pain, sob    Vital Signs Last 24 Hrs  T(C): 37.3 (02 Feb 2025 17:36), Max: 37.3 (02 Feb 2025 17:36)  T(F): 99.2 (02 Feb 2025 17:36), Max: 99.2 (02 Feb 2025 17:36)  HR: 100 (02 Feb 2025 17:36) (84 - 100)  BP: 109/77 (02 Feb 2025 17:36) (95/62 - 109/77)  RR: 16 (02 Feb 2025 17:36) (16 - 18)  SpO2: 94% (02 Feb 2025 17:36) (93% - 98%)    Parameters below as of 02 Feb 2025 17:36  Patient On (Oxygen Delivery Method): room air        PHYSICAL EXAM:    GENERAL: Elderly female looking comfortable   HEENT: PERRL, +EOMI  NECK: soft, Supple, No JVD  CHEST/LUNG: Clear to auscultate bilaterally; No wheezing  HEART: S1S2+, Regular rate and rhythm; No murmurs  ABDOMEN: Soft, Nontender, Nondistended; Bowel sounds present  EXTREMITIES:  Left Leg AKA, wound covered with clean dressings on with wound Vac   SKIN: No rashes or lesions  NEURO: AAOX3  PSYCH: normal mood    LABS:                        7.8    6.44  )-----------( 142      ( 02 Feb 2025 01:41 )             25.0     02-02    133[L]  |  106  |  14.3  ----------------------------<  129[H]  4.7   |  19.0[L]  |  0.75    Ca    7.3[L]      02 Feb 2025 01:41  Phos  2.9     02-02  Mg     2.1     02-02      PTT - ( 02 Feb 2025 08:35 )  PTT:68.6 sec  Urinalysis Basic - ( 02 Feb 2025 01:41 )    Color: x / Appearance: x / SG: x / pH: x  Gluc: 129 mg/dL / Ketone: x  / Bili: x / Urobili: x   Blood: x / Protein: x / Nitrite: x   Leuk Esterase: x / RBC: x / WBC x   Sq Epi: x / Non Sq Epi: x / Bacteria: x          I&O's Summary    01 Feb 2025 07:01  -  02 Feb 2025 07:00  --------------------------------------------------------  IN: 1272 mL / OUT: 555 mL / NET: 717 mL    02 Feb 2025 07:01  -  02 Feb 2025 18:42  --------------------------------------------------------  IN: 959 mL / OUT: 720 mL / NET: 239 mL        MEDICATIONS  (STANDING):  ascorbic acid 500 milliGRAM(s) Oral daily  chlorhexidine 2% Cloths 1 Application(s) Topical <User Schedule>  dextrose 5%. 1000 milliLiter(s) (100 mL/Hr) IV Continuous <Continuous>  dextrose 5%. 1000 milliLiter(s) (50 mL/Hr) IV Continuous <Continuous>  dextrose 5%. 1000 milliLiter(s) (100 mL/Hr) IV Continuous <Continuous>  dextrose 50% Injectable 12.5 Gram(s) IV Push once  dextrose 50% Injectable 25 Gram(s) IV Push once  dextrose 50% Injectable 25 Gram(s) IV Push once  ertapenem  IVPB 1000 milliGRAM(s) IV Intermittent every 24 hours  ertapenem  IVPB      folic acid 1 milliGRAM(s) Oral daily  glucagon  Injectable 1 milliGRAM(s) IntraMuscular once  glucagon  Injectable 1 milliGRAM(s) IntraMuscular once  insulin lispro (ADMELOG) corrective regimen sliding scale   SubCutaneous three times a day before meals  lactated ringers. 1000 milliLiter(s) (84 mL/Hr) IV Continuous <Continuous>  losartan 50 milliGRAM(s) Oral daily  metoprolol tartrate 25 milliGRAM(s) Oral every 12 hours  mirtazapine 7.5 milliGRAM(s) Oral daily  multivitamin/minerals 1 Tablet(s) Oral daily  pantoprazole   Suspension 40 milliGRAM(s) Oral daily  rosuvastatin 5 milliGRAM(s) Oral at bedtime    MEDICATIONS  (PRN):  acetaminophen     Tablet .. 650 milliGRAM(s) Oral every 6 hours PRN Temp greater or equal to 38C (100.4F), Mild Pain (1 - 3)  dextrose Oral Gel 15 Gram(s) Oral once PRN Blood Glucose LESS THAN 70 milliGRAM(s)/deciliter  ondansetron Injectable 4 milliGRAM(s) IV Push every 6 hours PRN Nausea  oxyCODONE    IR 5 milliGRAM(s) Oral every 4 hours PRN Moderate Pain (4 - 6)  oxyCODONE    IR 10 milliGRAM(s) Oral every 4 hours PRN Severe Pain (7 - 10)  senna 2 Tablet(s) Oral at bedtime PRN Constipation

## 2025-02-03 LAB
ALBUMIN SERPL ELPH-MCNC: 1.7 G/DL — LOW (ref 3.3–5.2)
ALP SERPL-CCNC: 156 U/L — HIGH (ref 40–120)
ALT FLD-CCNC: <5 U/L — SIGNIFICANT CHANGE UP
ANION GAP SERPL CALC-SCNC: 7 MMOL/L — SIGNIFICANT CHANGE UP (ref 5–17)
APTT BLD: 162.5 SEC — CRITICAL HIGH (ref 24.5–35.6)
APTT BLD: 39.4 SEC — HIGH (ref 24.5–35.6)
AST SERPL-CCNC: 19 U/L — SIGNIFICANT CHANGE UP
BASOPHILS # BLD AUTO: 0.02 K/UL — SIGNIFICANT CHANGE UP (ref 0–0.2)
BASOPHILS NFR BLD AUTO: 0.4 % — SIGNIFICANT CHANGE UP (ref 0–2)
BILIRUB SERPL-MCNC: 0.3 MG/DL — LOW (ref 0.4–2)
BLD GP AB SCN SERPL QL: SIGNIFICANT CHANGE UP
BUN SERPL-MCNC: 11.4 MG/DL — SIGNIFICANT CHANGE UP (ref 8–20)
CALCIUM SERPL-MCNC: 7.4 MG/DL — LOW (ref 8.4–10.5)
CHLORIDE SERPL-SCNC: 107 MMOL/L — SIGNIFICANT CHANGE UP (ref 96–108)
CO2 SERPL-SCNC: 22 MMOL/L — SIGNIFICANT CHANGE UP (ref 22–29)
CREAT SERPL-MCNC: 0.6 MG/DL — SIGNIFICANT CHANGE UP (ref 0.5–1.3)
EGFR: 90 ML/MIN/1.73M2 — SIGNIFICANT CHANGE UP
EOSINOPHIL # BLD AUTO: 0.09 K/UL — SIGNIFICANT CHANGE UP (ref 0–0.5)
EOSINOPHIL NFR BLD AUTO: 1.9 % — SIGNIFICANT CHANGE UP (ref 0–6)
GLUCOSE BLDC GLUCOMTR-MCNC: 110 MG/DL — HIGH (ref 70–99)
GLUCOSE BLDC GLUCOMTR-MCNC: 112 MG/DL — HIGH (ref 70–99)
GLUCOSE BLDC GLUCOMTR-MCNC: 125 MG/DL — HIGH (ref 70–99)
GLUCOSE BLDC GLUCOMTR-MCNC: 186 MG/DL — HIGH (ref 70–99)
GLUCOSE SERPL-MCNC: 103 MG/DL — HIGH (ref 70–99)
HCT VFR BLD CALC: 26.4 % — LOW (ref 34.5–45)
HCT VFR BLD CALC: 27.5 % — LOW (ref 34.5–45)
HGB BLD-MCNC: 8 G/DL — LOW (ref 11.5–15.5)
HGB BLD-MCNC: 8.2 G/DL — LOW (ref 11.5–15.5)
IMM GRANULOCYTES NFR BLD AUTO: 0.4 % — SIGNIFICANT CHANGE UP (ref 0–0.9)
INR BLD: 1.94 RATIO — HIGH (ref 0.85–1.16)
LYMPHOCYTES # BLD AUTO: 1.53 K/UL — SIGNIFICANT CHANGE UP (ref 1–3.3)
LYMPHOCYTES # BLD AUTO: 32.1 % — SIGNIFICANT CHANGE UP (ref 13–44)
MAGNESIUM SERPL-MCNC: 1.6 MG/DL — SIGNIFICANT CHANGE UP (ref 1.6–2.6)
MCHC RBC-ENTMCNC: 25.3 PG — LOW (ref 27–34)
MCHC RBC-ENTMCNC: 25.4 PG — LOW (ref 27–34)
MCHC RBC-ENTMCNC: 29.8 G/DL — LOW (ref 32–36)
MCHC RBC-ENTMCNC: 30.3 G/DL — LOW (ref 32–36)
MCV RBC AUTO: 83.8 FL — SIGNIFICANT CHANGE UP (ref 80–100)
MCV RBC AUTO: 84.9 FL — SIGNIFICANT CHANGE UP (ref 80–100)
MONOCYTES # BLD AUTO: 0.44 K/UL — SIGNIFICANT CHANGE UP (ref 0–0.9)
MONOCYTES NFR BLD AUTO: 9.2 % — SIGNIFICANT CHANGE UP (ref 2–14)
NEUTROPHILS # BLD AUTO: 2.66 K/UL — SIGNIFICANT CHANGE UP (ref 1.8–7.4)
NEUTROPHILS NFR BLD AUTO: 56 % — SIGNIFICANT CHANGE UP (ref 43–77)
PHOSPHATE SERPL-MCNC: 3 MG/DL — SIGNIFICANT CHANGE UP (ref 2.4–4.7)
PLATELET # BLD AUTO: 148 K/UL — LOW (ref 150–400)
PLATELET # BLD AUTO: 170 K/UL — SIGNIFICANT CHANGE UP (ref 150–400)
POTASSIUM SERPL-MCNC: 4.5 MMOL/L — SIGNIFICANT CHANGE UP (ref 3.5–5.3)
POTASSIUM SERPL-SCNC: 4.5 MMOL/L — SIGNIFICANT CHANGE UP (ref 3.5–5.3)
PROT SERPL-MCNC: 4.5 G/DL — LOW (ref 6.6–8.7)
PROTHROM AB SERPL-ACNC: 22.3 SEC — HIGH (ref 9.9–13.4)
RBC # BLD: 3.15 M/UL — LOW (ref 3.8–5.2)
RBC # BLD: 3.24 M/UL — LOW (ref 3.8–5.2)
RBC # FLD: 17.6 % — HIGH (ref 10.3–14.5)
RBC # FLD: 17.6 % — HIGH (ref 10.3–14.5)
SODIUM SERPL-SCNC: 136 MMOL/L — SIGNIFICANT CHANGE UP (ref 135–145)
WBC # BLD: 4.76 K/UL — SIGNIFICANT CHANGE UP (ref 3.8–10.5)
WBC # BLD: 5.41 K/UL — SIGNIFICANT CHANGE UP (ref 3.8–10.5)
WBC # FLD AUTO: 4.76 K/UL — SIGNIFICANT CHANGE UP (ref 3.8–10.5)
WBC # FLD AUTO: 5.41 K/UL — SIGNIFICANT CHANGE UP (ref 3.8–10.5)

## 2025-02-03 PROCEDURE — 99233 SBSQ HOSP IP/OBS HIGH 50: CPT | Mod: 25

## 2025-02-03 PROCEDURE — 99232 SBSQ HOSP IP/OBS MODERATE 35: CPT

## 2025-02-03 PROCEDURE — 99497 ADVNCD CARE PLAN 30 MIN: CPT | Mod: 25

## 2025-02-03 RX ORDER — HEPARIN SODIUM,PORCINE 10000/ML
VIAL (ML) INJECTION
Qty: 25000 | Refills: 0 | Status: DISCONTINUED | OUTPATIENT
Start: 2025-02-03 | End: 2025-02-03

## 2025-02-03 RX ORDER — MAGNESIUM SULFATE 0.8 MEQ/ML
2 AMPUL (ML) INJECTION ONCE
Refills: 0 | Status: COMPLETED | OUTPATIENT
Start: 2025-02-03 | End: 2025-02-03

## 2025-02-03 RX ORDER — METOPROLOL SUCCINATE 25 MG
12.5 TABLET, EXTENDED RELEASE 24 HR ORAL EVERY 8 HOURS
Refills: 0 | Status: DISCONTINUED | OUTPATIENT
Start: 2025-02-03 | End: 2025-02-06

## 2025-02-03 RX ORDER — APIXABAN 5 MG/1
2.5 TABLET, FILM COATED ORAL
Refills: 0 | Status: DISCONTINUED | OUTPATIENT
Start: 2025-02-03 | End: 2025-02-03

## 2025-02-03 RX ORDER — APIXABAN 5 MG/1
5 TABLET, FILM COATED ORAL
Refills: 0 | Status: DISCONTINUED | OUTPATIENT
Start: 2025-02-03 | End: 2025-02-04

## 2025-02-03 RX ORDER — MORPHINE SULFATE 60 MG/1
2 TABLET, FILM COATED, EXTENDED RELEASE ORAL ONCE
Refills: 0 | Status: DISCONTINUED | OUTPATIENT
Start: 2025-02-03 | End: 2025-02-03

## 2025-02-03 RX ADMIN — Medication 25 MILLIGRAM(S): at 05:03

## 2025-02-03 RX ADMIN — PANTOPRAZOLE 40 MILLIGRAM(S): 20 TABLET, DELAYED RELEASE ORAL at 12:08

## 2025-02-03 RX ADMIN — ANTISEPTIC SURGICAL SCRUB 1 APPLICATION(S): 0.04 SOLUTION TOPICAL at 05:03

## 2025-02-03 RX ADMIN — ROSUVASTATIN CALCIUM 5 MILLIGRAM(S): 10 TABLET, FILM COATED ORAL at 20:42

## 2025-02-03 RX ADMIN — MIRTAZAPINE 7.5 MILLIGRAM(S): 30 TABLET, FILM COATED ORAL at 11:49

## 2025-02-03 RX ADMIN — Medication 1300 UNIT(S)/HR: at 08:10

## 2025-02-03 RX ADMIN — MORPHINE SULFATE 2 MILLIGRAM(S): 60 TABLET, FILM COATED, EXTENDED RELEASE ORAL at 11:59

## 2025-02-03 RX ADMIN — Medication 50 MILLIGRAM(S): at 05:03

## 2025-02-03 RX ADMIN — Medication 1: at 11:48

## 2025-02-03 RX ADMIN — ACETAMINOPHEN 650 MILLIGRAM(S): 160 SUSPENSION ORAL at 20:43

## 2025-02-03 RX ADMIN — Medication 1 MILLIGRAM(S): at 11:49

## 2025-02-03 RX ADMIN — ACETAMINOPHEN 650 MILLIGRAM(S): 160 SUSPENSION ORAL at 21:43

## 2025-02-03 RX ADMIN — MORPHINE SULFATE 2 MILLIGRAM(S): 60 TABLET, FILM COATED, EXTENDED RELEASE ORAL at 10:59

## 2025-02-03 RX ADMIN — Medication 25 GRAM(S): at 17:08

## 2025-02-03 RX ADMIN — APIXABAN 5 MILLIGRAM(S): 5 TABLET, FILM COATED ORAL at 17:08

## 2025-02-03 RX ADMIN — ERTAPENEM SODIUM 120 MILLIGRAM(S): 1 INJECTION, POWDER, LYOPHILIZED, FOR SOLUTION INTRAMUSCULAR; INTRAVENOUS at 05:03

## 2025-02-03 RX ADMIN — Medication 12.5 MILLIGRAM(S): at 20:42

## 2025-02-03 RX ADMIN — Medication 500 MILLIGRAM(S): at 11:49

## 2025-02-03 RX ADMIN — Medication 1 TABLET(S): at 11:49

## 2025-02-03 NOTE — PROGRESS NOTE ADULT - SUBJECTIVE AND OBJECTIVE BOX
CC:  Follow up GOC , Symptoms    OVERNIGHT EVENTS:  events reviewed  patient s/p surgery  reported poor po intake    Present Symptoms:   Dyspnea:  No   Nausea/Vomiting:  No   Anxiety/ Agitation No     Depression:  Unable to assess  Fatigue:  Yes     Loss of appetite:  Yes     Constipation: Not Reported     Pain: yes            Location            Duration            Character            Severity            Factors            Effect    Pain AD Score:  [email or web address] (press ctrl + left click to view)    Review of Systems: Reviewed as above  Further ROS  limited to  obtain due to poor  historian    MEDICATIONS  (STANDING):  ascorbic acid 500 milliGRAM(s) Oral daily  chlorhexidine 2% Cloths 1 Application(s) Topical <User Schedule>  dextrose 5%. 1000 milliLiter(s) (100 mL/Hr) IV Continuous <Continuous>  dextrose 5%. 1000 milliLiter(s) (50 mL/Hr) IV Continuous <Continuous>  dextrose 5%. 1000 milliLiter(s) (100 mL/Hr) IV Continuous <Continuous>  dextrose 50% Injectable 25 Gram(s) IV Push once  dextrose 50% Injectable 12.5 Gram(s) IV Push once  dextrose 50% Injectable 25 Gram(s) IV Push once  ertapenem  IVPB      ertapenem  IVPB 1000 milliGRAM(s) IV Intermittent every 24 hours  folic acid 1 milliGRAM(s) Oral daily  glucagon  Injectable 1 milliGRAM(s) IntraMuscular once  glucagon  Injectable 1 milliGRAM(s) IntraMuscular once  heparin  Infusion.  Unit(s)/Hr (13 mL/Hr) IV Continuous <Continuous>  insulin lispro (ADMELOG) corrective regimen sliding scale   SubCutaneous three times a day before meals  metoprolol tartrate 12.5 milliGRAM(s) Oral every 8 hours  mirtazapine 7.5 milliGRAM(s) Oral daily  multivitamin/minerals 1 Tablet(s) Oral daily  pantoprazole   Suspension 40 milliGRAM(s) Oral daily  rosuvastatin 5 milliGRAM(s) Oral at bedtime    MEDICATIONS  (PRN):  acetaminophen     Tablet .. 650 milliGRAM(s) Oral every 6 hours PRN Temp greater or equal to 38C (100.4F), Mild Pain (1 - 3)  dextrose Oral Gel 15 Gram(s) Oral once PRN Blood Glucose LESS THAN 70 milliGRAM(s)/deciliter  ondansetron Injectable 4 milliGRAM(s) IV Push every 6 hours PRN Nausea  oxyCODONE    IR 5 milliGRAM(s) Oral every 4 hours PRN Moderate Pain (4 - 6)  oxyCODONE    IR 10 milliGRAM(s) Oral every 4 hours PRN Severe Pain (7 - 10)  senna 2 Tablet(s) Oral at bedtime PRN Constipation    PHYSICAL EXAM:  Vital Signs Last 24 Hrs  T(C): 36.4 (03 Feb 2025 08:37), Max: 37.6 (02 Feb 2025 21:00)  T(F): 97.5 (03 Feb 2025 08:37), Max: 99.6 (02 Feb 2025 21:00)  HR: 83 (03 Feb 2025 08:37) (81 - 100)  BP: 98/64 (03 Feb 2025 08:37) (98/64 - 112/61)  BP(mean): --  RR: 18 (03 Feb 2025 08:37) (16 - 18)  SpO2: 97% (03 Feb 2025 08:37) (94% - 97%)    Parameters below as of 03 Feb 2025 08:37  Patient On (Oxygen Delivery Method): room air    Karnofsky: 40 %  General:    Elderly woman NAD sleepy     HEENT:  NCAT     Lungs: comfortable  non labored  CV:  RR  GI:  soft NTND  MSK: weak  Skin:  warm/dry  Neuro  sleepy arouses to voice  Psych calm     LABS:                          8.0    4.76  )-----------( 148      ( 03 Feb 2025 06:20 )             26.4     02-03    136  |  107  |  11.4  ----------------------------<  103[H]  4.5   |  22.0  |  0.60    Ca    7.4[L]      03 Feb 2025 06:20  Phos  3.0     02-03  Mg     1.6     02-03    TPro  4.5[L]  /  Alb  1.7[L]  /  TBili  0.3[L]  /  DBili  x   /  AST  19  /  ALT  <5  /  AlkPhos  156[H]  02-03    PT/INR - ( 03 Feb 2025 06:20 )   PT: 22.3 sec;   INR: 1.94 ratio         PTT - ( 03 Feb 2025 06:20 )  PTT:39.4 sec  Urinalysis Basic - ( 03 Feb 2025 06:20 )    Color: x / Appearance: x / SG: x / pH: x  Gluc: 103 mg/dL / Ketone: x  / Bili: x / Urobili: x   Blood: x / Protein: x / Nitrite: x   Leuk Esterase: x / RBC: x / WBC x   Sq Epi: x / Non Sq Epi: x / Bacteria: x      I&O's Summary    02 Feb 2025 07:01  -  03 Feb 2025 07:00  --------------------------------------------------------  IN: 1309 mL / OUT: 1665 mL / NET: -356 mL    03 Feb 2025 07:01  -  03 Feb 2025 12:40  --------------------------------------------------------  IN: 120 mL / OUT: 400 mL / NET: -280 mL

## 2025-02-03 NOTE — PROGRESS NOTE ADULT - CONVERSATION DETAILS
CERTIFICATE OF RETURN TO WORK    October 10, 2017      Gracie Castellano  [address] [facility] [id number]                      This is to certify that Gracie Castellano has been under my care from 10/10/17 and can return to regular work on 10/11/17.        Sincerely,          Robbi Solorio MD  [address]  [address] [address]  [phone]     Spoke to granddaughter Minnie.  She states grandmother does not like the food at the [facility] and is why she does not eat.  Informed of the importance of adequate nutrition in wound healing. They will bring food from home.     We  discussed advanced directives - CPR, intubation  and mechanical ventilation.  Informed of limited benefit in consideration of patient's current and comorbid medical conditions . Minnie states she was asked this at the rehab and grandmother had told them she would want resuscitation.    Minnie feels grandmother will do better at home than [facility].  Requests to explore resource that can help in her GM care at home

## 2025-02-03 NOTE — PROVIDER CONTACT NOTE (CRITICAL VALUE NOTIFICATION) - ACTION/TREATMENT ORDERED:
Heparin gtt readjusted to 9ml/hr from 11ml/hr..
Heparin drip was previously discontinued
Will continue to follow nomogram
Full anti-coagulant nomogram followed. Dr. Sara Gutierrez vascular notified.

## 2025-02-03 NOTE — PROGRESS NOTE ADULT - ASSESSMENT
79yr woman  hx Afib, CVA, DM, HTN with  recent L AKA cholecystitis with PCT admitted with poorly healing L stump    L AKA  s/p debridement  cont plan per Vascular - patient with wound vac  monitor for pain    on Oxycodone 5/10 q 4hr PRN -   Recommend decrease Oxycodone to 2.5mg po q4hr for moderate pain and 5mg q4hr PRN for severe pain to mitigate effect of sedation  Consider Gabapentin 100mg qhs for pain  bowel regimen    Poor nutrition  Poor appetite  Education about nutrition and would healing  Granddaughter states grandmother does not like the food as is why she does not eat.  Patient also needs assistance with meals.  Family to assist in meals  Family to bring food from home.   Rec Nutritional supplements   If still poor, consider appetite stimulant  Marinol    Cholecystitis  s/p PCT    Debility  Assist with care  Turn/reposition  Safety precautions    Advance Directives  Patient is ,  is in [place].  She has 3 children.  Absent a HCP form, if  patient is without capacity and  is unable to be reached, patient's children are equal surrogates.   When patient more awake, will attempt to discuss appointing a health care proxy  Advance directives discussed CPR, Intubation.  Granddaughter states grandmother had made her wishes known at the [facility] and would want full interventions.     Encounter for Palliative Care  Palliative Care consulted to assist with goals of care.   Patient sleepy - unable to participate in discussions.    Case discussed with Vascular. Patient with poor appetite contributing to poor wound healing.    Recommendations for pain as above.   Family requesting home care assistance.  They feels she would do better at home than [facility].  Informed CM - will need to see if feasible given wound vac, IV abx?

## 2025-02-03 NOTE — PROGRESS NOTE ADULT - SUBJECTIVE AND OBJECTIVE BOX
HPI/OVERNIGHT EVENTS: Patient seen and examined at bedside this AM. No overnight events. No complaints. Denies fever, chills, nausea, vomiting, chest pain, SOB, dizziness, abd pain or any other concerning symptoms.    Vital Signs Last 24 Hrs  T(C): 36.9 (03 Feb 2025 04:00), Max: 37.6 (02 Feb 2025 21:00)  T(F): 98.5 (03 Feb 2025 04:00), Max: 99.6 (02 Feb 2025 21:00)  HR: 81 (03 Feb 2025 04:00) (81 - 100)  BP: 112/61 (03 Feb 2025 04:00) (96/60 - 112/61)  BP(mean): --  RR: 18 (03 Feb 2025 04:00) (16 - 18)  SpO2: 95% (03 Feb 2025 04:00) (94% - 95%)    Parameters below as of 03 Feb 2025 04:00  Patient On (Oxygen Delivery Method): room air        I&O's Detail    01 Feb 2025 07:01  -  02 Feb 2025 07:00  --------------------------------------------------------  IN:    Heparin Infusion: 84 mL    Lactated Ringers: 1008 mL    Oral Fluid: 180 mL  Total IN: 1272 mL    OUT:    Drain (mL): 130 mL    Indwelling Catheter - Urethral (mL): 375 mL    VAC (Vacuum Assisted Closure) System (mL): 50 mL  Total OUT: 555 mL    Total NET: 717 mL      02 Feb 2025 07:01  -  03 Feb 2025 06:49  --------------------------------------------------------  IN:    Heparin Infusion: 35 mL    Lactated Ringers: 924 mL    Oral Fluid: 350 mL  Total IN: 1309 mL    OUT:    Drain (mL): 290 mL    Indwelling Catheter - Urethral (mL): 1300 mL    VAC (Vacuum Assisted Closure) System (mL): 75 mL  Total OUT: 1665 mL    Total NET: -356 mL          Constitutional: patient resting comfortably in bed, in no acute distress  HEENT: EOMI, PERRLA, MMM.  Respiratory: Non labored breathing on RA  Cardiovascular: RRR  Gastrointestinal: Abdomen soft, non-tender, non-distended  Musculoskeletal: No joint pain, swelling or deformity; no limitation of movement  VASCULAR: L AKA w/ wound va in place, edematous    LABS:                        8.1    6.33  )-----------( 151      ( 02 Feb 2025 21:34 )             26.0     02-02    133[L]  |  106  |  14.3  ----------------------------<  129[H]  4.7   |  19.0[L]  |  0.75    Ca    7.3[L]      02 Feb 2025 01:41  Phos  2.9     02-02  Mg     2.1     02-02      PTT - ( 02 Feb 2025 08:35 )  PTT:68.6 sec  Urinalysis Basic - ( 02 Feb 2025 01:41 )    Color: x / Appearance: x / SG: x / pH: x  Gluc: 129 mg/dL / Ketone: x  / Bili: x / Urobili: x   Blood: x / Protein: x / Nitrite: x   Leuk Esterase: x / RBC: x / WBC x   Sq Epi: x / Non Sq Epi: x / Bacteria: x        MEDICATIONS  (STANDING):  ascorbic acid 500 milliGRAM(s) Oral daily  chlorhexidine 2% Cloths 1 Application(s) Topical <User Schedule>  dextrose 5%. 1000 milliLiter(s) (100 mL/Hr) IV Continuous <Continuous>  dextrose 5%. 1000 milliLiter(s) (50 mL/Hr) IV Continuous <Continuous>  dextrose 5%. 1000 milliLiter(s) (100 mL/Hr) IV Continuous <Continuous>  dextrose 50% Injectable 25 Gram(s) IV Push once  dextrose 50% Injectable 12.5 Gram(s) IV Push once  dextrose 50% Injectable 25 Gram(s) IV Push once  ertapenem  IVPB      ertapenem  IVPB 1000 milliGRAM(s) IV Intermittent every 24 hours  folic acid 1 milliGRAM(s) Oral daily  glucagon  Injectable 1 milliGRAM(s) IntraMuscular once  glucagon  Injectable 1 milliGRAM(s) IntraMuscular once  insulin lispro (ADMELOG) corrective regimen sliding scale   SubCutaneous three times a day before meals  lactated ringers. 1000 milliLiter(s) (84 mL/Hr) IV Continuous <Continuous>  losartan 50 milliGRAM(s) Oral daily  metoprolol tartrate 25 milliGRAM(s) Oral every 12 hours  mirtazapine 7.5 milliGRAM(s) Oral daily  multivitamin/minerals 1 Tablet(s) Oral daily  pantoprazole   Suspension 40 milliGRAM(s) Oral daily  rosuvastatin 5 milliGRAM(s) Oral at bedtime    MEDICATIONS  (PRN):  acetaminophen     Tablet .. 650 milliGRAM(s) Oral every 6 hours PRN Temp greater or equal to 38C (100.4F), Mild Pain (1 - 3)  dextrose Oral Gel 15 Gram(s) Oral once PRN Blood Glucose LESS THAN 70 milliGRAM(s)/deciliter  ondansetron Injectable 4 milliGRAM(s) IV Push every 6 hours PRN Nausea  oxyCODONE    IR 5 milliGRAM(s) Oral every 4 hours PRN Moderate Pain (4 - 6)  oxyCODONE    IR 10 milliGRAM(s) Oral every 4 hours PRN Severe Pain (7 - 10)  senna 2 Tablet(s) Oral at bedtime PRN Constipation

## 2025-02-03 NOTE — PROGRESS NOTE ADULT - ASSESSMENT
80 y/o F with Hx of Afib (on eliquis), HTN, DM2, HLD, CVA, limb ischemia s/p left AKA 12/16/24 & b/l femoral thrombectomy and cutdowns. Patient was recently discharged to rehab post AKA, returns with poorly healing stump wound and concern for infection. Admitted to vascular surgery,  Medicine following for medical management.    Plan:     PAD Hx of left limb ischemia & stump wound  s/p left AKA & b/l femoral thrombectomy and cutdown 12/16/2, now S/P  L AKA revision due to wound dehiscence on 1/30.   wound vac in place to left stump wound  Pain medication PRN/ antiemetics PRN  continue bowel regimen   IS- HOB 30-45  further management as per Primary team   on Heparin infusion- Eliquis remains on hold- plan for DOAC at later date  IV antibiotics     Urinary retention/UTI  muñoz placed 1/31. + UC E coli. Has H/O ESBL, was sensitive to Invanz on prior culture, continue for now, follow sensitivity.   Avoid constipation then TOV    HTN-/ Atrial fib:   Eliquis on hold - on heparin infusion  On Metoprolol tartrate 25mg bid   Would hold ARB for now to ensure that Metoprolol be given to improve rate control,  suggest decrease Metoprolol to 12.5 TID to help with rate control. ( patient has been missing at least one dose of Metoprolol daily due to low BP. Can titrate up as clinically indicated. Patient is still on IV fluids, encourage po intake so that they can be discontinued.     Cholecystitis:  HIDA scan + cystic duct/CBD obstruction. Positive for acute cholecystitis on prior admission, s/p cholecystostomy tube.1/13 by IR. Outpatient follow up with surgery.     DM2 type 2:   cont ISS     HLD:   Crestor 5mg qhs    Anemia:   Hgb stable If trends further down, will transfuse  low iron   cont iron and folic acid   Vit C   Monitor CBC    DVT prophylaxis: on heparin infusion.

## 2025-02-03 NOTE — PROVIDER CONTACT NOTE (CRITICAL VALUE NOTIFICATION) - SITUATION
APTT = 121.3
Pt s/p ALEX in Dec 2024
pt on heparin drip. APTT 145.4
S/P L AKA 12/16, FTT, wound not healing, plan for debridement in OR Thurs, Heparin drip  aPTT 122 critical value

## 2025-02-03 NOTE — PROGRESS NOTE ADULT - SUBJECTIVE AND OBJECTIVE BOX
CC: Left AKA wound dehiscence     INTERVAL HPI/OVERNIGHT EVENTS: Sitting up in bed. As per RN, patient had oatmeal for breakfast. Has not required pain medication since last night. Heparin drip resumed this am. Denies chest pain, SOB, nausea, vomiting, fever, chills. Metoprolol yesterday am due to low BP. Still on IV fluids.     Vital Signs Last 24 Hrs  T(C): 36.4 (03 Feb 2025 08:37), Max: 37.6 (02 Feb 2025 21:00)  T(F): 97.5 (03 Feb 2025 08:37), Max: 99.6 (02 Feb 2025 21:00)  HR: 83 (03 Feb 2025 08:37) (81 - 100)  BP: 98/64 (03 Feb 2025 08:37) (98/64 - 112/61)  BP(mean): --  RR: 18 (03 Feb 2025 08:37) (16 - 18)  SpO2: 97% (03 Feb 2025 08:37) (94% - 97%)    Parameters below as of 03 Feb 2025 08:37  Patient On (Oxygen Delivery Method): room air    PHYSICAL EXAM:    GENERAL: Elderly female looking comfortable   HEENT: PERRL, +EOMI  NECK: soft, Supple, No JVD  CHEST/LUNG: Clear to auscultate bilaterally; No wheezing  HEART: S1S2+, Irregular rate and rhythm; No murmurs  ABDOMEN: Soft, Nontender, Nondistended; Bowel sounds present  EXTREMITIES:  Left Leg AKA, + wound Vac   SKIN: No rashes or lesions  NEURO: No focal deficits  PSYCH: normal mood    I&O's Detail    02 Feb 2025 07:01  -  03 Feb 2025 07:00  --------------------------------------------------------  IN:    Heparin Infusion: 35 mL    Lactated Ringers: 924 mL    Oral Fluid: 350 mL  Total IN: 1309 mL    OUT:    Drain (mL): 290 mL    Indwelling Catheter - Urethral (mL): 1300 mL    VAC (Vacuum Assisted Closure) System (mL): 75 mL  Total OUT: 1665 mL    Total NET: -356 mL                                    8.0    4.76  )-----------( 148      ( 03 Feb 2025 06:20 )             26.4     03 Feb 2025 06:20    136    |  107    |  11.4   ----------------------------<  103    4.5     |  22.0   |  0.60     Ca    7.4        03 Feb 2025 06:20  Phos  3.0       03 Feb 2025 06:20  Mg     1.6       03 Feb 2025 06:20    TPro  4.5    /  Alb  1.7    /  TBili  0.3    /  DBili  x      /  AST  19     /  ALT  <5     /  AlkPhos  156    03 Feb 2025 06:20    PT/INR - ( 03 Feb 2025 06:20 )   PT: 22.3 sec;   INR: 1.94 ratio         PTT - ( 03 Feb 2025 06:20 )  PTT:39.4 sec  CAPILLARY BLOOD GLUCOSE      POCT Blood Glucose.: 112 mg/dL (03 Feb 2025 07:52)  POCT Blood Glucose.: 124 mg/dL (02 Feb 2025 21:25)  POCT Blood Glucose.: 128 mg/dL (02 Feb 2025 17:30)  POCT Blood Glucose.: 150 mg/dL (02 Feb 2025 11:39)    LIVER FUNCTIONS - ( 03 Feb 2025 06:20 )  Alb: 1.7 g/dL / Pro: 4.5 g/dL / ALK PHOS: 156 U/L / ALT: <5 U/L / AST: 19 U/L / GGT: x           Urinalysis Basic - ( 03 Feb 2025 06:20 )    Color: x / Appearance: x / SG: x / pH: x  Gluc: 103 mg/dL / Ketone: x  / Bili: x / Urobili: x   Blood: x / Protein: x / Nitrite: x   Leuk Esterase: x / RBC: x / WBC x   Sq Epi: x / Non Sq Epi: x / Bacteria: x        MEDICATIONS  (STANDING):  ascorbic acid 500 milliGRAM(s) Oral daily  chlorhexidine 2% Cloths 1 Application(s) Topical <User Schedule>  dextrose 5%. 1000 milliLiter(s) (100 mL/Hr) IV Continuous <Continuous>  dextrose 5%. 1000 milliLiter(s) (50 mL/Hr) IV Continuous <Continuous>  dextrose 5%. 1000 milliLiter(s) (100 mL/Hr) IV Continuous <Continuous>  dextrose 50% Injectable 25 Gram(s) IV Push once  dextrose 50% Injectable 12.5 Gram(s) IV Push once  dextrose 50% Injectable 25 Gram(s) IV Push once  ertapenem  IVPB      ertapenem  IVPB 1000 milliGRAM(s) IV Intermittent every 24 hours  folic acid 1 milliGRAM(s) Oral daily  glucagon  Injectable 1 milliGRAM(s) IntraMuscular once  glucagon  Injectable 1 milliGRAM(s) IntraMuscular once  heparin  Infusion.  Unit(s)/Hr (13 mL/Hr) IV Continuous <Continuous>  insulin lispro (ADMELOG) corrective regimen sliding scale   SubCutaneous three times a day before meals  lactated ringers. 1000 milliLiter(s) (84 mL/Hr) IV Continuous <Continuous>  losartan 50 milliGRAM(s) Oral daily  metoprolol tartrate 25 milliGRAM(s) Oral every 12 hours  mirtazapine 7.5 milliGRAM(s) Oral daily  multivitamin/minerals 1 Tablet(s) Oral daily  pantoprazole   Suspension 40 milliGRAM(s) Oral daily  rosuvastatin 5 milliGRAM(s) Oral at bedtime    MEDICATIONS  (PRN):  acetaminophen     Tablet .. 650 milliGRAM(s) Oral every 6 hours PRN Temp greater or equal to 38C (100.4F), Mild Pain (1 - 3)  dextrose Oral Gel 15 Gram(s) Oral once PRN Blood Glucose LESS THAN 70 milliGRAM(s)/deciliter  ondansetron Injectable 4 milliGRAM(s) IV Push every 6 hours PRN Nausea  oxyCODONE    IR 5 milliGRAM(s) Oral every 4 hours PRN Moderate Pain (4 - 6)  oxyCODONE    IR 10 milliGRAM(s) Oral every 4 hours PRN Severe Pain (7 - 10)  senna 2 Tablet(s) Oral at bedtime PRN Constipation      RADIOLOGY & ADDITIONAL TESTS:   CC: Left AKA wound dehiscence     INTERVAL HPI/OVERNIGHT EVENTS: Sitting up in bed. As per RN, patient had oatmeal for breakfast. Has not required pain medication since last night. Heparin drip resumed this am. Denies chest pain, SOB, nausea, vomiting, fever, chills. Metoprolol yesterday am due to low BP. Still on IV fluids.     Vital Signs Last 24 Hrs  T(C): 36.4 (03 Feb 2025 08:37), Max: 37.6 (02 Feb 2025 21:00)  T(F): 97.5 (03 Feb 2025 08:37), Max: 99.6 (02 Feb 2025 21:00)  HR: 83 (03 Feb 2025 08:37) (81 - 100)  BP: 98/64 (03 Feb 2025 08:37) (98/64 - 112/61)  RR: 18 (03 Feb 2025 08:37) (16 - 18)  SpO2: 97% (03 Feb 2025 08:37) (94% - 97%)    Parameters below as of 03 Feb 2025 08:37  Patient On (Oxygen Delivery Method): room air    PHYSICAL EXAM:    GENERAL: Elderly female looking comfortable   HEENT: PERRL, +EOMI  NECK: soft, Supple, No JVD  CHEST/LUNG: Clear to auscultate bilaterally; No wheezing  HEART: S1S2+, Irregular rate and rhythm; No murmurs  ABDOMEN: Soft, Nontender, Nondistended; Bowel sounds present  EXTREMITIES:  Left Leg AKA, + wound Vac   SKIN: No rashes or lesions  NEURO: No focal deficits  PSYCH: normal mood    I&O's Detail    02 Feb 2025 07:01  -  03 Feb 2025 07:00  --------------------------------------------------------  IN:    Heparin Infusion: 35 mL    Lactated Ringers: 924 mL    Oral Fluid: 350 mL  Total IN: 1309 mL    OUT:    Drain (mL): 290 mL    Indwelling Catheter - Urethral (mL): 1300 mL    VAC (Vacuum Assisted Closure) System (mL): 75 mL  Total OUT: 1665 mL    Total NET: -356 mL      Labs                          8.0    4.76  )-----------( 148      ( 03 Feb 2025 06:20 )             26.4     03 Feb 2025 06:20    136    |  107    |  11.4   ----------------------------<  103    4.5     |  22.0   |  0.60     Ca    7.4        03 Feb 2025 06:20  Phos  3.0       03 Feb 2025 06:20  Mg     1.6       03 Feb 2025 06:20    TPro  4.5    /  Alb  1.7    /  TBili  0.3    /  DBili  x      /  AST  19     /  ALT  <5     /  AlkPhos  156    03 Feb 2025 06:20    PT/INR - ( 03 Feb 2025 06:20 )   PT: 22.3 sec;   INR: 1.94 ratio         PTT - ( 03 Feb 2025 06:20 )  PTT:39.4 sec  CAPILLARY BLOOD GLUCOSE      POCT Blood Glucose.: 112 mg/dL (03 Feb 2025 07:52)  POCT Blood Glucose.: 124 mg/dL (02 Feb 2025 21:25)  POCT Blood Glucose.: 128 mg/dL (02 Feb 2025 17:30)  POCT Blood Glucose.: 150 mg/dL (02 Feb 2025 11:39)    LIVER FUNCTIONS - ( 03 Feb 2025 06:20 )  Alb: 1.7 g/dL / Pro: 4.5 g/dL / ALK PHOS: 156 U/L / ALT: <5 U/L / AST: 19 U/L / GGT: x             MEDICATIONS  (STANDING):  ascorbic acid 500 milliGRAM(s) Oral daily  chlorhexidine 2% Cloths 1 Application(s) Topical <User Schedule>  dextrose 5%. 1000 milliLiter(s) (100 mL/Hr) IV Continuous <Continuous>  dextrose 5%. 1000 milliLiter(s) (50 mL/Hr) IV Continuous <Continuous>  dextrose 5%. 1000 milliLiter(s) (100 mL/Hr) IV Continuous <Continuous>  dextrose 50% Injectable 25 Gram(s) IV Push once  dextrose 50% Injectable 12.5 Gram(s) IV Push once  dextrose 50% Injectable 25 Gram(s) IV Push once  ertapenem  IVPB      ertapenem  IVPB 1000 milliGRAM(s) IV Intermittent every 24 hours  folic acid 1 milliGRAM(s) Oral daily  glucagon  Injectable 1 milliGRAM(s) IntraMuscular once  glucagon  Injectable 1 milliGRAM(s) IntraMuscular once  heparin  Infusion.  Unit(s)/Hr (13 mL/Hr) IV Continuous <Continuous>  insulin lispro (ADMELOG) corrective regimen sliding scale   SubCutaneous three times a day before meals  lactated ringers. 1000 milliLiter(s) (84 mL/Hr) IV Continuous <Continuous>  losartan 50 milliGRAM(s) Oral daily  metoprolol tartrate 25 milliGRAM(s) Oral every 12 hours  mirtazapine 7.5 milliGRAM(s) Oral daily  multivitamin/minerals 1 Tablet(s) Oral daily  pantoprazole   Suspension 40 milliGRAM(s) Oral daily  rosuvastatin 5 milliGRAM(s) Oral at bedtime    MEDICATIONS  (PRN):  acetaminophen     Tablet .. 650 milliGRAM(s) Oral every 6 hours PRN Temp greater or equal to 38C (100.4F), Mild Pain (1 - 3)  dextrose Oral Gel 15 Gram(s) Oral once PRN Blood Glucose LESS THAN 70 milliGRAM(s)/deciliter  ondansetron Injectable 4 milliGRAM(s) IV Push every 6 hours PRN Nausea  oxyCODONE    IR 5 milliGRAM(s) Oral every 4 hours PRN Moderate Pain (4 - 6)  oxyCODONE    IR 10 milliGRAM(s) Oral every 4 hours PRN Severe Pain (7 - 10)  senna 2 Tablet(s) Oral at bedtime PRN Constipation      RADIOLOGY & ADDITIONAL TESTS:

## 2025-02-03 NOTE — PROGRESS NOTE ADULT - ATTENDING COMMENTS
Patient is see and evaluated, chart reviewed in detail, agree with assessment and plan of the NP  patient's pain is well controlled, has no complains  CTA b/l   left thigh stoma with Wound Vac on  Will continue with current plan of care  on heparin drip   wound care   pain meds   IS
Patient is see and evaluated, chart reviewed in detail, agree with assessment and plan of the NP  patient's pain is well controlled, has no complains  CTA b/l   left thigh stoma with Wound Vac on  RUQ cholecystostomy drain in place, drain in leaking   Will continue with current plan of care  on heparin drip   wound care   pain meds   IS .  Surgical consult for leaking cholecystostomy drain
stump is healthy , no signs of infection VAC changed today , hopefully can discharge to rehab , needs continued nutrition supplementation
conset obtained from daughter plan to revise AKA , as well as remove staples from both groins , discussed risk of wounddehiscence given continued malnutrition although patient  reports being hungry now

## 2025-02-03 NOTE — PROGRESS NOTE ADULT - ASSESSMENT
79F POD4 from L AKA revision due to wound dehiscence.    Plan:   - F/u AM labs, replete lytes as necessary  - Continued multimodal pain control  - Strict Is and Os  - Antiemetics PRN  - Anticoagulation - holding Hep gtt due to low Hgb and hypotension, will resume when Hgb stable.  - Diet: carb controlled.   - f/u urine cultures - E coli on prelim   - Pending palliative reassessment  -VAC change today

## 2025-02-04 LAB
-  AMPICILLIN/SULBACTAM: SIGNIFICANT CHANGE UP
-  AMPICILLIN: SIGNIFICANT CHANGE UP
-  AZTREONAM: SIGNIFICANT CHANGE UP
-  CEFAZOLIN: SIGNIFICANT CHANGE UP
-  CEFEPIME: SIGNIFICANT CHANGE UP
-  CEFTRIAXONE: SIGNIFICANT CHANGE UP
-  CEFUROXIME: SIGNIFICANT CHANGE UP
-  CIPROFLOXACIN: SIGNIFICANT CHANGE UP
-  ERTAPENEM: SIGNIFICANT CHANGE UP
-  GENTAMICIN: SIGNIFICANT CHANGE UP
-  IMIPENEM: SIGNIFICANT CHANGE UP
-  LEVOFLOXACIN: SIGNIFICANT CHANGE UP
-  MEROPENEM: SIGNIFICANT CHANGE UP
-  NITROFURANTOIN: SIGNIFICANT CHANGE UP
-  PIPERACILLIN/TAZOBACTAM: SIGNIFICANT CHANGE UP
-  TOBRAMYCIN: SIGNIFICANT CHANGE UP
-  TRIMETHOPRIM/SULFAMETHOXAZOLE: SIGNIFICANT CHANGE UP
ANION GAP SERPL CALC-SCNC: 10 MMOL/L — SIGNIFICANT CHANGE UP (ref 5–17)
BASOPHILS # BLD AUTO: 0.03 K/UL — SIGNIFICANT CHANGE UP (ref 0–0.2)
BASOPHILS NFR BLD AUTO: 0.5 % — SIGNIFICANT CHANGE UP (ref 0–2)
BUN SERPL-MCNC: 9.5 MG/DL — SIGNIFICANT CHANGE UP (ref 8–20)
CALCIUM SERPL-MCNC: 7.6 MG/DL — LOW (ref 8.4–10.5)
CHLORIDE SERPL-SCNC: 106 MMOL/L — SIGNIFICANT CHANGE UP (ref 96–108)
CO2 SERPL-SCNC: 20 MMOL/L — LOW (ref 22–29)
CREAT SERPL-MCNC: 0.59 MG/DL — SIGNIFICANT CHANGE UP (ref 0.5–1.3)
CULTURE RESULTS: ABNORMAL
EGFR: 90 ML/MIN/1.73M2 — SIGNIFICANT CHANGE UP
EOSINOPHIL # BLD AUTO: 0.05 K/UL — SIGNIFICANT CHANGE UP (ref 0–0.5)
EOSINOPHIL NFR BLD AUTO: 0.9 % — SIGNIFICANT CHANGE UP (ref 0–6)
GLUCOSE BLDC GLUCOMTR-MCNC: 114 MG/DL — HIGH (ref 70–99)
GLUCOSE BLDC GLUCOMTR-MCNC: 119 MG/DL — HIGH (ref 70–99)
GLUCOSE BLDC GLUCOMTR-MCNC: 123 MG/DL — HIGH (ref 70–99)
GLUCOSE BLDC GLUCOMTR-MCNC: 145 MG/DL — HIGH (ref 70–99)
GLUCOSE SERPL-MCNC: 106 MG/DL — HIGH (ref 70–99)
HCT VFR BLD CALC: 30 % — LOW (ref 34.5–45)
HGB BLD-MCNC: 9 G/DL — LOW (ref 11.5–15.5)
IMM GRANULOCYTES NFR BLD AUTO: 0.4 % — SIGNIFICANT CHANGE UP (ref 0–0.9)
LYMPHOCYTES # BLD AUTO: 1.63 K/UL — SIGNIFICANT CHANGE UP (ref 1–3.3)
LYMPHOCYTES # BLD AUTO: 29.1 % — SIGNIFICANT CHANGE UP (ref 13–44)
MAGNESIUM SERPL-MCNC: 1.7 MG/DL — LOW (ref 1.8–2.6)
MCHC RBC-ENTMCNC: 25.6 PG — LOW (ref 27–34)
MCHC RBC-ENTMCNC: 30 G/DL — LOW (ref 32–36)
MCV RBC AUTO: 85.5 FL — SIGNIFICANT CHANGE UP (ref 80–100)
METHOD TYPE: SIGNIFICANT CHANGE UP
MONOCYTES # BLD AUTO: 0.54 K/UL — SIGNIFICANT CHANGE UP (ref 0–0.9)
MONOCYTES NFR BLD AUTO: 9.6 % — SIGNIFICANT CHANGE UP (ref 2–14)
NEUTROPHILS # BLD AUTO: 3.34 K/UL — SIGNIFICANT CHANGE UP (ref 1.8–7.4)
NEUTROPHILS NFR BLD AUTO: 59.5 % — SIGNIFICANT CHANGE UP (ref 43–77)
ORGANISM # SPEC MICROSCOPIC CNT: ABNORMAL
ORGANISM # SPEC MICROSCOPIC CNT: SIGNIFICANT CHANGE UP
PHOSPHATE SERPL-MCNC: 3.4 MG/DL — SIGNIFICANT CHANGE UP (ref 2.4–4.7)
PLATELET # BLD AUTO: 155 K/UL — SIGNIFICANT CHANGE UP (ref 150–400)
POTASSIUM SERPL-MCNC: 4.7 MMOL/L — SIGNIFICANT CHANGE UP (ref 3.5–5.3)
POTASSIUM SERPL-SCNC: 4.7 MMOL/L — SIGNIFICANT CHANGE UP (ref 3.5–5.3)
RBC # BLD: 3.51 M/UL — LOW (ref 3.8–5.2)
RBC # FLD: 17.8 % — HIGH (ref 10.3–14.5)
SODIUM SERPL-SCNC: 136 MMOL/L — SIGNIFICANT CHANGE UP (ref 135–145)
SPECIMEN SOURCE: SIGNIFICANT CHANGE UP
WBC # BLD: 5.61 K/UL — SIGNIFICANT CHANGE UP (ref 3.8–10.5)
WBC # FLD AUTO: 5.61 K/UL — SIGNIFICANT CHANGE UP (ref 3.8–10.5)

## 2025-02-04 PROCEDURE — 99232 SBSQ HOSP IP/OBS MODERATE 35: CPT

## 2025-02-04 PROCEDURE — 99233 SBSQ HOSP IP/OBS HIGH 50: CPT

## 2025-02-04 RX ORDER — OXYCODONE HYDROCHLORIDE 30 MG/1
2.5 TABLET ORAL EVERY 4 HOURS
Refills: 0 | Status: DISCONTINUED | OUTPATIENT
Start: 2025-02-04 | End: 2025-02-06

## 2025-02-04 RX ORDER — OXYCODONE HYDROCHLORIDE 30 MG/1
5 TABLET ORAL EVERY 4 HOURS
Refills: 0 | Status: DISCONTINUED | OUTPATIENT
Start: 2025-02-04 | End: 2025-02-06

## 2025-02-04 RX ORDER — METOPROLOL SUCCINATE 25 MG
1 TABLET, EXTENDED RELEASE 24 HR ORAL
Refills: 0 | DISCHARGE

## 2025-02-04 RX ORDER — APIXABAN 5 MG/1
1 TABLET, FILM COATED ORAL
Qty: 0 | Refills: 0 | DISCHARGE
Start: 2025-02-04

## 2025-02-04 RX ORDER — OXYCODONE HYDROCHLORIDE 30 MG/1
1.5 TABLET ORAL
Refills: 0 | DISCHARGE
Start: 2025-02-04

## 2025-02-04 RX ORDER — METOPROLOL SUCCINATE 25 MG
12.5 TABLET, EXTENDED RELEASE 24 HR ORAL
Qty: 0 | Refills: 0 | DISCHARGE
Start: 2025-02-04

## 2025-02-04 RX ORDER — APIXABAN 5 MG/1
2.5 TABLET, FILM COATED ORAL EVERY 12 HOURS
Refills: 0 | Status: DISCONTINUED | OUTPATIENT
Start: 2025-02-04 | End: 2025-02-06

## 2025-02-04 RX ORDER — APIXABAN 5 MG/1
1 TABLET, FILM COATED ORAL
Refills: 0 | DISCHARGE

## 2025-02-04 RX ADMIN — APIXABAN 2.5 MILLIGRAM(S): 5 TABLET, FILM COATED ORAL at 17:47

## 2025-02-04 RX ADMIN — Medication 1 MILLIGRAM(S): at 12:08

## 2025-02-04 RX ADMIN — Medication 12.5 MILLIGRAM(S): at 14:47

## 2025-02-04 RX ADMIN — MIRTAZAPINE 7.5 MILLIGRAM(S): 30 TABLET, FILM COATED ORAL at 12:07

## 2025-02-04 RX ADMIN — ANTISEPTIC SURGICAL SCRUB 1 APPLICATION(S): 0.04 SOLUTION TOPICAL at 05:05

## 2025-02-04 RX ADMIN — ERTAPENEM SODIUM 120 MILLIGRAM(S): 1 INJECTION, POWDER, LYOPHILIZED, FOR SOLUTION INTRAMUSCULAR; INTRAVENOUS at 05:06

## 2025-02-04 RX ADMIN — ROSUVASTATIN CALCIUM 5 MILLIGRAM(S): 10 TABLET, FILM COATED ORAL at 21:15

## 2025-02-04 RX ADMIN — Medication 12.5 MILLIGRAM(S): at 05:05

## 2025-02-04 RX ADMIN — Medication 500 MILLIGRAM(S): at 12:08

## 2025-02-04 RX ADMIN — PANTOPRAZOLE 40 MILLIGRAM(S): 20 TABLET, DELAYED RELEASE ORAL at 12:07

## 2025-02-04 RX ADMIN — Medication 12.5 MILLIGRAM(S): at 21:15

## 2025-02-04 RX ADMIN — Medication 1 TABLET(S): at 12:07

## 2025-02-04 RX ADMIN — APIXABAN 5 MILLIGRAM(S): 5 TABLET, FILM COATED ORAL at 05:06

## 2025-02-04 NOTE — PROGRESS NOTE ADULT - ASSESSMENT
78 y/o F with Hx of Afib (on eliquis), HTN, DM2, HLD, CVA, limb ischemia s/p left AKA 12/16/24 & b/l femoral thrombectomy and cutdowns. Patient was recently discharged to rehab post AKA, returns with poorly healing stump wound and concern for infection. Admitted to vascular surgery,  Medicine following for medical management.    PAD Hx of left limb ischemia & stump wound  s/p left AKA & b/l femoral thrombectomy and cutdown 12/16/2, now S/P  L AKA revision due to wound dehiscence on 1/30.   wound vac in place to left stump wound  Pain medication PRN/ antiemetics PRN  continue bowel regimen   IS- HOB 30-45  now on Eliquis  now off abx, abx per primary team    Urinary retention/UTI  muñoz placed 1/31. + UC E coli. Has H/O ESBL, was sensitive to Invanz on prior culture, continue for now, follow sensitivity.   Patient received 3 days of Invanz.  Avoid constipation then TOV    HTN-/ Atrial fib:   Eliquis  On Metoprolol tartrate 12.5mg [id number]    Cholecystitis:  HIDA scan + cystic duct/CBD obstruction. Positive for acute cholecystitis on prior admission, s/p cholecystostomy tube.1/13 by IR. Outpatient follow up with surgery.     DM2 type 2:   cont ISS     HLD:   Crestor 5mg qhs    Anemia:   Hgb stable If trends further down, will transfuse  low iron   cont iron and folic acid   Vit C   Monitor CBC    DVT prophylaxis: on heparin infusion.

## 2025-02-04 NOTE — PROGRESS NOTE ADULT - ASSESSMENT
79yr woman  hx Afib, CVA, DM, HTN with  recent L AKA cholecystitis with PCT admitted with poorly healing L stump    L AKA  s/p debridement  cont plan per Vascular - patient with wound vac  monitor for pain    Changed Oxycodone to 2.5mg po q4hr for moderate pain and 5mg q4hr PRN for severe pain to mitigate effect of sedation  Consider Gabapentin 100mg qhs for pain  bowel regimen    Poor nutrition  Poor appetite  Education about nutrition and would healing  Granddaughter states grandmother does not like the food as is why she does not eat.  Patient also needs assistance with meals.  Family to assist in meals  Family to bring food from home.   Rec Nutritional supplements   Daughter Aletha reports patient eating more  If still poor, consider appetite stimulant  Marinol    Cholecystitis  s/p PCT    Debility  Assist with care  Turn/reposition  Safety precautions    Advance Directives  Patient is ,  is in [place].  She has 3 children.  Absent a HCP form, if  patient is without capacity and  is unable to be reached, patient's children are equal surrogates.   Patient remains without capacity to make decision about a HCP  Advance directives discussed CPR, Intubation.  Granddaughter states grandmother had made her wishes known at the [facility] and would want full interventions.     Encounter for Palliative Care  Palliative Care consulted to assist with goals of care  Family wish to go to rehab to continue treatments    79yr woman  hx Afib, CVA, DM, HTN with  recent L AKA cholecystitis with PCT admitted with poorly healing L stump    L AKA  s/p debridement  cont plan per Vascular - patient with wound vac  monitor for pain    Changed Oxycodone to 2.5mg po q4hr for moderate pain and 5mg q4hr PRN for severe pain to mitigate effect of sedation  Consider Gabapentin 100mg qhs for pain  bowel regimen    Poor nutrition  Poor appetite  Education about nutrition and would healing  Granddaughter states grandmother does not like the food as is why she does not eat.  Patient also needs assistance with meals.  Family to assist in meals  Family to bring food from home.   Rec Nutritional supplements   Daughter Aletha reports patient eating more  If still poor, consider appetite stimulant  Marinol    Cholecystitis  s/p PCT    Debility  Assist with care  Turn/reposition  Safety precautions    Advance Directives  Patient is ,  is in [place].  She has 3 children.  Absent a HCP form, if  patient is without capacity and  is unable to be reached, patient's children are equal surrogates.   Patient remains without capacity to make decision about a HCP  Advance directives discussed CPR, Intubation.  Granddaughter states grandmother had made her wishes known at the [facility] and would want full interventions.     Encounter for Palliative Care  Palliative Care consulted to assist with goals of care  Edgar Pompa at bedside  [id number] Bernstein  Family wish to go to rehab to continue treatments

## 2025-02-04 NOTE — CHART NOTE - NSCHARTNOTEFT_GEN_A_CORE
Source: Patient [ ]  Family [x ]   other [ x]    Current Diet: Diet, Regular:   Consistent Carbohydrate {Evening Snack} (CSTCHOSN) (01-30-25 @ 18:20)      Patient reports [ ] nausea  [ ] vomiting [ ] diarrhea [ ] constipation  [ ]chewing problems [ ] swallowing issues  [ ] other:     PO intake:  < 50% [x ]   50-75%  [ ]   %  [ ]  other :    Source for PO intake [ ] Patient [ x] family [ ] chart [ ] staff [ ] other    Current Weight:   (1/28) 163.1 lbs  No new weight  Noted with 1+ left stump edema, continue to monitor    Pertinent Medications: MEDICATIONS  (STANDING):  apixaban 2.5 milliGRAM(s) Oral every 12 hours  ascorbic acid 500 milliGRAM(s) Oral daily  dextrose 5%. 1000 milliLiter(s) (50 mL/Hr) IV Continuous <Continuous>  dextrose 5%. 1000 milliLiter(s) (100 mL/Hr) IV Continuous <Continuous>  dextrose 5%. 1000 milliLiter(s) (100 mL/Hr) IV Continuous <Continuous>  folic acid 1 milliGRAM(s) Oral daily  insulin lispro (ADMELOG) corrective regimen sliding scale   SubCutaneous three times a day before meals  metoprolol tartrate 12.5 milliGRAM(s) Oral every 8 hours  mirtazapine 7.5 milliGRAM(s) Oral daily  multivitamin/minerals 1 Tablet(s) Oral daily  pantoprazole   Suspension 40 milliGRAM(s) Oral daily  rosuvastatin 5 milliGRAM(s) Oral at bedtime    MEDICATIONS  (PRN):  ondansetron Injectable 4 milliGRAM(s) IV Push every 6 hours PRN Nausea  oxyCODONE    IR 10 milliGRAM(s) Oral every 4 hours PRN Severe Pain (7 - 10)  oxyCODONE    IR 5 milliGRAM(s) Oral every 4 hours PRN Moderate Pain (4 - 6)  senna 2 Tablet(s) Oral at bedtime PRN Constipation    Pertinent Labs: CBC Full  -  ( 04 Feb 2025 07:00 )  WBC Count : 5.61 K/uL  RBC Count : 3.51 M/uL  Hemoglobin : 9.0 g/dL  Hematocrit : 30.0 %    02-04 Na136 mmol/L Glu 106 mg/dL[H] K+ 4.7 mmol/L Cr  0.59 mg/dL BUN 9.5 mg/dL Phos 3.4 mg/dL Alb n/a   PAB n/a       Skin: Suspected DTI to right heel, surgical incision to left stump, b/l groins per documentation  Yehuda: 11    Nutrition focused physical exam previously conducted - found signs of malnutrition [ ]absent [ x]present    Subcutaneous fat loss: [x ] Orbital fat pads region, [ x]Buccal fat region, [ ]Triceps region,  [ ]Ribs region    Muscle wasting: [ x]Temples region, [ x]Clavicle region, [ x]Shoulder region, [ ]Scapula region, [ ]Interosseous region,  [ ]thigh region, [ ]Calf region    Estimated Needs:   [x ] no change since previous assessment  [ ] recalculated:     Current Nutrition Diagnosis: Pt remains at high nutrition risk secondary to malnutrition (severe, chronic) related to inability to meet sufficient protein-energy in setting of recent L AKA and cholecystitis, poor appetite, failure to thrive, poor skin integrity as evidenced by meeting <75% nutrient needs >1 month and NFPE findings. Pt lethargic at time of interview. Spoke with granddaughter at bedside, reports pt eating small amounts. Lunch tray observed at bedside, ~50% consumed per plate waste. Granddaughter asking about medication to increase pts appetite- noted Remeron in place. E      Recommendations:   1) Continue diet as feasible/tolerable.  2) Add Glucerna BID (220 kcal, 10g protein per serving).   3) Continue MVI, folic acid, and vitamin C supplementation.   4) Continue Remeron to potentially increase appetite.   5) Encourage po intake, monitor diet tolerance, and provide assistance at meals as needed.   6) Obtain daily weights to monitor trends.      Monitoring and Evaluation:   [ x] PO intake [x ] Tolerance to diet prescription [X] Weights  [X] Follow up per protocol [X] Labs: chem 8, mg, phos, H/H Source: Patient [ ]  Family [x ]   other [ x]    Current Diet: Diet, Regular:   Consistent Carbohydrate {Evening Snack} (CSTCHOSN) (01-30-25 @ 18:20)      Patient reports [ ] nausea  [ ] vomiting [ ] diarrhea [ ] constipation  [ ]chewing problems [ ] swallowing issues  [ ] other:     PO intake:  < 50% [x ]   50-75%  [ ]   %  [ ]  other :    Source for PO intake [ ] Patient [ x] family [ ] chart [ ] staff [ ] other    Current Weight:   (1/28) 163.1 lbs  No new weight  Noted with 1+ left stump edema, continue to monitor    Pertinent Medications: MEDICATIONS  (STANDING):  apixaban 2.5 milliGRAM(s) Oral every 12 hours  ascorbic acid 500 milliGRAM(s) Oral daily  dextrose 5%. 1000 milliLiter(s) (50 mL/Hr) IV Continuous <Continuous>  dextrose 5%. 1000 milliLiter(s) (100 mL/Hr) IV Continuous <Continuous>  dextrose 5%. 1000 milliLiter(s) (100 mL/Hr) IV Continuous <Continuous>  folic acid 1 milliGRAM(s) Oral daily  insulin lispro (ADMELOG) corrective regimen sliding scale   SubCutaneous three times a day before meals  metoprolol tartrate 12.5 milliGRAM(s) Oral every 8 hours  mirtazapine 7.5 milliGRAM(s) Oral daily  multivitamin/minerals 1 Tablet(s) Oral daily  pantoprazole   Suspension 40 milliGRAM(s) Oral daily  rosuvastatin 5 milliGRAM(s) Oral at bedtime    MEDICATIONS  (PRN):  ondansetron Injectable 4 milliGRAM(s) IV Push every 6 hours PRN Nausea  oxyCODONE    IR 10 milliGRAM(s) Oral every 4 hours PRN Severe Pain (7 - 10)  oxyCODONE    IR 5 milliGRAM(s) Oral every 4 hours PRN Moderate Pain (4 - 6)  senna 2 Tablet(s) Oral at bedtime PRN Constipation    Pertinent Labs: CBC Full  -  ( 04 Feb 2025 07:00 )  WBC Count : 5.61 K/uL  RBC Count : 3.51 M/uL  Hemoglobin : 9.0 g/dL  Hematocrit : 30.0 %    02-04 Na136 mmol/L Glu 106 mg/dL[H] K+ 4.7 mmol/L Cr  0.59 mg/dL BUN 9.5 mg/dL Phos 3.4 mg/dL Alb n/a   PAB n/a       Skin: Suspected DTI to right heel, surgical incision to left stump, b/l groins per documentation  Yehuda: 11    Nutrition focused physical exam previously conducted - found signs of malnutrition [ ]absent [ x]present    Subcutaneous fat loss: [x ] Orbital fat pads region, [ x]Buccal fat region, [ ]Triceps region,  [ ]Ribs region    Muscle wasting: [ x]Temples region, [ x]Clavicle region, [ x]Shoulder region, [ ]Scapula region, [ ]Interosseous region,  [ ]thigh region, [ ]Calf region    Estimated Needs:   [x ] no change since previous assessment  [ ] recalculated:     Current Nutrition Diagnosis: Pt remains at high nutrition risk secondary to malnutrition (severe, chronic) related to inability to meet sufficient protein-energy in setting of recent L AKA and cholecystitis, poor appetite, failure to thrive, poor skin integrity as evidenced by meeting <75% nutrient needs >1 month and NFPE findings. Pt lethargic at time of interview. Spoke with granddaughter at bedside, reports pt eating small amounts. Lunch tray observed at bedside, ~50% consumed per plate waste. Granddaughter asking about medication to increase pts appetite- noted Remeron in place. RD to follow up as feasible.       Recommendations:   1) Continue diet as feasible/tolerable.  2) Add Glucerna BID (220 kcal, 10g protein per serving).   3) Continue MVI, folic acid, and vitamin C supplementation.   4) Continue Remeron to potentially increase appetite.   5) Encourage po intake, monitor diet tolerance, and provide assistance at meals as needed.   6) Obtain daily weights to monitor trends.      Monitoring and Evaluation:   [ x] PO intake [x ] Tolerance to diet prescription [X] Weights  [X] Follow up per protocol [X] Labs: chem 8, mg, phos, H/H

## 2025-02-04 NOTE — PROGRESS NOTE ADULT - SUBJECTIVE AND OBJECTIVE BOX
[facility] Division of Hospital Medicine    Chief Complaint:  Left AKA wound dehiscence    SUBJECTIVE / OVERNIGHT EVENTS: Patient seen and examined. She is tired today.     Patient denies chest pain, SOB, abd pain, N/V, fever, chills, dysuria or any other complaints. All remainder ROS negative.     MEDICATIONS  (STANDING):  apixaban 2.5 milliGRAM(s) Oral every 12 hours  ascorbic acid 500 milliGRAM(s) Oral daily  chlorhexidine 2% Cloths 1 Application(s) Topical <User Schedule>  dextrose 5%. 1000 milliLiter(s) (100 mL/Hr) IV Continuous <Continuous>  dextrose 5%. 1000 milliLiter(s) (50 mL/Hr) IV Continuous <Continuous>  dextrose 5%. 1000 milliLiter(s) (100 mL/Hr) IV Continuous <Continuous>  dextrose 50% Injectable 25 Gram(s) IV Push once  dextrose 50% Injectable 12.5 Gram(s) IV Push once  dextrose 50% Injectable 25 Gram(s) IV Push once  folic acid 1 milliGRAM(s) Oral daily  glucagon  Injectable 1 milliGRAM(s) IntraMuscular once  glucagon  Injectable 1 milliGRAM(s) IntraMuscular once  insulin lispro (ADMELOG) corrective regimen sliding scale   SubCutaneous three times a day before meals  metoprolol tartrate 12.5 milliGRAM(s) Oral every 8 hours  mirtazapine 7.5 milliGRAM(s) Oral daily  multivitamin/minerals 1 Tablet(s) Oral daily  pantoprazole   Suspension 40 milliGRAM(s) Oral daily  rosuvastatin 5 milliGRAM(s) Oral at bedtime    MEDICATIONS  (PRN):  acetaminophen     Tablet .. 650 milliGRAM(s) Oral every 6 hours PRN Temp greater or equal to 38C (100.4F), Mild Pain (1 - 3)  dextrose Oral Gel 15 Gram(s) Oral once PRN Blood Glucose LESS THAN 70 milliGRAM(s)/deciliter  ondansetron Injectable 4 milliGRAM(s) IV Push every 6 hours PRN Nausea  oxyCODONE    IR 5 milliGRAM(s) Oral every 4 hours PRN Moderate Pain (4 - 6)  oxyCODONE    IR 10 milliGRAM(s) Oral every 4 hours PRN Severe Pain (7 - 10)  senna 2 Tablet(s) Oral at bedtime PRN Constipation        I&O's Summary    03 Feb 2025 07:01  -  04 Feb 2025 07:00  --------------------------------------------------------  IN: 440 mL / OUT: 1870 mL / NET: -1430 mL        PHYSICAL EXAM:  Vital Signs Last 24 Hrs  T(C): 36.8 (04 Feb 2025 08:34), Max: 37.6 (03 Feb 2025 20:35)  T(F): 98.2 (04 Feb 2025 08:34), Max: 99.6 (03 Feb 2025 20:35)  HR: 89 (04 Feb 2025 08:34) (85 - 102)  BP: 122/59 (04 Feb 2025 08:34) (101/58 - 122/59)  BP(mean): --  RR: 18 (04 Feb 2025 08:34) (15 - 18)  SpO2: 97% (04 Feb 2025 08:34) (94% - 98%)    Parameters below as of 04 Feb 2025 04:18  Patient On (Oxygen Delivery Method): room air            CONSTITUTIONAL: NAD, elderly, chronically ill appearing  ENMT: Moist oral mucosa, no pharyngeal injection or exudates  RESPIRATORY: Normal respiratory effort; lungs are clear to auscultation bilaterally  CARDIOVASCULAR: Regular rate and rhythm, normal S1 and S2,  No lower extremity edema;  ABDOMEN: Nontender to palpation, normoactive bowel sounds, no rebound/guarding;   MUSCLOSKELETAL:  no joint swelling or tenderness to palpation  PSYCH: A+O to person, place, and time; affect appropriate  NEUROLOGY: CN 2-12 are intact and symmetric  SKIN: No rashes; no palpable lesions    LABS:                        9.0    5.61  )-----------( 155      ( 04 Feb 2025 07:00 )             30.0     02-04    136  |  106  |  9.5  ----------------------------<  106[H]  4.7   |  20.0[L]  |  0.59    Ca    7.6[L]      04 Feb 2025 07:00  Phos  3.4     02-04  Mg     1.7     02-04    TPro  4.5[L]  /  Alb  1.7[L]  /  TBili  0.3[L]  /  DBili  x   /  AST  19  /  ALT  <5  /  AlkPhos  156[H]  02-03    PT/INR - ( 03 Feb 2025 06:20 )   PT: 22.3 sec;   INR: 1.94 ratio         PTT - ( 03 Feb 2025 14:10 )  PTT:162.5 sec      Urinalysis Basic - ( 04 Feb 2025 07:00 )    Color: x / Appearance: x / SG: x / pH: x  Gluc: 106 mg/dL / Ketone: x  / Bili: x / Urobili: x   Blood: x / Protein: x / Nitrite: x   Leuk Esterase: x / RBC: x / WBC x   Sq Epi: x / Non Sq Epi: x / Bacteria: x        CAPILLARY BLOOD GLUCOSE      POCT Blood Glucose.: 123 mg/dL (04 Feb 2025 08:05)  POCT Blood Glucose.: 110 mg/dL (03 Feb 2025 20:41)  POCT Blood Glucose.: 125 mg/dL (03 Feb 2025 17:07)        RADIOLOGY & ADDITIONAL TESTS:  Results Reviewed:   Imaging Personally Reviewed:  Electrocardiogram Personally Reviewed:                                           [facility] Division of Hospital Medicine    Chief Complaint:  Left AKA wound dehiscence    SUBJECTIVE / OVERNIGHT EVENTS: Patient seen and examined. She is tired today.     Patient denies chest pain, SOB, abd pain, N/V, fever, chills, dysuria or any other complaints. All remainder ROS negative.     MEDICATIONS  (STANDING):  apixaban 2.5 milliGRAM(s) Oral every 12 hours  ascorbic acid 500 milliGRAM(s) Oral daily  chlorhexidine 2% Cloths 1 Application(s) Topical <User Schedule>  dextrose 5%. 1000 milliLiter(s) (100 mL/Hr) IV Continuous <Continuous>  dextrose 5%. 1000 milliLiter(s) (50 mL/Hr) IV Continuous <Continuous>  dextrose 5%. 1000 milliLiter(s) (100 mL/Hr) IV Continuous <Continuous>  dextrose 50% Injectable 25 Gram(s) IV Push once  dextrose 50% Injectable 12.5 Gram(s) IV Push once  dextrose 50% Injectable 25 Gram(s) IV Push once  folic acid 1 milliGRAM(s) Oral daily  glucagon  Injectable 1 milliGRAM(s) IntraMuscular once  glucagon  Injectable 1 milliGRAM(s) IntraMuscular once  insulin lispro (ADMELOG) corrective regimen sliding scale   SubCutaneous three times a day before meals  metoprolol tartrate 12.5 milliGRAM(s) Oral every 8 hours  mirtazapine 7.5 milliGRAM(s) Oral daily  multivitamin/minerals 1 Tablet(s) Oral daily  pantoprazole   Suspension 40 milliGRAM(s) Oral daily  rosuvastatin 5 milliGRAM(s) Oral at bedtime    MEDICATIONS  (PRN):  acetaminophen     Tablet .. 650 milliGRAM(s) Oral every 6 hours PRN Temp greater or equal to 38C (100.4F), Mild Pain (1 - 3)  dextrose Oral Gel 15 Gram(s) Oral once PRN Blood Glucose LESS THAN 70 milliGRAM(s)/deciliter  ondansetron Injectable 4 milliGRAM(s) IV Push every 6 hours PRN Nausea  oxyCODONE    IR 5 milliGRAM(s) Oral every 4 hours PRN Moderate Pain (4 - 6)  oxyCODONE    IR 10 milliGRAM(s) Oral every 4 hours PRN Severe Pain (7 - 10)  senna 2 Tablet(s) Oral at bedtime PRN Constipation        I&O's Summary    03 Feb 2025 07:01  -  04 Feb 2025 07:00  --------------------------------------------------------  IN: 440 mL / OUT: 1870 mL / NET: -1430 mL        PHYSICAL EXAM:  Vital Signs Last 24 Hrs  T(C): 36.8 (04 Feb 2025 08:34), Max: 37.6 (03 Feb 2025 20:35)  T(F): 98.2 (04 Feb 2025 08:34), Max: 99.6 (03 Feb 2025 20:35)  HR: 89 (04 Feb 2025 08:34) (85 - 102)  BP: 122/59 (04 Feb 2025 08:34) (101/58 - 122/59)  BP(mean): --  RR: 18 (04 Feb 2025 08:34) (15 - 18)  SpO2: 97% (04 Feb 2025 08:34) (94% - 98%)    Parameters below as of 04 Feb 2025 04:18  Patient On (Oxygen Delivery Method): room air            CONSTITUTIONAL: NAD, elderly, chronically ill appearing  ENMT: Moist oral mucosa, no pharyngeal injection or exudates  RESPIRATORY: decreased breath sounds in the bases  CARDIOVASCULAR: Regular rate and rhythm, normal S1 and S2,    ABDOMEN: Nontender to palpation, normoactive bowel sounds, no rebound/guarding; + drain  MUSCLOSKELETAL:  left aka, + wound vac  PSYCH: A+O to person, place, and time; affect appropriate  NEUROLOGY: CN 2-12 are intact and symmetric  SKIN: No rashes; no palpable lesions    LABS:                        9.0    5.61  )-----------( 155      ( 04 Feb 2025 07:00 )             30.0     02-04    136  |  106  |  9.5  ----------------------------<  106[H]  4.7   |  20.0[L]  |  0.59    Ca    7.6[L]      04 Feb 2025 07:00  Phos  3.4     02-04  Mg     1.7     02-04    TPro  4.5[L]  /  Alb  1.7[L]  /  TBili  0.3[L]  /  DBili  x   /  AST  19  /  ALT  <5  /  AlkPhos  156[H]  02-03    PT/INR - ( 03 Feb 2025 06:20 )   PT: 22.3 sec;   INR: 1.94 ratio         PTT - ( 03 Feb 2025 14:10 )  PTT:162.5 sec      Urinalysis Basic - ( 04 Feb 2025 07:00 )    Color: x / Appearance: x / SG: x / pH: x  Gluc: 106 mg/dL / Ketone: x  / Bili: x / Urobili: x   Blood: x / Protein: x / Nitrite: x   Leuk Esterase: x / RBC: x / WBC x   Sq Epi: x / Non Sq Epi: x / Bacteria: x        CAPILLARY BLOOD GLUCOSE      POCT Blood Glucose.: 123 mg/dL (04 Feb 2025 08:05)  POCT Blood Glucose.: 110 mg/dL (03 Feb 2025 20:41)  POCT Blood Glucose.: 125 mg/dL (03 Feb 2025 17:07)        RADIOLOGY & ADDITIONAL TESTS:  Results Reviewed:   Imaging Personally Reviewed:  Electrocardiogram Personally Reviewed:

## 2025-02-04 NOTE — PROGRESS NOTE ADULT - TIME BILLING
Time spent with review of chart documents, labs, imaging. Direct patient assessment,  formulation of care plan, documentation. Discussion with  Interdisciplinary  team  with an additional  ACP  of  20    minutes  This time is exclusive of the encounter
Time spent with review of chart documents, labs, imaging. Direct patient assessment,  formulation of care plan, documentation. Discussion with  Interdisciplinary  team  JOSE Moreau

## 2025-02-04 NOTE — PROGRESS NOTE ADULT - SUBJECTIVE AND OBJECTIVE BOX
Subjective: patient evaluated and examined at the bedside. no overnight events. patient does not report acute complaints     MEDICATIONS  (STANDING):  apixaban 5 milliGRAM(s) Oral two times a day  ascorbic acid 500 milliGRAM(s) Oral daily  chlorhexidine 2% Cloths 1 Application(s) Topical <User Schedule>  dextrose 5%. 1000 milliLiter(s) (100 mL/Hr) IV Continuous <Continuous>  dextrose 5%. 1000 milliLiter(s) (50 mL/Hr) IV Continuous <Continuous>  dextrose 5%. 1000 milliLiter(s) (100 mL/Hr) IV Continuous <Continuous>  dextrose 50% Injectable 25 Gram(s) IV Push once  dextrose 50% Injectable 12.5 Gram(s) IV Push once  dextrose 50% Injectable 25 Gram(s) IV Push once  folic acid 1 milliGRAM(s) Oral daily  glucagon  Injectable 1 milliGRAM(s) IntraMuscular once  glucagon  Injectable 1 milliGRAM(s) IntraMuscular once  insulin lispro (ADMELOG) corrective regimen sliding scale   SubCutaneous three times a day before meals  metoprolol tartrate 12.5 milliGRAM(s) Oral every 8 hours  mirtazapine 7.5 milliGRAM(s) Oral daily  multivitamin/minerals 1 Tablet(s) Oral daily  pantoprazole   Suspension 40 milliGRAM(s) Oral daily  rosuvastatin 5 milliGRAM(s) Oral at bedtime    MEDICATIONS  (PRN):  acetaminophen     Tablet .. 650 milliGRAM(s) Oral every 6 hours PRN Temp greater or equal to 38C (100.4F), Mild Pain (1 - 3)  dextrose Oral Gel 15 Gram(s) Oral once PRN Blood Glucose LESS THAN 70 milliGRAM(s)/deciliter  ondansetron Injectable 4 milliGRAM(s) IV Push every 6 hours PRN Nausea  oxyCODONE    IR 5 milliGRAM(s) Oral every 4 hours PRN Moderate Pain (4 - 6)  oxyCODONE    IR 10 milliGRAM(s) Oral every 4 hours PRN Severe Pain (7 - 10)  senna 2 Tablet(s) Oral at bedtime PRN Constipation      Vital Signs Last 24 Hrs  T(C): 36.9 (04 Feb 2025 04:18), Max: 37.6 (03 Feb 2025 20:35)  T(F): 98.5 (04 Feb 2025 04:18), Max: 99.6 (03 Feb 2025 20:35)  HR: 85 (04 Feb 2025 04:18) (83 - 102)  BP: 121/83 (04 Feb 2025 04:18) (98/64 - 121/83)  BP(mean): --  RR: 15 (04 Feb 2025 04:18) (15 - 18)  SpO2: 94% (04 Feb 2025 04:18) (94% - 98%)    Parameters below as of 04 Feb 2025 04:18  Patient On (Oxygen Delivery Method): room air        Physical Exam:    Constitutional: NAD  Respiratory: Respirations non-labored, no accessory muscle use  : muñoz intact draining yellow urine   Extremities: LLE s/p AKA, wound vac intact       LABS:                        8.2    5.41  )-----------( 170      ( 03 Feb 2025 14:10 )             27.5     02-03    136  |  107  |  11.4  ----------------------------<  103[H]  4.5   |  22.0  |  0.60    Ca    7.4[L]      03 Feb 2025 06:20  Phos  3.0     02-03  Mg     1.6     02-03    TPro  4.5[L]  /  Alb  1.7[L]  /  TBili  0.3[L]  /  DBili  x   /  AST  19  /  ALT  <5  /  AlkPhos  156[H]  02-03    PT/INR - ( 03 Feb 2025 06:20 )   PT: 22.3 sec;   INR: 1.94 ratio         PTT - ( 03 Feb 2025 14:10 )  PTT:162.5 sec  Urinalysis Basic - ( 03 Feb 2025 06:20 )    Color: x / Appearance: x / SG: x / pH: x  Gluc: 103 mg/dL / Ketone: x  / Bili: x / Urobili: x   Blood: x / Protein: x / Nitrite: x   Leuk Esterase: x / RBC: x / WBC x   Sq Epi: x / Non Sq Epi: x / Bacteria: x        A: 79F POD#5 s/p L AKA revision due to wound dehiscence. Remains HD stable and afebrile     Plan:   - cont Eliquis   - diet: CC   - MM pain control prn   - s/p wound vac change on 2/3   - strict I/Os  - f/u urine cultures - E coli on prelim   - f/u am labs, replete lytes prn   - ongoing GOC discussions w/ palliative, family requesting home care i/o ISA   - dispo planning

## 2025-02-04 NOTE — PROGRESS NOTE ADULT - SUBJECTIVE AND OBJECTIVE BOX
CC:  Follow up GOC , Symptoms    OVERNIGHT EVENTS:  daughter reports sleeps a lot  eating more when awake    Present Symptoms:   Dyspnea:  No    Nausea/Vomiting:  No    Anxiety/ Agitation No     Depression:  No    Fatigue:  Yes    Loss of appetite:  Yes - reported improved  Constipation: No- BM today  Pain:  yes / stump             Location            Duration            Character            Severity            Factors            Effect    Pain AD Score:  [email or web address] (press ctrl + left click to view)    Review of Systems: Reviewed as above  All others negative  Further ROS  limited to  obtain due to poor  historian    MEDICATIONS  (STANDING):  apixaban 2.5 milliGRAM(s) Oral every 12 hours  ascorbic acid 500 milliGRAM(s) Oral daily  chlorhexidine 2% Cloths 1 Application(s) Topical <User Schedule>  dextrose 5%. 1000 milliLiter(s) (100 mL/Hr) IV Continuous <Continuous>  dextrose 5%. 1000 milliLiter(s) (50 mL/Hr) IV Continuous <Continuous>  dextrose 5%. 1000 milliLiter(s) (100 mL/Hr) IV Continuous <Continuous>  dextrose 50% Injectable 25 Gram(s) IV Push once  dextrose 50% Injectable 12.5 Gram(s) IV Push once  dextrose 50% Injectable 25 Gram(s) IV Push once  folic acid 1 milliGRAM(s) Oral daily  glucagon  Injectable 1 milliGRAM(s) IntraMuscular once  glucagon  Injectable 1 milliGRAM(s) IntraMuscular once  insulin lispro (ADMELOG) corrective regimen sliding scale   SubCutaneous three times a day before meals  metoprolol tartrate 12.5 milliGRAM(s) Oral every 8 hours  mirtazapine 7.5 milliGRAM(s) Oral daily  multivitamin/minerals 1 Tablet(s) Oral daily  pantoprazole   Suspension 40 milliGRAM(s) Oral daily  rosuvastatin 5 milliGRAM(s) Oral at bedtime    MEDICATIONS  (PRN):  acetaminophen     Tablet .. 650 milliGRAM(s) Oral every 6 hours PRN Temp greater or equal to 38C (100.4F), Mild Pain (1 - 3)  dextrose Oral Gel 15 Gram(s) Oral once PRN Blood Glucose LESS THAN 70 milliGRAM(s)/deciliter  ondansetron Injectable 4 milliGRAM(s) IV Push every 6 hours PRN Nausea  oxyCODONE    IR 2.5 milliGRAM(s) Oral every 4 hours PRN Moderate Pain (4 - 6)  oxyCODONE    IR 5 milliGRAM(s) Oral every 4 hours PRN Severe Pain (7 - 10)  senna 2 Tablet(s) Oral at bedtime PRN Constipation    PHYSICAL EXAM:  Vital Signs Last 24 Hrs  T(C): 36.3 (04 Feb 2025 14:44), Max: 37.6 (03 Feb 2025 20:35)  T(F): 97.4 (04 Feb 2025 14:44), Max: 99.6 (03 Feb 2025 20:35)  HR: 75 (04 Feb 2025 14:44) (75 - 102)  BP: 110/78 (04 Feb 2025 14:44) (108/70 - 122/59)  BP(mean): --  RR: 17 (04 Feb 2025 14:44) (15 - 18)  SpO2: 96% (04 Feb 2025 14:44) (94% - 98%)    Parameters below as of 04 Feb 2025 14:44  Patient On (Oxygen Delivery Method): room air      Karnofsky: 40%  General:  Frail elderly woman NAD       HEENT:  NCAT   mmm  Lungs: comfortable  non labored  CV:  RR  GI: soft NTND  MSK: cb/bb  Skin:  warm/dry  Neuro  AOx1 no focal deficits  Psych pleasant, calm cooperative    LABS:                          9.0    5.61  )-----------( 155      ( 04 Feb 2025 07:00 )             30.0     02-04    136  |  106  |  9.5  ----------------------------<  106[H]  4.7   |  20.0[L]  |  0.59    Ca    7.6[L]      04 Feb 2025 07:00  Phos  3.4     02-04  Mg     1.7     02-04    TPro  4.5[L]  /  Alb  1.7[L]  /  TBili  0.3[L]  /  DBili  x   /  AST  19  /  ALT  <5  /  AlkPhos  156[H]  02-03    PT/INR - ( 03 Feb 2025 06:20 )   PT: 22.3 sec;   INR: 1.94 ratio         PTT - ( 03 Feb 2025 14:10 )  PTT:162.5 sec  Urinalysis Basic - ( 04 Feb 2025 07:00 )    Color: x / Appearance: x / SG: x / pH: x  Gluc: 106 mg/dL / Ketone: x  / Bili: x / Urobili: x   Blood: x / Protein: x / Nitrite: x   Leuk Esterase: x / RBC: x / WBC x   Sq Epi: x / Non Sq Epi: x / Bacteria: x      I&O's Summary    03 Feb 2025 07:01  -  04 Feb 2025 07:00  --------------------------------------------------------  IN: 440 mL / OUT: 1870 mL / NET: -1430 mL    04 Feb 2025 07:01  -  04 Feb 2025 15:35  --------------------------------------------------------  IN: 240 mL / OUT: 490 mL / NET: -250 mL

## 2025-02-05 ENCOUNTER — TRANSCRIPTION ENCOUNTER (OUTPATIENT)
Age: 80
End: 2025-02-05

## 2025-02-05 LAB
GLUCOSE BLDC GLUCOMTR-MCNC: 102 MG/DL — HIGH (ref 70–99)
GLUCOSE BLDC GLUCOMTR-MCNC: 103 MG/DL — HIGH (ref 70–99)
GLUCOSE BLDC GLUCOMTR-MCNC: 109 MG/DL — HIGH (ref 70–99)
GLUCOSE BLDC GLUCOMTR-MCNC: 119 MG/DL — HIGH (ref 70–99)
HCT VFR BLD CALC: 29.2 % — LOW (ref 34.5–45)
HGB BLD-MCNC: 8.7 G/DL — LOW (ref 11.5–15.5)
MCHC RBC-ENTMCNC: 25 PG — LOW (ref 27–34)
MCHC RBC-ENTMCNC: 29.8 G/DL — LOW (ref 32–36)
MCV RBC AUTO: 83.9 FL — SIGNIFICANT CHANGE UP (ref 80–100)
PLATELET # BLD AUTO: 199 K/UL — SIGNIFICANT CHANGE UP (ref 150–400)
RBC # BLD: 3.48 M/UL — LOW (ref 3.8–5.2)
RBC # FLD: 17.2 % — HIGH (ref 10.3–14.5)
WBC # BLD: 5.9 K/UL — SIGNIFICANT CHANGE UP (ref 3.8–10.5)
WBC # FLD AUTO: 5.9 K/UL — SIGNIFICANT CHANGE UP (ref 3.8–10.5)

## 2025-02-05 PROCEDURE — 99232 SBSQ HOSP IP/OBS MODERATE 35: CPT

## 2025-02-05 RX ADMIN — OXYCODONE HYDROCHLORIDE 5 MILLIGRAM(S): 30 TABLET ORAL at 00:25

## 2025-02-05 RX ADMIN — OXYCODONE HYDROCHLORIDE 5 MILLIGRAM(S): 30 TABLET ORAL at 22:29

## 2025-02-05 RX ADMIN — Medication 12.5 MILLIGRAM(S): at 05:26

## 2025-02-05 RX ADMIN — OXYCODONE HYDROCHLORIDE 5 MILLIGRAM(S): 30 TABLET ORAL at 21:29

## 2025-02-05 RX ADMIN — Medication 12.5 MILLIGRAM(S): at 13:28

## 2025-02-05 RX ADMIN — Medication 12.5 MILLIGRAM(S): at 21:28

## 2025-02-05 RX ADMIN — APIXABAN 2.5 MILLIGRAM(S): 5 TABLET, FILM COATED ORAL at 17:44

## 2025-02-05 RX ADMIN — APIXABAN 2.5 MILLIGRAM(S): 5 TABLET, FILM COATED ORAL at 05:27

## 2025-02-05 RX ADMIN — Medication 1 MILLIGRAM(S): at 13:28

## 2025-02-05 RX ADMIN — Medication 500 MILLIGRAM(S): at 13:28

## 2025-02-05 RX ADMIN — Medication 1 TABLET(S): at 13:28

## 2025-02-05 RX ADMIN — ROSUVASTATIN CALCIUM 5 MILLIGRAM(S): 10 TABLET, FILM COATED ORAL at 21:29

## 2025-02-05 RX ADMIN — OXYCODONE HYDROCHLORIDE 5 MILLIGRAM(S): 30 TABLET ORAL at 01:25

## 2025-02-05 RX ADMIN — PANTOPRAZOLE 40 MILLIGRAM(S): 20 TABLET, DELAYED RELEASE ORAL at 13:28

## 2025-02-05 RX ADMIN — ANTISEPTIC SURGICAL SCRUB 1 APPLICATION(S): 0.04 SOLUTION TOPICAL at 05:27

## 2025-02-05 RX ADMIN — MIRTAZAPINE 7.5 MILLIGRAM(S): 30 TABLET, FILM COATED ORAL at 13:29

## 2025-02-05 NOTE — PROGRESS NOTE ADULT - SUBJECTIVE AND OBJECTIVE BOX
Subjective: Patient was seen and examined at bedside. No overnight events.       STATUS POST:      POST OPERATIVE DAY #:     MEDICATIONS  (STANDING):  apixaban 2.5 milliGRAM(s) Oral every 12 hours  ascorbic acid 500 milliGRAM(s) Oral daily  chlorhexidine 2% Cloths 1 Application(s) Topical <User Schedule>  dextrose 5%. 1000 milliLiter(s) (100 mL/Hr) IV Continuous <Continuous>  dextrose 5%. 1000 milliLiter(s) (50 mL/Hr) IV Continuous <Continuous>  dextrose 5%. 1000 milliLiter(s) (100 mL/Hr) IV Continuous <Continuous>  dextrose 50% Injectable 25 Gram(s) IV Push once  dextrose 50% Injectable 12.5 Gram(s) IV Push once  dextrose 50% Injectable 25 Gram(s) IV Push once  folic acid 1 milliGRAM(s) Oral daily  glucagon  Injectable 1 milliGRAM(s) IntraMuscular once  glucagon  Injectable 1 milliGRAM(s) IntraMuscular once  insulin lispro (ADMELOG) corrective regimen sliding scale   SubCutaneous three times a day before meals  metoprolol tartrate 12.5 milliGRAM(s) Oral every 8 hours  mirtazapine 7.5 milliGRAM(s) Oral daily  multivitamin/minerals 1 Tablet(s) Oral daily  pantoprazole   Suspension 40 milliGRAM(s) Oral daily  rosuvastatin 5 milliGRAM(s) Oral at bedtime    MEDICATIONS  (PRN):  acetaminophen     Tablet .. 650 milliGRAM(s) Oral every 6 hours PRN Temp greater or equal to 38C (100.4F), Mild Pain (1 - 3)  dextrose Oral Gel 15 Gram(s) Oral once PRN Blood Glucose LESS THAN 70 milliGRAM(s)/deciliter  ondansetron Injectable 4 milliGRAM(s) IV Push every 6 hours PRN Nausea  oxyCODONE    IR 2.5 milliGRAM(s) Oral every 4 hours PRN Moderate Pain (4 - 6)  oxyCODONE    IR 5 milliGRAM(s) Oral every 4 hours PRN Severe Pain (7 - 10)  senna 2 Tablet(s) Oral at bedtime PRN Constipation      Vital Signs Last 24 Hrs  T(C): 36.8 (05 Feb 2025 05:14), Max: 36.8 (04 Feb 2025 08:34)  T(F): 98.2 (05 Feb 2025 05:14), Max: 98.2 (04 Feb 2025 08:34)  HR: 103 (05 Feb 2025 05:14) (75 - 103)  BP: 123/75 (05 Feb 2025 05:14) (106/70 - 123/75)  BP(mean): --  RR: 17 (05 Feb 2025 05:14) (17 - 18)  SpO2: 98% (05 Feb 2025 05:14) (95% - 98%)    Parameters below as of 05 Feb 2025 05:14  Patient On (Oxygen Delivery Method): room air        Physical Exam:    Constitutional: NAD  HEENT: PERRL, EOMI  Neck: No JVD, FROM without pain  : muñoz draining yellow urine   Extremities: Extremities: LLE s/p AKA, wound vac intact       LABS:                        8.7    5.90  )-----------( 199      ( 05 Feb 2025 04:44 )             29.2     02-04    136  |  106  |  9.5  ----------------------------<  106[H]  4.7   |  20.0[L]  |  0.59    Ca    7.6[L]      04 Feb 2025 07:00  Phos  3.4     02-04  Mg     1.7     02-04      PTT - ( 03 Feb 2025 14:10 )  PTT:162.5 sec  Urinalysis Basic - ( 04 Feb 2025 07:00 )    Color: x / Appearance: x / SG: x / pH: x  Gluc: 106 mg/dL / Ketone: x  / Bili: x / Urobili: x   Blood: x / Protein: x / Nitrite: x   Leuk Esterase: x / RBC: x / WBC x   Sq Epi: x / Non Sq Epi: x / Bacteria: x        A: 79F POD#5 s/p L AKA revision due to wound dehiscence. Remains HD stable and afebrile     Plan:   - cont Eliquis   - diet: CC   - MM pain control prn   - s/p wound vac change on 2/3   - strict I/Os  - f/u urine cultures - E coli on prelim   - f/u am labs, replete lytes prn   - ongoing GOC discussions w/ palliative, family requesting home care i/o ISA   - dispo planning to nursing home

## 2025-02-05 NOTE — DISCHARGE NOTE NURSING/CASE MANAGEMENT/SOCIAL WORK - NSDCVIVACCINE_GEN_ALL_CORE_FT
Tdap; 09-Jun-2024 18:10; Grzegorz Lagos (ROSA); [employer]; [id number] (Exp. Date: 01-Dec-2025); IntraMuscular; Deltoid Right.; 0.5 milliLiter(s); VIS (VIS Published: 09-May-2013, VIS Presented: 09-Jun-2024);

## 2025-02-05 NOTE — PROGRESS NOTE ADULT - ASSESSMENT
80 y/o F with Hx of Afib (on eliquis), HTN, DM2, HLD, CVA, limb ischemia s/p left AKA 12/16/24 & b/l femoral thrombectomy and cutdowns. Patient was recently discharged to rehab post AKA, returns with poorly healing stump wound and concern for infection. Admitted to vascular surgery,  Medicine following for medical management.    PAD Hx of left limb ischemia & stump wound  s/p left AKA & b/l femoral thrombectomy and cutdown 12/16/2, now S/P  L AKA revision due to wound dehiscence on 1/30.   wound vac in place to left stump wound  Pain medication PRN/ antiemetics PRN  continue bowel regimen   IS- HOB 30-45  now on Eliquis  now off abx, abx per primary team    Urinary retention/UTI  muñoz placed 1/31. + UC E coli. Has H/O ESBL, was sensitive to Invanz on prior culture, continue for now, follow sensitivity.   Patient received 3 days of Invanz. not considered cauti/complicated uti bc urine cx was sent within 48 hours of muñoz placement  Avoid constipation then TOV    HTN-/ Atrial fib:   Eliquis  On Metoprolol tartrate 12.5mg [id number]    Cholecystitis:  HIDA scan + cystic duct/CBD obstruction. Positive for acute cholecystitis on prior admission, s/p cholecystostomy tube.1/13 by IR. Outpatient follow up with surgery.     DM2 type 2:   cont ISS     HLD:   Crestor 5mg qhs    Anemia:   Hgb stable If trends further down, will transfuse  low iron   cont iron and folic acid   Vit C   Monitor CBC    DVT prophylaxis: on heparin infusion.

## 2025-02-05 NOTE — DISCHARGE NOTE NURSING/CASE MANAGEMENT/SOCIAL WORK - FINANCIAL ASSISTANCE
[facility] provides services at a reduced cost to those who are determined to be eligible through [facility]’s financial assistance program. Information regarding [facility]’s financial assistance program can be found by going to [email or web address] or by calling [phone].

## 2025-02-05 NOTE — DISCHARGE NOTE NURSING/CASE MANAGEMENT/SOCIAL WORK - PATIENT PORTAL LINK FT
You can access the FollowMyHealth Patient Portal offered by [facility] by registering at the following website: [email or web address]. By joining [employer]’s FollowMyHealth portal, you will also be able to view your health information using other applications (apps) compatible with our system.

## 2025-02-06 VITALS — HEART RATE: 100 BPM

## 2025-02-06 LAB
GLUCOSE BLDC GLUCOMTR-MCNC: 139 MG/DL — HIGH (ref 70–99)
GLUCOSE BLDC GLUCOMTR-MCNC: 95 MG/DL — SIGNIFICANT CHANGE UP (ref 70–99)
HCT VFR BLD CALC: 27.9 % — LOW (ref 34.5–45)
HGB BLD-MCNC: 8.7 G/DL — LOW (ref 11.5–15.5)
MCHC RBC-ENTMCNC: 25.5 PG — LOW (ref 27–34)
MCHC RBC-ENTMCNC: 31.2 G/DL — LOW (ref 32–36)
MCV RBC AUTO: 81.8 FL — SIGNIFICANT CHANGE UP (ref 80–100)
PLATELET # BLD AUTO: 203 K/UL — SIGNIFICANT CHANGE UP (ref 150–400)
RBC # BLD: 3.41 M/UL — LOW (ref 3.8–5.2)
RBC # FLD: 17.4 % — HIGH (ref 10.3–14.5)
WBC # BLD: 5.39 K/UL — SIGNIFICANT CHANGE UP (ref 3.8–10.5)
WBC # FLD AUTO: 5.39 K/UL — SIGNIFICANT CHANGE UP (ref 3.8–10.5)

## 2025-02-06 PROCEDURE — 88307 TISSUE EXAM BY PATHOLOGIST: CPT

## 2025-02-06 PROCEDURE — 81001 URINALYSIS AUTO W/SCOPE: CPT

## 2025-02-06 PROCEDURE — 85027 COMPLETE CBC AUTOMATED: CPT

## 2025-02-06 PROCEDURE — 83735 ASSAY OF MAGNESIUM: CPT

## 2025-02-06 PROCEDURE — 73551 X-RAY EXAM OF FEMUR 1: CPT

## 2025-02-06 PROCEDURE — 85025 COMPLETE CBC W/AUTO DIFF WBC: CPT

## 2025-02-06 PROCEDURE — 71045 X-RAY EXAM CHEST 1 VIEW: CPT

## 2025-02-06 PROCEDURE — 80053 COMPREHEN METABOLIC PANEL: CPT

## 2025-02-06 PROCEDURE — 87186 SC STD MICRODIL/AGAR DIL: CPT

## 2025-02-06 PROCEDURE — 36415 COLL VENOUS BLD VENIPUNCTURE: CPT

## 2025-02-06 PROCEDURE — 85730 THROMBOPLASTIN TIME PARTIAL: CPT

## 2025-02-06 PROCEDURE — 99285 EMERGENCY DEPT VISIT HI MDM: CPT | Mod: 25

## 2025-02-06 PROCEDURE — 94640 AIRWAY INHALATION TREATMENT: CPT

## 2025-02-06 PROCEDURE — 84100 ASSAY OF PHOSPHORUS: CPT

## 2025-02-06 PROCEDURE — 93005 ELECTROCARDIOGRAM TRACING: CPT

## 2025-02-06 PROCEDURE — 86900 BLOOD TYPING SEROLOGIC ABO: CPT

## 2025-02-06 PROCEDURE — 99232 SBSQ HOSP IP/OBS MODERATE 35: CPT

## 2025-02-06 PROCEDURE — 85610 PROTHROMBIN TIME: CPT

## 2025-02-06 PROCEDURE — C9399: CPT

## 2025-02-06 PROCEDURE — 86901 BLOOD TYPING SEROLOGIC RH(D): CPT

## 2025-02-06 PROCEDURE — 82962 GLUCOSE BLOOD TEST: CPT

## 2025-02-06 PROCEDURE — C1889: CPT

## 2025-02-06 PROCEDURE — 87086 URINE CULTURE/COLONY COUNT: CPT

## 2025-02-06 PROCEDURE — T1013: CPT

## 2025-02-06 PROCEDURE — 86850 RBC ANTIBODY SCREEN: CPT

## 2025-02-06 PROCEDURE — 80048 BASIC METABOLIC PNL TOTAL CA: CPT

## 2025-02-06 RX ADMIN — MIRTAZAPINE 7.5 MILLIGRAM(S): 30 TABLET, FILM COATED ORAL at 11:56

## 2025-02-06 RX ADMIN — Medication 500 MILLIGRAM(S): at 11:56

## 2025-02-06 RX ADMIN — Medication 1 TABLET(S): at 11:57

## 2025-02-06 RX ADMIN — ANTISEPTIC SURGICAL SCRUB 1 APPLICATION(S): 0.04 SOLUTION TOPICAL at 05:02

## 2025-02-06 RX ADMIN — APIXABAN 2.5 MILLIGRAM(S): 5 TABLET, FILM COATED ORAL at 05:03

## 2025-02-06 RX ADMIN — PANTOPRAZOLE 40 MILLIGRAM(S): 20 TABLET, DELAYED RELEASE ORAL at 11:58

## 2025-02-06 RX ADMIN — Medication 12.5 MILLIGRAM(S): at 05:03

## 2025-02-06 NOTE — PROGRESS NOTE ADULT - SUBJECTIVE AND OBJECTIVE BOX
SUBJECTIVE / 24H EVENTS:    Pt seen and examined at bedside by the team during rounds. Pt found to be resting in bed comfortably with no new acute complaints at the time of examination. Pt denies CP, SOB, N/V, fever, chills.     MEDICATIONS  (STANDING):  apixaban 2.5 milliGRAM(s) Oral every 12 hours  ascorbic acid 500 milliGRAM(s) Oral daily  chlorhexidine 2% Cloths 1 Application(s) Topical <User Schedule>  dextrose 5%. 1000 milliLiter(s) (100 mL/Hr) IV Continuous <Continuous>  dextrose 5%. 1000 milliLiter(s) (50 mL/Hr) IV Continuous <Continuous>  dextrose 5%. 1000 milliLiter(s) (100 mL/Hr) IV Continuous <Continuous>  dextrose 50% Injectable 25 Gram(s) IV Push once  dextrose 50% Injectable 12.5 Gram(s) IV Push once  dextrose 50% Injectable 25 Gram(s) IV Push once  folic acid 1 milliGRAM(s) Oral daily  glucagon  Injectable 1 milliGRAM(s) IntraMuscular once  glucagon  Injectable 1 milliGRAM(s) IntraMuscular once  insulin lispro (ADMELOG) corrective regimen sliding scale   SubCutaneous three times a day before meals  metoprolol tartrate 12.5 milliGRAM(s) Oral every 8 hours  mirtazapine 7.5 milliGRAM(s) Oral daily  multivitamin/minerals 1 Tablet(s) Oral daily  pantoprazole   Suspension 40 milliGRAM(s) Oral daily  rosuvastatin 5 milliGRAM(s) Oral at bedtime    MEDICATIONS  (PRN):  acetaminophen     Tablet .. 650 milliGRAM(s) Oral every 6 hours PRN Temp greater or equal to 38C (100.4F), Mild Pain (1 - 3)  dextrose Oral Gel 15 Gram(s) Oral once PRN Blood Glucose LESS THAN 70 milliGRAM(s)/deciliter  ondansetron Injectable 4 milliGRAM(s) IV Push every 6 hours PRN Nausea  oxyCODONE    IR 2.5 milliGRAM(s) Oral every 4 hours PRN Moderate Pain (4 - 6)  oxyCODONE    IR 5 milliGRAM(s) Oral every 4 hours PRN Severe Pain (7 - 10)  senna 2 Tablet(s) Oral at bedtime PRN Constipation      Vital Signs Last 24 Hrs  T(C): 37.3 (06 Feb 2025 04:57), Max: 37.3 (06 Feb 2025 04:57)  T(F): 99.2 (06 Feb 2025 04:57), Max: 99.2 (06 Feb 2025 04:57)  HR: 84 (06 Feb 2025 04:57) (84 - 99)  BP: 112/69 (06 Feb 2025 04:57) (103/57 - 112/69)  BP(mean): --  RR: 18 (06 Feb 2025 04:57) (18 - 18)  SpO2: 92% (06 Feb 2025 04:57) (79% - 94%)    Parameters below as of 06 Feb 2025 04:57  Patient On (Oxygen Delivery Method): room air        Physical Exam  Constitutional: patient appears comfortable resting in bed, in no apparent distress  Respiratory: respirations are unlabored, no accessory muscle use, no conversational dyspnea  Cardiovascular: regular rate & rhythm  Gastrointestinal: abdomen is soft & non-distended, non-tender, no rebound tenderness / guarding  : muñoz draining yellow urine   Extremities: Extremities: LLE s/p AKA, wound vac intact     I&O's Detail    05 Feb 2025 07:01  -  06 Feb 2025 07:00  --------------------------------------------------------  IN:  Total IN: 0 mL    OUT:    Drain (mL): 60 mL    Indwelling Catheter - Urethral (mL): 1250 mL    VAC (Vacuum Assisted Closure) System (mL): 55 mL  Total OUT: 1365 mL    Total NET: -1365 mL          LABS:                        8.7    5.39  )-----------( 203      ( 06 Feb 2025 05:40 )             27.9

## 2025-02-06 NOTE — PROGRESS NOTE ADULT - NUTRITIONAL ASSESSMENT
This patient has been assessed with a concern for Malnutrition and has been determined to have a diagnosis/diagnoses of Severe protein-calorie malnutrition.    This patient is being managed with:   Diet Regular-  Consistent Carbohydrate {Evening Snack} (CSTCHOSN)  Entered: Jan 30 2025  6:19PM  
This patient has been assessed with a concern for Malnutrition and has been determined to have a diagnosis/diagnoses of Severe protein-calorie malnutrition.    This patient is being managed with:   Diet Regular-  Consistent Carbohydrate {Evening Snack} (CSTCHOSN)  Entered: Jan 30 2025  6:19PM    The following pending diet order is being considered for treatment of Severe protein-calorie malnutrition:  Diet Consistent Carbohydrate w/Evening Snack-  Supplement Feeding Modality:  Oral  Glucerna Shake Cans or Servings Per Day:  1       Frequency:  Two Times a day  Entered: Feb 4 2025  1:50PM  
This patient has been assessed with a concern for Malnutrition and has been determined to have a diagnosis/diagnoses of Severe protein-calorie malnutrition.    This patient is being managed with:   Diet Regular-  Consistent Carbohydrate {Evening Snack} (CSTCHOSN)  Entered: Jan 30 2025  6:19PM    The following pending diet order is being considered for treatment of Severe protein-calorie malnutrition:  Diet Consistent Carbohydrate w/Evening Snack-  Supplement Feeding Modality:  Oral  Glucerna Shake Cans or Servings Per Day:  1       Frequency:  Two Times a day  Entered: Feb 4 2025  1:50PM  
This patient has been assessed with a concern for Malnutrition and has been determined to have a diagnosis/diagnoses of Severe protein-calorie malnutrition.    This patient is being managed with:   Diet Regular-  Consistent Carbohydrate {Evening Snack} (CSTCHOSN)  Entered: Jan 30 2025  6:19PM  

## 2025-02-06 NOTE — PROGRESS NOTE ADULT - SUBJECTIVE AND OBJECTIVE BOX
[facility] Division of Hospital Medicine    Chief Complaint:  Left AKA wound dehiscence    SUBJECTIVE / OVERNIGHT EVENTS: Patient seen and examined. Pending insurance auth for ISA. Per Rn no overnight events.       MEDICATIONS  (STANDING):  apixaban 2.5 milliGRAM(s) Oral every 12 hours  ascorbic acid 500 milliGRAM(s) Oral daily  chlorhexidine 2% Cloths 1 Application(s) Topical <User Schedule>  dextrose 5%. 1000 milliLiter(s) (100 mL/Hr) IV Continuous <Continuous>  dextrose 5%. 1000 milliLiter(s) (50 mL/Hr) IV Continuous <Continuous>  dextrose 5%. 1000 milliLiter(s) (100 mL/Hr) IV Continuous <Continuous>  dextrose 50% Injectable 25 Gram(s) IV Push once  dextrose 50% Injectable 12.5 Gram(s) IV Push once  dextrose 50% Injectable 25 Gram(s) IV Push once  folic acid 1 milliGRAM(s) Oral daily  glucagon  Injectable 1 milliGRAM(s) IntraMuscular once  glucagon  Injectable 1 milliGRAM(s) IntraMuscular once  insulin lispro (ADMELOG) corrective regimen sliding scale   SubCutaneous three times a day before meals  metoprolol tartrate 12.5 milliGRAM(s) Oral every 8 hours  mirtazapine 7.5 milliGRAM(s) Oral daily  multivitamin/minerals 1 Tablet(s) Oral daily  pantoprazole   Suspension 40 milliGRAM(s) Oral daily  rosuvastatin 5 milliGRAM(s) Oral at bedtime    MEDICATIONS  (PRN):  acetaminophen     Tablet .. 650 milliGRAM(s) Oral every 6 hours PRN Temp greater or equal to 38C (100.4F), Mild Pain (1 - 3)  dextrose Oral Gel 15 Gram(s) Oral once PRN Blood Glucose LESS THAN 70 milliGRAM(s)/deciliter  ondansetron Injectable 4 milliGRAM(s) IV Push every 6 hours PRN Nausea  oxyCODONE    IR 2.5 milliGRAM(s) Oral every 4 hours PRN Moderate Pain (4 - 6)  oxyCODONE    IR 5 milliGRAM(s) Oral every 4 hours PRN Severe Pain (7 - 10)  senna 2 Tablet(s) Oral at bedtime PRN Constipation        I&O's Summary    05 Feb 2025 07:01  -  06 Feb 2025 07:00  --------------------------------------------------------  IN: 0 mL / OUT: 1365 mL / NET: -1365 mL        PHYSICAL EXAM:  Vital Signs Last 24 Hrs  T(C): 37.2 (06 Feb 2025 08:21), Max: 37.3 (06 Feb 2025 04:57)  T(F): 98.9 (06 Feb 2025 08:21), Max: 99.2 (06 Feb 2025 04:57)  HR: 94 (06 Feb 2025 08:21) (84 - 99)  BP: 98/59 (06 Feb 2025 08:21) (98/59 - 112/69)  BP(mean): --  RR: 18 (06 Feb 2025 08:21) (18 - 18)  SpO2: 97% (06 Feb 2025 08:21) (92% - 97%)    Parameters below as of 06 Feb 2025 08:21  Patient On (Oxygen Delivery Method): room air      CONSTITUTIONAL: NAD, elderly, chronically ill appearing  ENMT: Moist oral mucosa, no pharyngeal injection or exudates  RESPIRATORY: decreased breath sounds in the bases  CARDIOVASCULAR: Regular rate and rhythm, normal S1 and S2,    ABDOMEN: Nontender to palpation, normoactive bowel sounds, no rebound/guarding; + drain  MUSCLOSKELETAL:  left aka, + wound vac  PSYCH: A+O to person  NEUROLOGY: CN 2-12 are intact and symmetric  SKIN: No rashes; no palpable lesions    LABS:                        8.7    5.39  )-----------( 203      ( 06 Feb 2025 05:40 )             27.9                     CAPILLARY BLOOD GLUCOSE      POCT Blood Glucose.: 139 mg/dL (06 Feb 2025 11:50)  POCT Blood Glucose.: 95 mg/dL (06 Feb 2025 07:52)  POCT Blood Glucose.: 103 mg/dL (05 Feb 2025 22:20)  POCT Blood Glucose.: 109 mg/dL (05 Feb 2025 17:49)  POCT Blood Glucose.: 119 mg/dL (05 Feb 2025 13:12)        RADIOLOGY & ADDITIONAL TESTS:  Results Reviewed:   Imaging Personally Reviewed:  Electrocardiogram Personally Reviewed:

## 2025-02-06 NOTE — PROGRESS NOTE ADULT - ASSESSMENT
80 y/o F with Hx of Afib (on eliquis), HTN, DM2, HLD, CVA, limb ischemia s/p left AKA 12/16/24 & b/l femoral thrombectomy and cutdowns. Patient was recently discharged to rehab post AKA, returns with poorly healing stump wound and concern for infection. Admitted to vascular surgery,  Medicine following for medical management.    PAD Hx of left limb ischemia & stump wound  s/p left AKA & b/l femoral thrombectomy and cutdown 12/16/2, now S/P  L AKA revision due to wound dehiscence on 1/30.   wound vac in place to left stump wound  Pain medication PRN/ antiemetics PRN  continue bowel regimen   IS- HOB 30-45  now on Eliquis  now off abx, abx per primary team    Urinary retention/UTI  muñoz placed 1/31. + UC E coli. Has H/O ESBL, was sensitive to Invanz on prior culture, continue for now, follow sensitivity.   Patient received 3 days of Invanz. not considered cauti/complicated uti bc urine cx was sent within 48 hours of muñoz placement  Avoid constipation then TOV    HTN-/ Atrial fib:   Eliquis  On Metoprolol tartrate 12.5mg [id number]    Cholecystitis:  HIDA scan + cystic duct/CBD obstruction. Positive for acute cholecystitis on prior admission, s/p cholecystostomy tube.1/13 by IR. Outpatient follow up with surgery.     DM2 type 2:   cont ISS     HLD:   Crestor 5mg qhs    Anemia:   Hgb stable If trends further down, will transfuse  low iron   cont iron and folic acid   Vit C   Monitor CBC    DVT prophylaxis: on heparin infusion.   Dispo: per primary team. Call if questions arise.

## 2025-02-06 NOTE — PROGRESS NOTE ADULT - ASSESSMENT
Pt is a 79F POD#6 s/p L AKA revision due to wound dehiscence. Remains HD stable and afebrile. Urine cultures from 2/1 final with E coli.     - cont Eliquis   - diet: CC   - MM pain control prn   - s/p wound vac change on 2/3 - next change 2/6  - strict I/Os  - f/u am labs, replete lytes prn   - ongoing GOC discussions w/ palliative, family requesting home care i/o ISA   - Dispo planning to nursing home vs home

## 2025-02-06 NOTE — PROGRESS NOTE ADULT - PROVIDER SPECIALTY LIST ADULT
Hospitalist
Vascular Surgery
Vascular Surgery
Hospitalist
Hospitalist
Vascular Surgery
Hospitalist
Vascular Surgery
Palliative Care
Palliative Care

## 2025-02-07 LAB — SURGICAL PATHOLOGY STUDY: SIGNIFICANT CHANGE UP

## 2025-02-21 NOTE — ED PROVIDER NOTE - PATIENT PORTAL LINK FT
Copied from CRM #[id number]. Topic: MW Messaging - MW Patient Request  >> Feb 21, 2025 12:55 PM Talya SANCHEZ wrote:  phyllis called requesting to send a general message to clinician.   Verified issue is NOT regarding a symptom(s) requiring routine or emergent triage. Verified another message template type and CRM does not apply.    Selected 'Wrap Up CRM' and created new Telephone Encounter after clicking 'Convert to Clinical Call'. Selected appropriate Reason for Call.  Sent Pt message template and routed as routine priority per Clinician KB page to appropriate clinician pool. Readback full message.  
You can access the FollowMyHealth Patient Portal offered by [facility] by registering at the following website: [email or web address]. By joining [employer]’s FollowMyHealth portal, you will also be able to view your health information using other applications (apps) compatible with our system.

## 2025-02-25 ENCOUNTER — APPOINTMENT (OUTPATIENT)
Dept: VASCULAR SURGERY | Facility: CLINIC | Age: 80
End: 2025-02-25

## 2025-02-26 ENCOUNTER — INPATIENT (INPATIENT)
Facility: HOSPITAL | Age: 80
LOS: 8 days | Discharge: NURSING, REHAB OR LONG-TERM CARE | DRG: 463 | End: 2025-03-07
Attending: INTERNAL MEDICINE | Admitting: INTERNAL MEDICINE
Payer: MEDICAID

## 2025-02-26 VITALS
TEMPERATURE: 99 F | SYSTOLIC BLOOD PRESSURE: 125 MMHG | HEART RATE: 110 BPM | RESPIRATION RATE: 19 BRPM | OXYGEN SATURATION: 98 % | HEIGHT: 65 IN | DIASTOLIC BLOOD PRESSURE: 78 MMHG

## 2025-02-26 DIAGNOSIS — N39.0 URINARY TRACT INFECTION, SITE NOT SPECIFIED: ICD-10-CM

## 2025-02-26 DIAGNOSIS — Z98.890 OTHER SPECIFIED POSTPROCEDURAL STATES: Chronic | ICD-10-CM

## 2025-02-26 DIAGNOSIS — Z89.619 ACQUIRED ABSENCE OF UNSPECIFIED LEG ABOVE KNEE: Chronic | ICD-10-CM

## 2025-02-26 LAB
ALBUMIN SERPL ELPH-MCNC: 1.6 G/DL — LOW (ref 3.3–5)
ALP SERPL-CCNC: 242 U/L — HIGH (ref 40–120)
ALT FLD-CCNC: 26 U/L — SIGNIFICANT CHANGE UP (ref 12–78)
ANION GAP SERPL CALC-SCNC: 4 MMOL/L — LOW (ref 5–17)
APPEARANCE UR: CLEAR — SIGNIFICANT CHANGE UP
AST SERPL-CCNC: 44 U/L — HIGH (ref 15–37)
BACTERIA # UR AUTO: ABNORMAL /HPF
BASOPHILS # BLD AUTO: 0.04 K/UL — SIGNIFICANT CHANGE UP (ref 0–0.2)
BASOPHILS NFR BLD AUTO: 0.3 % — SIGNIFICANT CHANGE UP (ref 0–2)
BILIRUB SERPL-MCNC: 0.5 MG/DL — SIGNIFICANT CHANGE UP (ref 0.2–1.2)
BILIRUB UR-MCNC: NEGATIVE — SIGNIFICANT CHANGE UP
BUN SERPL-MCNC: 15 MG/DL — SIGNIFICANT CHANGE UP (ref 7–23)
CALCIUM SERPL-MCNC: 8.2 MG/DL — LOW (ref 8.5–10.1)
CAST: 3 /LPF — SIGNIFICANT CHANGE UP (ref 0–4)
CHLORIDE SERPL-SCNC: 104 MMOL/L — SIGNIFICANT CHANGE UP (ref 96–108)
CO2 SERPL-SCNC: 22 MMOL/L — SIGNIFICANT CHANGE UP (ref 22–31)
COLOR SPEC: YELLOW — SIGNIFICANT CHANGE UP
CREAT SERPL-MCNC: 0.82 MG/DL — SIGNIFICANT CHANGE UP (ref 0.5–1.3)
DIFF PNL FLD: ABNORMAL
EGFR: 72 ML/MIN/1.73M2 — SIGNIFICANT CHANGE UP
EGFR: 72 ML/MIN/1.73M2 — SIGNIFICANT CHANGE UP
EOSINOPHIL # BLD AUTO: 0.11 K/UL — SIGNIFICANT CHANGE UP (ref 0–0.5)
EOSINOPHIL NFR BLD AUTO: 0.9 % — SIGNIFICANT CHANGE UP (ref 0–6)
FLUAV AG NPH QL: SIGNIFICANT CHANGE UP
FLUBV AG NPH QL: SIGNIFICANT CHANGE UP
GLUCOSE SERPL-MCNC: 255 MG/DL — HIGH (ref 70–99)
GLUCOSE UR QL: NEGATIVE MG/DL — SIGNIFICANT CHANGE UP
HCT VFR BLD CALC: 34.8 % — SIGNIFICANT CHANGE UP (ref 34.5–45)
HGB BLD-MCNC: 10.6 G/DL — LOW (ref 11.5–15.5)
IMM GRANULOCYTES # BLD AUTO: 0.05 K/UL — SIGNIFICANT CHANGE UP (ref 0–0.07)
IMM GRANULOCYTES NFR BLD AUTO: 0.4 % — SIGNIFICANT CHANGE UP (ref 0–0.9)
KETONES UR-MCNC: NEGATIVE MG/DL — SIGNIFICANT CHANGE UP
LACTATE SERPL-SCNC: 3.4 MMOL/L — HIGH (ref 0.7–2)
LACTATE SERPL-SCNC: 3.5 MMOL/L — HIGH (ref 0.7–2)
LEUKOCYTE ESTERASE UR-ACNC: ABNORMAL
LYMPHOCYTES # BLD AUTO: 3.51 K/UL — HIGH (ref 1–3.3)
LYMPHOCYTES NFR BLD AUTO: 28.8 % — SIGNIFICANT CHANGE UP (ref 13–44)
MCHC RBC-ENTMCNC: 25.1 PG — LOW (ref 27–34)
MCHC RBC-ENTMCNC: 30.5 G/DL — LOW (ref 32–36)
MCV RBC AUTO: 82.5 FL — SIGNIFICANT CHANGE UP (ref 80–100)
MONOCYTES # BLD AUTO: 0.81 K/UL — SIGNIFICANT CHANGE UP (ref 0–0.9)
MONOCYTES NFR BLD AUTO: 6.7 % — SIGNIFICANT CHANGE UP (ref 2–14)
NEUTROPHILS # BLD AUTO: 7.65 K/UL — HIGH (ref 1.8–7.4)
NEUTROPHILS NFR BLD AUTO: 62.9 % — SIGNIFICANT CHANGE UP (ref 43–77)
NITRITE UR-MCNC: NEGATIVE — SIGNIFICANT CHANGE UP
NRBC # BLD AUTO: 0 K/UL — SIGNIFICANT CHANGE UP (ref 0–0)
NRBC # FLD: 0 K/UL — SIGNIFICANT CHANGE UP (ref 0–0)
NRBC BLD AUTO-RTO: 0 /100 WBCS — SIGNIFICANT CHANGE UP (ref 0–0)
PH UR: 6 — SIGNIFICANT CHANGE UP (ref 5–8)
PLATELET # BLD AUTO: 283 K/UL — SIGNIFICANT CHANGE UP (ref 150–400)
PMV BLD: 9.1 FL — SIGNIFICANT CHANGE UP (ref 7–13)
POTASSIUM SERPL-MCNC: 4.7 MMOL/L — SIGNIFICANT CHANGE UP (ref 3.5–5.3)
POTASSIUM SERPL-SCNC: 4.7 MMOL/L — SIGNIFICANT CHANGE UP (ref 3.5–5.3)
PROT SERPL-MCNC: 6.4 GM/DL — SIGNIFICANT CHANGE UP (ref 6–8.3)
PROT UR-MCNC: 30 MG/DL
RBC # BLD: 4.22 M/UL — SIGNIFICANT CHANGE UP (ref 3.8–5.2)
RBC # FLD: 17.8 % — HIGH (ref 10.3–14.5)
RBC CASTS # UR COMP ASSIST: 11 /HPF — HIGH (ref 0–4)
RSV RNA NPH QL NAA+NON-PROBE: SIGNIFICANT CHANGE UP
SARS-COV-2 RNA SPEC QL NAA+PROBE: SIGNIFICANT CHANGE UP
SODIUM SERPL-SCNC: 130 MMOL/L — LOW (ref 135–145)
SP GR SPEC: >1.03 — HIGH (ref 1–1.03)
SQUAMOUS # UR AUTO: 1 /HPF — SIGNIFICANT CHANGE UP (ref 0–5)
UROBILINOGEN FLD QL: 1 MG/DL — SIGNIFICANT CHANGE UP (ref 0.2–1)
WBC # BLD: 12.17 K/UL — HIGH (ref 3.8–10.5)
WBC # FLD AUTO: 12.17 K/UL — HIGH (ref 3.8–10.5)
WBC UR QL: 130 /HPF — HIGH (ref 0–5)

## 2025-02-26 PROCEDURE — 80053 COMPREHEN METABOLIC PANEL: CPT

## 2025-02-26 PROCEDURE — 75635 CT ANGIO ABDOMINAL ARTERIES: CPT | Mod: MC

## 2025-02-26 PROCEDURE — 92610 EVALUATE SWALLOWING FUNCTION: CPT | Mod: GN

## 2025-02-26 PROCEDURE — 85027 COMPLETE CBC AUTOMATED: CPT

## 2025-02-26 PROCEDURE — 83735 ASSAY OF MAGNESIUM: CPT

## 2025-02-26 PROCEDURE — 92523 SPEECH SOUND LANG COMPREHEN: CPT | Mod: GN

## 2025-02-26 PROCEDURE — 83605 ASSAY OF LACTIC ACID: CPT

## 2025-02-26 PROCEDURE — 84100 ASSAY OF PHOSPHORUS: CPT

## 2025-02-26 PROCEDURE — 85025 COMPLETE CBC W/AUTO DIFF WBC: CPT

## 2025-02-26 PROCEDURE — 99222 1ST HOSP IP/OBS MODERATE 55: CPT

## 2025-02-26 PROCEDURE — 82962 GLUCOSE BLOOD TEST: CPT

## 2025-02-26 PROCEDURE — 71045 X-RAY EXAM CHEST 1 VIEW: CPT | Mod: 26

## 2025-02-26 PROCEDURE — 36415 COLL VENOUS BLD VENIPUNCTURE: CPT

## 2025-02-26 PROCEDURE — 97163 PT EVAL HIGH COMPLEX 45 MIN: CPT | Mod: GP

## 2025-02-26 PROCEDURE — 80048 BASIC METABOLIC PNL TOTAL CA: CPT

## 2025-02-26 PROCEDURE — 74177 CT ABD & PELVIS W/CONTRAST: CPT | Mod: 26

## 2025-02-26 PROCEDURE — 99285 EMERGENCY DEPT VISIT HI MDM: CPT

## 2025-02-26 PROCEDURE — 83036 HEMOGLOBIN GLYCOSYLATED A1C: CPT

## 2025-02-26 PROCEDURE — 93010 ELECTROCARDIOGRAM REPORT: CPT

## 2025-02-26 RX ORDER — ERGOCALCIFEROL 1.25 MG/1
1 CAPSULE ORAL
Refills: 0 | DISCHARGE

## 2025-02-26 RX ORDER — VANCOMYCIN HCL IN 5 % DEXTROSE 1.5G/250ML
1000 PLASTIC BAG, INJECTION (ML) INTRAVENOUS ONCE
Refills: 0 | Status: COMPLETED | OUTPATIENT
Start: 2025-02-26 | End: 2025-02-26

## 2025-02-26 RX ORDER — ACETAMINOPHEN 500 MG/5ML
650 LIQUID (ML) ORAL EVERY 6 HOURS
Refills: 0 | Status: DISCONTINUED | OUTPATIENT
Start: 2025-02-26 | End: 2025-03-07

## 2025-02-26 RX ORDER — METOPROLOL SUCCINATE 50 MG/1
0.5 TABLET, EXTENDED RELEASE ORAL
Refills: 0 | DISCHARGE

## 2025-02-26 RX ORDER — PIPERACILLIN-TAZO-DEXTROSE,ISO 3.375G/5
3.38 IV SOLUTION, PIGGYBACK PREMIX FROZEN(ML) INTRAVENOUS ONCE
Refills: 0 | Status: COMPLETED | OUTPATIENT
Start: 2025-02-26 | End: 2025-02-26

## 2025-02-26 RX ADMIN — Medication 2200 MILLILITER(S): at 19:31

## 2025-02-26 RX ADMIN — Medication 200 GRAM(S): at 19:31

## 2025-02-26 RX ADMIN — Medication 1000 MILLILITER(S): at 23:58

## 2025-02-26 RX ADMIN — Medication 250 MILLIGRAM(S): at 20:08

## 2025-02-26 NOTE — CONSULT NOTE ADULT - ASSESSMENT
79F with Afib (on eliquis), HTN, DM2, HLD, h/o stroke, limb ischemia (s/p left AKA on 12/16, s/p b/l femoral thrombectomy), recent acute cholecystitis underwent perc chris tube by IR on 1/13, presented from nursing facility w weakness and abdominal pain.  Having bowel function and tolerating diet, perc chris tube has been functioning well  Patient seen and examined at bedside. Confused, HR and BP wnl while examining, Reports minimal abdominal pain. exam benign w minimal tenderness, no Elizondo's sign w functional perc chris tube     CT w Small abscess interposed between the gallbladder fundus and the right   colon measuring 1.4 x 1.4 cm. Mildly distended gallbladder with indwelling cholecystostomy tube      Recs:  stable finding of CT compared to Abdominal CT from last admission  Patient w minimal pain and benign exam w functional perc chris tube> no surgical intervention indicated  Perc chris tube care, IR recs

## 2025-02-26 NOTE — ED ADULT NURSE NOTE - OBJECTIVE STATEMENT
pt presents to ed  from [facility] for right sided abdominal pain. pt poor historian; pmhx dementia. pt a&o to self at this time. no vomiting/diarrhea. ems reports no fevers. no further complaints at this time.

## 2025-02-26 NOTE — PHARMACOTHERAPY INTERVENTION NOTE - COMMENTS
Medication reconciliation completed.  Reviewed Medication list and confirmed med allergies with list from Care Facility "[facility]"; confirmed with Dr. First [facility].

## 2025-02-26 NOTE — ED PROVIDER NOTE - CLINICAL SUMMARY MEDICAL DECISION MAKING FREE TEXT BOX
80 y/o Togolese-speaking female with hx of acute cholecystitis s/p PCT p/w abdominal pain.     VS reviewed- , afebrile  Exam notable for soft but tenderness generalized, NAD    r/o surgical abdomen, uti  labs, urine, needs muñoz exchange, ct, reassess

## 2025-02-26 NOTE — ED ADULT NURSE NOTE - NSFALLHARMRISKINTERV_ED_ALL_ED
Assistance OOB with selected safe patient handling equipment if applicable/Communicate risk of Fall with Harm to all staff, patient, and family/Monitor for mental status changes and reorient to person, place, and time, as needed/Move patient closer to nursing station/within visual sight of ED staff/Provide patient with walking aids/Provide visual cue: red socks, yellow wristband, yellow gown, etc/Reinforce activity limits and safety measures with patient and family/Toileting schedule using arm’s reach rule for commode and bathroom/Use of alarms - bed, stretcher, chair and/or video monitoring/Bed in lowest position, wheels locked, appropriate side rails in place/Call bell, personal items and telephone in reach/Instruct patient to call for assistance before getting out of bed/chair/stretcher/Non-slip footwear applied when patient is off stretcher/[place] to call system/Physically safe environment - no spills, clutter or unnecessary equipment/Purposeful Proactive Rounding/Room/bathroom lighting operational, light cord in reach

## 2025-02-26 NOTE — CONSULT NOTE ADULT - SUBJECTIVE AND OBJECTIVE BOX
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  Having bowel function and tolerating diet, perc chris tube has been functioning well  Patient seen and examined at bedside. Confused, HR and BP wnl while examining, Reports minimal abdominal pain. exam benign w minimal tenderness, no Elizondo's sign w functional perc chris tube     Vitals:  T(C): 37.2 (02-26 @ 21:15), Max: 37.2 (02-26 @ 16:07)  HR: 91 (02-26 @ 21:15) (91 - 126)  BP: 118/53 (02-26 @ 21:15) (100/50 - 130/74)  RR: 22 (02-26 @ 21:15) (17 - 24)  SpO2: 98% (02-26 @ 21:15) (95% - 98%)      Physical Exam:  General: NAD  Chest: Normal respiratory effort  Heart: RRR  Abdomen: Soft, ND, minimal tenderness, no r/G, PErc chris tube in place w bilious output       02-26 @ 17:23                    10.6  CBC: 12.17>)-------(<283                     34.8                 130 | 104 | 15    CMP:  ----------------------< 255               4.7 | 22 | 0.82                      Ca:8.2  Phos:-  Mg:-               0.5|      |44        LFTs:  ------|242|-----             -|      |-      Urinalysis with Rflx Culture (collected 02-26-25 @ 20:07)      Current Inpatient Medications:  acetaminophen     Tablet .. 650 milliGRAM(s) Oral every 6 hours PRN      < from: CT Abdomen and Pelvis w/ IV Cont (02.26.25 @ 18:42) >  FINDINGS:  LOWER CHEST: Unchanged small bilateral pleural effusions and atelectasis.   Coronary calcifications.    LIVER: Subcentimeter hypoattenuating lesion in the posterior right lobe   is too small otherwise characterize (2-24).  BILEDUCTS: Normal caliber.  GALLBLADDER: Mildly fluid distended gallbladder with indwelling   cholecystostomy tube and small intraluminal air. 1.4 x 1.4 cm   rim-enhancing collection with low density central fluid interposed   between the gallbladder fundus in the ascending colon (2-33, 602-32).   Additional small nonenhancing fluid surrounding the gallbladder fossa   tracking into the right paracolic gutter  SPLEEN: Accessory splenule. Splenic cyst versus hemangioma along the   inferior pole  PANCREAS: Within normal limits.  ADRENALS: Within normal limits.  KIDNEYS/URETERS: Symmetric enhancement bilaterally; no hydronephrosis.    BLADDER: Collapsed around Silver catheter with small intraluminal air.    Thickening and surrounding inflammation.  REPRODUCTIVE ORGANS: Uterus and adnexa are within normal limits.    BOWEL: No bowel obstruction. Appendix is normal. Mild pericolonic   inflammation in the hepatic flexure with small rim-enhancing collection,   as described above.  PERITONEUM/RETROPERITONEUM: Within normal limits.  VESSELS: Diffuse atherosclerotic changes.  LYMPH NODES: No lymphadenopathy.  ABDOMINAL WALL: Fluid and edema anterior to the bilateral femoral vessels   with overlying postsurgical change right groin; correlate for prior   catheterization. Diffuse subcutaneous edema. Soft tissue nodules anterior   abdominal wall, likely sequelae of prior medical injections.  BONES: Old right rib fractures. Degenerative changes.    IMPRESSION:  Small abscess interposed between the gallbladder fundus and the right   colon measuring 1.4 x 1.4 cm.    Mildly distended gallbladder with indwelling cholecystostomy tube;   correlate with output.    Cystitis.    < end of copied text >

## 2025-02-26 NOTE — ED ADULT TRIAGE NOTE - LOCATION:
Left arm; roxann Yanez from [facility], calls wishing to speak to Dr. Briones's nurse to get clarification on discharge.

## 2025-02-26 NOTE — ED PROVIDER NOTE - PROGRESS NOTE DETAILS
spoke with surgery regarding abscess on ct. per surgery, seen on prior imaging. ALISON. not likely source. patient with uti. will be admitted to medicine for sepsis 2/2 uti

## 2025-02-26 NOTE — ED ADULT TRIAGE NOTE - CHIEF COMPLAINT QUOTE
dora from [facility] with c/o RLQ abdominal pain, recent L AKA and MRSA infection. RUQ drain and chronic drain.    pmh: afib, DMII, dementia. HLD, CVA.

## 2025-02-26 NOTE — ED ADULT NURSE NOTE - NS ED NURSE PATIENT LEFT UNIT TIME
Patient called once more regarding depakote meds that were originally prescribed in hospital and needs to be refilled  She cannot get appointment with neurologist until November  Please advise  02:09

## 2025-02-26 NOTE — ED PROVIDER NOTE - PHYSICAL EXAMINATION
Constitutional: NAD, alert, verbal  HEENT: NCAT, EOMi, PERRL  Cardiac: RRR no MRG  Resp: clear, no wheezing or crackles  GI: soft and generalized ttp, PCT tube in place, with dark drainage  : muñoz in place, dark urine, non-bloody  MSK/Ext: left aka, right foot in boot  Neuro: DE LA TORRE  Skin: No rashes

## 2025-02-26 NOTE — ED ADULT NURSE NOTE - CHIEF COMPLAINT QUOTE
dora from [facility] with c/o RLQ abdominal pain, recent L AKA and MRSA infection. RUQ drain and chronic drain.    pmh: afib, DMII, dementia. HLD, CVA.

## 2025-02-27 LAB
A1C WITH ESTIMATED AVERAGE GLUCOSE RESULT: 6.3 % — HIGH (ref 4–5.6)
ALBUMIN SERPL ELPH-MCNC: 1.3 G/DL — LOW (ref 3.3–5)
ALP SERPL-CCNC: 176 U/L — HIGH (ref 40–120)
ALT FLD-CCNC: 17 U/L — SIGNIFICANT CHANGE UP (ref 12–78)
ANION GAP SERPL CALC-SCNC: 7 MMOL/L — SIGNIFICANT CHANGE UP (ref 5–17)
AST SERPL-CCNC: 20 U/L — SIGNIFICANT CHANGE UP (ref 15–37)
BILIRUB SERPL-MCNC: 0.5 MG/DL — SIGNIFICANT CHANGE UP (ref 0.2–1.2)
BUN SERPL-MCNC: 10 MG/DL — SIGNIFICANT CHANGE UP (ref 7–23)
CALCIUM SERPL-MCNC: 8.1 MG/DL — LOW (ref 8.5–10.1)
CHLORIDE SERPL-SCNC: 112 MMOL/L — HIGH (ref 96–108)
CO2 SERPL-SCNC: 20 MMOL/L — LOW (ref 22–31)
CREAT SERPL-MCNC: 0.5 MG/DL — SIGNIFICANT CHANGE UP (ref 0.5–1.3)
EGFR: 94 ML/MIN/1.73M2 — SIGNIFICANT CHANGE UP
EGFR: 94 ML/MIN/1.73M2 — SIGNIFICANT CHANGE UP
ESTIMATED AVERAGE GLUCOSE: 134 MG/DL — HIGH (ref 68–114)
GLUCOSE BLDC GLUCOMTR-MCNC: 130 MG/DL — HIGH (ref 70–99)
GLUCOSE BLDC GLUCOMTR-MCNC: 140 MG/DL — HIGH (ref 70–99)
GLUCOSE BLDC GLUCOMTR-MCNC: 162 MG/DL — HIGH (ref 70–99)
GLUCOSE BLDC GLUCOMTR-MCNC: 190 MG/DL — HIGH (ref 70–99)
GLUCOSE BLDC GLUCOMTR-MCNC: 190 MG/DL — HIGH (ref 70–99)
GLUCOSE SERPL-MCNC: 125 MG/DL — HIGH (ref 70–99)
HCT VFR BLD CALC: 33 % — LOW (ref 34.5–45)
HGB BLD-MCNC: 10.1 G/DL — LOW (ref 11.5–15.5)
LACTATE SERPL-SCNC: 1.6 MMOL/L — SIGNIFICANT CHANGE UP (ref 0.7–2)
MAGNESIUM SERPL-MCNC: 1.5 MG/DL — LOW (ref 1.6–2.6)
MCHC RBC-ENTMCNC: 25.6 PG — LOW (ref 27–34)
MCHC RBC-ENTMCNC: 30.6 G/DL — LOW (ref 32–36)
MCV RBC AUTO: 83.5 FL — SIGNIFICANT CHANGE UP (ref 80–100)
NRBC # BLD AUTO: 0 K/UL — SIGNIFICANT CHANGE UP (ref 0–0)
NRBC # FLD: 0 K/UL — SIGNIFICANT CHANGE UP (ref 0–0)
NRBC BLD AUTO-RTO: 0 /100 WBCS — SIGNIFICANT CHANGE UP (ref 0–0)
PHOSPHATE SERPL-MCNC: 2.8 MG/DL — SIGNIFICANT CHANGE UP (ref 2.5–4.5)
PLATELET # BLD AUTO: 211 K/UL — SIGNIFICANT CHANGE UP (ref 150–400)
PMV BLD: 9.3 FL — SIGNIFICANT CHANGE UP (ref 7–13)
POTASSIUM SERPL-MCNC: 3.4 MMOL/L — LOW (ref 3.5–5.3)
POTASSIUM SERPL-SCNC: 3.4 MMOL/L — LOW (ref 3.5–5.3)
PROT SERPL-MCNC: 5.3 GM/DL — LOW (ref 6–8.3)
RBC # BLD: 3.95 M/UL — SIGNIFICANT CHANGE UP (ref 3.8–5.2)
RBC # FLD: 17.7 % — HIGH (ref 10.3–14.5)
SODIUM SERPL-SCNC: 139 MMOL/L — SIGNIFICANT CHANGE UP (ref 135–145)
WBC # BLD: 6.66 K/UL — SIGNIFICANT CHANGE UP (ref 3.8–10.5)
WBC # FLD AUTO: 6.66 K/UL — SIGNIFICANT CHANGE UP (ref 3.8–10.5)

## 2025-02-27 PROCEDURE — 99233 SBSQ HOSP IP/OBS HIGH 50: CPT

## 2025-02-27 RX ORDER — FOLIC ACID 1 MG/1
1 TABLET ORAL DAILY
Refills: 0 | Status: DISCONTINUED | OUTPATIENT
Start: 2025-02-27 | End: 2025-03-07

## 2025-02-27 RX ORDER — INSULIN LISPRO 100 U/ML
INJECTION, SOLUTION INTRAVENOUS; SUBCUTANEOUS AT BEDTIME
Refills: 0 | Status: DISCONTINUED | OUTPATIENT
Start: 2025-02-27 | End: 2025-03-07

## 2025-02-27 RX ORDER — GLUCAGON 3 MG/1
1 POWDER NASAL ONCE
Refills: 0 | Status: DISCONTINUED | OUTPATIENT
Start: 2025-02-27 | End: 2025-03-07

## 2025-02-27 RX ORDER — MEROPENEM 1 G/30ML
1000 INJECTION INTRAVENOUS EVERY 8 HOURS
Refills: 0 | Status: DISCONTINUED | OUTPATIENT
Start: 2025-02-27 | End: 2025-03-04

## 2025-02-27 RX ORDER — MIRTAZAPINE 30 MG/1
7.5 TABLET, FILM COATED ORAL AT BEDTIME
Refills: 0 | Status: DISCONTINUED | OUTPATIENT
Start: 2025-02-27 | End: 2025-03-07

## 2025-02-27 RX ORDER — POLYETHYLENE GLYCOL 3350 17 G/17G
17 POWDER, FOR SOLUTION ORAL DAILY
Refills: 0 | Status: DISCONTINUED | OUTPATIENT
Start: 2025-02-27 | End: 2025-03-07

## 2025-02-27 RX ORDER — APIXABAN 2.5 MG/1
5 TABLET, FILM COATED ORAL EVERY 12 HOURS
Refills: 0 | Status: DISCONTINUED | OUTPATIENT
Start: 2025-02-27 | End: 2025-03-07

## 2025-02-27 RX ORDER — SODIUM CHLORIDE 9 G/1000ML
1000 INJECTION, SOLUTION INTRAVENOUS
Refills: 0 | Status: DISCONTINUED | OUTPATIENT
Start: 2025-02-27 | End: 2025-02-27

## 2025-02-27 RX ORDER — SODIUM CHLORIDE 9 G/1000ML
1000 INJECTION, SOLUTION INTRAVENOUS
Refills: 0 | Status: DISCONTINUED | OUTPATIENT
Start: 2025-02-27 | End: 2025-03-07

## 2025-02-27 RX ORDER — DEXTROSE 50 % IN WATER 50 %
15 SYRINGE (ML) INTRAVENOUS ONCE
Refills: 0 | Status: DISCONTINUED | OUTPATIENT
Start: 2025-02-27 | End: 2025-03-07

## 2025-02-27 RX ORDER — DEXTROSE 50 % IN WATER 50 %
25 SYRINGE (ML) INTRAVENOUS ONCE
Refills: 0 | Status: DISCONTINUED | OUTPATIENT
Start: 2025-02-27 | End: 2025-03-07

## 2025-02-27 RX ORDER — PIPERACILLIN-TAZO-DEXTROSE,ISO 3.375G/5
3.38 IV SOLUTION, PIGGYBACK PREMIX FROZEN(ML) INTRAVENOUS EVERY 8 HOURS
Refills: 0 | Status: DISCONTINUED | OUTPATIENT
Start: 2025-02-27 | End: 2025-02-27

## 2025-02-27 RX ORDER — MELATONIN 5 MG
3 TABLET ORAL AT BEDTIME
Refills: 0 | Status: DISCONTINUED | OUTPATIENT
Start: 2025-02-27 | End: 2025-03-07

## 2025-02-27 RX ORDER — ROSUVASTATIN CALCIUM 20 MG/1
5 TABLET, FILM COATED ORAL AT BEDTIME
Refills: 0 | Status: DISCONTINUED | OUTPATIENT
Start: 2025-02-27 | End: 2025-03-07

## 2025-02-27 RX ORDER — MAGNESIUM, ALUMINUM HYDROXIDE 200-200 MG
30 TABLET,CHEWABLE ORAL EVERY 4 HOURS
Refills: 0 | Status: DISCONTINUED | OUTPATIENT
Start: 2025-02-27 | End: 2025-03-07

## 2025-02-27 RX ORDER — ZINC SULFATE 50(220)MG
220 CAPSULE ORAL DAILY
Refills: 0 | Status: DISCONTINUED | OUTPATIENT
Start: 2025-02-27 | End: 2025-03-07

## 2025-02-27 RX ORDER — MAGNESIUM SULFATE 500 MG/ML
2 SYRINGE (ML) INJECTION ONCE
Refills: 0 | Status: COMPLETED | OUTPATIENT
Start: 2025-02-27 | End: 2025-02-27

## 2025-02-27 RX ORDER — VANCOMYCIN HCL IN 5 % DEXTROSE 1.5G/250ML
750 PLASTIC BAG, INJECTION (ML) INTRAVENOUS EVERY 12 HOURS
Refills: 0 | Status: DISCONTINUED | OUTPATIENT
Start: 2025-02-27 | End: 2025-02-27

## 2025-02-27 RX ORDER — METOPROLOL SUCCINATE 50 MG/1
12.5 TABLET, EXTENDED RELEASE ORAL
Refills: 0 | Status: DISCONTINUED | OUTPATIENT
Start: 2025-02-27 | End: 2025-03-07

## 2025-02-27 RX ORDER — ONDANSETRON HCL/PF 4 MG/2 ML
4 VIAL (ML) INJECTION EVERY 8 HOURS
Refills: 0 | Status: DISCONTINUED | OUTPATIENT
Start: 2025-02-27 | End: 2025-03-07

## 2025-02-27 RX ORDER — DEXTROSE 50 % IN WATER 50 %
12.5 SYRINGE (ML) INTRAVENOUS ONCE
Refills: 0 | Status: DISCONTINUED | OUTPATIENT
Start: 2025-02-27 | End: 2025-03-07

## 2025-02-27 RX ORDER — MEROPENEM 1 G/30ML
1000 INJECTION INTRAVENOUS EVERY 8 HOURS
Refills: 0 | Status: DISCONTINUED | OUTPATIENT
Start: 2025-02-27 | End: 2025-02-27

## 2025-02-27 RX ORDER — CYST/ALA/Q10/PHOS.SER/DHA/BROC 100-20-50
1 POWDER (GRAM) ORAL DAILY
Refills: 0 | Status: DISCONTINUED | OUTPATIENT
Start: 2025-02-27 | End: 2025-03-07

## 2025-02-27 RX ORDER — SENNA 187 MG
2 TABLET ORAL AT BEDTIME
Refills: 0 | Status: DISCONTINUED | OUTPATIENT
Start: 2025-02-27 | End: 2025-03-07

## 2025-02-27 RX ORDER — INSULIN LISPRO 100 U/ML
INJECTION, SOLUTION INTRAVENOUS; SUBCUTANEOUS
Refills: 0 | Status: DISCONTINUED | OUTPATIENT
Start: 2025-02-27 | End: 2025-03-07

## 2025-02-27 RX ADMIN — MEROPENEM 1000 MILLIGRAM(S): 1 INJECTION INTRAVENOUS at 13:17

## 2025-02-27 RX ADMIN — Medication 25 GRAM(S): at 10:13

## 2025-02-27 RX ADMIN — Medication 25 GRAM(S): at 04:59

## 2025-02-27 RX ADMIN — APIXABAN 5 MILLIGRAM(S): 2.5 TABLET, FILM COATED ORAL at 09:08

## 2025-02-27 RX ADMIN — MEROPENEM 1000 MILLIGRAM(S): 1 INJECTION INTRAVENOUS at 21:07

## 2025-02-27 RX ADMIN — Medication 40 MILLIEQUIVALENT(S): at 10:13

## 2025-02-27 RX ADMIN — ROSUVASTATIN CALCIUM 5 MILLIGRAM(S): 20 TABLET, FILM COATED ORAL at 21:07

## 2025-02-27 RX ADMIN — MIRTAZAPINE 7.5 MILLIGRAM(S): 30 TABLET, FILM COATED ORAL at 21:07

## 2025-02-27 RX ADMIN — INSULIN LISPRO 2: 100 INJECTION, SOLUTION INTRAVENOUS; SUBCUTANEOUS at 11:53

## 2025-02-27 RX ADMIN — Medication 100 MILLIGRAM(S): at 11:56

## 2025-02-27 RX ADMIN — Medication 500 MILLIGRAM(S): at 09:07

## 2025-02-27 RX ADMIN — Medication 2 TABLET(S): at 21:06

## 2025-02-27 RX ADMIN — METOPROLOL SUCCINATE 12.5 MILLIGRAM(S): 50 TABLET, EXTENDED RELEASE ORAL at 21:06

## 2025-02-27 RX ADMIN — INSULIN LISPRO 2: 100 INJECTION, SOLUTION INTRAVENOUS; SUBCUTANEOUS at 17:38

## 2025-02-27 RX ADMIN — Medication 40 MILLIGRAM(S): at 09:07

## 2025-02-27 RX ADMIN — SODIUM CHLORIDE 100 MILLILITER(S): 9 INJECTION, SOLUTION INTRAVENOUS at 04:14

## 2025-02-27 RX ADMIN — APIXABAN 5 MILLIGRAM(S): 2.5 TABLET, FILM COATED ORAL at 21:07

## 2025-02-27 RX ADMIN — Medication 220 MILLIGRAM(S): at 12:50

## 2025-02-27 RX ADMIN — Medication 250 MILLIGRAM(S): at 07:26

## 2025-02-27 RX ADMIN — Medication 1 TABLET(S): at 09:07

## 2025-02-27 RX ADMIN — FOLIC ACID 1 MILLIGRAM(S): 1 TABLET ORAL at 09:07

## 2025-02-27 NOTE — SWALLOW BEDSIDE ASSESSMENT ADULT - ASR SWALLOW LABIAL MOBILITY
Unable to formally assess due to altered mentation with variably reduced compliance/periodic resistive mouth closing upon stimulation.

## 2025-02-27 NOTE — DIETITIAN INITIAL EVALUATION ADULT - ADD RECOMMEND
Change diet to Consistent Carb, add Ensure Max bid  Suggest add Vit C 500 mg BID, add Zinc Sulfate 220 mg x 10 days to promote wound healing   Narciso BID  MVI w/ minerals daily to ensure 100% RDA met   Record PO intake in EMR after each meal (flowsheet, nursing.)   Monitor bowel movements, if no BM for >3 days, consider implementing bowel regimen.  Consider adding thiamine 100 mg daily 2/2 poor PO intake/ malnutrition  suggest Confirm Goals of Care regarding nutrition support. Will provide nutrition/ hydration within goals of care.  Monitor PO intake, tolerance, labs and weight.

## 2025-02-27 NOTE — DIETITIAN INITIAL EVALUATION ADULT - OTHER INFO
79F with Afib (on eliquis), HTN, DM2, HLD, h/o stroke, limb ischemia (s/p left AKA on 12/16, s/p b/l femoral thrombectomy), recent acute cholecystitis underwent perc chris tube by IR on 1/13, presented from nursing facility w weakness and abdominal pain. Patient confused, has been having intermittent confusion at baseline as per family at bedside.  used to speak with patient and family,  # [phone]. As per family patient noted to be more lethargic, complaining of more abdominal pain, and with fevers and chills since today morning.  79F with Afib (on eliquis), HTN, DM2, HLD, h/o stroke, limb ischemia (s/p left AKA on 12/16, s/p b/l femoral thrombectomy), recent acute cholecystitis underwent perc chris tube by IR on 1/13, presented from nursing facility w weakness and abdominal pain. Patient confused, has been having intermittent confusion at baseline as per family at bedside.  used to speak with patient and family,  # [phone]. As per family patient noted to be more lethargic, complaining of more abdominal pain, and with fevers and chills since today morning.     Admit dx is UTI  PT has left AKA  RD bedscale wt is 71 kg 156#    2/27  Previous RD bedscale weight is 74 kg  163#  not at [facility]  NFPE reveals moderate and severe muscle wasting, fat wasting   Pt has dx of DM, suggest check HgbA1c  Pt is not on home meds for diabetes  Pt did not give history, she is too lethargic for   suggest Confirm Goals of Care regarding nutrition support. Will provide nutrition/ hydration within goals of care.  Suggest add Ensure Max bid  Consider adding appetite stimulant such as Remeron or Marinol 2/2 chronically poor appetite/ PO intake   Will leave Italian written information for Diabetes for patient's family

## 2025-02-27 NOTE — DIETITIAN INITIAL EVALUATION ADULT - ORAL INTAKE PTA/DIET HISTORY
Unable to speak with pt, as she is very lethargic.  Family is not present.  Per note from 1/30, pt at that time of hospitalization (not at ) had poor PO intake.  Her weight is currently less than on that admit.  PO intake estimated < 75% ENN > one month.

## 2025-02-27 NOTE — PROGRESS NOTE ADULT - NUTRITIONAL ASSESSMENT
This patient has been assessed with a concern for Malnutrition and has been determined to have a diagnosis/diagnoses of Moderate protein-calorie malnutrition.    This patient is being managed with:   Diet DASH/TLC-  Sodium & Cholesterol Restricted  Entered: Feb 26 2025 10:49PM    The following pending diet order is being considered for treatment of Moderate protein-calorie malnutrition:  Diet Consistent Carbohydrate/No Snacks-  Supplement Feeding Modality:  Oral  Ensure Max Cans or Servings Per Day:  2       Frequency:  Two Times a day  Entered: Feb 27 2025 10:04AM    Diet Consistent Carbohydrate/No Snacks-  Entered: Feb 27 2025 10:03AM   This patient has been assessed with a concern for Malnutrition and has been determined to have a diagnosis/diagnoses of Moderate protein-calorie malnutrition.    Diet Consistent Carbohydrate/No Snacks-  Supplement Feeding Modality:  Oral  Ensure Max Cans or Servings Per Day:  2       Frequency:  Two Times a day  Entered: Feb 27 2025 10:04AM

## 2025-02-27 NOTE — PROGRESS NOTE ADULT - SUBJECTIVE AND OBJECTIVE BOX
Chief Complaint: Worsening generalized weakness, abdominal discomfort, fevers, chills, intermittent confusion    Interval Hx: Patient seen and examined. Awake and alert. Reports feeling improved. No fevers or rigors today. Occasional cough. No chest pain or tightness. No dyspnea. No abdominal pain. No other complaints. She is generally weak.     ROS: Multi system review is comprehensively negative x 10 systems except as above    Vitals:  T(F): 97.7 (27 Feb 2025 12:15), Max: 99 (26 Feb 2025 16:07)  HR: 88 (27 Feb 2025 14:23) (49 - 126)  BP: 104/60 (27 Feb 2025 12:15) (94/58 - 137/66)  RR: 18 (27 Feb 2025 12:15) (17 - 24)  SpO2: 94% (27 Feb 2025 12:15) (94% - 98%) on room air    Exam:  Gen: No acute distrss  HEENT: NCAT PERRL EOMI MMM clear oropharynx  Neck: Supple, no JVD, no LAD  CVS: s1 s2 normal, RRR  Chest: Normal resp effort, clear lungs B/L  Abd: Non distended, +BS, soft, non tender, +BS  Ext: L AKA, dressing C/D/I. No extremity tenderness. R groin with 0.75cm circular skin opening, no purulent drainage  Skin: R groin skin opening as mentioned, stage II sacral decubitus wound, R heel unstageable wound  Mood: Calm, pleasant  Neuro: Awake and alert, follows simple commands, answers some simple questions    Labs:                       10.1   6.66 )-----------( 211             33.0       139  |  112  |  10  -----------------------<  125  3.4   |   20   |  0.50    Ca    8.1    Phos  2.8    Mg   1.5        TPro  5.3  /  Alb  1.3  /  TBili  0.5  /  DBili  x   /  AST  20  /  ALT  17  /  AlkPhos  176    PT: 16.3 sec;   INR: 1.39 ratio  PTT: 36.8 sec    Lactate 3.5 --> 1.6    Micro:    Imaging:    Cardiac Testing:      Prior visit testing  TTE 12/17/24: LVEF 60%. Unable to assess LV diastolic function or filling pressure due to challenges from afib. Normal RV. Trace MR. Normal PASP estimate. No pericardial effusion. Normal IVC collapsibility.     Meds:  MEDICATIONS  (STANDING):  apixaban 5 milliGRAM(s) Oral every 12 hours  ascorbic acid 500 milliGRAM(s) Oral daily  folic acid 1 milliGRAM(s) Oral daily  insulin lispro (ADMELOG) corrective regimen sliding scale   SubCutaneous three times a day before meals  insulin lispro (ADMELOG) corrective regimen sliding scale   SubCutaneous at bedtime  meropenem Injectable 1000 milliGRAM(s) IV Push every 8 hours  metoprolol tartrate 12.5 milliGRAM(s) Oral two times a day  mirtazapine 7.5 milliGRAM(s) Oral at bedtime  multivitamin/minerals 1 Tablet(s) Oral daily  pantoprazole   Suspension 40 milliGRAM(s) Oral daily  rosuvastatin 5 milliGRAM(s) Oral at bedtime  senna 2 Tablet(s) Oral at bedtime  thiamine 100 milliGRAM(s) Oral daily  zinc sulfate 220 milliGRAM(s) Oral daily    MEDICATIONS  (PRN):  acetaminophen     Tablet .. 650 milliGRAM(s) Oral every 6 hours PRN Mild Pain (1 - 3)  aluminum hydroxide/magnesium hydroxide/simethicone Suspension 30 milliLiter(s) Oral every 4 hours PRN Dyspepsia  dextrose Oral Gel 15 Gram(s) Oral once PRN Blood Glucose LESS THAN 70 milliGRAM(s)/deciliter  melatonin 3 milliGRAM(s) Oral at bedtime PRN Insomnia  ondansetron Injectable 4 milliGRAM(s) IV Push every 8 hours PRN Nausea and/or Vomiting  polyethylene glycol 3350 17 Gram(s) Oral daily PRN Constipation                        Chief Complaint: Worsening generalized weakness, abdominal discomfort, fevers, chills, intermittent confusion    Interval Hx: Patient seen and examined. Awake and alert. Reports feeling improved. No fevers or rigors today. Occasional cough. No chest pain or tightness. No dyspnea. No abdominal pain. No other complaints. She is generally weak.     ROS: Multi system review is comprehensively negative x 10 systems except as above    Vitals:  T(F): 97.7 (27 Feb 2025 12:15), Max: 99 (26 Feb 2025 16:07)  HR: 88 (27 Feb 2025 14:23) (49 - 126)  BP: 104/60 (27 Feb 2025 12:15) (94/58 - 137/66)  RR: 18 (27 Feb 2025 12:15) (17 - 24)  SpO2: 94% (27 Feb 2025 12:15) (94% - 98%) on room air    Exam:  Gen: No acute distrss  HEENT: NCAT PERRL EOMI MMM clear oropharynx  Neck: Supple, no JVD, no LAD  CVS: s1 s2 normal, RRR  Chest: Normal resp effort, clear lungs B/L  Abd: Non distended, +BS, soft, non tender, +BS  Ext: L AKA, dressing C/D/I. No extremity tenderness. R groin with 0.75cm circular skin opening, no purulent drainage  Skin: R groin skin opening as mentioned, stage II sacral decubitus wound, R heel unstageable wound  Mood: Calm, pleasant  Neuro: Awake and alert, follows simple commands, answers some simple questions    Labs:                       10.1   6.66 )-----------( 211             33.0       139  |  112  |  10  -----------------------<  125  3.4   |   20   |  0.50    Ca    8.1    Phos  2.8    Mg   1.5        TPro  5.3  /  Alb  1.3  /  TBili  0.5  /  DBili  x   /  AST  20  /  ALT  17  /  AlkPhos  176    PT: 16.3 sec;   INR: 1.39 ratio  PTT: 36.8 sec    Lactate 3.5 --> 1.6    Micro:    Imaging:    Cardiac Testing:  EKG 2/26: Rate 102. Atrial fibrillation    Prior visit testing  TTE 12/17/24: LVEF 60%. Unable to assess LV diastolic function or filling pressure due to challenges from afib. Normal RV. Trace MR. Normal PASP estimate. No pericardial effusion. Normal IVC collapsibility.     Meds:  MEDICATIONS  (STANDING):  apixaban 5 milliGRAM(s) Oral every 12 hours  ascorbic acid 500 milliGRAM(s) Oral daily  folic acid 1 milliGRAM(s) Oral daily  insulin lispro (ADMELOG) corrective regimen sliding scale   SubCutaneous three times a day before meals  insulin lispro (ADMELOG) corrective regimen sliding scale   SubCutaneous at bedtime  meropenem Injectable 1000 milliGRAM(s) IV Push every 8 hours  metoprolol tartrate 12.5 milliGRAM(s) Oral two times a day  mirtazapine 7.5 milliGRAM(s) Oral at bedtime  multivitamin/minerals 1 Tablet(s) Oral daily  pantoprazole   Suspension 40 milliGRAM(s) Oral daily  rosuvastatin 5 milliGRAM(s) Oral at bedtime  senna 2 Tablet(s) Oral at bedtime  thiamine 100 milliGRAM(s) Oral daily  zinc sulfate 220 milliGRAM(s) Oral daily    MEDICATIONS  (PRN):  acetaminophen     Tablet .. 650 milliGRAM(s) Oral every 6 hours PRN Mild Pain (1 - 3)  aluminum hydroxide/magnesium hydroxide/simethicone Suspension 30 milliLiter(s) Oral every 4 hours PRN Dyspepsia  dextrose Oral Gel 15 Gram(s) Oral once PRN Blood Glucose LESS THAN 70 milliGRAM(s)/deciliter  melatonin 3 milliGRAM(s) Oral at bedtime PRN Insomnia  ondansetron Injectable 4 milliGRAM(s) IV Push every 8 hours PRN Nausea and/or Vomiting  polyethylene glycol 3350 17 Gram(s) Oral daily PRN Constipation                        Chief Complaint: Worsening generalized weakness, abdominal discomfort, fevers, chills, intermittent confusion    Interval Hx: Patient seen and examined. Awake and alert. Reports feeling improved. No fevers or rigors today. Occasional cough. No chest pain or tightness. No dyspnea. No abdominal pain. No other complaints. She is generally weak.     ROS: Multi system review is comprehensively negative x 10 systems except as above    Vitals:  T(F): 97.7 (27 Feb 2025 12:15), Max: 99 (26 Feb 2025 16:07)  HR: 88 (27 Feb 2025 14:23) (49 - 126)  BP: 104/60 (27 Feb 2025 12:15) (94/58 - 137/66)  RR: 18 (27 Feb 2025 12:15) (17 - 24)  SpO2: 94% (27 Feb 2025 12:15) (94% - 98%) on room air    Exam:  Gen: No acute distrss  HEENT: NCAT PERRL EOMI MMM clear oropharynx  Neck: Supple, no JVD, no LAD  CVS: s1 s2 normal, RRR  Chest: Normal resp effort, clear lungs B/L  Abd: Non distended, +BS, soft, non tender, +BS  Ext: L AKA, dressing C/D/I. No extremity tenderness. R groin with 0.75cm circular skin opening, no purulent drainage  Skin: R groin skin opening as mentioned, stage II sacral decubitus wound, R heel unstageable wound  Mood: Calm, pleasant  Neuro: Awake and alert, follows simple commands, answers some simple questions    Labs:    POCT glucose 130-190                         10.1   6.66 )-----------( 211             33.0       139  |  112  |  10  -----------------------<  125  3.4   |   20   |  0.50    Ca    8.1    Phos  2.8    Mg   1.5        TPro  5.3  /  Alb  1.3  /  TBili  0.5  /  DBili  x   /  AST  20  /  ALT  17  /  AlkPhos  176    PT: 16.3 sec;   INR: 1.39 ratio  PTT: 36.8 sec    Lactate 3.5 --> 1.6   A1c 6.3    UA 2/26: Yellow, clear, prot 30, gluc-, ket-, bld mod, bili-, small LE, N-, , RBC 11, bact many, epi 1 ceell    Micro:  Urine culture 2/26: In process  Blood culture x 2 2/26: In process  COVID19, flu, RSV PCR 2/26: Negative    Imaging:  CT abd, pelvis w/ IV cont 2/26: Unchanged small bilateral pleural effusions and atelectasis. Coronary calcifications. Subcentimeter hypoattenuating lesion in the posterior right lobe of liver too small otherwise characterize. Bile ducts normal caliber. Mildly fluid distended gallbladder with indwelling cholecystostomy tube and small intraluminal air. 1.4 x 1.4 cm rim-enhancing collection with low density central fluid interposed between the gallbladder fundus in the ascending colon. Additional small nonenhancing fluid surrounding the gallbladder fossa tracking into the right paracolic gutter. Accessory splenule. Splenic cyst versus hemangioma along the inferior pole. Pancreas and adrenals normal. Kidneys with symmetric enhancement bilaterally; no hydronephrosis. Ureters normal. Bladder collapsed around. Silver catheter with small intraluminal air. Thickening and surrounding inflammation. Uterus and adnexa are within normal limits. No bowel obstruction. Appendix is normal. Mild pericolonic inflammation in the hepatic flexure with small rim-enhancing collection, as described above. Peritoneum and retroperitoneum normal. Diffuse atherosclerotic changes. No lymphadenopathy. Fluid and edema anterior to the bilateral femoral vessels  with overlying postsurgical change right groin; correlate for prior catheterization. Diffuse subcutaneous edema. Soft tissue nodules anterior abdominal wall, likely sequelae of prior medical injections. Old right rib fractures. Degenerative changes.    CXR 2/26: Heart magnified by technique. Right upper quadrant drainage catheter again noted. On January 27 there was a small retrocardiac infiltrate. The present examination there are mild to moderate bibasilar effusions.    Cardiac Testing:  EKG 2/26: Rate 102. Atrial fibrillation    Prior visit testing  TTE 12/17/24: LVEF 60%. Unable to assess LV diastolic function or filling pressure due to challenges from afib. Normal RV. Trace MR. Normal PASP estimate. No pericardial effusion. Normal IVC collapsibility.     Meds:  MEDICATIONS  (STANDING):  apixaban 5 milliGRAM(s) Oral every 12 hours  ascorbic acid 500 milliGRAM(s) Oral daily  folic acid 1 milliGRAM(s) Oral daily  insulin lispro (ADMELOG) corrective regimen sliding scale   SubCutaneous three times a day before meals  insulin lispro (ADMELOG) corrective regimen sliding scale   SubCutaneous at bedtime  meropenem Injectable 1000 milliGRAM(s) IV Push every 8 hours  metoprolol tartrate 12.5 milliGRAM(s) Oral two times a day  mirtazapine 7.5 milliGRAM(s) Oral at bedtime  multivitamin/minerals 1 Tablet(s) Oral daily  pantoprazole   Suspension 40 milliGRAM(s) Oral daily  rosuvastatin 5 milliGRAM(s) Oral at bedtime  senna 2 Tablet(s) Oral at bedtime  thiamine 100 milliGRAM(s) Oral daily  zinc sulfate 220 milliGRAM(s) Oral daily    MEDICATIONS  (PRN):  acetaminophen     Tablet .. 650 milliGRAM(s) Oral every 6 hours PRN Mild Pain (1 - 3)  aluminum hydroxide/magnesium hydroxide/simethicone Suspension 30 milliLiter(s) Oral every 4 hours PRN Dyspepsia  dextrose Oral Gel 15 Gram(s) Oral once PRN Blood Glucose LESS THAN 70 milliGRAM(s)/deciliter  melatonin 3 milliGRAM(s) Oral at bedtime PRN Insomnia  ondansetron Injectable 4 milliGRAM(s) IV Push every 8 hours PRN Nausea and/or Vomiting  polyethylene glycol 3350 17 Gram(s) Oral daily PRN Constipation

## 2025-02-27 NOTE — SWALLOW BEDSIDE ASSESSMENT ADULT - DIET PRIOR TO ADMI
THE PT WAS ON A MECHANICAL SOFT TEXTURE DIET WITHOUT A LIQUID CONSISTENCY RESTRICTION AT [facility].

## 2025-02-27 NOTE — PATIENT PROFILE ADULT - FALL HARM RISK - HARM RISK INTERVENTIONS

## 2025-02-27 NOTE — SWALLOW BEDSIDE ASSESSMENT ADULT - SWALLOW EVAL: RECOMMENDED DIET
SUGGEST AN EASY TO CHEW DIET WITH THIN LIQUID CONSISTENCIES AS THE PATIENT APPEARS CLINICALLY TOLERANT OF THESE FOOD TEXTURES FROM AN OROPHARYNGEAL SWALLOWING PERSPECTIVE ON EXAM AND FOOD CONSISTENCIES ON THIS DIET ACCOMMODATE HER CHRONIC FUNCTIONAL ORAL DYSPHAGIC SEQUEL. NOTE THAT PT WAS ON A "MECHANICAL SOFT TEXTURE DIET" PTH.

## 2025-02-27 NOTE — H&P ADULT - NSHPPHYSICALEXAM_GEN_ALL_CORE
T(C): 37.2 (02-26-25 @ 21:15), Max: 37.2 (02-26-25 @ 16:07)  HR: 88 (02-27-25 @ 00:15) (83 - 126)  BP: 102/55 (02-27-25 @ 00:15) (94/58 - 132/74)  RR: 19 (02-27-25 @ 00:15) (17 - 24)  SpO2: 96% (02-27-25 @ 00:15) (95% - 98%)    General: ill appearing.   HEENT: non-traumatic, perrla, eomi  Cardio: s1s2 irregularly irregular  Lungs: comfortable breathing, decreased breath sounds.   Abdomen: Soft, non-tender, non-distended  Neuro: AOx0  ExtL Left AKA

## 2025-02-27 NOTE — DIETITIAN INITIAL EVALUATION ADULT - PERTINENT MEDS FT
MEDICATIONS  (STANDING):  apixaban 5 milliGRAM(s) Oral every 12 hours  ascorbic acid 500 milliGRAM(s) Oral daily  dextrose 5%. 1000 milliLiter(s) (100 mL/Hr) IV Continuous <Continuous>  dextrose 5%. 1000 milliLiter(s) (50 mL/Hr) IV Continuous <Continuous>  dextrose 50% Injectable 25 Gram(s) IV Push once  dextrose 50% Injectable 12.5 Gram(s) IV Push once  dextrose 50% Injectable 25 Gram(s) IV Push once  folic acid 1 milliGRAM(s) Oral daily  glucagon  Injectable 1 milliGRAM(s) IntraMuscular once  insulin lispro (ADMELOG) corrective regimen sliding scale   SubCutaneous three times a day before meals  insulin lispro (ADMELOG) corrective regimen sliding scale   SubCutaneous at bedtime  lactated ringers. 1000 milliLiter(s) (100 mL/Hr) IV Continuous <Continuous>  mirtazapine 7.5 milliGRAM(s) Oral at bedtime  multivitamin/minerals 1 Tablet(s) Oral daily  pantoprazole   Suspension 40 milliGRAM(s) Oral daily  piperacillin/tazobactam IVPB.. 3.375 Gram(s) IV Intermittent every 8 hours  rosuvastatin 5 milliGRAM(s) Oral at bedtime  senna 2 Tablet(s) Oral at bedtime  vancomycin  IVPB 750 milliGRAM(s) IV Intermittent every 12 hours    MEDICATIONS  (PRN):  acetaminophen     Tablet .. 650 milliGRAM(s) Oral every 6 hours PRN Mild Pain (1 - 3)  aluminum hydroxide/magnesium hydroxide/simethicone Suspension 30 milliLiter(s) Oral every 4 hours PRN Dyspepsia  dextrose Oral Gel 15 Gram(s) Oral once PRN Blood Glucose LESS THAN 70 milliGRAM(s)/deciliter  melatonin 3 milliGRAM(s) Oral at bedtime PRN Insomnia  ondansetron Injectable 4 milliGRAM(s) IV Push every 8 hours PRN Nausea and/or Vomiting  polyethylene glycol 3350 17 Gram(s) Oral daily PRN Constipation

## 2025-02-27 NOTE — SWALLOW BEDSIDE ASSESSMENT ADULT - NS SPL SWALLOW CLINIC TRIAL FT
The pt's willingness to accept PO was variably psychogenically decreased which was atop Oral Dysphagia of mild to moderate severity which subjectively appeared to be a grossly functional condition with a restricted inventory of modified food textures when alert/willing to accept PO(inconsistent). Bolus formation/transfer were mildly to moderately prolonged/discontinuous but mechanically functional. Piecemeal deglutition was evident. Mild tongue debris noted with coarse solids. Pt sometimes verbalized while PO in oral cavity & she sometimes purposefully "spit" PO anteriorally. Her underlying pharyngeal integrity subjectively appeared to be grossly within functional parameters for age. Swallow was triggered in an acceptable time frame for age, & laryngeal lift on palpation during swallowing trials was felt to be functional for age as well. NO behavioral aspiration signs exhibited. No change in O2 sats noted. Note that pt's functional Oral Dysphagia is chronic/pre-existing/irreversible in setting of Dementia/denture placements/old CVA. Pt is felt to be at oropharyngeal swallowing baseline.

## 2025-02-27 NOTE — DIETITIAN INITIAL EVALUATION ADULT - NSFNSPHYEXAMSKINFT_GEN_A_CORE
Pressure Injury 1: sacrum, Stage II  Pressure Injury 2: heel, Right:, Unstageable  Pressure Injury 3: none, none  Pressure Injury 4: none, none  Pressure Injury 5: none, none  Pressure Injury 6: none, none  Pressure Injury 7: none, none  Pressure Injury 8: none, none  Pressure Injury 9: none, none  Pressure Injury 10: none, none  Pressure Injury 11: none, none Pressure Injury 1: sacrum, Stage II  Pressure Injury 2: heel, Right:, Unstageable  Yehuda 12

## 2025-02-27 NOTE — H&P ADULT - NSHPLABSRESULTS_GEN_ALL_CORE
LABS:  cret                        10.6   12.17 )-----------( 283      ( 26 Feb 2025 17:23 )             34.8     02-26    130[L]  |  104  |  15  ----------------------------<  255[H]  4.7   |  22  |  0.82    Ca    8.2[L]      26 Feb 2025 17:23    TPro  6.4  /  Alb  1.6[L]  /  TBili  0.5  /  DBili  x   /  AST  44[H]  /  ALT  26  /  AlkPhos  242[H]  02-26    PT/INR - ( 26 Feb 2025 19:29 )   PT: 16.3 sec;   INR: 1.39 ratio      PTT - ( 26 Feb 2025 19:29 )  PTT:36.8 sec    Urinalysis with Rflx Culture (collected 02-26-25 @ 20:07)    < from: CT Abdomen and Pelvis w/ IV Cont (02.26.25 @ 18:42) >    IMPRESSION:  Small abscess interposed between the gallbladder fundus and the right   colon measuring 1.4 x 1.4 cm.    Mildly distended gallbladder with indwelling cholecystostomy tube;   correlate with output.    Cystitis.    --- End of Report ---    < end of copied text >    < from: Xray Chest 1 View- PORTABLE-Urgent (02.26.25 @ 17:36) >    IMPRESSION: Mild to moderate bibasilar effusions presently seen.    --- End of Report ---

## 2025-02-27 NOTE — SWALLOW BEDSIDE ASSESSMENT ADULT - ADDITIONAL RECOMMENDATIONS
1) HOSPITALIST F/U. PT WITH A NON NUTRITIVE COUGH WHICH IS REPORTEDLY CHRONIC. PT'S COUGH DID NOT APPEAR TO BE RELATED TO OROPHARYNGEAL DEGLUTITION.     2) NUTRITION F/U. PT'S WILLINGNESS TO ACCEPT PO HAS IS BEHAVIORALLY REDUCED AT TIMES WHICH IS ALSO A LONGSTANDING PRE-EXISTING ISSUE.

## 2025-02-27 NOTE — DIETITIAN INITIAL EVALUATION ADULT - PERTINENT LABORATORY DATA
02-27    139  |  112[H]  |  10  ----------------------------<  125[H]  3.4[L]   |  20[L]  |  0.50    Ca    8.1[L]      27 Feb 2025 07:24  Phos  2.8     02-27  Mg     1.5     02-27    TPro  5.3[L]  /  Alb  1.3[L]  /  TBili  0.5  /  DBili  x   /  AST  20  /  ALT  17  /  AlkPhos  176[H]  02-27  POCT Blood Glucose.: 130 mg/dL (02-27-25 @ 07:40)  A1C with Estimated Average Glucose Result: 11.5 % (12-17-24 @ 06:50)

## 2025-02-27 NOTE — PATIENT PROFILE ADULT - NSPROPOAURINARYCATHETER_GEN_A_NUR
Bartolo Holbrook was offered his hs medications twice. He refused to come to the desk for his medications, stating that he would come get them later. Medications were offered again and this time taken to the room by a different nurse. Bartolo Holbrook refused medications again, \"stating I just don't want them\". yes

## 2025-02-27 NOTE — SWALLOW BEDSIDE ASSESSMENT ADULT - ASR SWALLOW RECOMMEND DIAG
PT IS NOT AN MBS CANDIDATE GIVEN HER VARIABLY ALTERED MENTATION WITH FREQUENT OPPOSITION WHEN PO OFFERED.

## 2025-02-27 NOTE — DIETITIAN NUTRITION RISK NOTIFICATION - TREATMENT: THE FOLLOWING DIET HAS BEEN RECOMMENDED
Diet, Consistent Carbohydrate/No Snacks:   Supplement Feeding Modality:  Oral  Ensure Max Cans or Servings Per Day:  2       Frequency:  Two Times a day (02-27-25 @ 10:04) [Pending Verification By Attending]  Diet, Consistent Carbohydrate/No Snacks (02-27-25 @ 10:04) [Pending Verification By Attending]  Diet, DASH/TLC:   Sodium & Cholesterol Restricted (02-26-25 @ 22:49) [Active]

## 2025-02-27 NOTE — DIETITIAN NUTRITION RISK NOTIFICATION - MUSCLE MASS (LOSS OF MUSCLE)
Temples.../Clavicles.../Shoulders.../Thigh.../Calf...
Temples.../Clavicles.../Shoulders.../Thigh.../Calf...

## 2025-02-27 NOTE — DIETITIAN INITIAL EVALUATION ADULT - WEIGHT (LBS)
Left message to return call.
Left message to return call.
No-showed apt with me and does not have a future. Could have a short term supply to make it to an apt with me.
Pt care giver states pt needs a refill on Prozac. Pt was last seen in March with a upcoming appointment in September.  This medication was last prescribed in December for 90/1
156.5

## 2025-02-27 NOTE — SWALLOW BEDSIDE ASSESSMENT ADULT - SWALLOW EVAL: CRITERIA FOR SKILLED INTERVENTION MET
DO NOT FEEL THAT ACUTE SPEECH PATHOLOGY FOLLOW UP WOULD CHANGE CLINICAL MANAGEMENT/OUTCOME IN HOSPITAL SETTING. PT'S COGNITIVE DYSFUNCTION AND FUNCTIONAL ORAL DYSPHAGIA FOR MODIFIED FOOD TEXTURES ARE CHRONIC/PRE-EXISTING/IRREVERSIBLE. PT'S OROPHARYNGEAL SWALLOW/SPEECH-LANGUAGE INTEGRITY ARE FELT TO BE AT BASELINE. PT IS NOT A VIABLE RESTORATIVE SPEECH PATHOLOGY INTERVENTION CANDIDATE NONETHELESS GIVEN THE SEVERITY OF HER BASELINE COGNITIVE LIMITATIONS. GIVEN ABOVE, THIS SERVICE WILL NOT ACTIVELY FOLLOW. RECONSULT PRN SHOULD STATUS CHANGE AND CONDITION WARRANT.

## 2025-02-27 NOTE — H&P ADULT - HISTORY OF PRESENT ILLNESS
79F with Afib (on eliquis), HTN, DM2, HLD, h/o stroke, limb ischemia (s/p left AKA on 12/16, s/p b/l femoral thrombectomy), recent acute cholecystitis underwent perc chris tube by IR on 1/13, presented from nursing facility w weakness and abdominal pain. Patient confused, has been having intermittent confusion at baseline as per family at bedside.  used to speak with patient and family,  # [id number]. As per family patient noted to be more lethargic, complaining of more abdominal pain, and with fevers and chills since today morning.     In ED BP soft, otherwise VSS.  CBC: WBC 12.17, H/H 10/34, Plt 283  CMP: Na 130, K 4.7, BUN/Cr 15/0.82, Glu 255, Alk phos 242  Flu/COVID: negative  UA: proteinuria, mod blood, small leuks, 130 wbc, 11 RBCs, many bacteria.   CXR: Mild to moderate bibasilar effusions presently seen.  CT abdomen: Small abscess interposed between the gallbladder fundus and the right colon measuring 1.4 x 1.4 cm. Mildly distended gallbladder with indwelling cholecystostomy tube. Cystitis.

## 2025-02-27 NOTE — CONSULT NOTE ADULT - SUBJECTIVE AND OBJECTIVE BOX
Patient is a 79y old  Female who presents with a chief complaint of Urinary tract infection     (27 Feb 2025 09:00)    HPI:  79F with Afib (on eliquis), HTN, DM2, HLD, h/o stroke, limb ischemia (s/p left AKA on 12/16, s/p b/l femoral thrombectomy), recent acute cholecystitis underwent perc chris tube by IR on 1/13, presented from nursing facility w weakness and abdominal pain. Patient confused, has been having intermittent confusion at baseline as per family at bedside.  used to speak with patient and family,  # [id number]. As per family patient noted to be more lethargic, complaining of more abdominal pain, and with fevers and chills since today morning.  (27 Feb 2025 00:32)  Above HPI reviewed    79F with Afib (on eliquis), HTN, DM2, HLD, h/o stroke, limb ischemia (s/p left AKA on 12/16, s/p b/l femoral thrombectomy), recent acute cholecystitis underwent perc chris tube by IR on 1/13 presents 2/27 from Nursing Facility with abdominal pain and weakness  Afebrile on admission, on RA  WBC 12 > 6  Cr 0.50  Lactate 3.5 > 1.6  UA grossly positive  RVP negative  CXR: Mild to moderate bibasilar effusions presently seen  CT abdomen: Small abscess interposed between the gallbladder fundus and the right colon measuring 1.4 x 1.4 cm. Mildly distended gallbladder with indwelling cholecystostomy tube. Cystitis.  Prior UCx 2/2025 with ESBL Ecoli R Flouroquinolone and S Bactrim  Prior Bile Cx 1/2025 with E faecium Amp S  Surgery eval appreciated; no surgical intervention, Perc chris care    PAST MEDICAL & SURGICAL HISTORY:  DM (diabetes mellitus)      HTN (hypertension)      CVA (cerebrovascular accident)      Afib      S/P AKA (above knee amputation)  L      H/O insertion of cholecystostomy tube        FAMILY HISTORY:    Social Hx:    Allergies  No Known Allergies    ANTIMICROBIALS (past 90 days)  MEDICATIONS  (STANDING):  piperacillin/tazobactam IVPB..   25 mL/Hr IV Intermittent (02-27-25 @ 04:59)    piperacillin/tazobactam IVPB...   200 mL/Hr IV Intermittent (02-26-25 @ 19:31)    vancomycin  IVPB   250 mL/Hr IV Intermittent (02-27-25 @ 07:26)    vancomycin  IVPB.   250 mL/Hr IV Intermittent (02-26-25 @ 20:08)        ACTIVE ANTIMICROBIALS  piperacillin/tazobactam IVPB.. 3.375 every 8 hours  (2/26 --- )  vancomycin  IVPB 750 every 12 hours (2/26 ---- )    MEDICATIONS  (STANDING):  acetaminophen     Tablet .. 650 every 6 hours PRN  aluminum hydroxide/magnesium hydroxide/simethicone Suspension 30 every 4 hours PRN  apixaban 5 every 12 hours  dextrose 50% Injectable 25 once  dextrose 50% Injectable 12.5 once  dextrose 50% Injectable 25 once  dextrose Oral Gel 15 once PRN  glucagon  Injectable 1 once  insulin lispro (ADMELOG) corrective regimen sliding scale  three times a day before meals  insulin lispro (ADMELOG) corrective regimen sliding scale  at bedtime  melatonin 3 at bedtime PRN  mirtazapine 7.5 at bedtime  ondansetron Injectable 4 every 8 hours PRN  pantoprazole   Suspension 40 daily  polyethylene glycol 3350 17 daily PRN  rosuvastatin 5 at bedtime  senna 2 at bedtime      REVIEW OF SYSTEMS  [  ] ROS unobtainable because:    [  ] All other systems negative except as noted below:	    Constitutional:  [ ] fever [ ] chills  [ ] weight loss  [ ] weakness  Skin:  [ ] rash [ ] phlebitis	  Eyes: [ ] icterus [ ] pain  [ ] discharge	  ENMT: [ ] sore throat  [ ] thrush [ ] ulcers [ ] exudates  Respiratory: [ ] dyspnea [ ] hemoptysis [ ] cough [ ] sputum	  Cardiovascular:  [ ] chest pain [ ] palpitations [ ] edema	  Gastrointestinal:  [ ] nausea [ ] vomiting [ ] diarrhea [ ] constipation [ ] pain	  Genitourinary:  [ ] dysuria [ ] frequency [ ] hematuria [ ] discharge [ ] flank pain  [ ] incontinence  Musculoskeletal:  [ ] myalgias [ ] arthralgias [ ] arthritis  [ ] back pain  Neurological:  [ ] headache [ ] seizures  [ ] confusion/altered mental status  Psychiatric:  [ ] anxiety [ ] depression	  Hematology/Lymphatics:  [ ] lymphadenopathy  Endocrine:  [ ] adrenal [ ] thyroid  Allergic/Immunologic:	 [ ] transplant [ ] seasonal    Vital Signs Last 24 Hrs  T(C): 36.9 (27 Feb 2025 08:08), Max: 37.2 (26 Feb 2025 16:07)  T(F): 98.5 (27 Feb 2025 08:08), Max: 99 (26 Feb 2025 16:07)  HR: 102 (27 Feb 2025 08:08) (76 - 126)  BP: 127/73 (27 Feb 2025 08:08) (94/58 - 137/66)  BP(mean): 79 (27 Feb 2025 01:52) (61 - 92)  RR: 18 (27 Feb 2025 08:08) (17 - 24)  SpO2: 97% (27 Feb 2025 08:08) (95% - 98%)    Parameters below as of 27 Feb 2025 08:08  Patient On (Oxygen Delivery Method): room air        Physical Exam:  Constitutional:  well preserved, comfortable  Head/Eyes: no icterus  LUNGS:  CTA  CVS:  regular rhythm  Abd:  soft, non-tender; non-distended  Ext:  no edema  Vascular:  IV site no erythema tenderness or discharge  Neuro: AAO X 3, non- focal    Labs: all available labs reviewed                        10.1   6.66  )-----------( 211      ( 27 Feb 2025 07:24 )             33.0     02-27    139  |  112[H]  |  10  ----------------------------<  125[H]  3.4[L]   |  20[L]  |  0.50    Ca    8.1[L]      27 Feb 2025 07:24  Phos  2.8     02-27  Mg     1.5     02-27    TPro  5.3[L]  /  Alb  1.3[L]  /  TBili  0.5  /  DBili  x   /  AST  20  /  ALT  17  /  AlkPhos  176[H]  02-27     LIVER FUNCTIONS - ( 27 Feb 2025 07:24 )  Alb: 1.3 g/dL / Pro: 5.3 gm/dL / ALK PHOS: 176 U/L / ALT: 17 U/L / AST: 20 U/L / GGT: x           Urinalysis Basic - ( 27 Feb 2025 07:24 )    Color: x / Appearance: x / SG: x / pH: x  Gluc: 125 mg/dL / Ketone: x  / Bili: x / Urobili: x   Blood: x / Protein: x / Nitrite: x   Leuk Esterase: x / RBC: x / WBC x   Sq Epi: x / Non Sq Epi: x / Bacteria: x          Radiology: all available radiological tests reviewed    Advanced directives addressed: full resuscitation Patient is a 79y old  Female who presents with a chief complaint of Urinary tract infection     (27 Feb 2025 09:00)    HPI:  79F with Afib (on eliquis), HTN, DM2, HLD, h/o stroke, limb ischemia (s/p left AKA on 12/16, s/p b/l femoral thrombectomy), recent acute cholecystitis underwent perc chris tube by IR on 1/13, presented from nursing facility w weakness and abdominal pain. Patient confused, has been having intermittent confusion at baseline as per family at bedside.  used to speak with patient and family,  # [id number]. As per family patient noted to be more lethargic, complaining of more abdominal pain, and with fevers and chills since today morning.  (27 Feb 2025 00:32)  Above HPI reviewed    79F with Afib (on eliquis), HTN, DM2, HLD, h/o stroke, limb ischemia (s/p left AKA on 12/16, s/p b/l femoral thrombectomy), recent acute cholecystitis underwent perc chris tube by IR on 1/13 presents 2/27 from Nursing Facility with abdominal pain and weakness  Poor historian, but reports feeling tired, has some abdominal pain mostly near the perc chris tube, denies any fever or chills   Afebrile on admission, on RA  WBC 12 > 6  Cr 0.50  Lactate 3.5 > 1.6  UA grossly positive  RVP negative  CXR: Mild to moderate bibasilar effusions presently seen  CT abdomen: Small abscess interposed between the gallbladder fundus and the right colon measuring 1.4 x 1.4 cm. Mildly distended gallbladder with indwelling cholecystostomy tube. Cystitis.  Prior UCx 2/2025 with ESBL Ecoli R Flouroquinolone and S Bactrim  Prior Bile Cx 1/2025 with E faecium Amp S  Surgery eval appreciated; no surgical intervention, Perc chris care    PAST MEDICAL & SURGICAL HISTORY:  DM (diabetes mellitus)      HTN (hypertension)      CVA (cerebrovascular accident)      Afib      S/P AKA (above knee amputation)  L      H/O insertion of cholecystostomy tube        FAMILY HISTORY:    Social Hx: Denies alcohol, tobacco, recreational drug use    Allergies  No Known Allergies    ANTIMICROBIALS (past 90 days)  MEDICATIONS  (STANDING):  piperacillin/tazobactam IVPB..   25 mL/Hr IV Intermittent (02-27-25 @ 04:59)    piperacillin/tazobactam IVPB...   200 mL/Hr IV Intermittent (02-26-25 @ 19:31)    vancomycin  IVPB   250 mL/Hr IV Intermittent (02-27-25 @ 07:26)    vancomycin  IVPB.   250 mL/Hr IV Intermittent (02-26-25 @ 20:08)        ACTIVE ANTIMICROBIALS  piperacillin/tazobactam IVPB.. 3.375 every 8 hours  (2/26 --- )  vancomycin  IVPB 750 every 12 hours (2/26 ---- )    MEDICATIONS  (STANDING):  acetaminophen     Tablet .. 650 every 6 hours PRN  aluminum hydroxide/magnesium hydroxide/simethicone Suspension 30 every 4 hours PRN  apixaban 5 every 12 hours  dextrose 50% Injectable 25 once  dextrose 50% Injectable 12.5 once  dextrose 50% Injectable 25 once  dextrose Oral Gel 15 once PRN  glucagon  Injectable 1 once  insulin lispro (ADMELOG) corrective regimen sliding scale  three times a day before meals  insulin lispro (ADMELOG) corrective regimen sliding scale  at bedtime  melatonin 3 at bedtime PRN  mirtazapine 7.5 at bedtime  ondansetron Injectable 4 every 8 hours PRN  pantoprazole   Suspension 40 daily  polyethylene glycol 3350 17 daily PRN  rosuvastatin 5 at bedtime  senna 2 at bedtime      REVIEW OF SYSTEMS  [  ] ROS unobtainable because:    [ x ] All other systems negative except as noted below:	    Constitutional:  [ ] fever [ ] chills  [ ] weight loss  [ ] weakness  Skin:  [ ] rash [ ] phlebitis	  Eyes: [ ] icterus [ ] pain  [ ] discharge	  ENMT: [ ] sore throat  [ ] thrush [ ] ulcers [ ] exudates  Respiratory: [ ] dyspnea [ ] hemoptysis [ ] cough [ ] sputum	  Cardiovascular:  [ ] chest pain [ ] palpitations [ ] edema	  Gastrointestinal:  [ ] nausea [ ] vomiting [ ] diarrhea [ ] constipation [ ] pain	  Genitourinary:  [ ] dysuria [ ] frequency [ ] hematuria [ ] discharge [ ] flank pain  [ ] incontinence  Musculoskeletal:  [ ] myalgias [ ] arthralgias [ ] arthritis  [ ] back pain  Neurological:  [ ] headache [ ] seizures  [ ] confusion/altered mental status  Psychiatric:  [ ] anxiety [ ] depression	  Hematology/Lymphatics:  [ ] lymphadenopathy  Endocrine:  [ ] adrenal [ ] thyroid  Allergic/Immunologic:	 [ ] transplant [ ] seasonal    Vital Signs Last 24 Hrs  T(C): 36.9 (27 Feb 2025 08:08), Max: 37.2 (26 Feb 2025 16:07)  T(F): 98.5 (27 Feb 2025 08:08), Max: 99 (26 Feb 2025 16:07)  HR: 102 (27 Feb 2025 08:08) (76 - 126)  BP: 127/73 (27 Feb 2025 08:08) (94/58 - 137/66)  BP(mean): 79 (27 Feb 2025 01:52) (61 - 92)  RR: 18 (27 Feb 2025 08:08) (17 - 24)  SpO2: 97% (27 Feb 2025 08:08) (95% - 98%)    Parameters below as of 27 Feb 2025 08:08  Patient On (Oxygen Delivery Method): room air        Physical Exam:  Constitutional:  frail, NAD  Head/Eyes: no icterus  LUNGS:  CTA  CVS:  regular rhythm  Abd:  soft, non-tender; non-distended +perc chris tube  Ext:  no edema  Vascular:  IV site no erythema tenderness or discharge  Neuro: AAO X 2      Labs: all available labs reviewed                        10.1   6.66  )-----------( 211      ( 27 Feb 2025 07:24 )             33.0     02-27    139  |  112[H]  |  10  ----------------------------<  125[H]  3.4[L]   |  20[L]  |  0.50    Ca    8.1[L]      27 Feb 2025 07:24  Phos  2.8     02-27  Mg     1.5     02-27    TPro  5.3[L]  /  Alb  1.3[L]  /  TBili  0.5  /  DBili  x   /  AST  20  /  ALT  17  /  AlkPhos  176[H]  02-27     LIVER FUNCTIONS - ( 27 Feb 2025 07:24 )  Alb: 1.3 g/dL / Pro: 5.3 gm/dL / ALK PHOS: 176 U/L / ALT: 17 U/L / AST: 20 U/L / GGT: x           Urinalysis Basic - ( 27 Feb 2025 07:24 )    Color: x / Appearance: x / SG: x / pH: x  Gluc: 125 mg/dL / Ketone: x  / Bili: x / Urobili: x   Blood: x / Protein: x / Nitrite: x   Leuk Esterase: x / RBC: x / WBC x   Sq Epi: x / Non Sq Epi: x / Bacteria: x          Radiology: all available radiological tests reviewed    Advanced directives addressed: full resuscitation

## 2025-02-27 NOTE — H&P ADULT - ASSESSMENT
79F admitted for sepsis likely 2/2 intra-abdominal abscess +/- UTI    #Sepsis  #UTI  #Intraabdominal abscess  #Lactic acidosis  -CT abdomen: Small abscess interposed between the gallbladder fundus and the right colon measuring 1.4 x 1.4 cm.  -Seen by surgery, d/t stable CT compared to last dont recommend surgical intervention.   -Pt with muñoz, continue  -C/w broad-spectrum antibiotics.   -F/u septic work up  -ID consult.     #Cholecystitis s/p cholecystostomy tube  -Cholecystostomy tube functioning well.    #HTN-/ Atrial fib:   -C/w her eliquis  -Hold her metoprolol for now due to soft BP    #DM2 type 2 with hyperglycemia  -ISS    #HLD:   -Crestor 5mg qhs    #S/p left AKA & b/l femoral thrombectomy and cutdown 12/16/2,   #S/p L AKA revision due to wound dehiscence  -Wound care.  79F admitted for sepsis likely 2/2 intra-abdominal abscess +/- UTI    #Sepsis  #UTI  #Intraabdominal abscess  #Lactic acidosis  -CT abdomen: Small abscess interposed between the gallbladder fundus and the right colon measuring 1.4 x 1.4 cm.  -Seen by surgery, d/t stable CT compared to last dont recommend surgical intervention.   -Pt with muñoz, continue  -C/w broad-spectrum antibiotics.   -F/u septic work up  -ID consult.     #Cholecystitis s/p cholecystostomy tube  -Cholecystostomy tube functioning well.    #HTN-/ Atrial fib:   -C/w her eliquis  -Hold her metoprolol for now due to soft BP    #DM2 type 2 with hyperglycemia  -ISS    #HLD:   -Crestor 5mg qhs    #S/p left AKA & b/l femoral thrombectomy and cutdown 12/16/2,   #S/p L AKA revision due to wound dehiscence  -Wound care.     DVTppx: Eliquis  Fall and aspiration precautions.  Turn and reposition q2h to prevent bedsores  Skin checks as per RN

## 2025-02-27 NOTE — DIETITIAN NUTRITION RISK NOTIFICATION - MALNUTRITION EVALUATION AS DEMONSTRATED BY (ADULTS > 20 YEARS OF AGE)
Inadequate energy intake.../Loss of subcutaneous fat.../Loss of muscle...
Inadequate energy intake.../Loss of subcutaneous fat.../Loss of muscle...

## 2025-02-27 NOTE — PROGRESS NOTE ADULT - TIME BILLING
- extensive review of patient's medical chart including prior hospital encounters, current admission progress notes, labs, imaging and other testing, seeing and evaluating patient at bedside, explaining to patient regarding current condition and plan of care, then ordering tests and collaborating with specialty consultants including Infectious Disease, and finally, documenting today's findings and plan.

## 2025-02-27 NOTE — SWALLOW BEDSIDE ASSESSMENT ADULT - SLP GENERAL OBSERVATIONS
On encounter, the pt was alert. She was interactive but variably distractible and variably irritable. Pt was able to verbalize during communicative probes in [place] without evidence of a primary motor speech or primary linguistic pathology. However, her verbalizations variably reflected reduced memory/insight indicative of Cognitive Dysfunction with chronic/pre-existing component associated with Dementia. Pt's daughter feels that patient is at usual speech-language state.

## 2025-02-27 NOTE — SWALLOW BEDSIDE ASSESSMENT ADULT - SWALLOW EVAL: PROGNOSIS
2) The pt's willingness to accept PO is variably psychogenically decreased which is atop Oral Dysphagia of mild to moderate severity which subjectively appears to be a grossly functional condition with a restricted inventory of modified food textures when alert/willing to accept PO(inconsistent). Bolus formation/transfer were mildly to moderately prolonged but mechanically functional. Piecemeal deglutition was evident. Pt sometimes verbalized while PO in oral cavity & he sometimes purposefully "spit" PO anteriorally. Her underlying pharyngeal integrity subjectively appeared to be grossly within functional parameters for age. NO behavioral aspiration signs exhibited. No change in O2 sats noted. Note that pt's functional Oral Dysphagia is chronic/pre-existing/irreversible in setting of Dementia/denture placements/old CVA. Pt is felt to be at oropharyngeal swallowing baseline.

## 2025-02-27 NOTE — DIETITIAN INITIAL EVALUATION ADULT - NAME AND PHONE
Debbie Bautista RDN, CDN, [facility]      [phone]   [email or web address]

## 2025-02-27 NOTE — SWALLOW BEDSIDE ASSESSMENT ADULT - SWALLOW EVAL: DIAGNOSIS
1) On encounter, the pt was alert. She was interactive but variably distractible and variably irritable. Pt was able to verbalize during communicative probes in [place] without evidence of a primary motor speech or primary linguistic pathology. However, her verbalizations variably reflected reduced memory/insight indicative of Cognitive Dysfunction with chronic/pre-existing component associated with Dementia. Pt's daughter feels that patient is at usual speech-language state.

## 2025-02-27 NOTE — PROGRESS NOTE ADULT - ASSESSMENT
apixaban 5 milliGRAM(s) Oral every 12 hours  ascorbic acid 500 milliGRAM(s) Oral daily  folic acid 1 milliGRAM(s) Oral daily  insulin lispro (ADMELOG) corrective regimen sliding scale   SubCutaneous three times a day before meals  insulin lispro (ADMELOG) corrective regimen sliding scale   SubCutaneous at bedtime  meropenem Injectable 1000 milliGRAM(s) IV Push every 8 hours  metoprolol tartrate 12.5 milliGRAM(s) Oral two times a day  mirtazapine 7.5 milliGRAM(s) Oral at bedtime  multivitamin/minerals 1 Tablet(s) Oral daily  pantoprazole   Suspension 40 milliGRAM(s) Oral daily  rosuvastatin 5 milliGRAM(s) Oral at bedtime  senna 2 Tablet(s) Oral at bedtime  thiamine 100 milliGRAM(s) Oral daily  zinc sulfate 220 milliGRAM(s) Oral daily      Acute metabolic encephalopathy    Sepsis with organ dysfunction due to UTI    Elevated lactate  Resolved. In setting of acute infection, hypoperfusion. Lactate has since normalized.     Hyponatremia    Hypokalemia  Mild. K 3.4. Replete K to goal ~4.    Hypomagnesemia  Mild. Mg 1.5. Replete Mg to goal ~2.     Moderate protein calorie malnutrition  Dietician input appreciated. Supplementing diet with Ensure Max BID, thiamine daily, MVI daily, zinc daily x 10 days, vit C 79 year old woman with diabetes, HTN, HLD, afib on Eliquis, hx of CVA, hx of limb ischemia s/p L AKA, B/L femoral artery thrombectomy, recent diagnosis of acute cholecystitis for which cholecystostomy tube was placed, sent from nursing home for further evaluation of generalized weakness, confusion, fever, found to have hyponatremia, leukocytosis, abnormal urinalysis with pyuria and bacteriuria, CT scan findings of bladder thickening and inflammation consistent with cystitis, as well as findings of mildly fluid-distended gallbladder with indwelling cholecystostomy tube and small intraluminal air, 1.4 x 1.4 cm rim-enhancing collection with low density central fluid interposed between the gallbladder fundus in the ascending colon, and additional small nonenhancing fluid surrounding the gallbladder fossa tracking into the right paracolic gutter. Patient was given IV antibiotics and admitted to Medicine.     Acute metabolic encephalopathy  Multifactorial due to acute infection, hyponatremia, hypokalemia. Mentation appears improved today as compared to that previously described. Continue to treat underlying issues. Reorient frequently. Monitor voiding, bowel movements. Monitor for pain.     Sepsis with organ dysfunction due to UTI, also finding of 1.4cm rim enhancing collection adjacent to gallbladder fundus, present upon admission  Patient admitted with fever, leukocytosis, elevated lactate. Found to have abnormal UA with pyuria and bacteriuria. CT A/P with signs of cystitis. Also noted on CT was mildly fluid distended gallbladder with indwelling cholecystostomy tube and small intraluminal air. 1.4 x 1.4 cm rim-enhancing collection with low density central fluid interposed between the gallbladder fundus in the ascending colon. Additional small nonenhancing fluid surrounding the gallbladder fossa tracking into the right paracolic gutter. All too small to drain as per IR and that includes the 1.4cm collection described. Continue IV antibiotics. Appreciate ID input. On meropenem. Continue supportive care.     Elevated lactate  Resolved. In setting of acute infection, hypoperfusion. Lactate has since normalized.     Diabetes mellitus  Well controlled. A1c 6.3. POCT glucose 130s. Continue on insulin correctional scale.     HTN  BP controlled. Continue metoprolol.    HLD  Stable. Continue statin.     Paroxysmal atrial fibrillation  Rate controlled. Continue on metoprolol. Continue DOAC for stroke risk reduction.    Hx of CVA  Continue DOAC, statin, BP control    PAD, hx of limb ischemia, L AKA, B/L femoral thrombectomy  Continue to monitor L AKA site. Additionally patient appears to have a small, 0.7cm round skin opening in R groin. Possibly site of prior vascular cannulation that did not heal? No purulence.     Hyponatremia  Mild. Likely from low solute intake or possibly from dehydration. Will continue to monitor. Encourage PO intake.    Hypokalemia  Mild. K 3.4. Replete K to goal ~4.    Hypomagnesemia  Mild. Mg 1.5. Replete Mg to goal ~2.     Small bilateral pleural effusions  Noted in setting of malnutrition, hypoalbuminemia to 1.5. Stable in size on imaging compared to prior. Respiratory status stable. No hypoxia.     Moderate protein calorie malnutrition  Dietician input appreciated. Supplementing diet with Ensure Max BID, thiamine daily, MVI daily, zinc daily x 10 days, vit C

## 2025-02-27 NOTE — DIETITIAN INITIAL EVALUATION ADULT - OBTAIN WEEKLY WEIGHT
TRANSFER - IN REPORT:    Verbal report received from Harvey Case RN(name) on [facility]  being received from ED(unit) for routine progression of care      Report consisted of patients Situation, Background, Assessment and   Recommendations(SBAR). Information from the following report(s) SBAR, Kardex, ED Summary, Intake/Output, MAR and Recent Results was reviewed with the receiving nurse. Opportunity for questions and clarification was provided. Assessment to be completed upon patients arrival to unit and care assumed. yes

## 2025-02-27 NOTE — DIETITIAN NUTRITION RISK NOTIFICATION - ADDITIONAL COMMENTS/DIETITIAN RECOMMENDATIONS
Change diet to Consistent Carb, add Ensure Max bid  Suggest add Vit C 500 mg BID, add Zinc Sulfate 220 mg x 10 days to promote wound healing   Narciso BID  MVI w/ minerals daily to ensure 100% RDA met   Record PO intake in EMR after each meal (flowsheet, nursing.)   Monitor bowel movements, if no BM for >3 days, consider implementing bowel regimen.  Consider adding thiamine 100 mg daily 2/2 poor PO intake/ malnutrition  suggest Confirm Goals of Care regarding nutrition support. Will provide nutrition/ hydration within goals of care.  Monitor PO intake, tolerance, labs and weight. 
Change diet to Consistent Carb, add Ensure Max bid  Suggest add Vit C 500 mg BID, add Zinc Sulfate 220 mg x 10 days to promote wound healing   Narciso BID  MVI w/ minerals daily to ensure 100% RDA met   Record PO intake in EMR after each meal (flowsheet, nursing.)   Monitor bowel movements, if no BM for >3 days, consider implementing bowel regimen.  Consider adding thiamine 100 mg daily 2/2 poor PO intake/ malnutrition  suggest Confirm Goals of Care regarding nutrition support. Will provide nutrition/ hydration within goals of care.  Monitor PO intake, tolerance, labs and weight.

## 2025-02-27 NOTE — CONSULT NOTE ADULT - ASSESSMENT
Assessment:  79F with Afib (on eliquis), HTN, DM2, HLD, h/o stroke, limb ischemia (s/p left AKA on 12/16, s/p b/l femoral thrombectomy), recent acute cholecystitis underwent perc chris tube by IR on 1/13 presents 2/27 from Nursing Facility with abdominal pain and weakness  Afebrile on admission, on RA  WBC 12 > 6  Cr 0.50  Lactate 3.5 > 1.6  UA grossly positive  RVP negative  CXR: Mild to moderate bibasilar effusions presently seen  CT abdomen: Small abscess interposed between the gallbladder fundus and the right colon measuring 1.4 x 1.4 cm. Mildly distended gallbladder with indwelling cholecystostomy tube. Cystitis.  Prior UCx 2/2025 with ESBL Ecoli R Flouroquinolone and S Bactrim  Prior Bile Cx 1/2025 with E faecium Amp S    Antimicrobials:  piperacillin/tazobactam IVPB.. 3.375 every 8 hours  (2/26 --- )  vancomycin  IVPB 750 every 12 hours (2/26 ---- )    Impression:   #Recent Acute Chris s/p Perc chris complicated by Intraabdominal Abscess  #Complicated UTI  #Leukocytosis resolved  #Lactic Acidosis, improved  #DM    Recommendations:  - stop empiric Vanco  - continue empiric Zosyn 3.375 q8 (Day #2)  - monitor temperature curve  - trend WBC  - reason for abx use reviewed with patient  - side effects of antibiotic discussed, tolerating abx well so far  - Prior cultures reviewed. An epidemiologic assessment was performed. There is a significant risk for resistant microorganisms to spread to family members, and/or healthcare staff. The patient will be placed on isolation according to infection control policy. Will reconsider isolation measures based on new culture results and other clinical data as appropriate Appropriate cultures collected and an appropriate broad spectrum antibiotic therapy will be considered  - follow up BCx x2, UCx  - Surgery eval appreciated; no surgical intervention, Perc chris care  - rest per primary team    Clinical team may change from intravenous to oral antibiotics when the following criteria are met:   1. Patient is clinically improving/stable       a)	Improved signs and symptoms of infection from initial presentation       b)	Afebrile for 24 hours       c)	Leukocytosis trending towards normal range   2. Patient is tolerating oral intake   3. Initial/repeat blood cultures are negative OR do not need to wait for preliminary blood cultures to result    Cannot advise changing to oral antibiotic therapy until culture sensitivity is available.   Assessment:  79F with Afib (on eliquis), HTN, DM2, HLD, h/o stroke, limb ischemia (s/p left AKA on 12/16, s/p b/l femoral thrombectomy), recent acute cholecystitis underwent perc chris tube by IR on 1/13 presents 2/27 from Nursing Facility with abdominal pain and weakness  Poor historian, but reports feeling tired, has some abdominal pain mostly near the perc chris tube, denies any fever or chills   Afebrile on admission, on RA  WBC 12 > 6  Cr 0.50  Lactate 3.5 > 1.6  UA grossly positive  RVP negative  CXR: Mild to moderate bibasilar effusions presently seen  CT abdomen: Small abscess interposed between the gallbladder fundus and the right colon measuring 1.4 x 1.4 cm. Mildly distended gallbladder with indwelling cholecystostomy tube. Cystitis.  Prior UCx 2/2025 with ESBL Ecoli R Flouroquinolone and S Bactrim  Prior Bile Cx 1/2025 with E faecium Amp S    Antimicrobials:  vancomycin  IVPB 750 every 12 hours (2/26 ---- 2/27)  piperacillin/tazobactam IVPB.. 3.375 every 8 hours  (2/26 --- ) Meropenem (2/27 --- )    Impression:   #Recent Acute Chris s/p Perc chris complicated by Intraabdominal Abscess  #Complicated UTI  #Leukocytosis resolved  #Lactic Acidosis, improved  #DM    Recommendations:  - stop empiric Vanco  - switch empiric Zosyn to Meropenem 1G q8 (Day #1)  - monitor temperature curve  - trend WBC  - reason for abx use reviewed with patient  - side effects of antibiotic discussed, tolerating abx well so far  - Prior cultures reviewed. An epidemiologic assessment was performed. There is a significant risk for resistant microorganisms to spread to family members, and/or healthcare staff. The patient will be placed on isolation according to infection control policy. Will reconsider isolation measures based on new culture results and other clinical data as appropriate Appropriate cultures collected and an appropriate broad spectrum antibiotic therapy will be considered  - follow up BCx x2, UCx  - Surgery eval appreciated; no surgical intervention, Perc chris care  - rest per primary team    Clinical team may change from intravenous to oral antibiotics when the following criteria are met:   1. Patient is clinically improving/stable       a)	Improved signs and symptoms of infection from initial presentation       b)	Afebrile for 24 hours       c)	Leukocytosis trending towards normal range   2. Patient is tolerating oral intake   3. Initial/repeat blood cultures are negative OR do not need to wait for preliminary blood cultures to result    Cannot advise changing to oral antibiotic therapy until culture sensitivity is available.

## 2025-02-27 NOTE — SWALLOW BEDSIDE ASSESSMENT ADULT - COMMENTS
The pt was admitted to  from [facility] with generalized weakness and reduced PO intake. Hospital course is remarkable for sepsis, elevated lactate,  hyponatremia, hypokalemia, hypomagnesemia, hypoalbuminemia and altered sensorium. alessio The pt was admitted to  from [facility] with generalized weakness and reduced PO intake. Hospital course is remarkable for sepsis, elevated lactate, hyponatremia, hypokalemia, hypomagnesemia, hypoalbuminemia, presence of enhancing collection on imaging in approximation to gallbladder. The pt was admitted to  from [facility] with generalized weakness and reduced PO intake. Hospital course is remarkable for pleural effusions on imaging, UTI, sepsis, elevated lactate, hyponatremia, hypokalemia, hypomagnesemia, hypoalbuminemia, presence of enhancing collection on imaging in approximation to gallbladder and altered sensorium. This profile is superimposed upon a history of Dementia, DM, PVD s/p bilateral femoral thrombectomies/left AKA, atrial fibrillation, HTN, obstructive uropathy, past CVA, and previous insertion of a choley tube.

## 2025-02-28 LAB
ANION GAP SERPL CALC-SCNC: 5 MMOL/L — SIGNIFICANT CHANGE UP (ref 5–17)
BASOPHILS # BLD AUTO: 0.03 K/UL — SIGNIFICANT CHANGE UP (ref 0–0.2)
BASOPHILS NFR BLD AUTO: 0.5 % — SIGNIFICANT CHANGE UP (ref 0–2)
BUN SERPL-MCNC: 7 MG/DL — SIGNIFICANT CHANGE UP (ref 7–23)
CALCIUM SERPL-MCNC: 8.4 MG/DL — LOW (ref 8.5–10.1)
CHLORIDE SERPL-SCNC: 113 MMOL/L — HIGH (ref 96–108)
CO2 SERPL-SCNC: 24 MMOL/L — SIGNIFICANT CHANGE UP (ref 22–31)
CREAT SERPL-MCNC: 0.6 MG/DL — SIGNIFICANT CHANGE UP (ref 0.5–1.3)
CULTURE RESULTS: SIGNIFICANT CHANGE UP
EGFR: 90 ML/MIN/1.73M2 — SIGNIFICANT CHANGE UP
EGFR: 90 ML/MIN/1.73M2 — SIGNIFICANT CHANGE UP
EOSINOPHIL # BLD AUTO: 0.24 K/UL — SIGNIFICANT CHANGE UP (ref 0–0.5)
EOSINOPHIL NFR BLD AUTO: 4.2 % — SIGNIFICANT CHANGE UP (ref 0–6)
GLUCOSE BLDC GLUCOMTR-MCNC: 103 MG/DL — HIGH (ref 70–99)
GLUCOSE BLDC GLUCOMTR-MCNC: 135 MG/DL — HIGH (ref 70–99)
GLUCOSE BLDC GLUCOMTR-MCNC: 153 MG/DL — HIGH (ref 70–99)
GLUCOSE BLDC GLUCOMTR-MCNC: 187 MG/DL — HIGH (ref 70–99)
GLUCOSE SERPL-MCNC: 97 MG/DL — SIGNIFICANT CHANGE UP (ref 70–99)
HCT VFR BLD CALC: 34.5 % — SIGNIFICANT CHANGE UP (ref 34.5–45)
HGB BLD-MCNC: 10.5 G/DL — LOW (ref 11.5–15.5)
IMM GRANULOCYTES # BLD AUTO: 0.01 K/UL — SIGNIFICANT CHANGE UP (ref 0–0.07)
IMM GRANULOCYTES NFR BLD AUTO: 0.2 % — SIGNIFICANT CHANGE UP (ref 0–0.9)
LYMPHOCYTES # BLD AUTO: 2.27 K/UL — SIGNIFICANT CHANGE UP (ref 1–3.3)
LYMPHOCYTES NFR BLD AUTO: 39.3 % — SIGNIFICANT CHANGE UP (ref 13–44)
MAGNESIUM SERPL-MCNC: 1.7 MG/DL — SIGNIFICANT CHANGE UP (ref 1.6–2.6)
MCHC RBC-ENTMCNC: 25.1 PG — LOW (ref 27–34)
MCHC RBC-ENTMCNC: 30.4 G/DL — LOW (ref 32–36)
MCV RBC AUTO: 82.3 FL — SIGNIFICANT CHANGE UP (ref 80–100)
MONOCYTES # BLD AUTO: 0.45 K/UL — SIGNIFICANT CHANGE UP (ref 0–0.9)
MONOCYTES NFR BLD AUTO: 7.8 % — SIGNIFICANT CHANGE UP (ref 2–14)
NEUTROPHILS # BLD AUTO: 2.77 K/UL — SIGNIFICANT CHANGE UP (ref 1.8–7.4)
NEUTROPHILS NFR BLD AUTO: 48 % — SIGNIFICANT CHANGE UP (ref 43–77)
NRBC # BLD AUTO: 0 K/UL — SIGNIFICANT CHANGE UP (ref 0–0)
NRBC # FLD: 0 K/UL — SIGNIFICANT CHANGE UP (ref 0–0)
NRBC BLD AUTO-RTO: 0 /100 WBCS — SIGNIFICANT CHANGE UP (ref 0–0)
PHOSPHATE SERPL-MCNC: 3.2 MG/DL — SIGNIFICANT CHANGE UP (ref 2.5–4.5)
PLATELET # BLD AUTO: 236 K/UL — SIGNIFICANT CHANGE UP (ref 150–400)
PMV BLD: 9.2 FL — SIGNIFICANT CHANGE UP (ref 7–13)
POTASSIUM SERPL-MCNC: 4.1 MMOL/L — SIGNIFICANT CHANGE UP (ref 3.5–5.3)
POTASSIUM SERPL-SCNC: 4.1 MMOL/L — SIGNIFICANT CHANGE UP (ref 3.5–5.3)
RBC # BLD: 4.19 M/UL — SIGNIFICANT CHANGE UP (ref 3.8–5.2)
RBC # FLD: 17.6 % — HIGH (ref 10.3–14.5)
SODIUM SERPL-SCNC: 142 MMOL/L — SIGNIFICANT CHANGE UP (ref 135–145)
SPECIMEN SOURCE: SIGNIFICANT CHANGE UP
WBC # BLD: 5.77 K/UL — SIGNIFICANT CHANGE UP (ref 3.8–10.5)
WBC # FLD AUTO: 5.77 K/UL — SIGNIFICANT CHANGE UP (ref 3.8–10.5)

## 2025-02-28 PROCEDURE — 99233 SBSQ HOSP IP/OBS HIGH 50: CPT

## 2025-02-28 RX ADMIN — Medication 2 TABLET(S): at 21:32

## 2025-02-28 RX ADMIN — METOPROLOL SUCCINATE 12.5 MILLIGRAM(S): 50 TABLET, EXTENDED RELEASE ORAL at 21:32

## 2025-02-28 RX ADMIN — INSULIN LISPRO 2: 100 INJECTION, SOLUTION INTRAVENOUS; SUBCUTANEOUS at 17:55

## 2025-02-28 RX ADMIN — MEROPENEM 1000 MILLIGRAM(S): 1 INJECTION INTRAVENOUS at 14:40

## 2025-02-28 RX ADMIN — APIXABAN 5 MILLIGRAM(S): 2.5 TABLET, FILM COATED ORAL at 21:32

## 2025-02-28 RX ADMIN — MIRTAZAPINE 7.5 MILLIGRAM(S): 30 TABLET, FILM COATED ORAL at 21:32

## 2025-02-28 RX ADMIN — Medication 1 TABLET(S): at 10:07

## 2025-02-28 RX ADMIN — MEROPENEM 1000 MILLIGRAM(S): 1 INJECTION INTRAVENOUS at 06:18

## 2025-02-28 RX ADMIN — Medication 100 MILLIGRAM(S): at 10:06

## 2025-02-28 RX ADMIN — ROSUVASTATIN CALCIUM 5 MILLIGRAM(S): 20 TABLET, FILM COATED ORAL at 21:32

## 2025-02-28 RX ADMIN — APIXABAN 5 MILLIGRAM(S): 2.5 TABLET, FILM COATED ORAL at 10:07

## 2025-02-28 RX ADMIN — Medication 500 MILLIGRAM(S): at 10:06

## 2025-02-28 RX ADMIN — MEROPENEM 1000 MILLIGRAM(S): 1 INJECTION INTRAVENOUS at 21:32

## 2025-02-28 RX ADMIN — METOPROLOL SUCCINATE 12.5 MILLIGRAM(S): 50 TABLET, EXTENDED RELEASE ORAL at 10:07

## 2025-02-28 RX ADMIN — Medication 220 MILLIGRAM(S): at 10:07

## 2025-02-28 RX ADMIN — FOLIC ACID 1 MILLIGRAM(S): 1 TABLET ORAL at 10:06

## 2025-02-28 RX ADMIN — Medication 40 MILLIGRAM(S): at 10:07

## 2025-02-28 NOTE — PROGRESS NOTE ADULT - ASSESSMENT
79 year old woman with diabetes, HTN, HLD, afib on Eliquis, hx of CVA, hx of limb ischemia s/p L AKA, B/L femoral artery thrombectomy, recent diagnosis of acute cholecystitis for which cholecystostomy tube was placed, sent from nursing home for further evaluation of generalized weakness, confusion, fever, found to have hyponatremia, leukocytosis, abnormal urinalysis with pyuria and bacteriuria, CT scan findings of bladder thickening and inflammation consistent with cystitis, as well as findings of mildly fluid-distended gallbladder with indwelling cholecystostomy tube and small intraluminal air, 1.4 x 1.4 cm rim-enhancing collection with low density central fluid interposed between the gallbladder fundus in the ascending colon, and additional small nonenhancing fluid surrounding the gallbladder fossa tracking into the right paracolic gutter. Patient was given IV antibiotics and admitted to Medicine.     Acute metabolic encephalopathy  Multifactorial due to acute infection, hyponatremia, hypokalemia. Mentation appears improved today as compared to that previously described. Continue to treat underlying issues. Reorient frequently. Monitor voiding, bowel movements. Monitor for pain.     Sepsis with organ dysfunction due to UTI, also finding of 1.4cm rim enhancing collection adjacent to gallbladder fundus, present upon admission  Patient admitted with fever, leukocytosis, elevated lactate. Found to have abnormal UA with pyuria and bacteriuria. CT A/P with signs of cystitis. Also noted on CT was mildly fluid distended gallbladder with indwelling cholecystostomy tube and small intraluminal air. 1.4 x 1.4 cm rim-enhancing collection with low density central fluid interposed between the gallbladder fundus in the ascending colon. Additional small nonenhancing fluid surrounding the gallbladder fossa tracking into the right paracolic gutter. All too small to drain as per IR and that includes the 1.4cm collection described. Continue IV antibiotics. Appreciate ID input. On meropenem. Continue supportive care.     Elevated lactate  Resolved. In setting of acute infection, hypoperfusion. Lactate has since normalized.     Diabetes mellitus  Well controlled. A1c 6.3. POCT glucose 100s-140s. Continue on insulin correctional scale.     HTN  BP controlled. Continue metoprolol.    HLD  Stable. Continue statin.     Paroxysmal atrial fibrillation  Rate controlled. Continue on metoprolol. Continue DOAC for stroke risk reduction.    Hx of CVA  Continue DOAC, statin, BP control    PAD, hx of limb ischemia, L AKA, B/L femoral thrombectomy  Continue to monitor L AKA site. Additionally patient appears to have a small, 0.7cm round skin opening in R groin. Possibly site of prior vascular cannulation that did not heal? No purulence.     Hyponatremia  Mild. Likely from low solute intake or possibly from dehydration. Na normalized with IV hydration. Encourage PO intake.    Hypokalemia  Resolved with potassium supplementation.     Hypomagnesemia  Resolved with magnesium supplementation.     Small bilateral pleural effusions  Noted in setting of malnutrition, hypoalbuminemia to 1.5. Stable in size on imaging compared to prior. Respiratory status stable. No hypoxia.     Moderate protein calorie malnutrition  Dietician input appreciated. Supplementing diet with Ensure Max BID, thiamine daily, MVI daily, zinc daily x 10 days, vit C 79 year old woman with diabetes, HTN, HLD, afib on Eliquis, hx of CVA, hx of limb ischemia s/p L AKA, B/L femoral artery thrombectomy, recent diagnosis of acute cholecystitis for which cholecystostomy tube was placed, sent from nursing home for further evaluation of generalized weakness, confusion, fever, found to have hyponatremia, leukocytosis, abnormal urinalysis with pyuria and bacteriuria, CT scan findings of bladder thickening and inflammation consistent with cystitis, as well as findings of mildly fluid-distended gallbladder with indwelling cholecystostomy tube and small intraluminal air, 1.4 x 1.4 cm rim-enhancing collection with low density central fluid interposed between the gallbladder fundus in the ascending colon, and additional small nonenhancing fluid surrounding the gallbladder fossa tracking into the right paracolic gutter. Patient was given IV antibiotics and admitted to Medicine.     Acute metabolic encephalopathy  Multifactorial due to acute infection, hyponatremia, hypokalemia. Mentation appears improved today as compared to that previously described. Continue to treat underlying issues. Reorient frequently. Monitor voiding, bowel movements. Monitor for pain.     Sepsis with organ dysfunction due to UTI, also finding of 1.4cm rim enhancing collection adjacent to gallbladder fundus, present upon admission  Patient admitted with fever, leukocytosis, elevated lactate. Found to have abnormal UA with pyuria and bacteriuria. CT A/P with signs of cystitis. Also noted on CT was mildly fluid distended gallbladder with indwelling cholecystostomy tube and small intraluminal air. 1.4 x 1.4 cm rim-enhancing collection with low density central fluid interposed between the gallbladder fundus in the ascending colon. Additional small nonenhancing fluid surrounding the gallbladder fossa tracking into the right paracolic gutter. All too small to drain as per IR and that includes the 1.4cm collection described. Appreciate ID input. Continue on antibiotics, currently meropenem. Continue supportive care.     Elevated lactate  Resolved. In setting of acute infection, hypoperfusion. Lactate has since normalized.     Diabetes mellitus  Well controlled. A1c 6.3. POCT glucose 100s-140s. Continue on insulin correctional scale.     HTN  BP controlled. Continue metoprolol.    HLD  Stable. Continue statin.     Paroxysmal atrial fibrillation  Rate controlled. Continue on metoprolol. Continue DOAC for stroke risk reduction.    Hx of CVA  Continue DOAC, statin, BP control    PAD, hx of limb ischemia, L AKA, B/L femoral thrombectomy  Continue to monitor L AKA site. Additionally patient appears to have a small, 0.7cm round skin opening in R groin. Possibly site of prior vascular cannulation that did not heal? No purulence. L AKA stump skin open. Requesting Vascular Surgery input re wound care for the stump and evaluation of the groin opening.     Hyponatremia  Mild. Likely from low solute intake or possibly from dehydration. Na normalized with IV hydration. Encourage PO intake.    Hypokalemia  Resolved with potassium supplementation.     Hypomagnesemia  Resolved with magnesium supplementation.     Small bilateral pleural effusions  Noted in setting of malnutrition, hypoalbuminemia to 1.5. Stable in size on imaging compared to prior. Respiratory status stable. No hypoxia.     Moderate protein calorie malnutrition  Dietician input appreciated. Supplementing diet with Ensure Max BID, thiamine daily, MVI daily, zinc daily x 10 days, vit C

## 2025-02-28 NOTE — PHYSICAL THERAPY INITIAL EVALUATION ADULT - PASSIVE RANGE OF MOTION EXAMINATION, REHAB EVAL
Bilateral shoulder flex ~ 90 degrees. Right hip flex ~ 80 degrees, knee ext/flex ~20-~WFL, DF ~-15 degrees. Left stump hip flex ~ 60 degrees, abd ~ 30 degrees./Left UE Passive ROM was WFL (within functional limits)/Right UE Passive ROM was WFL (within functional limits)/Left LE Passive ROM was WFL (within functional limits)/Right LE Passive ROM was WFL (within functional limits)

## 2025-02-28 NOTE — PHYSICAL THERAPY INITIAL EVALUATION ADULT - LEVEL OF INDEPENDENCE: SUPINE/SIT, REHAB EVAL
Pt would benefit from OOB via meaghan lift at this time/maximum assist (25% patients effort)/dependent (less than 25% patients effort)

## 2025-02-28 NOTE — PROGRESS NOTE ADULT - ASSESSMENT
Assessment:  79F with Afib (on eliquis), HTN, DM2, HLD, h/o stroke, limb ischemia (s/p left AKA on 12/16, s/p b/l femoral thrombectomy), recent acute cholecystitis underwent perc chris tube by IR on 1/13 presents 2/27 from Nursing Facility with abdominal pain and weakness  Poor historian, but reports feeling tired, has some abdominal pain mostly near the perc chris tube, denies any fever or chills   Afebrile on admission, on RA  WBC 12 > 6  Cr 0.50  Lactate 3.5 > 1.6  UA grossly positive, UCx 2/26 > 3 organism, contaminated  RVP negative  CXR: Mild to moderate bibasilar effusions presently seen  CT abdomen: Small abscess interposed between the gallbladder fundus and the right colon measuring 1.4 x 1.4 cm. Mildly distended gallbladder with indwelling cholecystostomy tube. Cystitis.  Prior UCx 2/2025 with ESBL Ecoli R Flouroquinolone and S Bactrim  Prior Bile Cx 1/2025 with E faecium Amp S  BCx 2/26 negative    Antimicrobials:  vancomycin  IVPB 750 every 12 hours (2/26 ---- 2/27)  piperacillin/tazobactam IVPB.. 3.375 every 8 hours  (2/26 --- ) Meropenem (2/27 --- )    Impression:   #Recent Acute Chris s/p Perc chris complicated by Intraabdominal Abscess  #Complicated UTI  #Leukocytosis resolved  #Lactic Acidosis, improved  #DM    Recommendations:  - continue empiric Meropenem 1G q8 (Day #2)  - monitor temperature curve  - trend WBC  - reason for abx use reviewed with patient  - side effects of antibiotic discussed, tolerating abx well so far  - Prior cultures reviewed. An epidemiologic assessment was performed. There is a significant risk for resistant microorganisms to spread to family members, and/or healthcare staff. The patient will be placed on isolation according to infection control policy. Will reconsider isolation measures based on new culture results and other clinical data as appropriate Appropriate cultures collected and an appropriate broad spectrum antibiotic therapy will be considered  - Surgery eval appreciated; no surgical intervention, collection too small to drain as per IR   - if continues to improve clinically, can transition to PO antibiotic once ready for discharge: Bactrim DS 1tab q12 to complete 7-day course for presumed UTI (enddate: 3/6/2025) and Augmentin 875mg q12 to complete 3-week course for Intraabdominal infection (enddate: 3/19/2025)  - rest per primary team    Clinical team may change from intravenous to oral antibiotics when the following criteria are met:   1. Patient is clinically improving/stable       a)	Improved signs and symptoms of infection from initial presentation       b)	Afebrile for 24 hours       c)	Leukocytosis trending towards normal range   2. Patient is tolerating oral intake   3. Initial/repeat blood cultures are negative OR do not need to wait for preliminary blood cultures to result    When above criteria met may change iv antibiotics to an oral agent as above

## 2025-02-28 NOTE — PHYSICAL THERAPY INITIAL EVALUATION ADULT - GENERAL OBSERVATIONS, REHAB EVAL
Pt found supine in bed with tele monitor, muñoz, and bed alarm activated. Noted left AKA with dressing. Also noted mod edema left stump. Noted dressing right foot/ankle and ankle positioned in PF. PAINAD- 0/10.

## 2025-02-28 NOTE — PHYSICAL THERAPY INITIAL EVALUATION ADULT - PLANNED THERAPY INTERVENTIONS, PT EVAL
Increase mobility when possible. Eval. Pt left in bed with all observation section equipment/material intact and bed alarm activated. PAINAD- 0/10. Will cont to follow./bed mobility training/ROM/strengthening/transfer training

## 2025-02-28 NOTE — ADVANCED PRACTICE NURSE CONSULT - RECOMMEDATIONS
-Vascular Consulted    -Continue turning/positioning patient from side-to-side q2h while in bed, q1h when/if OOB chair, or in accordance w/ pt's plan of care. Utilize pillows and/or Spry positioner pillow to assist w/ turning/positioning. When/if OOB chair, utilize pillows or chair cushion to offload pressure.   -Continue to offload right heel from mattress with spry positioner    -Continue applying Coloplast Andres Protect moisture barrier cream to buttock and perineal area daily and prn after each incontinent episode.    -Continue utilizing one underpad underneath patient to contain incontinence episodes; change pad when saturated/soiled.   -Consider utilizing female incontinence device/Primafit (if patient candidate) to contain urinary incontinence.  -Continue nutrition consult for optimal wound healing & nutritional status.   -Assess skin/wound qshift, report changes to primary provider.   -Maintain Low AIR LOSS MATTRESS     Left Distal Stump Vascular consulted and use there recommendations when made)   -Clean with saline and pat dry   -1/4 strength Dakins to gauze   -Cover with ABD pad and wrap with kerlix   Change two times per day and prn if soiled      Right Heel  -Paint with betadine and cover with dry sterile dressing ABD pad and wrap with shae daily change.     Right inguinal puncture site cover with foam for drainage daily and change and prn if soiled.     Plan of Care: Primary RN made aware of above recs. Spoke w/ Racklin in regards to above. Vascular Consulted No further needs/recs from [facility] service at this time and will not follow patient . Staff RN to perform routine skin/wound assessment and manage wound care. Report any changes to MD

## 2025-02-28 NOTE — PHYSICAL THERAPY INITIAL EVALUATION ADULT - PERTINENT HX OF CURRENT PROBLEM, REHAB EVAL
78yo female with intermittent confusion at baseline, as per family patient noted to be more lethargic, complaining of more abdominal pain, and with fevers and chills since today morning. 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 As per family patient noted to be more lethargic, complaining of more abdominal pain, and with fevers and chills since today morning. Pt with hx of Afib (on eliquis), HTN, DM2, HLD, stroke, limb ischemia (s/p left AKA on 12/16, s/p b/l femoral thrombectomy), recent acute cholecystitis underwent perc chris tube by IR on 1/13. UA: positive, CXR: Mild to moderate bibasilar effusions presently seen. CT abd/pelvis: Small abscess interposed between the gallbladder fundus and the right colon measuring 1.4 x 1.4 cm. Mildly distended gallbladder with indwelling cholecystostomy tube, cystitis.

## 2025-02-28 NOTE — ADVANCED PRACTICE NURSE CONSULT - ASSESSMENT
This is an 79 year old Female and per MD H&P " with Afib (on eliquis), HTN, DM2, HLD, h/o stroke, limb ischemia (s/p left AKA on 12/16, s/p b/l femoral thrombectomy), recent acute cholecystitis underwent perc chris tube by IR on 1/13, presented from nursing facility w weakness and abdominal pain. Patient confused, has been having intermittent confusion at baseline as per family at bedside.  used to speak with patient and family,  # [id number]. As per family patient noted to be more lethargic, complaining of more abdominal pain, and with fevers and chills since today morning."    Assessed patient at bedside.  Yehuda 12 and per score, comorbidities and current medical status places patient as a very high risk for skin injuries  Patient lying on Centella low air loss mattress for pressure redistribution   Recommend utilizing spry flow lock positioner for sacral offloading and lateral truing every two hours.       Patient was being followed by Vascular surgery at [facility] (See notes from that admission) for left BKA dehiscence of site. Recommend Vascular team to assess.     Left Distal stump with full thickness wound 6cm x 10cm x 1cm with 95% of pink granulation tissue and 5% of black slough firmly attached. No odor or purulence noted. Periwound intact with no induration. Patient admitted from [facility] and as per there paper order have been doing dakins solution to guaze two times per day and prn if soiled. Recommend continuing with 1/4 strength dakins solution to gauze cover with ABD pad and wrap with Kerlix change two times per day and prn if dressing compromised. This will aid in Chemical debridement and decrease bioburden.     Right inguinal puncture 0.2cm x 0.2cm x 0.2cm with moderate sero-yellow drainage with periwound induration. Foam applied     Right heel dressing paint with betadine cover with dry sterile dressing and continue to float heel with spry concepcion lock positioner    For incontinence and partial thickness skin breakdown to upper intergluteal cleft and sacral region. Continue to apply Zinc based Andres daily and prn when soiled. Do not recommend any adhesive foam. Primafit on patient for urinary incontinence and maintain 1-absorbant pad under patient.   This is an 79 year old Female and per MD H&P " with Afib (on eliquis), HTN, DM2, HLD, h/o stroke, limb ischemia (s/p left AKA on 12/16, s/p b/l femoral thrombectomy), recent acute cholecystitis underwent perc chris tube by IR on 1/13, presented from nursing facility w weakness and abdominal pain. Patient confused, has been having intermittent confusion at baseline as per family at bedside.  used to speak with patient and family,  # [id number]. As per family patient noted to be more lethargic, complaining of more abdominal pain, and with fevers and chills since today morning."    Assessed patient at bedside.  Yehuda 12 and per score, comorbidities and current medical status places patient as a very high risk for skin injuries  Patient lying on Centella low air loss mattress for pressure redistribution   Recommend utilizing spry flow lock positioner for sacral offloading and lateral truing every two hours.       Patient was being followed by Vascular surgery at [facility] (See notes from that admission) for left AKA dehiscence of site. Recommend Vascular team to assess.     Left Distal stump with full thickness wound 6cm x 10cm x 1cm with 95% of pink granulation tissue and 5% of black slough firmly attached. No odor or purulence noted. Periwound intact with no induration. Patient admitted from [facility] and as per there paper order have been doing dakins solution to guaze two times per day and prn if soiled. Recommend continuing with 1/4 strength dakins solution to gauze cover with ABD pad and wrap with Kerlix change two times per day and prn if dressing compromised. This will aid in Chemical debridement and decrease bioburden.     Right inguinal puncture 0.2cm x 0.2cm x 0.2cm with moderate sero-yellow drainage with periwound induration. Foam applied     Right heel dressing paint with betadine cover with dry sterile dressing and continue to float heel with spry concepcion lock positioner    For incontinence and partial thickness skin breakdown to upper intergluteal cleft and sacral region. Continue to apply Zinc based Andres daily and prn when soiled. Do not recommend any adhesive foam. Primafit on patient for urinary incontinence and maintain 1-absorbant pad under patient.

## 2025-02-28 NOTE — PROGRESS NOTE ADULT - SUBJECTIVE AND OBJECTIVE BOX
Chief Complaint: Worsening generalized weakness, abdominal discomfort, fevers, chills, intermittent confusion    Interval Hx: 2/27: Patient seen and examined. Awake and alert. Reports feeling improved. No fevers or rigors today. Occasional cough. No chest pain or tightness. No dyspnea. No abdominal pain. No other complaints. She is generally weak.     2/28: On meropenem for probable UTI, UA with pyuria, bacteriuria, signs of cystitis on CT. 1.4cm collection adjacent to gallbladder fossa, to small to access/drain as per IR.       ROS: Multi system review is comprehensively negative x 10 systems except as above    Vitals:  T(F): 98.4 (28 Feb 2025 03:31), Max: 98.5 (27 Feb 2025 16:07)  HR: 88 (28 Feb 2025 03:31) (49 - 99)  BP: 114/98 (28 Feb 2025 03:31) (104/60 - 117/65)  RR: 18 (28 Feb 2025 03:31) (18 - 19)  SpO2: 96% (28 Feb 2025 03:31) (94% - 100%) on room air    Exam:  Gen: No acute distrss  HEENT: NCAT PERRL EOMI MMM clear oropharynx  Neck: Supple, no JVD, no LAD  CVS: s1 s2 normal, RRR  Chest: Normal resp effort, clear lungs B/L  Abd: Non distended, +BS, soft, non tender, +BS  Ext: L AKA, dressing C/D/I. No extremity tenderness. R groin with 0.75cm circular skin opening, no purulent drainage  Skin: R groin skin opening as mentioned, stage II sacral decubitus wound, R heel unstageable wound  Mood: Calm, pleasant  Neuro: Awake and alert, follows simple commands, answers some simple questions    Labs:    POCT glucose 100-140                        10.5   5.77  )---------( 236                   34.5       142  |  113  |  7   -----------------------<  97  4.1   |   24   |  0.60    Ca    8.4   Phos  3.2    Mg  1.7         TPro  5.3  /  Alb  1.3  /  TBili  0.5  /  DBili  x   /  AST  20  /  ALT  17  /  AlkPhos  176    PT: 16.3 sec;   INR: 1.39 ratio  PTT: 36.8 sec    Lactate 3.5 --> 1.6   A1c 6.3    UA 2/26: Yellow, clear, prot 30, gluc-, ket-, bld mod, bili-, small LE, N-, , RBC 11, bact many, epi 1 ceell    Micro:  Urine culture 2/26: >3 organism morphotypes  Blood culture x 2 2/26: Negative to date  COVID19, flu, RSV PCR 2/26: Negative    Imaging:  CT abd, pelvis w/ IV cont 2/26: Unchanged small bilateral pleural effusions and atelectasis. Coronary calcifications. Subcentimeter hypoattenuating lesion in the posterior right lobe of liver too small otherwise characterize. Bile ducts normal caliber. Mildly fluid distended gallbladder with indwelling cholecystostomy tube and small intraluminal air. 1.4 x 1.4 cm rim-enhancing collection with low density central fluid interposed between the gallbladder fundus in the ascending colon. Additional small nonenhancing fluid surrounding the gallbladder fossa tracking into the right paracolic gutter. Accessory splenule. Splenic cyst versus hemangioma along the inferior pole. Pancreas and adrenals normal. Kidneys with symmetric enhancement bilaterally; no hydronephrosis. Ureters normal. Bladder collapsed around. Silver catheter with small intraluminal air. Thickening and surrounding inflammation. Uterus and adnexa are within normal limits. No bowel obstruction. Appendix is normal. Mild pericolonic inflammation in the hepatic flexure with small rim-enhancing collection, as described above. Peritoneum and retroperitoneum normal. Diffuse atherosclerotic changes. No lymphadenopathy. Fluid and edema anterior to the bilateral femoral vessels  with overlying postsurgical change right groin; correlate for prior catheterization. Diffuse subcutaneous edema. Soft tissue nodules anterior abdominal wall, likely sequelae of prior medical injections. Old right rib fractures. Degenerative changes.    CXR 2/26: Heart magnified by technique. Right upper quadrant drainage catheter again noted. On January 27 there was a small retrocardiac infiltrate. The present examination there are mild to moderate bibasilar effusions.    Cardiac Testing:  EKG 2/26: Rate 102. Atrial fibrillation    Prior visit testing  TTE 12/17/24: LVEF 60%. Unable to assess LV diastolic function or filling pressure due to challenges from afib. Normal RV. Trace MR. Normal PASP estimate. No pericardial effusion. Normal IVC collapsibility.     Meds:  MEDICATIONS  (STANDING):  apixaban 5 milliGRAM(s) Oral every 12 hours  ascorbic acid 500 milliGRAM(s) Oral daily  folic acid 1 milliGRAM(s) Oral daily  insulin lispro (ADMELOG) corrective regimen sliding scale   SubCutaneous three times a day before meals  insulin lispro (ADMELOG) corrective regimen sliding scale   SubCutaneous at bedtime  meropenem Injectable 1000 milliGRAM(s) IV Push every 8 hours  metoprolol tartrate 12.5 milliGRAM(s) Oral two times a day  mirtazapine 7.5 milliGRAM(s) Oral at bedtime  multivitamin/minerals 1 Tablet(s) Oral daily  pantoprazole   Suspension 40 milliGRAM(s) Oral daily  rosuvastatin 5 milliGRAM(s) Oral at bedtime  senna 2 Tablet(s) Oral at bedtime  thiamine 100 milliGRAM(s) Oral daily  zinc sulfate 220 milliGRAM(s) Oral daily    MEDICATIONS  (PRN):  acetaminophen     Tablet .. 650 milliGRAM(s) Oral every 6 hours PRN Mild Pain (1 - 3)  aluminum hydroxide/magnesium hydroxide/simethicone Suspension 30 milliLiter(s) Oral every 4 hours PRN Dyspepsia  dextrose Oral Gel 15 Gram(s) Oral once PRN Blood Glucose LESS THAN 70 milliGRAM(s)/deciliter  melatonin 3 milliGRAM(s) Oral at bedtime PRN Insomnia  ondansetron Injectable 4 milliGRAM(s) IV Push every 8 hours PRN Nausea and/or Vomiting  polyethylene glycol 3350 17 Gram(s) Oral daily PRN Constipation                Chief Complaint: Worsening generalized weakness, abdominal discomfort, fevers, chills, intermittent confusion    Interval Hx: Patient seen and examined. Awake and alert. Reports feeling improved. No fevers or rigors today. Rare cough. No dyspnea. No chest pain or tightness. No dyspnea. No abdominal pain. No other complaints. She is generally weak. On meropenem for probable UTI, UA with pyuria, bacteriuria, signs of cystitis on CT. Urine appears clear in her Silver (present prior to admission, changed in the ER). Also on CT was a 1.4cm collection adjacent to gallbladder fossa, to small to access/drain as per IR.       ROS: Multi system review is comprehensively negative x 10 systems except as above    Vitals:  T(F): 98.4 (28 Feb 2025 03:31), Max: 98.5 (27 Feb 2025 16:07)  HR: 88 (28 Feb 2025 03:31) (49 - 99)  BP: 114/98 (28 Feb 2025 03:31) (104/60 - 117/65)  RR: 18 (28 Feb 2025 03:31) (18 - 19)  SpO2: 96% (28 Feb 2025 03:31) (94% - 100%) on room air    Exam:  Gen: No acute distrss  HEENT: NCAT PERRL EOMI MMM clear oropharynx  Neck: Supple, no JVD, no LAD  CVS: s1 s2 normal, RRR  Chest: Normal resp effort, clear lungs B/L  Abd: Non distended, +BS, soft, non tender, +BS  Ext: L AKA, dressing C/D/I, wound open, no drainage. No extremity tenderness. R groin with 0.75cm circular skin opening, no purulent drainage  Skin: R groin skin opening as mentioned, stage II sacral decubitus wound, R heel unstageable wound  Mood: Calm, pleasant  Neuro: Awake and alert, follows simple commands, answers some simple questions    Labs:    POCT glucose 100-140                        10.5   5.77  )---------( 236                   34.5       142  |  113  |  7   -----------------------<  97  4.1   |   24   |  0.60    Ca    8.4   Phos  3.2    Mg  1.7         TPro  5.3  /  Alb  1.3  /  TBili  0.5  /  DBili  x   /  AST  20  /  ALT  17  /  AlkPhos  176    PT: 16.3 sec;   INR: 1.39 ratio  PTT: 36.8 sec    Lactate 3.5 --> 1.6   A1c 6.3    UA 2/26: Yellow, clear, prot 30, gluc-, ket-, bld mod, bili-, small LE, N-, , RBC 11, bact many, epi 1 cell    Micro:  Urine culture 2/26: >3 organism morphotypes  Blood culture x 2 2/26: Negative to date  COVID19, flu, RSV PCR 2/26: Negative    Imaging:  CT abd, pelvis w/ IV cont 2/26: Unchanged small bilateral pleural effusions and atelectasis. Coronary calcifications. Subcentimeter hypoattenuating lesion in the posterior right lobe of liver too small otherwise characterize. Bile ducts normal caliber. Mildly fluid distended gallbladder with indwelling cholecystostomy tube and small intraluminal air. 1.4 x 1.4 cm rim-enhancing collection with low density central fluid interposed between the gallbladder fundus in the ascending colon. Additional small nonenhancing fluid surrounding the gallbladder fossa tracking into the right paracolic gutter. Accessory splenule. Splenic cyst versus hemangioma along the inferior pole. Pancreas and adrenals normal. Kidneys with symmetric enhancement bilaterally; no hydronephrosis. Ureters normal. Bladder collapsed around. Silver catheter with small intraluminal air. Thickening and surrounding inflammation. Uterus and adnexa are within normal limits. No bowel obstruction. Appendix is normal. Mild pericolonic inflammation in the hepatic flexure with small rim-enhancing collection, as described above. Peritoneum and retroperitoneum normal. Diffuse atherosclerotic changes. No lymphadenopathy. Fluid and edema anterior to the bilateral femoral vessels  with overlying postsurgical change right groin; correlate for prior catheterization. Diffuse subcutaneous edema. Soft tissue nodules anterior abdominal wall, likely sequelae of prior medical injections. Old right rib fractures. Degenerative changes.    CXR 2/26: Heart magnified by technique. Right upper quadrant drainage catheter again noted. On January 27 there was a small retrocardiac infiltrate. The present examination there are mild to moderate bibasilar effusions.    Cardiac Testing:  EKG 2/26: Rate 102. Atrial fibrillation    Prior visit testing  TTE 12/17/24: LVEF 60%. Unable to assess LV diastolic function or filling pressure due to challenges from afib. Normal RV. Trace MR. Normal PASP estimate. No pericardial effusion. Normal IVC collapsibility.     Meds:  MEDICATIONS  (STANDING):  apixaban 5 milliGRAM(s) Oral every 12 hours  ascorbic acid 500 milliGRAM(s) Oral daily  folic acid 1 milliGRAM(s) Oral daily  insulin lispro (ADMELOG) corrective regimen sliding scale   SubCutaneous three times a day before meals  insulin lispro (ADMELOG) corrective regimen sliding scale   SubCutaneous at bedtime  meropenem Injectable 1000 milliGRAM(s) IV Push every 8 hours  metoprolol tartrate 12.5 milliGRAM(s) Oral two times a day  mirtazapine 7.5 milliGRAM(s) Oral at bedtime  multivitamin/minerals 1 Tablet(s) Oral daily  pantoprazole   Suspension 40 milliGRAM(s) Oral daily  rosuvastatin 5 milliGRAM(s) Oral at bedtime  senna 2 Tablet(s) Oral at bedtime  thiamine 100 milliGRAM(s) Oral daily  zinc sulfate 220 milliGRAM(s) Oral daily    MEDICATIONS  (PRN):  acetaminophen     Tablet .. 650 milliGRAM(s) Oral every 6 hours PRN Mild Pain (1 - 3)  aluminum hydroxide/magnesium hydroxide/simethicone Suspension 30 milliLiter(s) Oral every 4 hours PRN Dyspepsia  dextrose Oral Gel 15 Gram(s) Oral once PRN Blood Glucose LESS THAN 70 milliGRAM(s)/deciliter  melatonin 3 milliGRAM(s) Oral at bedtime PRN Insomnia  ondansetron Injectable 4 milliGRAM(s) IV Push every 8 hours PRN Nausea and/or Vomiting  polyethylene glycol 3350 17 Gram(s) Oral daily PRN Constipation

## 2025-02-28 NOTE — PROGRESS NOTE ADULT - SUBJECTIVE AND OBJECTIVE BOX
Date of Service:02-28-25 @ 12:55  Interval History/ROS: Afebrile overnight, comfortable on RA, BCx negative, reports feeling better, appetite is okay, no fever or chills      REVIEW OF SYSTEMS  [  ] ROS unobtainable because:    [ x ] All other systems negative except as noted below    Constitutional:  [ ] fever [ ] chills  [ ] weight loss  [ ]night sweat  [ ]poor appetite/PO intake [ ]fatigue   Skin:  [ ] rash [ ] phlebitis	  Eyes: [ ] icterus [ ] pain  [ ] discharge	  ENMT: [ ] sore throat  [ ] thrush [ ] ulcers [ ] exudates [ ]anosmia  Respiratory: [ ] dyspnea [ ] hemoptysis [ ] cough [ ] sputum	  Cardiovascular:  [ ] chest pain [ ] palpitations [ ] edema	  Gastrointestinal:  [ ] nausea [ ] vomiting [ ] diarrhea [ ] constipation [ ] pain	  Genitourinary:  [ ] dysuria [ ] frequency [ ] hematuria [ ] discharge [ ] flank pain  [ ] incontinence  Musculoskeletal:  [ ] myalgias [ ] arthralgias [ ] arthritis  [ ] back pain  Neurological:  [ ] headache [ ] weakness [ ] seizures  [ ] confusion/altered mental status    Allergies  No Known Allergies        ANTIMICROBIALS:    meropenem Injectable 1000 every 8 hours        OTHER MEDS: MEDICATIONS  (STANDING):  acetaminophen     Tablet .. 650 every 6 hours PRN  aluminum hydroxide/magnesium hydroxide/simethicone Suspension 30 every 4 hours PRN  apixaban 5 every 12 hours  dextrose 50% Injectable 25 once  dextrose 50% Injectable 12.5 once  dextrose 50% Injectable 25 once  dextrose Oral Gel 15 once PRN  glucagon  Injectable 1 once  insulin lispro (ADMELOG) corrective regimen sliding scale  three times a day before meals  insulin lispro (ADMELOG) corrective regimen sliding scale  at bedtime  melatonin 3 at bedtime PRN  metoprolol tartrate 12.5 two times a day  mirtazapine 7.5 at bedtime  ondansetron Injectable 4 every 8 hours PRN  pantoprazole   Suspension 40 daily  polyethylene glycol 3350 17 daily PRN  rosuvastatin 5 at bedtime  senna 2 at bedtime      Vital Signs Last 24 Hrs  T(F): 97.8 (02-28-25 @ 12:00), Max: 99 (02-26-25 @ 16:07)    Vital Signs Last 24 Hrs  HR: 98 (02-28-25 @ 12:00) (88 - 112)  BP: 119/81 (02-28-25 @ 12:00) (109/71 - 124/75)  RR: 18 (02-28-25 @ 12:00)  SpO2: 98% (02-28-25 @ 12:00) (96% - 100%)  Wt(kg): --    EXAM:    Constitutional:  frail, NAD  Head/Eyes: no icterus  LUNGS:  CTA  CVS:  regular rhythm  Abd:  soft, non-tender; non-distended +perc chris tube  Ext:  no edema  Vascular:  IV site no erythema tenderness or discharge  Neuro: AAO X 2    Labs:                        10.5   5.77  )-----------( 236      ( 28 Feb 2025 06:45 )             34.5     02-28    142  |  113[H]  |  7   ----------------------------<  97  4.1   |  24  |  0.60    Ca    8.4[L]      28 Feb 2025 06:45  Phos  3.2     02-28  Mg     1.7     02-28    TPro  5.3[L]  /  Alb  1.3[L]  /  TBili  0.5  /  DBili  x   /  AST  20  /  ALT  17  /  AlkPhos  176[H]  02-27      WBC Trend:  WBC Count: 5.77 (02-28-25 @ 06:45)  WBC Count: 6.66 (02-27-25 @ 07:24)  WBC Count: 12.17 (02-26-25 @ 17:23)      Creatine Trend:  Creatinine: 0.60 (02-28)  Creatinine: 0.50 (02-27)  Creatinine: 0.82 (02-26)      Liver Biochemical Testing Trend:  Alanine Aminotransferase (ALT/SGPT): 17 (02-27)  Alanine Aminotransferase (ALT/SGPT): 26 (02-26)  Alanine Aminotransferase (ALT/SGPT): <5 (02-03)  Alanine Aminotransferase (ALT/SGPT): 11 (01-27)  Alanine Aminotransferase (ALT/SGPT): 10 (01-24)  Aspartate Aminotransferase (AST/SGOT): 20 (02-27-25 @ 07:24)  Aspartate Aminotransferase (AST/SGOT): 44 (02-26-25 @ 17:23)  Aspartate Aminotransferase (AST/SGOT): 19 (02-03-25 @ 06:20)  Aspartate Aminotransferase (AST/SGOT): 22 (01-27-25 @ 18:09)  Aspartate Aminotransferase (AST/SGOT): 23 (01-24-25 @ 04:55)  Bilirubin Total: 0.5 (02-27)  Bilirubin Total: 0.5 (02-26)  Bilirubin Total: 0.3 (02-03)  Bilirubin Total: 0.6 (01-27)  Bilirubin Total: 0.6 (01-24)      Trend LDH      Urinalysis Basic - ( 28 Feb 2025 06:45 )    Color: x / Appearance: x / SG: x / pH: x  Gluc: 97 mg/dL / Ketone: x  / Bili: x / Urobili: x   Blood: x / Protein: x / Nitrite: x   Leuk Esterase: x / RBC: x / WBC x   Sq Epi: x / Non Sq Epi: x / Bacteria: x        MICROBIOLOGY:    MRSA PCR Result.: NotDetec (01-14-25 @ 16:25)  MRSA PCR Result.: NotDetec (12-17-24 @ 23:20)      Urinalysis with Rflx Culture (collected 26 Feb 2025 20:07)    Culture - Urine (collected 26 Feb 2025 20:07)  Source: .Urine None  Final Report:    >=3 organisms. Probable collection contamination.    Culture - Blood (collected 26 Feb 2025 17:41)  Source: .Blood None  Preliminary Report:    No growth at 24 hours    Culture - Blood (collected 26 Feb 2025 17:23)  Source: .Blood None  Preliminary Report:    No growth at 24 hours    Culture - Urine (collected 01 Feb 2025 08:02)  Source: Catheterized Catheterized  Final Report:    >100,000 CFU/ml Escherichia coli ESBL    <10,000 CFU/ml Normal Urogenital daisy present  Organism: Escherichia coli ESBL  Organism: Escherichia coli ESBL    Sensitivities:      Method Type: BETH      -  Ampicillin: R >16 These ampicillin results predict results for amoxicillin      -  Ampicillin/Sulbactam: I 16/8      -  Aztreonam: R >16      -  Cefazolin: R >16 For uncomplicated UTI with K. pneumoniae, E. coli, or P. mirablis: BETH <=16 is sensitive and BETH >=32 is resistant. This also predicts results for oral agents cefaclor, cefdinir, cefpodoxime, cefprozil, cefuroxime axetil, cephalexin and locarbef for uncomplicated UTI. Note that some isolates may be susceptible to these agents while testing resistant to cefazolin.      -  Cefepime: R >16      -  Ceftriaxone: R >32      -  Cefuroxime: R >16      -  Ciprofloxacin: R >2      -  Ertapenem: S <=0.5      -  Gentamicin: R >8      -  Imipenem: S <=1      -  Levofloxacin: R >4      -  Meropenem: S <=1      -  Nitrofurantoin: I 64 Should not be used to treat pyelonephritis      -  Piperacillin/Tazobactam: S <=8      -  Tobramycin: R >8      -  Trimethoprim/Sulfamethoxazole: S 2/38    Culture - Body Fluid with Gram Stain (collected 13 Jan 2025 14:07)  Source: Bile  Final Report:    Few Enterococcus faecium  Organism: Enterococcus faecium  Organism: Enterococcus faecium    Sensitivities:      Method Type: BETH      -  Ampicillin: S 8 Predicts results to ampicillin/sulbactam, amoxacillin-clavulanate and  piperacillin-tazobactam.      -  Vancomycin: S 0.5    Culture - Blood (collected 06 Jan 2025 14:30)  Source: .Blood BLOOD  Final Report:    No growth at 5 days    Culture - Urine (collected 18 Dec 2024 23:39)  Source: Catheterized Catheterized  Final Report:    >100,000 CFU/ml Escherichia coli ESBL  Organism: Escherichia coli ESBL    Sensitivities:      Method Type: BETH      -  Amoxicillin/Clavulanic Acid: R >16/8      -  Ampicillin: R >16 These ampicillin results predict results for amoxicillin      -  Ampicillin/Sulbactam: I 16/8      -  Aztreonam: R >16      -  Cefazolin: R >16 For uncomplicated UTI with K. pneumoniae, E. coli, or P. mirablis: BETH <=16 is sensitive and BETH >=32 is resistant. This also predicts results for oral agents cefaclor, cefdinir, cefpodoxime, cefprozil, cefuroxime axetil, cephalexin and locarbef for uncomplicated UTI. Note that some isolates may be susceptible to these agents while testing resistant to cefazolin.      -  Cefepime: R >16      -  Cefotaxime: R >32      -  Ceftazidime: R >16      -  Ceftriaxone: R >32      -  Cefuroxime: R >16      -  Ciprofloxacin: R >2      -  Ertapenem: S <=0.5      -  Gentamicin: R >8      -  Imipenem: S <=1      -  Levofloxacin: R >4      -  Meropenem: S <=1      -  Minocycline: S <=4      -  Nitrofurantoin: I 64 Should not be used to treat pyelonephritis      -  Piperacillin/Tazobactam: S <=8      -  Tigecycline: S <=2      -  Tobramycin: R >8      -  Trimethoprim/Sulfamethoxazole: S 1/19  Organism: Escherichia coli ESBL    Culture - Urine (collected 18 Aug 2024 07:59)  Source: Clean Catch Clean Catch (Midstream)  Final Report:    >100,000 CFU/ml Escherichia coli ESBL    <10,000 CFU/ml Normal Urogenital daisy present  Organism: Escherichia coli ESBL  Organism: Escherichia coli ESBL    Sensitivities:      Method Type: BETH      -  Ampicillin: R >16 These ampicillin results predict results for amoxicillin      -  Ampicillin/Sulbactam: I 16/8      -  Aztreonam: R >16      -  Cefazolin: R >16 For uncomplicated UTI with K. pneumoniae, E. coli, or P. mirablis: BETH <=16 is sensitive and BETH >=32 is resistant. This also predicts results for oral agents cefaclor, cefdinir, cefpodoxime, cefprozil, cefuroxime axetil, cephalexin and locarbef for uncomplicated UTI. Note that some isolates may be susceptible to these agents while testing resistant to cefazolin.      -  Cefepime: R >16      -  Ceftriaxone: R >32      -  Cefuroxime: R >16      -  Ciprofloxacin: R >2      -  Ertapenem: S <=0.5      -  Gentamicin: R >8      -  Imipenem: S <=1      -  Levofloxacin: R >4      -  Meropenem: S <=1      -  Nitrofurantoin: S <=32 Should not be used to treat pyelonephritis      -  Piperacillin/Tazobactam: S <=8      -  Tobramycin: R >8      -  Trimethoprim/Sulfamethoxazole: S 2/38    Culture - Urine (collected 04 Nov 2023 16:32)  Source: Clean Catch Clean Catch (Midstream)  Final Report:    >100,000 CFU/ml Klebsiella pneumoniae  Organism: Klebsiella pneumoniae  Organism: Klebsiella pneumoniae    Sensitivities:      Method Type: BETH      -  Amoxicillin/Clavulanic Acid: S <=8/4      -  Ampicillin: R >16 These ampicillin results predict results for amoxicillin      -  Ampicillin/Sulbactam: S 8/4      -  Aztreonam: S <=4      -  Cefazolin: S <=2 For uncomplicated UTI with K. pneumoniae, E. coli, or P. mirablis: BETH <=16 is sensitive and BETH >=32 is resistant. This also predicts results for oral agents cefaclor, cefdinir, cefpodoxime, cefprozil, cefuroxime axetil, cephalexin and locarbef for uncomplicated UTI. Note that some isolates may be susceptible to these agents while testing resistant to cefazolin.      -  Cefepime: S <=2      -  Cefoxitin: S <=8      -  Ceftriaxone: S <=1      -  Cefuroxime: S <=4      -  Ciprofloxacin: S <=0.25      -  Ertapenem: S <=0.5      -  Gentamicin: S <=2      -  Imipenem: S <=1      -  Levofloxacin: S <=0.5      -  Meropenem: S <=1      -  Nitrofurantoin: I 64 Should not be used to treat pyelonephritis      -  Piperacillin/Tazobactam: S <=8      -  Tobramycin: S <=2      -  Trimethoprim/Sulfamethoxazole: R >2/38    Lactate, Blood: 1.6 (02-27 @ 07:24)  Lactate, Blood: 3.4 (02-26 @ 19:29)  Lactate, Blood: 3.5 (02-26 @ 17:23)        RADIOLOGY:  imaging below personally reviewed    Xray Chest 1 View- PORTABLE-Urgent:  (26 Feb 2025 17:36)              CT Abdomen and Pelvis w/ IV Cont:   ACC: [id number] EXAM:  CT ABDOMEN AND PELVIS IC   ORDERED BY: MAGDIEL CUNNINGHAM     PROCEDURE DATE:  02/26/2025          INTERPRETATION:  CLINICAL INFORMATION: Abdominal pain.    COMPARISON: CT abdomen pelvis 1/14/2025. CT angiogram abdomen and pelvis  1/6/2025. CT abdomen pelvis 8/18/2024    CONTRAST/COMPLICATIONS:  IV Contrast: Omnipaque 350  90 cc administered   0 cc discarded  Oral Contrast: NONE    PROCEDURE:  CT of the Abdomen and Pelvis was performed.  Sagittal and coronal reformats were performed.    FINDINGS:  LOWER CHEST: Unchanged small bilateral pleural effusions and atelectasis.   Coronary calcifications.    LIVER: Subcentimeter hypoattenuating lesion in the posterior right lobe   is too small otherwise characterize (2-24).  BILEDUCTS: Normal caliber.  GALLBLADDER: Mildly fluid distended gallbladder with indwelling   cholecystostomy tube and small intraluminal air. 1.4 x 1.4 cm   rim-enhancing collection with low density central fluid interposed   between the gallbladder fundus in the ascending colon (2-33, 602-32).   Additional small nonenhancing fluid surrounding the gallbladder fossa   tracking into the right paracolic gutter  SPLEEN: Accessory splenule. Splenic cyst versus hemangioma along the   inferior pole  PANCREAS: Within normal limits.  ADRENALS: Within normal limits.  KIDNEYS/URETERS: Symmetric enhancement bilaterally; no hydronephrosis.    BLADDER: Collapsed around Silver catheter with small intraluminal air.    Thickening and surrounding inflammation.  REPRODUCTIVE ORGANS: Uterus and adnexa are within normal limits.    BOWEL: No bowel obstruction. Appendix is normal. Mild pericolonic   inflammation in the hepatic flexure with small rim-enhancing collection,   as described above.  PERITONEUM/RETROPERITONEUM: Within normal limits.  VESSELS: Diffuse atherosclerotic changes.  LYMPH NODES: No lymphadenopathy.  ABDOMINAL WALL: Fluid and edema anterior to the bilateral femoral vessels   with overlying postsurgical change right groin; correlate for prior   catheterization. Diffuse subcutaneous edema. Soft tissue nodules anterior   abdominal wall, likely sequelae of prior medical injections.  BONES: Old right rib fractures. Degenerative changes.    IMPRESSION:  Small abscess interposed between the gallbladder fundus and the right   colon measuring 1.4 x 1.4 cm.    Mildly distended gallbladder with indwelling cholecystostomy tube;   correlate with output.    Cystitis.    --- End of Report ---    CORETTA SAGE MD; Resident Radiologist  This document has been electronically signed.  EDGAR FRANCISCO MD; Attending Radiologist  This document has been electronically signed. Feb 26 2025  7:47PM (02-26-25 @ 18:42)

## 2025-02-28 NOTE — PROGRESS NOTE ADULT - TIME BILLING
extensive review of patient's medical chart including prior hospital encounters, current admission progress notes, labs, imaging and other testing, seeing and evaluating patient at bedside, explaining to patient regarding current condition and plan of care, then ordering tests and collaborating with specialty consultants including Infectious Disease, and finally, documenting today's findings and plan.

## 2025-02-28 NOTE — PROGRESS NOTE ADULT - NUTRITIONAL ASSESSMENT
This patient has been assessed with a concern for Malnutrition and has been determined to have a diagnosis/diagnoses of Moderate protein-calorie malnutrition.    This patient is being managed with:   Diet Consistent Carbohydrate/No Snacks-  Supplement Feeding Modality:  Oral  Ensure Max Cans or Servings Per Day:  2       Frequency:  Two Times a day  Entered: Feb 27 2025 10:04AM

## 2025-02-28 NOTE — PHYSICAL THERAPY INITIAL EVALUATION ADULT - ADDITIONAL COMMENTS
As per chart, pt transferred from [facility]. Prior to initial admission for AKA, pt lives with daughter and family in private home, 3 [address] with rail, no stairs inside. Pt primarily uses wheelchair, one person assist from transfers, dependent for ADLs. (Info obtained from chart)- Ashwin 2/1/25 As per chart, pt transferred from [facility]. Prior to initial admission for AKA, pt lives with daughter and family in private home, 3 [address] with rail, no stairs inside. Pt primarily uses wheelchair, one person assist from transfers, dependent for ADLs. (Info obtained from chart)- Eval 2/1/25. Update 2/28/2025: pt is not a good historian of PLOF or home environment.

## 2025-02-28 NOTE — PHYSICAL THERAPY INITIAL EVALUATION ADULT - MANUAL MUSCLE TESTING RESULTS, REHAB EVAL
Bilateral UE strength 3/5 within available ROM. RLE no active movemnt on command verbal and tactile cues. Left stump no active movement of commands verbal and tactile.

## 2025-03-01 LAB
ANION GAP SERPL CALC-SCNC: 4 MMOL/L — LOW (ref 5–17)
BASOPHILS # BLD AUTO: 0.03 K/UL — SIGNIFICANT CHANGE UP (ref 0–0.2)
BASOPHILS NFR BLD AUTO: 0.6 % — SIGNIFICANT CHANGE UP (ref 0–2)
BUN SERPL-MCNC: 7 MG/DL — SIGNIFICANT CHANGE UP (ref 7–23)
CALCIUM SERPL-MCNC: 8.1 MG/DL — LOW (ref 8.5–10.1)
CHLORIDE SERPL-SCNC: 110 MMOL/L — HIGH (ref 96–108)
CO2 SERPL-SCNC: 27 MMOL/L — SIGNIFICANT CHANGE UP (ref 22–31)
CREAT SERPL-MCNC: 0.65 MG/DL — SIGNIFICANT CHANGE UP (ref 0.5–1.3)
EGFR: 88 ML/MIN/1.73M2 — SIGNIFICANT CHANGE UP
EGFR: 88 ML/MIN/1.73M2 — SIGNIFICANT CHANGE UP
EOSINOPHIL # BLD AUTO: 0.13 K/UL — SIGNIFICANT CHANGE UP (ref 0–0.5)
EOSINOPHIL NFR BLD AUTO: 2.7 % — SIGNIFICANT CHANGE UP (ref 0–6)
GLUCOSE BLDC GLUCOMTR-MCNC: 130 MG/DL — HIGH (ref 70–99)
GLUCOSE BLDC GLUCOMTR-MCNC: 145 MG/DL — HIGH (ref 70–99)
GLUCOSE BLDC GLUCOMTR-MCNC: 146 MG/DL — HIGH (ref 70–99)
GLUCOSE BLDC GLUCOMTR-MCNC: 187 MG/DL — HIGH (ref 70–99)
GLUCOSE SERPL-MCNC: 157 MG/DL — HIGH (ref 70–99)
HCT VFR BLD CALC: 31.8 % — LOW (ref 34.5–45)
HGB BLD-MCNC: 9.7 G/DL — LOW (ref 11.5–15.5)
IMM GRANULOCYTES # BLD AUTO: 0.02 K/UL — SIGNIFICANT CHANGE UP (ref 0–0.07)
IMM GRANULOCYTES NFR BLD AUTO: 0.4 % — SIGNIFICANT CHANGE UP (ref 0–0.9)
LYMPHOCYTES # BLD AUTO: 1.51 K/UL — SIGNIFICANT CHANGE UP (ref 1–3.3)
LYMPHOCYTES NFR BLD AUTO: 31.8 % — SIGNIFICANT CHANGE UP (ref 13–44)
MAGNESIUM SERPL-MCNC: 1.6 MG/DL — SIGNIFICANT CHANGE UP (ref 1.6–2.6)
MCHC RBC-ENTMCNC: 24.9 PG — LOW (ref 27–34)
MCHC RBC-ENTMCNC: 30.5 G/DL — LOW (ref 32–36)
MCV RBC AUTO: 81.5 FL — SIGNIFICANT CHANGE UP (ref 80–100)
MONOCYTES # BLD AUTO: 0.35 K/UL — SIGNIFICANT CHANGE UP (ref 0–0.9)
MONOCYTES NFR BLD AUTO: 7.4 % — SIGNIFICANT CHANGE UP (ref 2–14)
NEUTROPHILS # BLD AUTO: 2.71 K/UL — SIGNIFICANT CHANGE UP (ref 1.8–7.4)
NEUTROPHILS NFR BLD AUTO: 57.1 % — SIGNIFICANT CHANGE UP (ref 43–77)
NRBC # BLD AUTO: 0 K/UL — SIGNIFICANT CHANGE UP (ref 0–0)
NRBC # FLD: 0 K/UL — SIGNIFICANT CHANGE UP (ref 0–0)
NRBC BLD AUTO-RTO: 0 /100 WBCS — SIGNIFICANT CHANGE UP (ref 0–0)
PHOSPHATE SERPL-MCNC: 3.6 MG/DL — SIGNIFICANT CHANGE UP (ref 2.5–4.5)
PLATELET # BLD AUTO: 227 K/UL — SIGNIFICANT CHANGE UP (ref 150–400)
PMV BLD: 9.2 FL — SIGNIFICANT CHANGE UP (ref 7–13)
POTASSIUM SERPL-MCNC: 3.9 MMOL/L — SIGNIFICANT CHANGE UP (ref 3.5–5.3)
POTASSIUM SERPL-SCNC: 3.9 MMOL/L — SIGNIFICANT CHANGE UP (ref 3.5–5.3)
RBC # BLD: 3.9 M/UL — SIGNIFICANT CHANGE UP (ref 3.8–5.2)
RBC # FLD: 17.2 % — HIGH (ref 10.3–14.5)
SODIUM SERPL-SCNC: 141 MMOL/L — SIGNIFICANT CHANGE UP (ref 135–145)
WBC # BLD: 4.75 K/UL — SIGNIFICANT CHANGE UP (ref 3.8–10.5)
WBC # FLD AUTO: 4.75 K/UL — SIGNIFICANT CHANGE UP (ref 3.8–10.5)

## 2025-03-01 PROCEDURE — 99233 SBSQ HOSP IP/OBS HIGH 50: CPT

## 2025-03-01 RX ORDER — SODIUM HYPOCHLORITE 0.12 MG/ML
1 SOLUTION TOPICAL
Refills: 0 | Status: DISCONTINUED | OUTPATIENT
Start: 2025-03-01 | End: 2025-03-07

## 2025-03-01 RX ORDER — MAGNESIUM SULFATE 500 MG/ML
1 SYRINGE (ML) INJECTION ONCE
Refills: 0 | Status: COMPLETED | OUTPATIENT
Start: 2025-03-01 | End: 2025-03-01

## 2025-03-01 RX ADMIN — Medication 1 TABLET(S): at 09:27

## 2025-03-01 RX ADMIN — Medication 2 TABLET(S): at 22:49

## 2025-03-01 RX ADMIN — Medication 100 MILLIGRAM(S): at 09:30

## 2025-03-01 RX ADMIN — MEROPENEM 1000 MILLIGRAM(S): 1 INJECTION INTRAVENOUS at 22:49

## 2025-03-01 RX ADMIN — MEROPENEM 1000 MILLIGRAM(S): 1 INJECTION INTRAVENOUS at 13:01

## 2025-03-01 RX ADMIN — Medication 500 MILLIGRAM(S): at 09:28

## 2025-03-01 RX ADMIN — APIXABAN 5 MILLIGRAM(S): 2.5 TABLET, FILM COATED ORAL at 09:29

## 2025-03-01 RX ADMIN — MEROPENEM 1000 MILLIGRAM(S): 1 INJECTION INTRAVENOUS at 05:50

## 2025-03-01 RX ADMIN — METOPROLOL SUCCINATE 12.5 MILLIGRAM(S): 50 TABLET, EXTENDED RELEASE ORAL at 22:49

## 2025-03-01 RX ADMIN — INSULIN LISPRO 2: 100 INJECTION, SOLUTION INTRAVENOUS; SUBCUTANEOUS at 12:54

## 2025-03-01 RX ADMIN — ROSUVASTATIN CALCIUM 5 MILLIGRAM(S): 20 TABLET, FILM COATED ORAL at 22:58

## 2025-03-01 RX ADMIN — METOPROLOL SUCCINATE 12.5 MILLIGRAM(S): 50 TABLET, EXTENDED RELEASE ORAL at 09:28

## 2025-03-01 RX ADMIN — Medication 40 MILLIGRAM(S): at 09:32

## 2025-03-01 RX ADMIN — FOLIC ACID 1 MILLIGRAM(S): 1 TABLET ORAL at 09:28

## 2025-03-01 RX ADMIN — SODIUM HYPOCHLORITE 1 APPLICATION(S): 0.12 SOLUTION TOPICAL at 17:19

## 2025-03-01 RX ADMIN — MIRTAZAPINE 7.5 MILLIGRAM(S): 30 TABLET, FILM COATED ORAL at 22:58

## 2025-03-01 RX ADMIN — Medication 220 MILLIGRAM(S): at 09:31

## 2025-03-01 RX ADMIN — APIXABAN 5 MILLIGRAM(S): 2.5 TABLET, FILM COATED ORAL at 22:58

## 2025-03-01 RX ADMIN — Medication 100 GRAM(S): at 09:27

## 2025-03-01 NOTE — PROGRESS NOTE ADULT - SUBJECTIVE AND OBJECTIVE BOX
Chief Complaint: Worsening generalized weakness, abdominal discomfort, fevers, chills, intermittent confusion    Interval Hx: 2/28: Patient seen and examined. Awake and alert. Reports feeling improved. No fevers or rigors today. Rare cough. No dyspnea. No chest pain or tightness. No dyspnea. No abdominal pain. No other complaints. She is generally weak. On meropenem for probable UTI, UA with pyuria, bacteriuria, signs of cystitis on CT. Urine appears clear in her Silver (present prior to admission, changed in the ER). Also on CT was a 1.4cm collection adjacent to gallbladder fossa, to small to access/drain as per IR.       3/1: Appreciate input from Wound Care RN who provided recommendations but advised to follow up with Vascular Surgery here for final definitive recommendations. Input pending. Patient receiving wound care to L AKA stump, R groin non-closing puncture wound and R heel wound.     ROS: Multi system review is comprehensively negative x 10 systems except as above    Vitals:  T(F): 98.2 (01 Mar 2025 08:00), Max: 98.2 (01 Mar 2025 00:02)  HR: 86 (01 Mar 2025 08:00) (86 - 103)  BP: 115/58 (01 Mar 2025 08:00) (98/54 - 119/81)  RR: 18 (01 Mar 2025 08:00) (18 - 18)  SpO2: 97% (01 Mar 2025 08:00) (97% - 99%) on room air    Exam:  Gen: No acute distrss  HEENT: NCAT PERRL EOMI MMM clear oropharynx  Neck: Supple, no JVD, no LAD  CVS: s1 s2 normal, RRR  Chest: Normal resp effort, clear lungs B/L  Abd: Non distended, +BS, soft, non tender, +BS  Ext: L AKA stump with full thickness wound 6cm x 10cm x 1cm with 95% of pink granulation tissue and 5% of black slough firmly attached. No odor or purulence noted. Periwound intact with no induration. Right inguinal puncture 0.2cm x 0.2cm x 0.2cm with moderate sero-yellow drainage with periwound induration. Right heel wound as well.   Skin: See Ext exam above. Additionally patient with incontinence and partial thickness skin breakdown to upper intergluteal cleft and sacral region  Mood: Calm, pleasant  Neuro: Awake and alert, follows simple commands, answers some simple questions    Labs:    POCT glucose 140s                                   9.7    4.75  )-------( 227                 31.8       141  |  110  |  7   -----------------------<  157  3.9   |  27  |  0.65    Ca  8.1     Phos  3.6     Mg  1.6    TPro  5.3  /  Alb  1.3  /  TBili  0.5  /  DBili  x   /  AST  20  /  ALT  17  /  AlkPhos  176    PT: 16.3 sec;   INR: 1.39 ratio  PTT: 36.8 sec    Lactate 3.5 --> 1.6   A1c 6.3    UA 2/26: Yellow, clear, prot 30, gluc-, ket-, bld mod, bili-, small LE, N-, , RBC 11, bact many, epi 1 cell    Micro:  Urine culture 2/26: >3 organism morphotypes  Blood culture x 2 2/26: Negative to date  COVID19, flu, RSV PCR 2/26: Negative    Imaging:  CT abd, pelvis w/ IV cont 2/26: Unchanged small bilateral pleural effusions and atelectasis. Coronary calcifications. Subcentimeter hypoattenuating lesion in the posterior right lobe of liver too small otherwise characterize. Bile ducts normal caliber. Mildly fluid distended gallbladder with indwelling cholecystostomy tube and small intraluminal air. 1.4 x 1.4 cm rim-enhancing collection with low density central fluid interposed between the gallbladder fundus in the ascending colon. Additional small nonenhancing fluid surrounding the gallbladder fossa tracking into the right paracolic gutter. Accessory splenule. Splenic cyst versus hemangioma along the inferior pole. Pancreas and adrenals normal. Kidneys with symmetric enhancement bilaterally; no hydronephrosis. Ureters normal. Bladder collapsed around. Silver catheter with small intraluminal air. Thickening and surrounding inflammation. Uterus and adnexa are within normal limits. No bowel obstruction. Appendix is normal. Mild pericolonic inflammation in the hepatic flexure with small rim-enhancing collection, as described above. Peritoneum and retroperitoneum normal. Diffuse atherosclerotic changes. No lymphadenopathy. Fluid and edema anterior to the bilateral femoral vessels  with overlying postsurgical change right groin; correlate for prior catheterization. Diffuse subcutaneous edema. Soft tissue nodules anterior abdominal wall, likely sequelae of prior medical injections. Old right rib fractures. Degenerative changes.    CXR 2/26: Heart magnified by technique. Right upper quadrant drainage catheter again noted. On January 27 there was a small retrocardiac infiltrate. The present examination there are mild to moderate bibasilar effusions.    Cardiac Testing:  EKG 2/26: Rate 102. Atrial fibrillation    Prior visit testing  TTE 12/17/24: LVEF 60%. Unable to assess LV diastolic function or filling pressure due to challenges from afib. Normal RV. Trace MR. Normal PASP estimate. No pericardial effusion. Normal IVC collapsibility.     Meds:  MEDICATIONS  (STANDING):  apixaban 5 milliGRAM(s) Oral every 12 hours  ascorbic acid 500 milliGRAM(s) Oral daily  folic acid 1 milliGRAM(s) Oral daily  insulin lispro (ADMELOG) corrective regimen sliding scale   SubCutaneous three times a day before meals  insulin lispro (ADMELOG) corrective regimen sliding scale   SubCutaneous at bedtime  meropenem Injectable 1000 milliGRAM(s) IV Push every 8 hours  metoprolol tartrate 12.5 milliGRAM(s) Oral two times a day  mirtazapine 7.5 milliGRAM(s) Oral at bedtime  multivitamin/minerals 1 Tablet(s) Oral daily  pantoprazole   Suspension 40 milliGRAM(s) Oral daily  rosuvastatin 5 milliGRAM(s) Oral at bedtime  senna 2 Tablet(s) Oral at bedtime  thiamine 100 milliGRAM(s) Oral daily  zinc sulfate 220 milliGRAM(s) Oral daily    MEDICATIONS  (PRN):  acetaminophen     Tablet .. 650 milliGRAM(s) Oral every 6 hours PRN Mild Pain (1 - 3)  aluminum hydroxide/magnesium hydroxide/simethicone Suspension 30 milliLiter(s) Oral every 4 hours PRN Dyspepsia  dextrose Oral Gel 15 Gram(s) Oral once PRN Blood Glucose LESS THAN 70 milliGRAM(s)/deciliter  melatonin 3 milliGRAM(s) Oral at bedtime PRN Insomnia  ondansetron Injectable 4 milliGRAM(s) IV Push every 8 hours PRN Nausea and/or Vomiting  polyethylene glycol 3350 17 Gram(s) Oral daily PRN Constipation

## 2025-03-01 NOTE — PROGRESS NOTE ADULT - ASSESSMENT
79 year old woman with diabetes, HTN, HLD, afib on Eliquis, hx of CVA, hx of limb ischemia s/p L AKA, B/L femoral artery thrombectomy, recent diagnosis of acute cholecystitis for which cholecystostomy tube was placed, sent from nursing home for further evaluation of generalized weakness, confusion, fever, found to have hyponatremia, leukocytosis, abnormal urinalysis with pyuria and bacteriuria, CT scan findings of bladder thickening and inflammation consistent with cystitis, as well as findings of mildly fluid-distended gallbladder with indwelling cholecystostomy tube and small intraluminal air, 1.4 x 1.4 cm rim-enhancing collection with low density central fluid interposed between the gallbladder fundus in the ascending colon, and additional small nonenhancing fluid surrounding the gallbladder fossa tracking into the right paracolic gutter. Patient was given IV antibiotics and admitted to Medicine.     Acute metabolic encephalopathy  Multifactorial due to acute infection, hyponatremia, hypokalemia. Mentation appears improved today as compared to that previously described. Continue to treat underlying issues. Reorient frequently. Monitor voiding, bowel movements. Monitor for pain.     Sepsis with organ dysfunction due to UTI, also finding of 1.4cm rim enhancing collection adjacent to gallbladder fundus, present upon admission  Patient admitted with fever, leukocytosis, elevated lactate. Found to have abnormal UA with pyuria and bacteriuria. CT A/P with signs of cystitis. Also noted on CT was mildly fluid distended gallbladder with indwelling cholecystostomy tube and small intraluminal air. 1.4 x 1.4 cm rim-enhancing collection with low density central fluid interposed between the gallbladder fundus in the ascending colon. Additional small nonenhancing fluid surrounding the gallbladder fossa tracking into the right paracolic gutter. All too small to drain as per IR and that includes the 1.4cm collection described. Appreciate ID input. Continue on antibiotics, currently meropenem. Continue supportive care.     Elevated lactate  Resolved. In setting of acute infection, hypoperfusion. Lactate has since normalized.     Diabetes mellitus  Well controlled. A1c 6.3. POCT glucose 100s-140s. Continue on insulin correctional scale.     HTN  BP controlled. Continue metoprolol.    HLD  Stable. Continue statin.     Paroxysmal atrial fibrillation  Rate controlled. Continue on metoprolol. Continue DOAC for stroke risk reduction.    Hx of CVA  Continue DOAC, statin, BP control    PAD, hx of limb ischemia, L AKA, B/L femoral thrombectomy, L AKA full thickness wound, R groin puncture site  Appreciate input from Wound Care RN who advised wound care but ultimately advised Vascular Surgery input re wound care and any further mangement if indicated. Continue current wound care orders.    Hyponatremia  Resolved. Likely from low solute intake or possibly from dehydration. Na normalized with IV hydration. Encourage PO intake.    Hypokalemia  Resolved with potassium supplementation.     Hypomagnesemia  Resolved with magnesium supplementation.     Small bilateral pleural effusions  Noted in setting of malnutrition, hypoalbuminemia to 1.5. Stable in size on imaging compared to prior. Respiratory status stable. No hypoxia.     Moderate protein calorie malnutrition  Dietician input appreciated. Supplementing diet with Ensure Max BID, thiamine daily, MVI daily, zinc daily x 10 days, vit C

## 2025-03-01 NOTE — CONSULT NOTE ADULT - ASSESSMENT
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 Vascular surgery consulted for evaluation of R groin surgical site wound and L AKA stump wound       PLAN   No acute surgical management   Continue local wound care of R groin and L  AKA stump wound. No sign of infection.  CTA abd/pelvis with b/l runoff to r/o any collection   Vascular will follow

## 2025-03-01 NOTE — CONSULT NOTE ADULT - SUBJECTIVE AND OBJECTIVE BOX
VASCULAR SURGERY CONSULT NOTE  --------------------------------------------------------------------------------------------          HPI: ***    79F with Afib (on eliquis), HTN, DM2, HLD, h/o stroke, limb ischemia  s/p b/l femoral thrombectomy with left AKA on 12/16,  recent acute cholecystitis underwent perc chris tube by IR on 1/13, presented from nursing facility w weakness and abdominal pain. Patient confused, has been having intermittent confusion at baseline as per family at bedside. Admitted for possible sepsis. Of note, patient was seen in [facility] with dehisced and necrotic L AKA stump for which she had revision of L AKA stump with  guillotine amputation. Vascular surgery consulted for evaluation of R groin surgical site wound and L AKA stump wound     ROS: 10-system review is otherwise negative except HPI above.      PAST MEDICAL & SURGICAL HISTORY:  DM (diabetes mellitus)      HTN (hypertension)      CVA (cerebrovascular accident)      Afib      S/P AKA (above knee amputation)  L      H/O insertion of cholecystostomy tube        FAMILY HISTORY:    [] Family history not pertinent as reviewed with the patient and family    SOCIAL HISTORY:  ***    ALLERGIES: No Known Allergies      HOME MEDICATIONS:  ***    CURRENT MEDICATIONS  MEDICATIONS (STANDING): apixaban 5 milliGRAM(s) Oral every 12 hours  ascorbic acid 500 milliGRAM(s) Oral daily  dextrose 5%. 1000 milliLiter(s) IV Continuous <Continuous>  dextrose 5%. 1000 milliLiter(s) IV Continuous <Continuous>  dextrose 50% Injectable 25 Gram(s) IV Push once  dextrose 50% Injectable 12.5 Gram(s) IV Push once  dextrose 50% Injectable 25 Gram(s) IV Push once  folic acid 1 milliGRAM(s) Oral daily  glucagon  Injectable 1 milliGRAM(s) IntraMuscular once  insulin lispro (ADMELOG) corrective regimen sliding scale   SubCutaneous three times a day before meals  insulin lispro (ADMELOG) corrective regimen sliding scale   SubCutaneous at bedtime  meropenem Injectable 1000 milliGRAM(s) IV Push every 8 hours  metoprolol tartrate 12.5 milliGRAM(s) Oral two times a day  mirtazapine 7.5 milliGRAM(s) Oral at bedtime  multivitamin/minerals 1 Tablet(s) Oral daily  pantoprazole   Suspension 40 milliGRAM(s) Oral daily  rosuvastatin 5 milliGRAM(s) Oral at bedtime  senna 2 Tablet(s) Oral at bedtime  thiamine 100 milliGRAM(s) Oral daily  zinc sulfate 220 milliGRAM(s) Oral daily    MEDICATIONS (PRN):acetaminophen     Tablet .. 650 milliGRAM(s) Oral every 6 hours PRN Mild Pain (1 - 3)  aluminum hydroxide/magnesium hydroxide/simethicone Suspension 30 milliLiter(s) Oral every 4 hours PRN Dyspepsia  dextrose Oral Gel 15 Gram(s) Oral once PRN Blood Glucose LESS THAN 70 milliGRAM(s)/deciliter  melatonin 3 milliGRAM(s) Oral at bedtime PRN Insomnia  ondansetron Injectable 4 milliGRAM(s) IV Push every 8 hours PRN Nausea and/or Vomiting  polyethylene glycol 3350 17 Gram(s) Oral daily PRN Constipation    --------------------------------------------------------------------------------------------    Vitals:   T(C): 36.8 (03-01-25 @ 08:00), Max: 36.8 (03-01-25 @ 00:02)  HR: 86 (03-01-25 @ 08:00) (86 - 103)  BP: 115/58 (03-01-25 @ 08:00) (98/54 - 115/58)  RR: 18 (03-01-25 @ 08:00) (18 - 18)  SpO2: 97% (03-01-25 @ 08:00) (97% - 99%)  CAPILLARY BLOOD GLUCOSE      POCT Blood Glucose.: 187 mg/dL (01 Mar 2025 12:26)  POCT Blood Glucose.: 146 mg/dL (01 Mar 2025 08:04)  POCT Blood Glucose.: 153 mg/dL (28 Feb 2025 20:51)  POCT Blood Glucose.: 187 mg/dL (28 Feb 2025 17:33)    CAPILLARY BLOOD GLUCOSE      POCT Blood Glucose.: 187 mg/dL (01 Mar 2025 12:26)  POCT Blood Glucose.: 146 mg/dL (01 Mar 2025 08:04)  POCT Blood Glucose.: 153 mg/dL (28 Feb 2025 20:51)  POCT Blood Glucose.: 187 mg/dL (28 Feb 2025 17:33)      02-28 @ 07:01  -  03-01 @ 07:00  --------------------------------------------------------  IN:  Total IN: 0 mL    OUT:    Indwelling Catheter - Urethral (mL): 1400 mL  Total OUT: 1400 mL    Total NET: -1400 mL      03-01 @ 07:01  -  03-01 @ 16:22  --------------------------------------------------------  IN:  Total IN: 0 mL    OUT:    Indwelling Catheter - Urethral (mL): 400 mL  Total OUT: 400 mL    Total NET: -400 mL        Height (cm): 165.1 (02-26 @ 16:07)  Weight (kg): 79.3 (02-27 @ 01:27)  BMI (kg/m2): 29.1 (02-27 @ 01:27)  BSA (m2): 1.87 (02-27 @ 01:27)    PHYSICAL EXAM: ***  General: NAD, Lying in bed comfortably  Neuro: A+Ox1  HEENT: NC/AT, EOMI  Neck: Soft, supple  Cardio: RRR, nml S1/S2  Resp: Good effort, CTA b/l  Thorax: No chest wall tenderness  Breast: No lesions/masses, no drainage  GI/Abd: Soft, NT/ND, no rebound/guarding, no masses palpated  Musculoskeletal: L AKA guillotine amputation stump with healthy granulation, no drainage or necrotic tissues R groin wound at prior surgical site with serous discharges   --------------------------------------------------------------------------------------------    LABS  CBC (03-01 @ 06:29)                              9.7[L]                         4.75    )----------------(  227        57.1  % Neutrophils, 31.8  % Lymphocytes, ANC: 2.71                                31.8[L]  CBC (02-28 @ 06:45)                              10.5[L]                         5.77    )----------------(  236        48.0  % Neutrophils, 39.3  % Lymphocytes, ANC: 2.77                                34.5      BMP (03-01 @ 06:29)             141     |  110[H]  |  7     		Ca++ --      Ca 8.1[L]             ---------------------------------( 157[H]		Mg 1.6                3.9     |  27      |  0.65  			Ph 3.6     BMP (02-28 @ 06:45)             142     |  113[H]  |  7     		Ca++ --      Ca 8.4[L]             ---------------------------------( 97    		Mg 1.7                4.1     |  24      |  0.60  			Ph 3.2                 --------------------------------------------------------------------------------------------    MICROBIOLOGY  Urinalysis (03-01 @ 06:29):     Color:  / Appearance:  / SG:  / pH:  / Gluc: 157[H] / Ketones:  / Bili:  / Urobili:  / Protein : / Nitrites:  / Leuk.Est:  / RBC:  / WBC:  / Sq Epi:  / Non Sq Epi:  / Bacteria        -> .Urine None Culture (02-26 @ 20:07)     NG    NG    >=3 organisms. Probable collection contamination.    -> .Blood None Culture (02-26 @ 17:41)     NG    NG    No growth at 48 Hours    -> .Blood None Culture (02-26 @ 17:23)     NG    NG    No growth at 48 Hours      --------------------------------------------------------------------------------------------

## 2025-03-02 LAB
ANION GAP SERPL CALC-SCNC: 5 MMOL/L — SIGNIFICANT CHANGE UP (ref 5–17)
BASOPHILS # BLD AUTO: 0.04 K/UL — SIGNIFICANT CHANGE UP (ref 0–0.2)
BASOPHILS NFR BLD AUTO: 0.9 % — SIGNIFICANT CHANGE UP (ref 0–2)
BUN SERPL-MCNC: 9 MG/DL — SIGNIFICANT CHANGE UP (ref 7–23)
CALCIUM SERPL-MCNC: 8.4 MG/DL — LOW (ref 8.5–10.1)
CHLORIDE SERPL-SCNC: 110 MMOL/L — HIGH (ref 96–108)
CO2 SERPL-SCNC: 26 MMOL/L — SIGNIFICANT CHANGE UP (ref 22–31)
CREAT SERPL-MCNC: 0.58 MG/DL — SIGNIFICANT CHANGE UP (ref 0.5–1.3)
EGFR: 91 ML/MIN/1.73M2 — SIGNIFICANT CHANGE UP
EGFR: 91 ML/MIN/1.73M2 — SIGNIFICANT CHANGE UP
EOSINOPHIL # BLD AUTO: 0.18 K/UL — SIGNIFICANT CHANGE UP (ref 0–0.5)
EOSINOPHIL NFR BLD AUTO: 4.2 % — SIGNIFICANT CHANGE UP (ref 0–6)
GLUCOSE BLDC GLUCOMTR-MCNC: 113 MG/DL — HIGH (ref 70–99)
GLUCOSE BLDC GLUCOMTR-MCNC: 126 MG/DL — HIGH (ref 70–99)
GLUCOSE BLDC GLUCOMTR-MCNC: 164 MG/DL — HIGH (ref 70–99)
GLUCOSE BLDC GLUCOMTR-MCNC: 191 MG/DL — HIGH (ref 70–99)
GLUCOSE SERPL-MCNC: 145 MG/DL — HIGH (ref 70–99)
HCT VFR BLD CALC: 33.1 % — LOW (ref 34.5–45)
HGB BLD-MCNC: 10.3 G/DL — LOW (ref 11.5–15.5)
IMM GRANULOCYTES # BLD AUTO: 0.01 K/UL — SIGNIFICANT CHANGE UP (ref 0–0.07)
IMM GRANULOCYTES NFR BLD AUTO: 0.2 % — SIGNIFICANT CHANGE UP (ref 0–0.9)
LYMPHOCYTES # BLD AUTO: 2.01 K/UL — SIGNIFICANT CHANGE UP (ref 1–3.3)
LYMPHOCYTES NFR BLD AUTO: 46.7 % — HIGH (ref 13–44)
MCHC RBC-ENTMCNC: 25.1 PG — LOW (ref 27–34)
MCHC RBC-ENTMCNC: 31.1 G/DL — LOW (ref 32–36)
MCV RBC AUTO: 80.7 FL — SIGNIFICANT CHANGE UP (ref 80–100)
MONOCYTES # BLD AUTO: 0.37 K/UL — SIGNIFICANT CHANGE UP (ref 0–0.9)
MONOCYTES NFR BLD AUTO: 8.6 % — SIGNIFICANT CHANGE UP (ref 2–14)
NEUTROPHILS # BLD AUTO: 1.69 K/UL — LOW (ref 1.8–7.4)
NEUTROPHILS NFR BLD AUTO: 39.4 % — LOW (ref 43–77)
NRBC # BLD AUTO: 0 K/UL — SIGNIFICANT CHANGE UP (ref 0–0)
NRBC # FLD: 0 K/UL — SIGNIFICANT CHANGE UP (ref 0–0)
NRBC BLD AUTO-RTO: 0 /100 WBCS — SIGNIFICANT CHANGE UP (ref 0–0)
PLATELET # BLD AUTO: 228 K/UL — SIGNIFICANT CHANGE UP (ref 150–400)
PMV BLD: 9.3 FL — SIGNIFICANT CHANGE UP (ref 7–13)
POTASSIUM SERPL-MCNC: 3.7 MMOL/L — SIGNIFICANT CHANGE UP (ref 3.5–5.3)
POTASSIUM SERPL-SCNC: 3.7 MMOL/L — SIGNIFICANT CHANGE UP (ref 3.5–5.3)
RBC # BLD: 4.1 M/UL — SIGNIFICANT CHANGE UP (ref 3.8–5.2)
RBC # FLD: 17.2 % — HIGH (ref 10.3–14.5)
SODIUM SERPL-SCNC: 141 MMOL/L — SIGNIFICANT CHANGE UP (ref 135–145)
WBC # BLD: 4.3 K/UL — SIGNIFICANT CHANGE UP (ref 3.8–10.5)
WBC # FLD AUTO: 4.3 K/UL — SIGNIFICANT CHANGE UP (ref 3.8–10.5)

## 2025-03-02 PROCEDURE — 99233 SBSQ HOSP IP/OBS HIGH 50: CPT

## 2025-03-02 PROCEDURE — 75635 CT ANGIO ABDOMINAL ARTERIES: CPT | Mod: 26

## 2025-03-02 RX ADMIN — APIXABAN 5 MILLIGRAM(S): 2.5 TABLET, FILM COATED ORAL at 09:06

## 2025-03-02 RX ADMIN — Medication 2 TABLET(S): at 21:54

## 2025-03-02 RX ADMIN — Medication 1 TABLET(S): at 09:06

## 2025-03-02 RX ADMIN — Medication 40 MILLIGRAM(S): at 09:07

## 2025-03-02 RX ADMIN — METOPROLOL SUCCINATE 12.5 MILLIGRAM(S): 50 TABLET, EXTENDED RELEASE ORAL at 09:06

## 2025-03-02 RX ADMIN — FOLIC ACID 1 MILLIGRAM(S): 1 TABLET ORAL at 09:07

## 2025-03-02 RX ADMIN — MEROPENEM 1000 MILLIGRAM(S): 1 INJECTION INTRAVENOUS at 21:54

## 2025-03-02 RX ADMIN — MIRTAZAPINE 7.5 MILLIGRAM(S): 30 TABLET, FILM COATED ORAL at 21:55

## 2025-03-02 RX ADMIN — INSULIN LISPRO 2: 100 INJECTION, SOLUTION INTRAVENOUS; SUBCUTANEOUS at 07:51

## 2025-03-02 RX ADMIN — MEROPENEM 1000 MILLIGRAM(S): 1 INJECTION INTRAVENOUS at 13:18

## 2025-03-02 RX ADMIN — MEROPENEM 1000 MILLIGRAM(S): 1 INJECTION INTRAVENOUS at 05:36

## 2025-03-02 RX ADMIN — ROSUVASTATIN CALCIUM 5 MILLIGRAM(S): 20 TABLET, FILM COATED ORAL at 21:55

## 2025-03-02 RX ADMIN — SODIUM HYPOCHLORITE 1 APPLICATION(S): 0.12 SOLUTION TOPICAL at 09:34

## 2025-03-02 RX ADMIN — APIXABAN 5 MILLIGRAM(S): 2.5 TABLET, FILM COATED ORAL at 21:54

## 2025-03-02 RX ADMIN — Medication 500 MILLIGRAM(S): at 09:06

## 2025-03-02 RX ADMIN — Medication 220 MILLIGRAM(S): at 09:07

## 2025-03-02 RX ADMIN — Medication 100 MILLIGRAM(S): at 09:07

## 2025-03-02 RX ADMIN — INSULIN LISPRO 2: 100 INJECTION, SOLUTION INTRAVENOUS; SUBCUTANEOUS at 12:00

## 2025-03-02 RX ADMIN — SODIUM HYPOCHLORITE 1 APPLICATION(S): 0.12 SOLUTION TOPICAL at 21:54

## 2025-03-02 NOTE — PROGRESS NOTE ADULT - SUBJECTIVE AND OBJECTIVE BOX
Chief Complaint: Worsening generalized weakness, abdominal discomfort, fevers, chills, intermittent confusion    Interval Hx: 2/28: Patient seen and examined. Awake and alert. Reports feeling improved. No fevers or rigors today. Rare cough. No dyspnea. No chest pain or tightness. No dyspnea. No abdominal pain. No other complaints. She is generally weak. On meropenem for probable UTI, UA with pyuria, bacteriuria, signs of cystitis on CT. Urine appears clear in her Silver (present prior to admission, changed in the ER). Also on CT was a 1.4cm collection adjacent to gallbladder fossa, to small to access/drain as per IR.       3/1: Appreciate input from Wound Care RN who provided recommendations but advised to follow up with Vascular Surgery here for final definitive recommendations. Input pending. Patient receiving wound care to L AKA stump, R groin non-closing puncture wound and R heel wound.     3/2: no new complaints. Await CT angio     ROS: Multi system review is comprehensively negative x 10 systems except as above    Vitals:  T(F): 98.2 (01 Mar 2025 08:00), Max: 98.2 (01 Mar 2025 00:02)  HR: 86 (01 Mar 2025 08:00) (86 - 103)  BP: 115/58 (01 Mar 2025 08:00) (98/54 - 119/81)  RR: 18 (01 Mar 2025 08:00) (18 - 18)  SpO2: 97% (01 Mar 2025 08:00) (97% - 99%) on room air    Exam:  Gen: No acute distrss  HEENT: NCAT PERRL EOMI MMM clear oropharynx  Neck: Supple, no JVD, no LAD  CVS: s1 s2 normal, RRR  Chest: Normal resp effort, clear lungs B/L  Abd: Non distended, +BS, soft, non tender, +BS  Ext: L AKA stump with full thickness wound 6cm x 10cm x 1cm with 95% of pink granulation tissue and 5% of black slough firmly attached. No odor or purulence noted. Periwound intact with no induration. Right inguinal puncture 0.2cm x 0.2cm x 0.2cm with moderate sero-yellow drainage with periwound induration. Right heel wound as well.   Skin: See Ext exam above. Additionally patient with incontinence and partial thickness skin breakdown to upper intergluteal cleft and sacral region  Mood: Calm, pleasant  Neuro: Awake and alert, follows simple commands, answers some simple questions    Labs:    POCT glucose 140s                                   10.3   4.30  )-----------( 228      ( 02 Mar 2025 05:58 )             33.1   03-02    141  |  110[H]  |  9   ----------------------------<  145[H]  3.7   |  26  |  0.58    Ca    8.4[L]      02 Mar 2025 05:58  Phos  3.6     03-01  Mg     1.6     03-01                   TPro  5.3  /  Alb  1.3  /  TBili  0.5  /  DBili  x   /  AST  20  /  ALT  17  /  AlkPhos  176    PT: 16.3 sec;   INR: 1.39 ratio  PTT: 36.8 sec    Lactate 3.5 --> 1.6   A1c 6.3    UA 2/26: Yellow, clear, prot 30, gluc-, ket-, bld mod, bili-, small LE, N-, , RBC 11, bact many, epi 1 cell    Micro:  Urine culture 2/26: >3 organism morphotypes  Blood culture x 2 2/26: Negative to date  COVID19, flu, RSV PCR 2/26: Negative    Imaging:  CT abd, pelvis w/ IV cont 2/26: Unchanged small bilateral pleural effusions and atelectasis. Coronary calcifications. Subcentimeter hypoattenuating lesion in the posterior right lobe of liver too small otherwise characterize. Bile ducts normal caliber. Mildly fluid distended gallbladder with indwelling cholecystostomy tube and small intraluminal air. 1.4 x 1.4 cm rim-enhancing collection with low density central fluid interposed between the gallbladder fundus in the ascending colon. Additional small nonenhancing fluid surrounding the gallbladder fossa tracking into the right paracolic gutter. Accessory splenule. Splenic cyst versus hemangioma along the inferior pole. Pancreas and adrenals normal. Kidneys with symmetric enhancement bilaterally; no hydronephrosis. Ureters normal. Bladder collapsed around. Silver catheter with small intraluminal air. Thickening and surrounding inflammation. Uterus and adnexa are within normal limits. No bowel obstruction. Appendix is normal. Mild pericolonic inflammation in the hepatic flexure with small rim-enhancing collection, as described above. Peritoneum and retroperitoneum normal. Diffuse atherosclerotic changes. No lymphadenopathy. Fluid and edema anterior to the bilateral femoral vessels  with overlying postsurgical change right groin; correlate for prior catheterization. Diffuse subcutaneous edema. Soft tissue nodules anterior abdominal wall, likely sequelae of prior medical injections. Old right rib fractures. Degenerative changes.    CXR 2/26: Heart magnified by technique. Right upper quadrant drainage catheter again noted. On January 27 there was a small retrocardiac infiltrate. The present examination there are mild to moderate bibasilar effusions.    Cardiac Testing:  EKG 2/26: Rate 102. Atrial fibrillation    Prior visit testing  TTE 12/17/24: LVEF 60%. Unable to assess LV diastolic function or filling pressure due to challenges from afib. Normal RV. Trace MR. Normal PASP estimate. No pericardial effusion. Normal IVC collapsibility.     Meds:  MEDICATIONS  (STANDING):  apixaban 5 milliGRAM(s) Oral every 12 hours  ascorbic acid 500 milliGRAM(s) Oral daily  Dakins Solution - 1/4 Strength 1 Application(s) Topical two times a day  dextrose 5%. 1000 milliLiter(s) (100 mL/Hr) IV Continuous <Continuous>  dextrose 5%. 1000 milliLiter(s) (50 mL/Hr) IV Continuous <Continuous>  dextrose 50% Injectable 25 Gram(s) IV Push once  dextrose 50% Injectable 12.5 Gram(s) IV Push once  dextrose 50% Injectable 25 Gram(s) IV Push once  folic acid 1 milliGRAM(s) Oral daily  glucagon  Injectable 1 milliGRAM(s) IntraMuscular once  insulin lispro (ADMELOG) corrective regimen sliding scale   SubCutaneous three times a day before meals  insulin lispro (ADMELOG) corrective regimen sliding scale   SubCutaneous at bedtime  meropenem Injectable 1000 milliGRAM(s) IV Push every 8 hours  metoprolol tartrate 12.5 milliGRAM(s) Oral two times a day  mirtazapine 7.5 milliGRAM(s) Oral at bedtime  multivitamin/minerals 1 Tablet(s) Oral daily  pantoprazole   Suspension 40 milliGRAM(s) Oral daily  rosuvastatin 5 milliGRAM(s) Oral at bedtime  senna 2 Tablet(s) Oral at bedtime  thiamine 100 milliGRAM(s) Oral daily  zinc sulfate 220 milliGRAM(s) Oral daily    MEDICATIONS  (PRN):  acetaminophen     Tablet .. 650 milliGRAM(s) Oral every 6 hours PRN Mild Pain (1 - 3)  aluminum hydroxide/magnesium hydroxide/simethicone Suspension 30 milliLiter(s) Oral every 4 hours PRN Dyspepsia  dextrose Oral Gel 15 Gram(s) Oral once PRN Blood Glucose LESS THAN 70 milliGRAM(s)/deciliter  melatonin 3 milliGRAM(s) Oral at bedtime PRN Insomnia  ondansetron Injectable 4 milliGRAM(s) IV Push every 8 hours PRN Nausea and/or Vomiting  polyethylene glycol 3350 17 Gram(s) Oral daily PRN Constipation

## 2025-03-02 NOTE — PROGRESS NOTE ADULT - SUBJECTIVE AND OBJECTIVE BOX
SURGERY DAILY PROGRESS NOTE:     Subjective:  Patient seen and examined at bedside during am rounds. AVSS. Denies any fevers, chills, n/v/d, chest pain or shortness of breath    Objective:    MEDICATIONS  (STANDING):  apixaban 5 milliGRAM(s) Oral every 12 hours  ascorbic acid 500 milliGRAM(s) Oral daily  Dakins Solution - 1/4 Strength 1 Application(s) Topical two times a day  dextrose 5%. 1000 milliLiter(s) (100 mL/Hr) IV Continuous <Continuous>  dextrose 5%. 1000 milliLiter(s) (50 mL/Hr) IV Continuous <Continuous>  dextrose 50% Injectable 25 Gram(s) IV Push once  dextrose 50% Injectable 12.5 Gram(s) IV Push once  dextrose 50% Injectable 25 Gram(s) IV Push once  folic acid 1 milliGRAM(s) Oral daily  glucagon  Injectable 1 milliGRAM(s) IntraMuscular once  insulin lispro (ADMELOG) corrective regimen sliding scale   SubCutaneous three times a day before meals  insulin lispro (ADMELOG) corrective regimen sliding scale   SubCutaneous at bedtime  meropenem Injectable 1000 milliGRAM(s) IV Push every 8 hours  metoprolol tartrate 12.5 milliGRAM(s) Oral two times a day  mirtazapine 7.5 milliGRAM(s) Oral at bedtime  multivitamin/minerals 1 Tablet(s) Oral daily  pantoprazole   Suspension 40 milliGRAM(s) Oral daily  rosuvastatin 5 milliGRAM(s) Oral at bedtime  senna 2 Tablet(s) Oral at bedtime  thiamine 100 milliGRAM(s) Oral daily  zinc sulfate 220 milliGRAM(s) Oral daily    MEDICATIONS  (PRN):  acetaminophen     Tablet .. 650 milliGRAM(s) Oral every 6 hours PRN Mild Pain (1 - 3)  aluminum hydroxide/magnesium hydroxide/simethicone Suspension 30 milliLiter(s) Oral every 4 hours PRN Dyspepsia  dextrose Oral Gel 15 Gram(s) Oral once PRN Blood Glucose LESS THAN 70 milliGRAM(s)/deciliter  melatonin 3 milliGRAM(s) Oral at bedtime PRN Insomnia  ondansetron Injectable 4 milliGRAM(s) IV Push every 8 hours PRN Nausea and/or Vomiting  polyethylene glycol 3350 17 Gram(s) Oral daily PRN Constipation      Vital Signs Last 24 Hrs  T(C): 37.3 (02 Mar 2025 15:35), Max: 37.3 (02 Mar 2025 15:35)  T(F): 99.1 (02 Mar 2025 15:35), Max: 99.1 (02 Mar 2025 15:35)  HR: 103 (02 Mar 2025 15:35) (86 - 103)  BP: 129/63 (02 Mar 2025 15:35) (102/76 - 138/58)  BP(mean): --  RR: 18 (02 Mar 2025 15:35) (18 - 18)  SpO2: 98% (02 Mar 2025 15:35) (95% - 99%)    Parameters below as of 02 Mar 2025 15:35  Patient On (Oxygen Delivery Method): room air          PHYSICAL EXAM   General: NAD, Lying in bed comfortably  Neuro: A+Ox1  HEENT: NC/AT, EOMI  Neck: Soft, supple  Cardio: RRR, nml S1/S2  Resp: Good effort, CTA b/l  Thorax: No chest wall tenderness  Breast: No lesions/masses, no drainage  GI/Abd: Soft, NT/ND, no rebound/guarding, no masses palpated  Musculoskeletal: L AKA guillotine amputation stump with healthy granulation, no drainage or necrotic tissues R groin wound at prior surgical site with serous discharges         I&O's Detail    01 Mar 2025 07:01  -  02 Mar 2025 07:00  --------------------------------------------------------  IN:  Total IN: 0 mL    OUT:    Drain (mL): 50 mL    Indwelling Catheter - Urethral (mL): 1900 mL  Total OUT: 1950 mL    Total NET: -1950 mL      02 Mar 2025 07:01  -  02 Mar 2025 19:34  --------------------------------------------------------  IN:    Oral Fluid: 500 mL  Total IN: 500 mL    OUT:  Total OUT: 0 mL    Total NET: 500 mL          Daily     Daily     LABS:                        10.3   4.30  )-----------( 228      ( 02 Mar 2025 05:58 )             33.1     03-02    141  |  110[H]  |  9   ----------------------------<  145[H]  3.7   |  26  |  0.58    Ca    8.4[L]      02 Mar 2025 05:58  Phos  3.6     03-01  Mg     1.6     03-01        Urinalysis Basic - ( 02 Mar 2025 05:58 )    Color: x / Appearance: x / SG: x / pH: x  Gluc: 145 mg/dL / Ketone: x  / Bili: x / Urobili: x   Blood: x / Protein: x / Nitrite: x   Leuk Esterase: x / RBC: x / WBC x   Sq Epi: x / Non Sq Epi: x / Bacteria: x        RADIOLOGY & ADDITIONAL STUDIES:    ASSESSMENT/PLAN:

## 2025-03-02 NOTE — PROGRESS NOTE ADULT - ASSESSMENT
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 Vascular surgery consulted for evaluation of R groin surgical site wound and L AKA stump wound       PLAN   No acute surgical management   Continue local wound care of R groin and L  AKA stump wound. No sign of infection.  CTA pending official read  Vascular will follow

## 2025-03-03 LAB
ANION GAP SERPL CALC-SCNC: 5 MMOL/L — SIGNIFICANT CHANGE UP (ref 5–17)
BUN SERPL-MCNC: 8 MG/DL — SIGNIFICANT CHANGE UP (ref 7–23)
CALCIUM SERPL-MCNC: 8.4 MG/DL — LOW (ref 8.5–10.1)
CHLORIDE SERPL-SCNC: 109 MMOL/L — HIGH (ref 96–108)
CO2 SERPL-SCNC: 25 MMOL/L — SIGNIFICANT CHANGE UP (ref 22–31)
CREAT SERPL-MCNC: 0.63 MG/DL — SIGNIFICANT CHANGE UP (ref 0.5–1.3)
EGFR: 89 ML/MIN/1.73M2 — SIGNIFICANT CHANGE UP
EGFR: 89 ML/MIN/1.73M2 — SIGNIFICANT CHANGE UP
GLUCOSE BLDC GLUCOMTR-MCNC: 122 MG/DL — HIGH (ref 70–99)
GLUCOSE BLDC GLUCOMTR-MCNC: 131 MG/DL — HIGH (ref 70–99)
GLUCOSE BLDC GLUCOMTR-MCNC: 156 MG/DL — HIGH (ref 70–99)
GLUCOSE BLDC GLUCOMTR-MCNC: 197 MG/DL — HIGH (ref 70–99)
GLUCOSE SERPL-MCNC: 122 MG/DL — HIGH (ref 70–99)
HCT VFR BLD CALC: 32.9 % — LOW (ref 34.5–45)
HGB BLD-MCNC: 10 G/DL — LOW (ref 11.5–15.5)
MCHC RBC-ENTMCNC: 25.2 PG — LOW (ref 27–34)
MCHC RBC-ENTMCNC: 30.4 G/DL — LOW (ref 32–36)
MCV RBC AUTO: 82.9 FL — SIGNIFICANT CHANGE UP (ref 80–100)
NRBC # BLD AUTO: 0 K/UL — SIGNIFICANT CHANGE UP (ref 0–0)
NRBC # FLD: 0 K/UL — SIGNIFICANT CHANGE UP (ref 0–0)
NRBC BLD AUTO-RTO: 0 /100 WBCS — SIGNIFICANT CHANGE UP (ref 0–0)
PLATELET # BLD AUTO: 202 K/UL — SIGNIFICANT CHANGE UP (ref 150–400)
PMV BLD: 9.3 FL — SIGNIFICANT CHANGE UP (ref 7–13)
POTASSIUM SERPL-MCNC: 3.7 MMOL/L — SIGNIFICANT CHANGE UP (ref 3.5–5.3)
POTASSIUM SERPL-SCNC: 3.7 MMOL/L — SIGNIFICANT CHANGE UP (ref 3.5–5.3)
RBC # BLD: 3.97 M/UL — SIGNIFICANT CHANGE UP (ref 3.8–5.2)
RBC # FLD: 17.4 % — HIGH (ref 10.3–14.5)
SODIUM SERPL-SCNC: 139 MMOL/L — SIGNIFICANT CHANGE UP (ref 135–145)
WBC # BLD: 4.55 K/UL — SIGNIFICANT CHANGE UP (ref 3.8–10.5)
WBC # FLD AUTO: 4.55 K/UL — SIGNIFICANT CHANGE UP (ref 3.8–10.5)

## 2025-03-03 PROCEDURE — 99024 POSTOP FOLLOW-UP VISIT: CPT

## 2025-03-03 PROCEDURE — 99232 SBSQ HOSP IP/OBS MODERATE 35: CPT

## 2025-03-03 RX ADMIN — Medication 1 TABLET(S): at 09:46

## 2025-03-03 RX ADMIN — MEROPENEM 1000 MILLIGRAM(S): 1 INJECTION INTRAVENOUS at 13:27

## 2025-03-03 RX ADMIN — APIXABAN 5 MILLIGRAM(S): 2.5 TABLET, FILM COATED ORAL at 09:44

## 2025-03-03 RX ADMIN — SODIUM HYPOCHLORITE 1 APPLICATION(S): 0.12 SOLUTION TOPICAL at 05:50

## 2025-03-03 RX ADMIN — APIXABAN 5 MILLIGRAM(S): 2.5 TABLET, FILM COATED ORAL at 21:11

## 2025-03-03 RX ADMIN — INSULIN LISPRO 2: 100 INJECTION, SOLUTION INTRAVENOUS; SUBCUTANEOUS at 11:38

## 2025-03-03 RX ADMIN — Medication 3 MILLIGRAM(S): at 21:11

## 2025-03-03 RX ADMIN — Medication 100 MILLIGRAM(S): at 09:46

## 2025-03-03 RX ADMIN — FOLIC ACID 1 MILLIGRAM(S): 1 TABLET ORAL at 09:46

## 2025-03-03 RX ADMIN — Medication 220 MILLIGRAM(S): at 09:44

## 2025-03-03 RX ADMIN — Medication 40 MILLIGRAM(S): at 09:43

## 2025-03-03 RX ADMIN — ROSUVASTATIN CALCIUM 5 MILLIGRAM(S): 20 TABLET, FILM COATED ORAL at 21:11

## 2025-03-03 RX ADMIN — MEROPENEM 1000 MILLIGRAM(S): 1 INJECTION INTRAVENOUS at 21:10

## 2025-03-03 RX ADMIN — METOPROLOL SUCCINATE 12.5 MILLIGRAM(S): 50 TABLET, EXTENDED RELEASE ORAL at 09:44

## 2025-03-03 RX ADMIN — SODIUM HYPOCHLORITE 1 APPLICATION(S): 0.12 SOLUTION TOPICAL at 17:34

## 2025-03-03 RX ADMIN — MEROPENEM 1000 MILLIGRAM(S): 1 INJECTION INTRAVENOUS at 05:50

## 2025-03-03 RX ADMIN — MIRTAZAPINE 7.5 MILLIGRAM(S): 30 TABLET, FILM COATED ORAL at 21:11

## 2025-03-03 RX ADMIN — Medication 2 TABLET(S): at 21:11

## 2025-03-03 RX ADMIN — METOPROLOL SUCCINATE 12.5 MILLIGRAM(S): 50 TABLET, EXTENDED RELEASE ORAL at 21:11

## 2025-03-03 RX ADMIN — Medication 500 MILLIGRAM(S): at 09:44

## 2025-03-03 NOTE — PROGRESS NOTE ADULT - SUBJECTIVE AND OBJECTIVE BOX
Chief Complaint: Worsening generalized weakness, abdominal discomfort, fevers, chills, intermittent confusion    Interval Hx: 2/28: Patient seen and examined. Awake and alert. Reports feeling improved. No fevers or rigors today. Rare cough. No dyspnea. No chest pain or tightness. No dyspnea. No abdominal pain. No other complaints. She is generally weak. On meropenem for probable UTI, UA with pyuria, bacteriuria, signs of cystitis on CT. Urine appears clear in her Silver (present prior to admission, changed in the ER). Also on CT was a 1.4cm collection adjacent to gallbladder fossa, to small to access/drain as per IR.       3/1: Appreciate input from Wound Care RN who provided recommendations but advised to follow up with Vascular Surgery here for final definitive recommendations. Input pending. Patient receiving wound care to L AKA stump, R groin non-closing puncture wound and R heel wound.     3/2: no new complaints. Await CT angio     3/3: Patient seen and evaluated today at bedside No new complaints. Awaiting CT angio results    ROS: Multi system review is comprehensively negative x 10 systems except as above    Vital Signs Last 24 Hrs  T(C): 36.6 (03 Mar 2025 08:22), Max: 37.3 (02 Mar 2025 15:35)  T(F): 97.9 (03 Mar 2025 08:22), Max: 99.1 (02 Mar 2025 15:35)  HR: 96 (03 Mar 2025 08:22) (89 - 103)  BP: 104/64 (03 Mar 2025 08:22) (99/55 - 132/82)  BP(mean): --  RR: 19 (03 Mar 2025 08:22) (18 - 19)  SpO2: 95% (03 Mar 2025 08:22) (95% - 98%)    Parameters below as of 03 Mar 2025 08:22  Patient On (Oxygen Delivery Method): room air    I&O's Detail    02 Mar 2025 07:01  -  03 Mar 2025 07:00  --------------------------------------------------------  IN:    Oral Fluid: 500 mL  Total IN: 500 mL    OUT:    Drain (mL): 10 mL    Indwelling Catheter - Urethral (mL): 700 mL  Total OUT: 710 mL    Total NET: -210 mL      Exam:  Gen: No acute distress  HEENT: NCAT PERRL EOMI MMM clear oropharynx  Neck: Supple, no JVD, no LAD  CVS: s1 s2 normal, RRR  Chest: Normal resp effort, clear lungs B/L  Abd: Non distended, +BS, soft, non tender, +BS  Ext: L AKA stump with full thickness wound 6cm x 10cm x 1cm with 95% of pink granulation tissue and 5% of black slough firmly attached. No odor or purulence noted. Periwound intact with no induration. Right inguinal puncture 0.2cm x 0.2cm x 0.2cm with moderate sero-yellow drainage with periwound induration. Right heel wound as well.   Skin: See Ext exam above. Additionally patient with incontinence and partial thickness skin breakdown to upper intergluteal cleft and sacral region  Mood: Calm, pleasant  Neuro: Awake and alert, follows simple commands, answers some simple questions    Labs:                        10.0   4.55  )-----------( 202      ( 03 Mar 2025 06:33 )             32.9     03-03    139  |  109[H]  |  8   ----------------------------<  122[H]  3.7   |  25  |  0.63    Ca    8.4[L]      03 Mar 2025 06:33    CAPILLARY BLOOD GLUCOSE  POCT Blood Glucose.: 156 mg/dL (03 Mar 2025 11:34)  POCT Blood Glucose.: 131 mg/dL (03 Mar 2025 07:38)  POCT Blood Glucose.: 113 mg/dL (02 Mar 2025 21:57)  POCT Blood Glucose.: 126 mg/dL (02 Mar 2025 17:29)      PT: 16.3 sec;   INR: 1.39 ratio  PTT: 36.8 sec    Lactate 3.5 --> 1.6    A1c 6.3    UA 2/26: Yellow, clear, prot 30, gluc-, ket-, bld mod, bili-, small LE, N-, , RBC 11, bact many, epi 1 cell    Micro:  Urine culture 2/26: >3 organism morphotypes  Blood culture x 2 2/26: Negative to date  COVID19, flu, RSV PCR 2/26: Negative    Home Medications:  acetaminophen 325 mg oral tablet: 2 tab(s) orally every 6 hours As needed Temp greater or equal to 38C (100.4F), Mild Pain (1 - 3) (26 Feb 2025 22:13)  ascorbic acid 500 mg oral tablet: 1 tab(s) orally once a day (26 Feb 2025 22:13)  Eliquis 2.5 mg oral tablet: 1 tab(s) orally every 12 hours (26 Feb 2025 22:13)  folic acid 1 mg oral tablet: 1 tab(s) orally once a day (26 Feb 2025 22:13)  hydrocortisone 1% topical cream: Apply topically to affected area 2 times a day , apply to upper back (26 Feb 2025 22:09)  metoprolol tartrate 25 mg oral tablet: 0.5 tab(s) orally every 8 hours (26 Feb 2025 22:57)  mirtazapine 7.5 mg oral tablet: 1 tab(s) orally once a day (at bedtime) (26 Feb 2025 22:13)  Multiple Vitamins with Minerals oral tablet: 1 tab(s) orally once a day (26 Feb 2025 22:13)  oxyCODONE 5 mg oral tablet: 1.5 tab(s) orally every 8 hours as needed for pain (26 Feb 2025 22:10)  polyethylene glycol 3350 oral powder for reconstitution: 17 gram(s) orally once a day (26 Feb 2025 22:13)  povidone iodine 10% topical solution: Apply topically to affected area once a day as needed for  wound care (26 Feb 2025 22:57)  povidone iodine 10% topical solution: Apply topically to affected area once a day , apply to right heel (26 Feb 2025 22:57)  rosuvastatin 5 mg oral capsule: 1 cap(s) orally once a day (at bedtime) (26 Feb 2025 22:13)  senna leaf extract oral tablet: 2 tab(s) orally once a day (at bedtime) (26 Feb 2025 22:13)  triamcinolone 0.1% topical ointment: Apply topically to affected area 2 times a day , apply to buttocks (26 Feb 2025 22:13)  Vitamin D2 1.25 mg (50,000 intl units) oral capsule: 1 cap(s) orally once a week (26 Feb 2025 22:57)    MEDICATIONS  (STANDING):  apixaban 5 milliGRAM(s) Oral every 12 hours  ascorbic acid 500 milliGRAM(s) Oral daily  Dakins Solution - 1/4 Strength 1 Application(s) Topical two times a day  dextrose 5%. 1000 milliLiter(s) (100 mL/Hr) IV Continuous <Continuous>  dextrose 5%. 1000 milliLiter(s) (50 mL/Hr) IV Continuous <Continuous>  dextrose 50% Injectable 25 Gram(s) IV Push once  dextrose 50% Injectable 12.5 Gram(s) IV Push once  dextrose 50% Injectable 25 Gram(s) IV Push once  folic acid 1 milliGRAM(s) Oral daily  glucagon  Injectable 1 milliGRAM(s) IntraMuscular once  insulin lispro (ADMELOG) corrective regimen sliding scale   SubCutaneous three times a day before meals  insulin lispro (ADMELOG) corrective regimen sliding scale   SubCutaneous at bedtime  meropenem Injectable 1000 milliGRAM(s) IV Push every 8 hours  metoprolol tartrate 12.5 milliGRAM(s) Oral two times a day  mirtazapine 7.5 milliGRAM(s) Oral at bedtime  multivitamin/minerals 1 Tablet(s) Oral daily  pantoprazole   Suspension 40 milliGRAM(s) Oral daily  rosuvastatin 5 milliGRAM(s) Oral at bedtime  senna 2 Tablet(s) Oral at bedtime  thiamine 100 milliGRAM(s) Oral daily  zinc sulfate 220 milliGRAM(s) Oral daily    MEDICATIONS  (PRN):  acetaminophen     Tablet .. 650 milliGRAM(s) Oral every 6 hours PRN Mild Pain (1 - 3)  aluminum hydroxide/magnesium hydroxide/simethicone Suspension 30 milliLiter(s) Oral every 4 hours PRN Dyspepsia  dextrose Oral Gel 15 Gram(s) Oral once PRN Blood Glucose LESS THAN 70 milliGRAM(s)/deciliter  melatonin 3 milliGRAM(s) Oral at bedtime PRN Insomnia  ondansetron Injectable 4 milliGRAM(s) IV Push every 8 hours PRN Nausea and/or Vomiting  polyethylene glycol 3350 17 Gram(s) Oral daily PRN Constipation      Imaging:  CT abd, pelvis w/ IV cont 2/26: Unchanged small bilateral pleural effusions and atelectasis. Coronary calcifications. Subcentimeter hypoattenuating lesion in the posterior right lobe of liver too small otherwise characterize. Bile ducts normal caliber. Mildly fluid distended gallbladder with indwelling cholecystostomy tube and small intraluminal air. 1.4 x 1.4 cm rim-enhancing collection with low density central fluid interposed between the gallbladder fundus in the ascending colon. Additional small nonenhancing fluid surrounding the gallbladder fossa tracking into the right paracolic gutter. Accessory splenule. Splenic cyst versus hemangioma along the inferior pole. Pancreas and adrenals normal. Kidneys with symmetric enhancement bilaterally; no hydronephrosis. Ureters normal. Bladder collapsed around. Silver catheter with small intraluminal air. Thickening and surrounding inflammation. Uterus and adnexa are within normal limits. No bowel obstruction. Appendix is normal. Mild pericolonic inflammation in the hepatic flexure with small rim-enhancing collection, as described above. Peritoneum and retroperitoneum normal. Diffuse atherosclerotic changes. No lymphadenopathy. Fluid and edema anterior to the bilateral femoral vessels  with overlying postsurgical change right groin; correlate for prior catheterization. Diffuse subcutaneous edema. Soft tissue nodules anterior abdominal wall, likely sequelae of prior medical injections. Old right rib fractures. Degenerative changes.    CXR 2/26: Heart magnified by technique. Right upper quadrant drainage catheter again noted. On January 27 there was a small retrocardiac infiltrate. The present examination there are mild to moderate bibasilar effusions.    Cardiac Testing:  EKG 2/26: Rate 102. Atrial fibrillation    Prior visit testing  TTE 12/17/24: LVEF 60%. Unable to assess LV diastolic function or filling pressure due to challenges from afib. Normal RV. Trace MR. Normal PASP estimate. No pericardial effusion. Normal IVC collapsibility.

## 2025-03-03 NOTE — PROGRESS NOTE ADULT - ASSESSMENT
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 Vascular surgery consulted for evaluation of R groin surgical site wound and L AKA stump wound       PLAN   C/w IV abx  F/u ID consult  No acute surgical intervention indicated at this time.  Continue local wound care of R groin and L  AKA stump wound. No sign of infection.  CTA pending official read  Vascular will follow and update primary team once official read results

## 2025-03-03 NOTE — PROGRESS NOTE ADULT - SUBJECTIVE AND OBJECTIVE BOX
SURGERY DAILY PROGRESS NOTE:     Subjective:  Patient seen and examined at bedside during am rounds. AVSS. Denies any fevers, chills, n/v/d, chest pain or shortness of breath    Objective:    MEDICATIONS  (STANDING):  apixaban 5 milliGRAM(s) Oral every 12 hours  ascorbic acid 500 milliGRAM(s) Oral daily  Dakins Solution - 1/4 Strength 1 Application(s) Topical two times a day  dextrose 5%. 1000 milliLiter(s) (100 mL/Hr) IV Continuous <Continuous>  dextrose 5%. 1000 milliLiter(s) (50 mL/Hr) IV Continuous <Continuous>  dextrose 50% Injectable 25 Gram(s) IV Push once  dextrose 50% Injectable 12.5 Gram(s) IV Push once  dextrose 50% Injectable 25 Gram(s) IV Push once  folic acid 1 milliGRAM(s) Oral daily  glucagon  Injectable 1 milliGRAM(s) IntraMuscular once  insulin lispro (ADMELOG) corrective regimen sliding scale   SubCutaneous three times a day before meals  insulin lispro (ADMELOG) corrective regimen sliding scale   SubCutaneous at bedtime  meropenem Injectable 1000 milliGRAM(s) IV Push every 8 hours  metoprolol tartrate 12.5 milliGRAM(s) Oral two times a day  mirtazapine 7.5 milliGRAM(s) Oral at bedtime  multivitamin/minerals 1 Tablet(s) Oral daily  pantoprazole   Suspension 40 milliGRAM(s) Oral daily  rosuvastatin 5 milliGRAM(s) Oral at bedtime  senna 2 Tablet(s) Oral at bedtime  thiamine 100 milliGRAM(s) Oral daily  zinc sulfate 220 milliGRAM(s) Oral daily    MEDICATIONS  (PRN):  acetaminophen     Tablet .. 650 milliGRAM(s) Oral every 6 hours PRN Mild Pain (1 - 3)  aluminum hydroxide/magnesium hydroxide/simethicone Suspension 30 milliLiter(s) Oral every 4 hours PRN Dyspepsia  dextrose Oral Gel 15 Gram(s) Oral once PRN Blood Glucose LESS THAN 70 milliGRAM(s)/deciliter  melatonin 3 milliGRAM(s) Oral at bedtime PRN Insomnia  ondansetron Injectable 4 milliGRAM(s) IV Push every 8 hours PRN Nausea and/or Vomiting  polyethylene glycol 3350 17 Gram(s) Oral daily PRN Constipation      Vital Signs Last 24 Hrs  T(C): 37.3 (02 Mar 2025 15:35), Max: 37.3 (02 Mar 2025 15:35)  T(F): 99.1 (02 Mar 2025 15:35), Max: 99.1 (02 Mar 2025 15:35)  HR: 89 (02 Mar 2025 21:51) (86 - 103)  BP: 99/55 (02 Mar 2025 21:51) (99/55 - 138/58)  BP(mean): --  RR: 18 (02 Mar 2025 21:51) (18 - 18)  SpO2: 95% (02 Mar 2025 21:51) (95% - 99%)    Parameters below as of 02 Mar 2025 21:51  Patient On (Oxygen Delivery Method): room air          PHYSICAL EXAM   General: NAD, Lying in bed comfortably  Neuro: A+Ox1  HEENT: NC/AT, EOMI  Neck: Soft, supple  Cardio: RRR, nml S1/S2  Resp: Good effort, CTA b/l  Thorax: No chest wall tenderness  Breast: No lesions/masses, no drainage  GI/Abd: Soft, NT/ND, no rebound/guarding, no masses palpated  Musculoskeletal: L AKA guillotine amputation stump with healthy granulation, no drainage or necrotic tissues R groin wound at prior surgical site with serous discharges         I&O's Detail    01 Mar 2025 07:01  -  02 Mar 2025 07:00  --------------------------------------------------------  IN:  Total IN: 0 mL    OUT:    Drain (mL): 50 mL    Indwelling Catheter - Urethral (mL): 1900 mL  Total OUT: 1950 mL    Total NET: -1950 mL      02 Mar 2025 07:01  -  03 Mar 2025 00:31  --------------------------------------------------------  IN:    Oral Fluid: 500 mL  Total IN: 500 mL    OUT:  Total OUT: 0 mL    Total NET: 500 mL          Daily     Daily     LABS:                        10.3   4.30  )-----------( 228      ( 02 Mar 2025 05:58 )             33.1     03-02    141  |  110[H]  |  9   ----------------------------<  145[H]  3.7   |  26  |  0.58    Ca    8.4[L]      02 Mar 2025 05:58  Phos  3.6     03-01  Mg     1.6     03-01        Urinalysis Basic - ( 02 Mar 2025 05:58 )    Color: x / Appearance: x / SG: x / pH: x  Gluc: 145 mg/dL / Ketone: x  / Bili: x / Urobili: x   Blood: x / Protein: x / Nitrite: x   Leuk Esterase: x / RBC: x / WBC x   Sq Epi: x / Non Sq Epi: x / Bacteria: x        RADIOLOGY & ADDITIONAL STUDIES:    ASSESSMENT/PLAN:

## 2025-03-03 NOTE — PROGRESS NOTE ADULT - ASSESSMENT
79 year old woman with diabetes, HTN, HLD, afib on Eliquis, hx of CVA, hx of limb ischemia s/p L AKA, B/L femoral artery thrombectomy, recent diagnosis of acute cholecystitis for which cholecystostomy tube was placed, sent from nursing home for further evaluation of generalized weakness, confusion, fever, found to have hyponatremia, leukocytosis, abnormal urinalysis with pyuria and bacteriuria, CT scan findings of bladder thickening and inflammation consistent with cystitis, as well as findings of mildly fluid-distended gallbladder with indwelling cholecystostomy tube and small intraluminal air, 1.4 x 1.4 cm rim-enhancing collection with low density central fluid interposed between the gallbladder fundus in the ascending colon, and additional small nonenhancing fluid surrounding the gallbladder fossa tracking into the right paracolic gutter. Patient was given IV antibiotics and admitted to Medicine.     #Acute metabolic encephalopathy  - Multifactorial due to acute infection, hyponatremia, hypokalemia. Mentation appears improved today as compared to that previously described.   - Continue to treat underlying issues.   - Reorient frequently.   - Monitor voiding, bowel movements. Strict I&O  - Monitor for pain.     #Sepsis with organ dysfunction due to UTI, also finding of 1.4cm rim enhancing collection adjacent to gallbladder fundus, present upon admission  - Patient admitted with fever, leukocytosis, elevated lactate. Found to have abnormal UA with pyuria and bacteriuria. CT A/P with signs of cystitis. Also noted on CT was mildly fluid distended gallbladder with indwelling cholecystostomy tube and small intraluminal air. 1.4 x 1.4 cm rim-enhancing collection with low density central fluid interposed between the gallbladder fundus in the ascending colon. Additional small nonenhancing fluid surrounding the gallbladder fossa tracking into the right paracolic gutter. All too small to drain as per IR and that includes the 1.4cm collection described.   - C/w meropenem  - ID eval appreciated:  if continues to improve clinically, can transition to PO antibiotic once ready for discharge: Bactrim DS 1tab q12 to complete 7-day course for presumed UTI (enddate: 3/6/2025) and Augmentin 875mg q12 to complete 3-week course for Intraabdominal infection (enddate: 3/19/2025)  - Monitor temperature curve  - Trend WBC   - Continue supportive care.     # S/p L AKA, hx of PAD, limb ischemia, & B/L femoral thrombectomy, L AKA full thickness wound, R groin puncture site  - C/w wound care   - Wound Care RN bella appreciated  - Vascular surgery eval appreciated: C/w IV abx, f/u ID eval, no acute surgical interventions at this time, c/w local wound care  - Vascular following     #Elevated lactate  - Resolved. In setting of acute infection, hypoperfusion. Lactate has since normalized.     #Diabetes mellitus  - C/w POC testing   - C/w insulin correctional scale    #HTN  - C/w Metroprolol      #HLD  - C/w rosuvastatin   Stable. Continue statin.     #Paroxysmal atrial fibrillation  - Rate controlled.   - C/w metoprolol   - C/w DOAC for stroke risk reduction.    #Hx of CVA  - Continue DOAC, statin, BP control    #Hyponatremia  - Resolved. Likely from low solute intake or possibly from dehydration.  - Na normalized with IV hydration.   - Encourage PO intake.    #Hypokalemia  - Resolved with potassium supplementation.     #Hypomagnesemia  - Resolved with magnesium supplementation.     #Small bilateral pleural effusions  - Noted in setting of malnutrition, hypoalbuminemia to 1.5. Stable in size on imaging compared to prior. Respiratory status stable. No hypoxia.     #Moderate protein calorie malnutrition  - Dietician eval appreciated   - Supplementing diet with Ensure Max BID, thiamine daily, MVI daily, zinc daily x 10 days, vit C    #VTE prophylaxis  - on Eliquis     #Fall risk

## 2025-03-03 NOTE — PROGRESS NOTE ADULT - ATTENDING COMMENTS
Seen and examined, CTA reviewed.  No role for vascular surgical intervention  Continue local wound care

## 2025-03-03 NOTE — PROGRESS NOTE ADULT - REASON FOR ADMISSION
Acute metabolic encephalopathy  Sepsis with organ dysfunction due to UTI  Elevated lactate  Hyponatremia

## 2025-03-04 LAB
ANION GAP SERPL CALC-SCNC: 7 MMOL/L — SIGNIFICANT CHANGE UP (ref 5–17)
BUN SERPL-MCNC: 7 MG/DL — SIGNIFICANT CHANGE UP (ref 7–23)
CALCIUM SERPL-MCNC: 8.7 MG/DL — SIGNIFICANT CHANGE UP (ref 8.5–10.1)
CHLORIDE SERPL-SCNC: 109 MMOL/L — HIGH (ref 96–108)
CO2 SERPL-SCNC: 25 MMOL/L — SIGNIFICANT CHANGE UP (ref 22–31)
CREAT SERPL-MCNC: 0.66 MG/DL — SIGNIFICANT CHANGE UP (ref 0.5–1.3)
CULTURE RESULTS: SIGNIFICANT CHANGE UP
CULTURE RESULTS: SIGNIFICANT CHANGE UP
EGFR: 88 ML/MIN/1.73M2 — SIGNIFICANT CHANGE UP
EGFR: 88 ML/MIN/1.73M2 — SIGNIFICANT CHANGE UP
GLUCOSE BLDC GLUCOMTR-MCNC: 144 MG/DL — HIGH (ref 70–99)
GLUCOSE BLDC GLUCOMTR-MCNC: 165 MG/DL — HIGH (ref 70–99)
GLUCOSE BLDC GLUCOMTR-MCNC: 176 MG/DL — HIGH (ref 70–99)
GLUCOSE SERPL-MCNC: 155 MG/DL — HIGH (ref 70–99)
HCT VFR BLD CALC: 34.7 % — SIGNIFICANT CHANGE UP (ref 34.5–45)
HGB BLD-MCNC: 10.6 G/DL — LOW (ref 11.5–15.5)
MCHC RBC-ENTMCNC: 24.9 PG — LOW (ref 27–34)
MCHC RBC-ENTMCNC: 30.5 G/DL — LOW (ref 32–36)
MCV RBC AUTO: 81.6 FL — SIGNIFICANT CHANGE UP (ref 80–100)
NRBC # BLD AUTO: 0 K/UL — SIGNIFICANT CHANGE UP (ref 0–0)
NRBC # FLD: 0 K/UL — SIGNIFICANT CHANGE UP (ref 0–0)
NRBC BLD AUTO-RTO: 0 /100 WBCS — SIGNIFICANT CHANGE UP (ref 0–0)
PLATELET # BLD AUTO: 232 K/UL — SIGNIFICANT CHANGE UP (ref 150–400)
PMV BLD: 9.6 FL — SIGNIFICANT CHANGE UP (ref 7–13)
POTASSIUM SERPL-MCNC: 3.8 MMOL/L — SIGNIFICANT CHANGE UP (ref 3.5–5.3)
POTASSIUM SERPL-SCNC: 3.8 MMOL/L — SIGNIFICANT CHANGE UP (ref 3.5–5.3)
RBC # BLD: 4.25 M/UL — SIGNIFICANT CHANGE UP (ref 3.8–5.2)
RBC # FLD: 17.3 % — HIGH (ref 10.3–14.5)
SODIUM SERPL-SCNC: 141 MMOL/L — SIGNIFICANT CHANGE UP (ref 135–145)
SPECIMEN SOURCE: SIGNIFICANT CHANGE UP
SPECIMEN SOURCE: SIGNIFICANT CHANGE UP
WBC # BLD: 4.95 K/UL — SIGNIFICANT CHANGE UP (ref 3.8–10.5)
WBC # FLD AUTO: 4.95 K/UL — SIGNIFICANT CHANGE UP (ref 3.8–10.5)

## 2025-03-04 PROCEDURE — 99232 SBSQ HOSP IP/OBS MODERATE 35: CPT

## 2025-03-04 RX ORDER — AMOXICILLIN AND CLAVULANATE POTASSIUM 500; 125 MG/1; MG/1
1 TABLET, FILM COATED ORAL EVERY 12 HOURS
Refills: 0 | Status: DISCONTINUED | OUTPATIENT
Start: 2025-03-04 | End: 2025-03-07

## 2025-03-04 RX ORDER — SULFAMETHOXAZOLE/TRIMETHOPRIM 800-160 MG
1 TABLET ORAL EVERY 12 HOURS
Refills: 0 | Status: COMPLETED | OUTPATIENT
Start: 2025-03-04 | End: 2025-03-06

## 2025-03-04 RX ADMIN — ROSUVASTATIN CALCIUM 5 MILLIGRAM(S): 20 TABLET, FILM COATED ORAL at 21:49

## 2025-03-04 RX ADMIN — APIXABAN 5 MILLIGRAM(S): 2.5 TABLET, FILM COATED ORAL at 10:22

## 2025-03-04 RX ADMIN — INSULIN LISPRO 2: 100 INJECTION, SOLUTION INTRAVENOUS; SUBCUTANEOUS at 08:36

## 2025-03-04 RX ADMIN — Medication 40 MILLIGRAM(S): at 10:22

## 2025-03-04 RX ADMIN — Medication 1 TABLET(S): at 15:27

## 2025-03-04 RX ADMIN — Medication 100 MILLIGRAM(S): at 10:20

## 2025-03-04 RX ADMIN — SODIUM HYPOCHLORITE 1 APPLICATION(S): 0.12 SOLUTION TOPICAL at 08:17

## 2025-03-04 RX ADMIN — APIXABAN 5 MILLIGRAM(S): 2.5 TABLET, FILM COATED ORAL at 21:49

## 2025-03-04 RX ADMIN — INSULIN LISPRO 2: 100 INJECTION, SOLUTION INTRAVENOUS; SUBCUTANEOUS at 18:49

## 2025-03-04 RX ADMIN — AMOXICILLIN AND CLAVULANATE POTASSIUM 1 TABLET(S): 500; 125 TABLET, FILM COATED ORAL at 21:49

## 2025-03-04 RX ADMIN — Medication 500 MILLIGRAM(S): at 10:20

## 2025-03-04 RX ADMIN — METOPROLOL SUCCINATE 12.5 MILLIGRAM(S): 50 TABLET, EXTENDED RELEASE ORAL at 21:48

## 2025-03-04 RX ADMIN — METOPROLOL SUCCINATE 12.5 MILLIGRAM(S): 50 TABLET, EXTENDED RELEASE ORAL at 10:21

## 2025-03-04 RX ADMIN — Medication 1 TABLET(S): at 21:48

## 2025-03-04 RX ADMIN — MIRTAZAPINE 7.5 MILLIGRAM(S): 30 TABLET, FILM COATED ORAL at 21:49

## 2025-03-04 RX ADMIN — FOLIC ACID 1 MILLIGRAM(S): 1 TABLET ORAL at 10:21

## 2025-03-04 RX ADMIN — AMOXICILLIN AND CLAVULANATE POTASSIUM 1 TABLET(S): 500; 125 TABLET, FILM COATED ORAL at 16:15

## 2025-03-04 RX ADMIN — Medication 1 TABLET(S): at 10:22

## 2025-03-04 RX ADMIN — Medication 220 MILLIGRAM(S): at 10:20

## 2025-03-04 RX ADMIN — SODIUM HYPOCHLORITE 1 APPLICATION(S): 0.12 SOLUTION TOPICAL at 18:50

## 2025-03-04 RX ADMIN — Medication 2 TABLET(S): at 21:49

## 2025-03-04 NOTE — PROGRESS NOTE ADULT - SUBJECTIVE AND OBJECTIVE BOX
Patient is seen and examined. Chart is reviewed. No new complain. No acute event overnight.      Gen: No fever, chills, weakness  ENT: No visual changes or throat pain  Neck: No pain or stiffness  Respiratory: No cough or wheezing  Cardiovascular: No chest pain or palpitations  Gastrointestinal: No abdominal pain, nausea, vomiting, constipation, or diarrhea  Hematologic: No easy bleeding or bruising  Neurologic: No numbness or focal weakness  Psych: No depression or insomnia  Skin: No rash or itching        MEDICATIONS  (STANDING):  amoxicillin  875 milliGRAM(s)/clavulanate 1 Tablet(s) Oral every 12 hours  apixaban 5 milliGRAM(s) Oral every 12 hours  ascorbic acid 500 milliGRAM(s) Oral daily  Dakins Solution - 1/4 Strength 1 Application(s) Topical two times a day  dextrose 5%. 1000 milliLiter(s) (100 mL/Hr) IV Continuous <Continuous>  dextrose 5%. 1000 milliLiter(s) (50 mL/Hr) IV Continuous <Continuous>  dextrose 50% Injectable 25 Gram(s) IV Push once  dextrose 50% Injectable 12.5 Gram(s) IV Push once  dextrose 50% Injectable 25 Gram(s) IV Push once  folic acid 1 milliGRAM(s) Oral daily  glucagon  Injectable 1 milliGRAM(s) IntraMuscular once  insulin lispro (ADMELOG) corrective regimen sliding scale   SubCutaneous three times a day before meals  insulin lispro (ADMELOG) corrective regimen sliding scale   SubCutaneous at bedtime  metoprolol tartrate 12.5 milliGRAM(s) Oral two times a day  mirtazapine 7.5 milliGRAM(s) Oral at bedtime  multivitamin/minerals 1 Tablet(s) Oral daily  pantoprazole   Suspension 40 milliGRAM(s) Oral daily  rosuvastatin 5 milliGRAM(s) Oral at bedtime  senna 2 Tablet(s) Oral at bedtime  thiamine 100 milliGRAM(s) Oral daily  trimethoprim  160 mG/sulfamethoxazole 800 mG 1 Tablet(s) Oral every 12 hours  zinc sulfate 220 milliGRAM(s) Oral daily    MEDICATIONS  (PRN):  acetaminophen     Tablet .. 650 milliGRAM(s) Oral every 6 hours PRN Mild Pain (1 - 3)  aluminum hydroxide/magnesium hydroxide/simethicone Suspension 30 milliLiter(s) Oral every 4 hours PRN Dyspepsia  dextrose Oral Gel 15 Gram(s) Oral once PRN Blood Glucose LESS THAN 70 milliGRAM(s)/deciliter  melatonin 3 milliGRAM(s) Oral at bedtime PRN Insomnia  ondansetron Injectable 4 milliGRAM(s) IV Push every 8 hours PRN Nausea and/or Vomiting  polyethylene glycol 3350 17 Gram(s) Oral daily PRN Constipation      Vital Signs Last 24 Hrs  T(C): 36.9 (04 Mar 2025 08:04), Max: 36.9 (03 Mar 2025 23:30)  T(F): 98.5 (04 Mar 2025 08:04), Max: 98.5 (04 Mar 2025 08:04)  HR: 74 (04 Mar 2025 08:04) (74 - 100)  BP: 115/65 (04 Mar 2025 08:04) (113/67 - 125/67)  BP(mean): --  RR: 18 (04 Mar 2025 08:04) (17 - 18)  SpO2: 100% (04 Mar 2025 08:04) (100% - 100%)    Parameters below as of 04 Mar 2025 08:04  Patient On (Oxygen Delivery Method): room air      Gen: No acute distress  HEENT: NCAT PERRL EOMI MMM clear oropharynx  Neck: Supple, no JVD, no LAD  CVS: s1 s2 normal, RRR  Chest: Normal resp effort, clear lungs B/L  Abd: Non distended, +BS, soft, non tender, +BS  Ext: L AKA stump with full thickness wound 6cm x 10cm x 1cm with 95% of pink granulation tissue and 5% of black slough firmly attached. No odor or purulence noted. Periwound intact with no induration. Right inguinal puncture 0.2cm x 0.2cm x 0.2cm with moderate sero-yellow drainage with periwound induration. Right heel wound as well.   Skin: See Ext exam above. Additionally patient with incontinence and partial thickness skin breakdown to upper intergluteal cleft and sacral region  Mood: Calm, pleasant  Neuro: Awake and alert, follows simple commands, answers some simple questions      LABS:                          10.6   4.95  )-----------( 232      ( 04 Mar 2025 06:20 )             34.7     04 Mar 2025 06:20    141    |  109    |  7      ----------------------------<  155    3.8     |  25     |  0.66     Ca    8.7        04 Mar 2025 06:20          CAPILLARY BLOOD GLUCOSE      POCT Blood Glucose.: 176 mg/dL (04 Mar 2025 08:28)  POCT Blood Glucose.: 197 mg/dL (03 Mar 2025 21:03)  POCT Blood Glucose.: 122 mg/dL (03 Mar 2025 16:19)        Urinalysis Basic - ( 04 Mar 2025 06:20 )    Color: x / Appearance: x / SG: x / pH: x  Gluc: 155 mg/dL / Ketone: x  / Bili: x / Urobili: x   Blood: x / Protein: x / Nitrite: x   Leuk Esterase: x / RBC: x / WBC x   Sq Epi: x / Non Sq Epi: x / Bacteria: x        RADIOLOGY:

## 2025-03-04 NOTE — PROGRESS NOTE ADULT - ASSESSMENT
A/P:    #Acute metabolic encephalopathy  - Multifactorial due to acute infection, hyponatremia, hypokalemia. Mentation appears improved today as compared to that previously described.   - Continue to treat underlying issues.   - Reorient frequently.   - Monitor voiding, bowel movements. Strict I&O  - Monitor for pain.     #Sepsis with organ dysfunction due to UTI, also finding of 1.4cm rim enhancing collection adjacent to gallbladder fundus, present upon admission  - Patient admitted with fever, leukocytosis, elevated lactate. Found to have abnormal UA with pyuria and bacteriuria. CT A/P with signs of cystitis. Also noted on CT was mildly fluid distended gallbladder with indwelling cholecystostomy tube and small intraluminal air. 1.4 x 1.4 cm rim-enhancing collection with low density central fluid interposed between the gallbladder fundus in the ascending colon. Additional small nonenhancing fluid surrounding the gallbladder fossa tracking into the right paracolic gutter. All too small to drain as per IR and that includes the 1.4cm collection described.   - C/w meropenem  - ID eval appreciated:  if continues to improve clinically, can transition to PO antibiotic once ready for discharge: Bactrim DS 1tab q12 to complete 7-day course for presumed UTI (enddate: 3/6/2025) and Augmentin 875mg q12 to complete 3-week course for Intraabdominal infection (enddate: 3/19/2025)  - Monitor temperature curve  - Trend WBC   - Continue supportive care.     # S/p L AKA, hx of PAD, limb ischemia, & B/L femoral thrombectomy, L AKA full thickness wound, R groin puncture site  - C/w wound care   - Wound Care RN eval appreciated  - Vascular surgery eval appreciated: C/w IV abx, f/u ID eval, no acute surgical interventions at this time, c/w local wound care  - Vascular following     #Elevated lactate  - Resolved. In setting of acute infection, hypoperfusion. Lactate has since normalized.     #Diabetes mellitus  - C/w POC testing   - C/w insulin correctional scale    #HTN  - C/w Metroprolol      #HLD  - C/w rosuvastatin   Stable. Continue statin.     #Paroxysmal atrial fibrillation  - Rate controlled.   - C/w metoprolol   - C/w DOAC for stroke risk reduction.    #Hx of CVA  - Continue DOAC, statin, BP control    #Hyponatremia  - Resolved. Likely from low solute intake or possibly from dehydration.  - Na normalized with IV hydration.   - Encourage PO intake.    #Hypokalemia  - Resolved with potassium supplementation.     #Hypomagnesemia  - Resolved with magnesium supplementation.     #Small bilateral pleural effusions  - Noted in setting of malnutrition, hypoalbuminemia to 1.5. Stable in size on imaging compared to prior. Respiratory status stable. No hypoxia.     #Moderate protein calorie malnutrition  - Dietician eval appreciated   - Supplementing diet with Ensure Max BID, thiamine daily, MVI daily, zinc daily x 10 days, vit C    #VTE prophylaxis  - on Eliquis     #Fall risk

## 2025-03-05 ENCOUNTER — TRANSCRIPTION ENCOUNTER (OUTPATIENT)
Age: 80
End: 2025-03-05

## 2025-03-05 LAB
GLUCOSE BLDC GLUCOMTR-MCNC: 136 MG/DL — HIGH (ref 70–99)
GLUCOSE BLDC GLUCOMTR-MCNC: 140 MG/DL — HIGH (ref 70–99)
GLUCOSE BLDC GLUCOMTR-MCNC: 151 MG/DL — HIGH (ref 70–99)
GLUCOSE BLDC GLUCOMTR-MCNC: 183 MG/DL — HIGH (ref 70–99)

## 2025-03-05 PROCEDURE — 99232 SBSQ HOSP IP/OBS MODERATE 35: CPT

## 2025-03-05 RX ORDER — SULFAMETHOXAZOLE/TRIMETHOPRIM 800-160 MG
1 TABLET ORAL
Qty: 0 | Refills: 0 | DISCHARGE
Start: 2025-03-05

## 2025-03-05 RX ORDER — APIXABAN 2.5 MG/1
1 TABLET, FILM COATED ORAL
Qty: 0 | Refills: 0 | DISCHARGE
Start: 2025-03-05

## 2025-03-05 RX ORDER — ZINC SULFATE 50(220)MG
1 CAPSULE ORAL
Qty: 0 | Refills: 0 | DISCHARGE
Start: 2025-03-05

## 2025-03-05 RX ORDER — POVIDONE-IODINE 7.5 %
1 SOLUTION, NON-ORAL TOPICAL
Refills: 0 | DISCHARGE

## 2025-03-05 RX ORDER — MIRTAZAPINE 30 MG/1
1 TABLET, FILM COATED ORAL
Qty: 0 | Refills: 0 | DISCHARGE
Start: 2025-03-05

## 2025-03-05 RX ORDER — AMOXICILLIN AND CLAVULANATE POTASSIUM 500; 125 MG/1; MG/1
1 TABLET, FILM COATED ORAL
Qty: 0 | Refills: 0 | DISCHARGE
Start: 2025-03-05

## 2025-03-05 RX ADMIN — APIXABAN 5 MILLIGRAM(S): 2.5 TABLET, FILM COATED ORAL at 21:32

## 2025-03-05 RX ADMIN — FOLIC ACID 1 MILLIGRAM(S): 1 TABLET ORAL at 10:09

## 2025-03-05 RX ADMIN — AMOXICILLIN AND CLAVULANATE POTASSIUM 1 TABLET(S): 500; 125 TABLET, FILM COATED ORAL at 21:33

## 2025-03-05 RX ADMIN — INSULIN LISPRO 2: 100 INJECTION, SOLUTION INTRAVENOUS; SUBCUTANEOUS at 16:49

## 2025-03-05 RX ADMIN — Medication 100 MILLIGRAM(S): at 10:10

## 2025-03-05 RX ADMIN — METOPROLOL SUCCINATE 12.5 MILLIGRAM(S): 50 TABLET, EXTENDED RELEASE ORAL at 21:33

## 2025-03-05 RX ADMIN — Medication 1 TABLET(S): at 10:09

## 2025-03-05 RX ADMIN — MIRTAZAPINE 7.5 MILLIGRAM(S): 30 TABLET, FILM COATED ORAL at 21:36

## 2025-03-05 RX ADMIN — SODIUM HYPOCHLORITE 1 APPLICATION(S): 0.12 SOLUTION TOPICAL at 06:33

## 2025-03-05 RX ADMIN — APIXABAN 5 MILLIGRAM(S): 2.5 TABLET, FILM COATED ORAL at 10:09

## 2025-03-05 RX ADMIN — Medication 1 TABLET(S): at 21:35

## 2025-03-05 RX ADMIN — METOPROLOL SUCCINATE 12.5 MILLIGRAM(S): 50 TABLET, EXTENDED RELEASE ORAL at 10:08

## 2025-03-05 RX ADMIN — Medication 2 TABLET(S): at 21:35

## 2025-03-05 RX ADMIN — Medication 220 MILLIGRAM(S): at 10:10

## 2025-03-05 RX ADMIN — AMOXICILLIN AND CLAVULANATE POTASSIUM 1 TABLET(S): 500; 125 TABLET, FILM COATED ORAL at 10:08

## 2025-03-05 RX ADMIN — INSULIN LISPRO 2: 100 INJECTION, SOLUTION INTRAVENOUS; SUBCUTANEOUS at 11:54

## 2025-03-05 RX ADMIN — Medication 650 MILLIGRAM(S): at 21:36

## 2025-03-05 RX ADMIN — Medication 40 MILLIGRAM(S): at 10:08

## 2025-03-05 RX ADMIN — ROSUVASTATIN CALCIUM 5 MILLIGRAM(S): 20 TABLET, FILM COATED ORAL at 21:34

## 2025-03-05 RX ADMIN — SODIUM HYPOCHLORITE 1 APPLICATION(S): 0.12 SOLUTION TOPICAL at 17:53

## 2025-03-05 RX ADMIN — Medication 1 TABLET(S): at 10:10

## 2025-03-05 RX ADMIN — Medication 500 MILLIGRAM(S): at 10:10

## 2025-03-05 NOTE — DISCHARGE NOTE PROVIDER - ATTENDING DISCHARGE PHYSICAL EXAMINATION:
Gen: No acute distress  HEENT: NCAT PERRL EOMI MMM clear oropharynx  Neck: Supple, no JVD, no LAD  CVS: s1 s2 normal, RRR  Chest: Normal resp effort, clear lungs B/L  Abd: Non distended, +BS, soft, non tender, +BS  Ext: L AKA stump with full thickness wound 6cm x 10cm x 1cm with 95% of pink granulation tissue and 5% of black slough firmly attached. No odor or purulence noted. Periwound intact with no induration. Right inguinal puncture 0.2cm x 0.2cm x 0.2cm with moderate sero-yellow drainage with periwound induration. Right heel wound as well.   Skin: See Ext exam above. Additionally patient with incontinence and partial thickness skin breakdown to upper intergluteal cleft and sacral region  Mood: Calm, pleasant  Neuro: Awake and alert, follows simple commands, answers some simple questions.

## 2025-03-05 NOTE — DISCHARGE NOTE PROVIDER - HOSPITAL COURSE
79F with Afib (on eliquis), HTN, DM2, HLD, h/o stroke, limb ischemia (s/p left AKA on 12/16, s/p b/l femoral thrombectomy), recent acute cholecystitis underwent perc chris tube by IR on 1/13, presented from nursing facility w weakness and abdominal pain. Patient confused, has been having intermittent confusion at baseline as per family at bedside.  used to speak with patient and family,  # [id number]. As per family patient noted to be more lethargic, complaining of more abdominal pain, and with fevers and chills since today morning.     #Acute metabolic encephalopathy  - Multifactorial due to acute infection, hyponatremia, hypokalemia. Mentation appears improved   - treated underlying issues.   - Reorient frequently.   - Monitor voiding, bowel movements. Strict I&O  - Monitor for pain.     #Sepsis with organ dysfunction due to UTI, also finding of 1.4cm rim enhancing collection adjacent to gallbladder fundus, present upon admission  - Patient admitted with fever, leukocytosis, elevated lactate. Found to have abnormal UA with pyuria and bacteriuria. CT A/P with signs of cystitis. Also noted on CT was mildly fluid distended gallbladder with indwelling cholecystostomy tube and small intraluminal air. 1.4 x 1.4 cm rim-enhancing collection with low density central fluid interposed between the gallbladder fundus in the ascending colon. Additional small nonenhancing fluid surrounding the gallbladder fossa tracking into the right paracolic gutter. All too small to drain as per IR and that includes the 1.4cm collection described.   - C/w meropenem  - ID eval appreciated:  if continues to improve clinically, can transition to PO antibiotic once ready for discharge: Bactrim DS 1tab q12 to complete 7-day course for presumed UTI (enddate: 3/6/2025) and Augmentin 875mg q12 to complete 3-week course for Intraabdominal infection (enddate: 3/19/2025)  - Monitor temperature curve  - Trend WBC   - Continue supportive care.     # S/p L AKA, hx of PAD, limb ischemia, & B/L femoral thrombectomy, L AKA full thickness wound, R groin puncture site  - C/w wound care   - Wound Care RN eval appreciated  - Vascular surgery eval appreciated: C/w IV abx, f/u ID eval, no acute surgical interventions at this time, c/w local wound care  - Vascular following     #Elevated lactate  - Resolved. In setting of acute infection, hypoperfusion. Lactate has since normalized.     #Diabetes mellitus  - C/w POC testing   - C/w insulin correctional scale    #HTN  - C/w Metroprolol      #HLD  - C/w rosuvastatin   Stable. Continue statin.     #Paroxysmal atrial fibrillation  - Rate controlled.   - C/w metoprolol   - C/w DOAC for stroke risk reduction.    #Hx of CVA  - Continue DOAC, statin, BP control    #Hyponatremia  - Resolved. Likely from low solute intake or possibly from dehydration.  - Na normalized with IV hydration.   - Encourage PO intake.    #Hypokalemia  - Resolved with potassium supplementation.     #Hypomagnesemia  - Resolved with magnesium supplementation.     #Small bilateral pleural effusions  - Noted in setting of malnutrition, hypoalbuminemia to 1.5. Stable in size on imaging compared to prior. Respiratory status stable. No hypoxia.     #Moderate protein calorie malnutrition  - Dietician eval appreciated   - Supplementing diet with Ensure Max BID, thiamine daily, MVI daily, zinc daily x 10 days, vit C    #VTE prophylaxis  - on Eliquis     Left Distal Stump Vascular consulted and use there recommendations when made)   -Clean with saline and pat dry   -1/4 strength Dakins to gauze   -Cover with ABD pad and wrap with kerlix   Change two times per day and prn if soiled      Right Heel  -Paint with betadine and cover with dry sterile dressing ABD pad and wrap with shae daily change.     Right inguinal puncture site cover with foam for drainage daily and change and prn if soiled.        79F with Afib (on eliquis), HTN, DM2, HLD, h/o stroke, limb ischemia (s/p left AKA on 12/16, s/p b/l femoral thrombectomy), recent acute cholecystitis underwent perc chris tube by IR on 1/13, presented from nursing facility w weakness and abdominal pain. Patient confused, has been having intermittent confusion at baseline as per family at bedside.  used to speak with patient and family,  # [id number]. As per family patient noted to be more lethargic, complaining of more abdominal pain, and with fevers and chills since today morning.     #Acute metabolic encephalopathy  - Multifactorial due to acute infection, hyponatremia, hypokalemia. Mentation appears improved   - treated underlying issues.   - Reorient frequently.   - Monitor voiding, bowel movements. Strict I&O  - Monitor for pain.     #Sepsis with organ dysfunction due to UTI, also finding of 1.4cm rim enhancing collection adjacent to gallbladder fundus, present upon admission  #Sepsis due to UTI associated with chronic muñoz present on admission   - Patient admitted with fever, leukocytosis, elevated lactate. Found to have abnormal UA with pyuria and bacteriuria. CT A/P with signs of cystitis. Also noted on CT was mildly fluid distended gallbladder with indwelling cholecystostomy tube and small intraluminal air. 1.4 x 1.4 cm rim-enhancing collection with low density central fluid interposed between the gallbladder fundus in the ascending colon. Additional small nonenhancing fluid surrounding the gallbladder fossa tracking into the right paracolic gutter. All too small to drain as per IR and that includes the 1.4cm collection described.   - s/p IV meropenem  - ID eval appreciated:  transitioned to PO antibiotic : Bactrim DS 1tab q12 to complete 7-day course for presumed UTI (enddate: 3/6/2025) and Augmentin 875mg q12 to complete 3-week course for Intraabdominal infection (enddate: 3/19/2025)    # S/p L AKA, hx of PAD, limb ischemia, & B/L femoral thrombectomy, L AKA full thickness wound, R groin puncture site  - C/w wound care   - Wound Care RN eval appreciated  - Vascular surgery eval appreciated: C/w abx, f/u ID eval, no acute surgical interventions at this time, c/w local wound care  - Vascular followed    #Elevated lactate  - Resolved. In setting of acute infection, hypoperfusion. Lactate has since normalized.     #Cholecystostomy tube in place  -Flush with 10 cc NS once a day     #Diabetes mellitus  - C/w POC testing   - C/w insulin correctional scale    #HTN  - C/w Metroprolol      #HLD  - C/w rosuvastatin   Stable. Continue statin.     #Paroxysmal atrial fibrillation  - Rate controlled.   - C/w metoprolol   - C/w DOAC for stroke risk reduction.    #Hx of CVA  - Continue DOAC, statin, BP control    #Hyponatremia  - Resolved. Likely from low solute intake or possibly from dehydration.  - Na normalized with IV hydration.   - Encourage PO intake.    #Hypokalemia  - Resolved with potassium supplementation.     #Hypomagnesemia  - Resolved with magnesium supplementation.     #Small bilateral pleural effusions  - Noted in setting of malnutrition, hypoalbuminemia to 1.5. Stable in size on imaging compared to prior. Respiratory status stable. No hypoxia.     #Moderate protein calorie malnutrition  - Dietician eval appreciated   - Supplementing diet with Ensure Max BID, thiamine daily, MVI daily, zinc daily x 10 days, vit C    #VTE prophylaxis  - on Eliquis     Wound care:   Left Distal Stump Vascular consulted and use there recommendations when made)   -Clean with saline and pat dry   -1/4 strength Dakins to gauze   -Cover with ABD pad and wrap with kerlix   Change two times per day and prn if soiled      Right Heel  -Paint with betadine and cover with dry sterile dressing ABD pad and wrap with shae daily change.     Right inguinal puncture site cover with foam for drainage daily and change and prn if soiled.        79F with Afib (on eliquis), HTN, DM2, HLD, h/o stroke, limb ischemia (s/p left AKA on 12/16, s/p b/l femoral thrombectomy), recent acute cholecystitis underwent perc chris tube by IR on 1/13, presented from nursing facility w weakness and abdominal pain. Patient confused, has been having intermittent confusion at baseline as per family at bedside.  used to speak with patient and family,  # [id number]. As per family patient noted to be more lethargic, complaining of more abdominal pain, and with fevers and chills since today morning.     #Acute metabolic encephalopathy  - Multifactorial due to acute infection, hyponatremia, hypokalemia. Mentation appears improved   - treated underlying issues.   - Reorient frequently.   - Monitor voiding, bowel movements. Strict I&O  - Monitor for pain.     #Sepsis with organ dysfunction due to UTI, also finding of 1.4cm rim enhancing collection adjacent to gallbladder fundus, present upon admission  #Sepsis due to UTI associated with chronic muñoz present on admission   - Patient admitted with fever, leukocytosis, elevated lactate. Found to have abnormal UA with pyuria and bacteriuria. CT A/P with signs of cystitis. Also noted on CT was mildly fluid distended gallbladder with indwelling cholecystostomy tube and small intraluminal air. 1.4 x 1.4 cm rim-enhancing collection with low density central fluid interposed between the gallbladder fundus in the ascending colon. Additional small nonenhancing fluid surrounding the gallbladder fossa tracking into the right paracolic gutter. All too small to drain as per IR and that includes the 1.4cm collection described.   - s/p IV meropenem  - ID eval appreciated:  transitioned to PO antibiotic : Bactrim DS 1tab q12 to complete 7-day course for presumed UTI (enddate: 3/6/2025) and Augmentin 875mg q12 to complete 3-week course for Intraabdominal infection (enddate: 3/19/2025)    # S/p L AKA, hx of PAD, limb ischemia, & B/L femoral thrombectomy, L AKA full thickness wound, R groin puncture site  - C/w wound care   - Wound Care RN eval appreciated  - Vascular surgery eval appreciated: C/w abx, f/u ID eval, no acute surgical interventions at this time, c/w local wound care  - Vascular followed    #Elevated lactate  - Resolved. In setting of acute infection, hypoperfusion. Lactate has since normalized.     #Cholecystostomy tube in place  -Flush with 10 cc NS once a day     #Diabetes mellitus  - C/w POC testing   - C/w insulin correctional scale    #HTN  - C/w Metroprolol      #HLD  - C/w rosuvastatin   Stable. Continue statin.     #Paroxysmal atrial fibrillation  - Rate controlled.   - C/w metoprolol   - C/w DOAC for stroke risk reduction.    #Hx of CVA  - Continue DOAC, statin, BP control    #Hyponatremia  - Resolved. Likely from low solute intake or possibly from dehydration.  - Na normalized with IV hydration.   - Encourage PO intake.    #Hypokalemia  - Resolved with potassium supplementation.     #Hypomagnesemia  - Resolved with magnesium supplementation.     #Small bilateral pleural effusions  - Noted in setting of malnutrition, hypoalbuminemia to 1.5. Stable in size on imaging compared to prior. Respiratory status stable. No hypoxia.     #Moderate protein calorie malnutrition  - Dietician eval appreciated   - Supplementing diet with Ensure Max BID, thiamine daily, MVI daily, zinc daily x 10 days, vit C    #VTE prophylaxis  - on Eliquis     Wound care:   Left Distal Stump Vascular consulted and use there recommendations when made)   -Clean with saline and pat dry   -1/4 strength Dakins to gauze   -Cover with ABD pad and wrap with kerlix   Change two times per day and prn if soiled      Right Heel  -Paint with betadine and cover with dry sterile dressing ABD pad and wrap with shae daily change.     Right inguinal puncture site cover with foam for drainage daily and change and prn if soiled.     Patient is seen and examined today. Will dc to rehab today.  Vital Signs Last 24 Hrs  T(C): 36.7 (07 Mar 2025 10:07), Max: 36.8 (07 Mar 2025 00:07)  T(F): 98.1 (07 Mar 2025 10:07), Max: 98.2 (07 Mar 2025 00:07)  HR: 60 (07 Mar 2025 10:07) (60 - 110)  BP: 118/83 (07 Mar 2025 10:07) (90/81 - 118/83)  BP(mean): --  RR: 18 (07 Mar 2025 10:07) (18 - 18)

## 2025-03-05 NOTE — CHART NOTE - NSCHARTNOTEFT_GEN_A_CORE
#Sepsis with organ dysfunction due to UTI, present upon admission  #Sepsis due to UTI associated with chronic muñoz present on admission

## 2025-03-05 NOTE — DISCHARGE NOTE PROVIDER - NSDCCPCAREPLAN_GEN_ALL_CORE_FT
PRINCIPAL DISCHARGE DIAGNOSIS  Diagnosis: Sepsis due to gram-negative UTI  Assessment and Plan of Treatment:       SECONDARY DISCHARGE DIAGNOSES  Diagnosis: Acute metabolic encephalopathy  Assessment and Plan of Treatment:     Diagnosis: Cholecystostomy care  Assessment and Plan of Treatment:     Diagnosis: S/P BKA (below knee amputation), left  Assessment and Plan of Treatment:

## 2025-03-05 NOTE — DISCHARGE NOTE PROVIDER - NSDCMRMEDTOKEN_GEN_ALL_CORE_FT
acetaminophen 325 mg oral tablet: 2 tab(s) orally every 6 hours As needed Temp greater or equal to 38C (100.4F), Mild Pain (1 - 3)  ascorbic acid 500 mg oral tablet: 1 tab(s) orally once a day  Eliquis 2.5 mg oral tablet: 1 tab(s) orally every 12 hours  folic acid 1 mg oral tablet: 1 tab(s) orally once a day  hydrocortisone 1% topical cream: Apply topically to affected area 2 times a day , apply to upper back  metoprolol tartrate 25 mg oral tablet: 0.5 tab(s) orally every 8 hours  mirtazapine 7.5 mg oral tablet: 1 tab(s) orally once a day (at bedtime)  Multiple Vitamins with Minerals oral tablet: 1 tab(s) orally once a day  oxyCODONE 5 mg oral tablet: 1.5 tab(s) orally every 8 hours as needed for pain  polyethylene glycol 3350 oral powder for reconstitution: 17 gram(s) orally once a day  povidone iodine 10% topical solution: Apply topically to affected area once a day as needed for  wound care  povidone iodine 10% topical solution: Apply topically to affected area once a day , apply to right heel  Protonix 40 mg oral granule, delayed release: 1 packet(s) orally once a day  rosuvastatin 5 mg oral capsule: 1 cap(s) orally once a day (at bedtime)  senna leaf extract oral tablet: 2 tab(s) orally once a day (at bedtime)  triamcinolone 0.1% topical ointment: Apply topically to affected area 2 times a day , apply to buttocks  Vitamin D2 1.25 mg (50,000 intl units) oral capsule: 1 cap(s) orally once a week   acetaminophen 325 mg oral tablet: 2 tab(s) orally every 6 hours As needed Temp greater or equal to 38C (100.4F), Mild Pain (1 - 3)  amoxicillin-clavulanate 875 mg-125 mg oral tablet: 1 tab(s) orally every 12 hours until  3/19/25  apixaban 5 mg oral tablet: 1 tab(s) orally every 12 hours  ascorbic acid 500 mg oral tablet: 1 tab(s) orally once a day  folic acid 1 mg oral tablet: 1 tab(s) orally once a day  metoprolol tartrate 25 mg oral tablet: 0.5 tab(s) orally every 8 hours  mirtazapine 7.5 mg oral tablet: 1 tab(s) orally once a day (at bedtime)  Multiple Vitamins with Minerals oral tablet: 1 tab(s) orally once a day  polyethylene glycol 3350 oral powder for reconstitution: 17 gram(s) orally once a day  Protonix 40 mg oral granule, delayed release: 1 packet(s) orally once a day  rosuvastatin 5 mg oral capsule: 1 cap(s) orally once a day (at bedtime)  senna leaf extract oral tablet: 2 tab(s) orally once a day (at bedtime)  sulfamethoxazole-trimethoprim 800 mg-160 mg oral tablet: 1 tab(s) orally every 12 hours until 3/6/25  thiamine 100 mg oral tablet: 1 tab(s) orally once a day  Vitamin D2 1.25 mg (50,000 intl units) oral capsule: 1 cap(s) orally once a week  zinc sulfate 220 mg (as elemental zinc 50 mg) oral capsule: 1 cap(s) orally once a day   yes

## 2025-03-05 NOTE — CDI QUERY NOTE - NSCDIOTHERTXTBX_GEN_ALL_CORE_HH
Clinical documentation and/or evidence of the patient’s presentation, evaluation, and medical management, as evidenced below, may support a cause and effect link that is not documented in the medical record.  In order to ensure accurate coding and accuracy of the clinical record, the documentation in this patient’s medical record requires additional clarification.      If you think the supporting documentation and/or clinical evidence supports a cause and effect link, please include more specific documentation associated with these findings in your progress note and/or discharge summary.      Please clarify if there is a relationship between the Sepsis  and UTI/Chronic muñoz such as:  •	Severe Sepsis secondary to the UTI and associated with chronic muñoz, present on admission.  •	Severe sepsis secondary to UTI and  unrelated to chronic muñoz (specify diagnosis or procedure)   •	Other (specify)      Supporting documentation and/or clinical evidence:     ED provider documentation on 2/26/2025:   patient with uti. will be admitted to medicine for sepsis 2/2 uti.  r/o surgical abdomen, uti  labs, urine, needs muñoz exchange, ct, reassess  Present on Admission - Urethral Catheter: Yes.         Discharge summary documentation on 3/5/2025:  #Sepsis with organ dysfunction due to UTI

## 2025-03-05 NOTE — DISCHARGE NOTE PROVIDER - DETAILS OF MALNUTRITION DIAGNOSIS/DIAGNOSES
This patient has been assessed with a concern for Malnutrition and was treated during this hospitalization for the following Nutrition diagnosis/diagnoses:     -  02/27/2025: Moderate protein-calorie malnutrition

## 2025-03-05 NOTE — DISCHARGE NOTE PROVIDER - CARE PROVIDER_API CALL
Peña Mcneil  Interventional Radiology and Diagnostic Radiology  [address] [place] [id number]  Phone: [phone]  Fax: [phone]  Established Patient  Follow Up Time: 1 month

## 2025-03-05 NOTE — DISCHARGE NOTE PROVIDER - NSDCFUADDINST_GEN_ALL_CORE_FT
Left Distal Stump Vascular consulted and use there recommendations when made)   -Clean with saline and pat dry   -1/4 strength Dakins to gauze   -Cover with ABD pad and wrap with kerlix   Change two times per day and prn if soiled      Right Heel  -Paint with betadine and cover with dry sterile dressing ABD pad and wrap with shae daily change.     Right inguinal puncture site cover with foam for drainage daily and change and prn if soiled.      Cholecystostomy tube care:  Please flush the drain once a day with 10 cc Normal Saline fluid     Wound care:  Left Distal Stump Vascular consulted and use there recommendations when made)   -Clean with saline and pat dry   -1/4 strength Dakins to gauze   -Cover with ABD pad and wrap with kerlix   Change two times per day and prn if soiled      Right Heel  -Paint with betadine and cover with dry sterile dressing ABD pad and wrap with shae daily change.     Right inguinal puncture site cover with foam for drainage daily and change and prn if soiled.

## 2025-03-06 LAB
GLUCOSE BLDC GLUCOMTR-MCNC: 113 MG/DL — HIGH (ref 70–99)
GLUCOSE BLDC GLUCOMTR-MCNC: 127 MG/DL — HIGH (ref 70–99)
GLUCOSE BLDC GLUCOMTR-MCNC: 142 MG/DL — HIGH (ref 70–99)
GLUCOSE BLDC GLUCOMTR-MCNC: 200 MG/DL — HIGH (ref 70–99)

## 2025-03-06 PROCEDURE — 99232 SBSQ HOSP IP/OBS MODERATE 35: CPT

## 2025-03-06 RX ADMIN — Medication 500 MILLIGRAM(S): at 10:10

## 2025-03-06 RX ADMIN — AMOXICILLIN AND CLAVULANATE POTASSIUM 1 TABLET(S): 500; 125 TABLET, FILM COATED ORAL at 10:11

## 2025-03-06 RX ADMIN — Medication 1 TABLET(S): at 10:10

## 2025-03-06 RX ADMIN — Medication 1 TABLET(S): at 22:18

## 2025-03-06 RX ADMIN — APIXABAN 5 MILLIGRAM(S): 2.5 TABLET, FILM COATED ORAL at 10:11

## 2025-03-06 RX ADMIN — MIRTAZAPINE 7.5 MILLIGRAM(S): 30 TABLET, FILM COATED ORAL at 22:18

## 2025-03-06 RX ADMIN — Medication 650 MILLIGRAM(S): at 11:34

## 2025-03-06 RX ADMIN — APIXABAN 5 MILLIGRAM(S): 2.5 TABLET, FILM COATED ORAL at 22:18

## 2025-03-06 RX ADMIN — Medication 40 MILLIGRAM(S): at 10:10

## 2025-03-06 RX ADMIN — Medication 220 MILLIGRAM(S): at 10:10

## 2025-03-06 RX ADMIN — METOPROLOL SUCCINATE 12.5 MILLIGRAM(S): 50 TABLET, EXTENDED RELEASE ORAL at 10:11

## 2025-03-06 RX ADMIN — ROSUVASTATIN CALCIUM 5 MILLIGRAM(S): 20 TABLET, FILM COATED ORAL at 22:18

## 2025-03-06 RX ADMIN — Medication 2 TABLET(S): at 22:18

## 2025-03-06 RX ADMIN — INSULIN LISPRO 2: 100 INJECTION, SOLUTION INTRAVENOUS; SUBCUTANEOUS at 17:27

## 2025-03-06 RX ADMIN — FOLIC ACID 1 MILLIGRAM(S): 1 TABLET ORAL at 10:11

## 2025-03-06 RX ADMIN — AMOXICILLIN AND CLAVULANATE POTASSIUM 1 TABLET(S): 500; 125 TABLET, FILM COATED ORAL at 22:17

## 2025-03-06 RX ADMIN — SODIUM HYPOCHLORITE 1 APPLICATION(S): 0.12 SOLUTION TOPICAL at 06:55

## 2025-03-06 RX ADMIN — Medication 100 MILLIGRAM(S): at 10:11

## 2025-03-06 RX ADMIN — SODIUM HYPOCHLORITE 1 APPLICATION(S): 0.12 SOLUTION TOPICAL at 18:09

## 2025-03-06 RX ADMIN — METOPROLOL SUCCINATE 12.5 MILLIGRAM(S): 50 TABLET, EXTENDED RELEASE ORAL at 22:17

## 2025-03-06 NOTE — PROGRESS NOTE ADULT - ASSESSMENT
A/P:    #Acute metabolic encephalopathy  - Multifactorial due to acute infection, hyponatremia, hypokalemia. Mentation appears improved and at baseline   - Continue to treat underlying issues.   - Reorient frequently.   - Monitor voiding, bowel movements. Strict I&O  - Monitor for pain.     #Sepsis with organ dysfunction due to UTI, also finding of 1.4cm rim enhancing collection adjacent to gallbladder fundus, present upon admission  - Patient admitted with fever, leukocytosis, elevated lactate. Found to have abnormal UA with pyuria and bacteriuria. CT A/P with signs of cystitis. Also noted on CT was mildly fluid distended gallbladder with indwelling cholecystostomy tube and small intraluminal air. 1.4 x 1.4 cm rim-enhancing collection with low density central fluid interposed between the gallbladder fundus in the ascending colon. Additional small nonenhancing fluid surrounding the gallbladder fossa tracking into the right paracolic gutter. All too small to drain as per IR and that includes the 1.4cm collection described.   - C/w meropenem  - ID eval appreciated:  can transition to PO antibiotic : Bactrim DS 1tab q12 to complete 7-day course for presumed UTI (enddate: 3/6/2025) and Augmentin 875mg q12 to complete 3-week course for Intraabdominal infection (enddate: 3/19/2025)  - Monitor temperature curve  - Trend WBC   - Continue supportive care.     # S/p L AKA, hx of PAD, limb ischemia, & B/L femoral thrombectomy, L AKA full thickness wound, R groin puncture site  - C/w wound care   - Wound Care RN eval appreciated  - Vascular surgery eval appreciated: C/w IV abx, f/u ID eval, no acute surgical interventions at this time, c/w local wound care  - Vascular following     #Elevated lactate  - Resolved. In setting of acute infection, hypoperfusion. Lactate has since normalized.     #Diabetes mellitus  - C/w POC testing   - C/w insulin correctional scale    #HTN  - C/w Metroprolol      #HLD  - C/w rosuvastatin   Stable. Continue statin.     #Paroxysmal atrial fibrillation  - Rate controlled.   - C/w metoprolol   - C/w DOAC for stroke risk reduction.    #Hx of CVA  - Continue DOAC, statin, BP control    #Hyponatremia  - Resolved. Likely from low solute intake or possibly from dehydration.  - Na normalized with IV hydration.   - Encourage PO intake.    #Hypokalemia  - Resolved with potassium supplementation.     #Hypomagnesemia  - Resolved with magnesium supplementation.     #Small bilateral pleural effusions  - Noted in setting of malnutrition, hypoalbuminemia to 1.5. Stable in size on imaging compared to prior. Respiratory status stable. No hypoxia.     #Moderate protein calorie malnutrition  - Dietician eval appreciated   - Supplementing diet with Ensure Max BID, thiamine daily, MVI daily, zinc daily x 10 days, vit C    #VTE prophylaxis  - on Eliquis     #Fall risk       Medically optimized to be discharged to NH.

## 2025-03-06 NOTE — PROGRESS NOTE ADULT - PROVIDER SPECIALTY LIST ADULT
Hospitalist
Hospitalist
Vascular Surgery
Hospitalist
Hospitalist
Infectious Disease
Vascular Surgery
Hospitalist

## 2025-03-06 NOTE — PROGRESS NOTE ADULT - SUBJECTIVE AND OBJECTIVE BOX
Patient is seen and examined. Chart is reviewed. No new complain.    Gen: No fever, chills, weakness  ENT: No visual changes or throat pain  Neck: No pain or stiffness  Respiratory: No cough or wheezing  Cardiovascular: No chest pain or palpitations  Gastrointestinal: No abdominal pain, nausea, vomiting, constipation, or diarrhea  Hematologic: No easy bleeding or bruising  Neurologic: No numbness or focal weakness  Psych: No depression or insomnia  Skin: No rash or itching      MEDICATIONS  (STANDING):  amoxicillin  875 milliGRAM(s)/clavulanate 1 Tablet(s) Oral every 12 hours  apixaban 5 milliGRAM(s) Oral every 12 hours  ascorbic acid 500 milliGRAM(s) Oral daily  Dakins Solution - 1/4 Strength 1 Application(s) Topical two times a day  dextrose 5%. 1000 milliLiter(s) (100 mL/Hr) IV Continuous <Continuous>  dextrose 5%. 1000 milliLiter(s) (50 mL/Hr) IV Continuous <Continuous>  dextrose 50% Injectable 25 Gram(s) IV Push once  dextrose 50% Injectable 12.5 Gram(s) IV Push once  dextrose 50% Injectable 25 Gram(s) IV Push once  folic acid 1 milliGRAM(s) Oral daily  glucagon  Injectable 1 milliGRAM(s) IntraMuscular once  insulin lispro (ADMELOG) corrective regimen sliding scale   SubCutaneous three times a day before meals  insulin lispro (ADMELOG) corrective regimen sliding scale   SubCutaneous at bedtime  metoprolol tartrate 12.5 milliGRAM(s) Oral two times a day  mirtazapine 7.5 milliGRAM(s) Oral at bedtime  multivitamin/minerals 1 Tablet(s) Oral daily  pantoprazole   Suspension 40 milliGRAM(s) Oral daily  rosuvastatin 5 milliGRAM(s) Oral at bedtime  senna 2 Tablet(s) Oral at bedtime  thiamine 100 milliGRAM(s) Oral daily  trimethoprim  160 mG/sulfamethoxazole 800 mG 1 Tablet(s) Oral every 12 hours  zinc sulfate 220 milliGRAM(s) Oral daily    MEDICATIONS  (PRN):  acetaminophen     Tablet .. 650 milliGRAM(s) Oral every 6 hours PRN Mild Pain (1 - 3)  aluminum hydroxide/magnesium hydroxide/simethicone Suspension 30 milliLiter(s) Oral every 4 hours PRN Dyspepsia  dextrose Oral Gel 15 Gram(s) Oral once PRN Blood Glucose LESS THAN 70 milliGRAM(s)/deciliter  melatonin 3 milliGRAM(s) Oral at bedtime PRN Insomnia  ondansetron Injectable 4 milliGRAM(s) IV Push every 8 hours PRN Nausea and/or Vomiting  polyethylene glycol 3350 17 Gram(s) Oral daily PRN Constipation      Vital Signs Last 24 Hrs  T(C): 36.9 (06 Mar 2025 08:02), Max: 37.1 (05 Mar 2025 21:20)  T(F): 98.5 (06 Mar 2025 08:02), Max: 98.8 (05 Mar 2025 21:20)  HR: 92 (06 Mar 2025 10:07) (89 - 94)  BP: 127/73 (06 Mar 2025 10:07) (105/75 - 127/73)  BP(mean): --  RR: 18 (05 Mar 2025 23:11) (18 - 18)  SpO2: 95% (05 Mar 2025 23:11) (93% - 96%)    Parameters below as of 05 Mar 2025 23:11  Patient On (Oxygen Delivery Method): room air      Gen: No acute distress  HEENT: NCAT PERRL EOMI MMM clear oropharynx  Neck: Supple, no JVD, no LAD  CVS: s1 s2 normal, RRR  Chest: Normal resp effort, clear lungs B/L  Abd: Non distended, +BS, soft, non tender, +BS  Ext: L AKA stump with full thickness wound 6cm x 10cm x 1cm with 95% of pink granulation tissue and 5% of black slough firmly attached. No odor or purulence noted. Periwound intact with no induration. Right inguinal puncture 0.2cm x 0.2cm x 0.2cm with moderate sero-yellow drainage with periwound induration. Right heel wound as well.   Skin: See Ext exam above. Additionally patient with incontinence and partial thickness skin breakdown to upper intergluteal cleft and sacral region  Mood: Calm, pleasant  Neuro: Awake and alert, follows simple commands, answers some simple questions      LABS:                CAPILLARY BLOOD GLUCOSE      POCT Blood Glucose.: 142 mg/dL (06 Mar 2025 11:43)  POCT Blood Glucose.: 127 mg/dL (06 Mar 2025 07:49)  POCT Blood Glucose.: 140 mg/dL (05 Mar 2025 21:56)  POCT Blood Glucose.: 151 mg/dL (05 Mar 2025 16:45)            RADIOLOGY:

## 2025-03-07 VITALS
RESPIRATION RATE: 18 BRPM | DIASTOLIC BLOOD PRESSURE: 83 MMHG | HEART RATE: 60 BPM | SYSTOLIC BLOOD PRESSURE: 118 MMHG | TEMPERATURE: 98 F | OXYGEN SATURATION: 91 %

## 2025-03-07 LAB — GLUCOSE BLDC GLUCOMTR-MCNC: 136 MG/DL — HIGH (ref 70–99)

## 2025-03-07 PROCEDURE — 99239 HOSP IP/OBS DSCHRG MGMT >30: CPT

## 2025-03-07 RX ADMIN — METOPROLOL SUCCINATE 12.5 MILLIGRAM(S): 50 TABLET, EXTENDED RELEASE ORAL at 10:10

## 2025-03-07 RX ADMIN — FOLIC ACID 1 MILLIGRAM(S): 1 TABLET ORAL at 10:10

## 2025-03-07 RX ADMIN — AMOXICILLIN AND CLAVULANATE POTASSIUM 1 TABLET(S): 500; 125 TABLET, FILM COATED ORAL at 10:09

## 2025-03-07 RX ADMIN — Medication 1 TABLET(S): at 10:10

## 2025-03-07 RX ADMIN — SODIUM HYPOCHLORITE 1 APPLICATION(S): 0.12 SOLUTION TOPICAL at 05:16

## 2025-03-07 RX ADMIN — Medication 40 MILLIGRAM(S): at 10:09

## 2025-03-07 RX ADMIN — Medication 500 MILLIGRAM(S): at 10:11

## 2025-03-07 RX ADMIN — Medication 100 MILLIGRAM(S): at 10:11

## 2025-03-07 RX ADMIN — APIXABAN 5 MILLIGRAM(S): 2.5 TABLET, FILM COATED ORAL at 10:11

## 2025-03-07 RX ADMIN — Medication 220 MILLIGRAM(S): at 10:11

## 2025-03-07 NOTE — DISCHARGE NOTE NURSING/CASE MANAGEMENT/SOCIAL WORK - FINANCIAL ASSISTANCE
[facility] provides services at a reduced cost to those who are determined to be eligible through [facility]’s financial assistance program. Information regarding [facility]’s financial assistance program can be found by going to [email or web address] or by calling [phone].

## 2025-03-07 NOTE — DISCHARGE NOTE NURSING/CASE MANAGEMENT/SOCIAL WORK - NSDCPETBCESMAN_GEN_ALL_CORE
Pt. uncooperative, refusing meds, coming in the hallway, asking for food, security called, MD notified, will monitor pt. If you are a smoker, it is important for your health to stop smoking. Please be aware that second hand smoke is also harmful.

## 2025-03-07 NOTE — DISCHARGE NOTE NURSING/CASE MANAGEMENT/SOCIAL WORK - PATIENT PORTAL LINK FT
You can access the FollowMyHealth Patient Portal offered by [facility] by registering at the following website: [email or web address]. By joining [employer]’s FollowMyHealth portal, you will also be able to view your health information using other applications (apps) compatible with our system.

## 2025-03-11 DIAGNOSIS — E44.0 MODERATE PROTEIN-CALORIE MALNUTRITION: ICD-10-CM

## 2025-03-11 DIAGNOSIS — K65.1 PERITONEAL ABSCESS: ICD-10-CM

## 2025-03-11 DIAGNOSIS — Z89.612 ACQUIRED ABSENCE OF LEFT LEG ABOVE KNEE: ICD-10-CM

## 2025-03-11 DIAGNOSIS — N39.0 URINARY TRACT INFECTION, SITE NOT SPECIFIED: ICD-10-CM

## 2025-03-11 DIAGNOSIS — J90 PLEURAL EFFUSION, NOT ELSEWHERE CLASSIFIED: ICD-10-CM

## 2025-03-11 DIAGNOSIS — I10 ESSENTIAL (PRIMARY) HYPERTENSION: ICD-10-CM

## 2025-03-11 DIAGNOSIS — E83.42 HYPOMAGNESEMIA: ICD-10-CM

## 2025-03-11 DIAGNOSIS — E87.1 HYPO-OSMOLALITY AND HYPONATREMIA: ICD-10-CM

## 2025-03-11 DIAGNOSIS — Z79.01 LONG TERM (CURRENT) USE OF ANTICOAGULANTS: ICD-10-CM

## 2025-03-11 DIAGNOSIS — E11.65 TYPE 2 DIABETES MELLITUS WITH HYPERGLYCEMIA: ICD-10-CM

## 2025-03-11 DIAGNOSIS — I48.0 PAROXYSMAL ATRIAL FIBRILLATION: ICD-10-CM

## 2025-03-11 DIAGNOSIS — A41.50 GRAM-NEGATIVE SEPSIS, UNSPECIFIED: ICD-10-CM

## 2025-03-11 DIAGNOSIS — R65.20 SEVERE SEPSIS WITHOUT SEPTIC SHOCK: ICD-10-CM

## 2025-03-11 DIAGNOSIS — E78.5 HYPERLIPIDEMIA, UNSPECIFIED: ICD-10-CM

## 2025-03-11 DIAGNOSIS — E87.6 HYPOKALEMIA: ICD-10-CM

## 2025-03-11 DIAGNOSIS — G93.41 METABOLIC ENCEPHALOPATHY: ICD-10-CM

## 2025-03-11 DIAGNOSIS — T83.511A INFECTION AND INFLAMMATORY REACTION DUE TO INDWELLING URETHRAL CATHETER, INITIAL ENCOUNTER: ICD-10-CM

## 2025-03-19 ENCOUNTER — APPOINTMENT (OUTPATIENT)
Dept: VASCULAR SURGERY | Facility: CLINIC | Age: 80
End: 2025-03-19

## 2025-03-19 NOTE — CONSULT NOTE ADULT - ASSESSMENT
No care due was identified.  [facility] Embedded Care Due Messages. Reference number: [id number].   3/19/2025 10:07:44 AM CDT   Assessment: Patient is a medically complex 78 yo female admitted for acute cholecystitis that is being managed by abx and PCT placement. ACS initially consulted for the acute cholecystitis, however given comorbidities and family preference the decision was made for non-operative management ACS re-consulted for acute worsening of abdominal pain.     Plan:  -No evidence of peritonitis on exam, HDS and labs wnl.  -CT reviewed, pending read, however no large volume ascities or free air noted  -Recommend MM pain control, NPO/IVF for now and further recs pending formal read  -Remainder of care per primary team    Discussed with attending surgeon Dr. Petersen Assessment: Patient is a medically complex 78 yo female admitted for acute cholecystitis that is being managed by abx and PCT placement. ACS initially consulted for the acute cholecystitis, however given comorbidities and family preference the decision was made for non-operative management ACS re-consulted for acute worsening of abdominal pain.     Plan:  -No acute surgical intervention.  -No evidence of peritonitis on exam, HDS and labs wnl.  -CT reviewed, without large volume ascities or free air noted. Formal read without acute pathology aside from acute cholecystitis. Possible post procedural pain vs small amount of bilious peritonitis. However, drain functioning appropriately and no large pericholecystic collection.   -Recommend MM pain control  -Can resume diet as tolerated   -Serial abdominal exams   -Remainder of care per primary team    Discussed with attending surgeon Dr. Petersen Assessment: Patient is a medically complex 78 yo female admitted for acute cholecystitis that is being managed by abx and PCT placement. ACS initially consulted for the acute cholecystitis, however given comorbidities and family preference the decision was made for non-operative management ACS re-consulted for acute worsening of abdominal pain.     Plan:  -No acute surgical intervention.  -No evidence of peritonitis on exam, HDS and labs wnl.  -CT reviewed, without large volume ascities or free air noted. Formal read without acute pathology aside from acute cholecystitis. Possible post procedural pain vs small amount of bilious peritonitis. However, drain functioning appropriately and no large pericholecystic collection.   -Recommend MM pain control  -Can resume diet as tolerated   -Serial abdominal exams   -Remainder of care per primary team  -ACS to sign off at this time.    Discussed with attending surgeon Dr. Petersen

## 2025-04-01 ENCOUNTER — APPOINTMENT (OUTPATIENT)
Dept: VASCULAR SURGERY | Facility: CLINIC | Age: 80
End: 2025-04-01

## 2025-04-01 VITALS
SYSTOLIC BLOOD PRESSURE: 117 MMHG | DIASTOLIC BLOOD PRESSURE: 76 MMHG | TEMPERATURE: 97.4 F | HEART RATE: 96 BPM | OXYGEN SATURATION: 96 % | RESPIRATION RATE: 14 BRPM

## 2025-04-17 ENCOUNTER — EMERGENCY (EMERGENCY)
Facility: HOSPITAL | Age: 80
LOS: 1 days | End: 2025-04-17
Attending: EMERGENCY MEDICINE
Payer: SELF-PAY

## 2025-04-17 VITALS
HEIGHT: 65 IN | TEMPERATURE: 99 F | SYSTOLIC BLOOD PRESSURE: 162 MMHG | OXYGEN SATURATION: 98 % | WEIGHT: 139.99 LBS | HEART RATE: 98 BPM | RESPIRATION RATE: 18 BRPM | DIASTOLIC BLOOD PRESSURE: 87 MMHG

## 2025-04-17 DIAGNOSIS — Z98.890 OTHER SPECIFIED POSTPROCEDURAL STATES: Chronic | ICD-10-CM

## 2025-04-17 DIAGNOSIS — Z89.619 ACQUIRED ABSENCE OF UNSPECIFIED LEG ABOVE KNEE: Chronic | ICD-10-CM

## 2025-04-17 LAB
ALBUMIN SERPL ELPH-MCNC: 2.9 G/DL — LOW (ref 3.3–5.2)
ALP SERPL-CCNC: 240 U/L — HIGH (ref 40–120)
ALT FLD-CCNC: 23 U/L — SIGNIFICANT CHANGE UP
ANION GAP SERPL CALC-SCNC: 13 MMOL/L — SIGNIFICANT CHANGE UP (ref 5–17)
APTT BLD: 34.2 SEC — SIGNIFICANT CHANGE UP (ref 24.5–35.6)
AST SERPL-CCNC: 34 U/L — HIGH
BASOPHILS # BLD AUTO: 0.03 K/UL — SIGNIFICANT CHANGE UP (ref 0–0.2)
BASOPHILS NFR BLD AUTO: 0.5 % — SIGNIFICANT CHANGE UP (ref 0–2)
BILIRUB SERPL-MCNC: 0.3 MG/DL — LOW (ref 0.4–2)
BUN SERPL-MCNC: 22.5 MG/DL — HIGH (ref 8–20)
CALCIUM SERPL-MCNC: 8.9 MG/DL — SIGNIFICANT CHANGE UP (ref 8.4–10.5)
CHLORIDE SERPL-SCNC: 98 MMOL/L — SIGNIFICANT CHANGE UP (ref 96–108)
CO2 SERPL-SCNC: 26 MMOL/L — SIGNIFICANT CHANGE UP (ref 22–29)
CREAT SERPL-MCNC: 0.61 MG/DL — SIGNIFICANT CHANGE UP (ref 0.5–1.3)
EGFR: 90 ML/MIN/1.73M2 — SIGNIFICANT CHANGE UP
EGFR: 90 ML/MIN/1.73M2 — SIGNIFICANT CHANGE UP
EOSINOPHIL # BLD AUTO: 0.19 K/UL — SIGNIFICANT CHANGE UP (ref 0–0.5)
EOSINOPHIL NFR BLD AUTO: 3.4 % — SIGNIFICANT CHANGE UP (ref 0–6)
GLUCOSE SERPL-MCNC: 207 MG/DL — HIGH (ref 70–99)
HCT VFR BLD CALC: 36.9 % — SIGNIFICANT CHANGE UP (ref 34.5–45)
HGB BLD-MCNC: 11.8 G/DL — SIGNIFICANT CHANGE UP (ref 11.5–15.5)
IMM GRANULOCYTES # BLD AUTO: 0.01 K/UL — SIGNIFICANT CHANGE UP (ref 0–0.07)
IMM GRANULOCYTES NFR BLD AUTO: 0.2 % — SIGNIFICANT CHANGE UP (ref 0–0.9)
INR BLD: 1.25 RATIO — HIGH (ref 0.85–1.16)
LYMPHOCYTES # BLD AUTO: 1.93 K/UL — SIGNIFICANT CHANGE UP (ref 1–3.3)
LYMPHOCYTES NFR BLD AUTO: 34.5 % — SIGNIFICANT CHANGE UP (ref 13–44)
MCHC RBC-ENTMCNC: 25.5 PG — LOW (ref 27–34)
MCHC RBC-ENTMCNC: 32 G/DL — SIGNIFICANT CHANGE UP (ref 32–36)
MCV RBC AUTO: 79.9 FL — LOW (ref 80–100)
MONOCYTES # BLD AUTO: 0.48 K/UL — SIGNIFICANT CHANGE UP (ref 0–0.9)
MONOCYTES NFR BLD AUTO: 8.6 % — SIGNIFICANT CHANGE UP (ref 2–14)
NEUTROPHILS # BLD AUTO: 2.96 K/UL — SIGNIFICANT CHANGE UP (ref 1.8–7.4)
NEUTROPHILS NFR BLD AUTO: 52.8 % — SIGNIFICANT CHANGE UP (ref 43–77)
NRBC # BLD AUTO: 0 K/UL — SIGNIFICANT CHANGE UP (ref 0–0)
NRBC # FLD: 0 K/UL — SIGNIFICANT CHANGE UP (ref 0–0)
NRBC BLD AUTO-RTO: 0 /100 WBCS — SIGNIFICANT CHANGE UP (ref 0–0)
PLATELET # BLD AUTO: 185 K/UL — SIGNIFICANT CHANGE UP (ref 150–400)
PMV BLD: 9.8 FL — SIGNIFICANT CHANGE UP (ref 7–13)
POTASSIUM SERPL-MCNC: 4.3 MMOL/L — SIGNIFICANT CHANGE UP (ref 3.5–5.3)
POTASSIUM SERPL-SCNC: 4.3 MMOL/L — SIGNIFICANT CHANGE UP (ref 3.5–5.3)
PROT SERPL-MCNC: 6.8 G/DL — SIGNIFICANT CHANGE UP (ref 6.6–8.7)
PROTHROM AB SERPL-ACNC: 14.5 SEC — HIGH (ref 9.9–13.4)
RBC # BLD: 4.62 M/UL — SIGNIFICANT CHANGE UP (ref 3.8–5.2)
RBC # FLD: 15.2 % — HIGH (ref 10.3–14.5)
SODIUM SERPL-SCNC: 137 MMOL/L — SIGNIFICANT CHANGE UP (ref 135–145)
WBC # BLD: 5.6 K/UL — SIGNIFICANT CHANGE UP (ref 3.8–10.5)
WBC # FLD AUTO: 5.6 K/UL — SIGNIFICANT CHANGE UP (ref 3.8–10.5)

## 2025-04-17 PROCEDURE — 36415 COLL VENOUS BLD VENIPUNCTURE: CPT

## 2025-04-17 PROCEDURE — 96374 THER/PROPH/DIAG INJ IV PUSH: CPT | Mod: XU

## 2025-04-17 PROCEDURE — 74177 CT ABD & PELVIS W/CONTRAST: CPT | Mod: MC

## 2025-04-17 PROCEDURE — T1013: CPT

## 2025-04-17 PROCEDURE — 85025 COMPLETE CBC W/AUTO DIFF WBC: CPT

## 2025-04-17 PROCEDURE — 99284 EMERGENCY DEPT VISIT MOD MDM: CPT | Mod: 25

## 2025-04-17 PROCEDURE — 74177 CT ABD & PELVIS W/CONTRAST: CPT | Mod: 26

## 2025-04-17 PROCEDURE — 99285 EMERGENCY DEPT VISIT HI MDM: CPT

## 2025-04-17 PROCEDURE — 80053 COMPREHEN METABOLIC PANEL: CPT

## 2025-04-17 PROCEDURE — 85730 THROMBOPLASTIN TIME PARTIAL: CPT

## 2025-04-17 PROCEDURE — 85610 PROTHROMBIN TIME: CPT

## 2025-04-17 RX ORDER — ACETAMINOPHEN 500 MG/5ML
1000 LIQUID (ML) ORAL ONCE
Refills: 0 | Status: COMPLETED | OUTPATIENT
Start: 2025-04-17 | End: 2025-04-17

## 2025-04-17 RX ADMIN — Medication 400 MILLIGRAM(S): at 18:25

## 2025-04-17 RX ADMIN — Medication 1000 MILLILITER(S): at 18:25

## 2025-04-17 NOTE — ED PROVIDER NOTE - NSFOLLOWUPINSTRUCTIONS_ED_ALL_ED_FT
Please read the information below regarding instructions on how to care for the cholecystostomy tube. Please follow-up with the interventional radiology team at [facility] for further management of the cholecystostomy tube. Please come back to the ED if you begin to have fevers, chills, purulent drainage around the tube site, nausea, vomiting.    Cholecystostomy, Care After  The following information offers guidance on how to care for yourself after your procedure. Your health care provider may also give you more specific instructions. If you have problems or questions, contact your health care provider.    What can I expect after the procedure?  After your procedure, it is common to have soreness near the incision site of your drainage tube (catheter). You may also have a small amount of fluid or blood coming from the catheter incision site.    Follow these instructions at home:  Incision care    A sign showing that a person should not take a bath.  Follow instructions from your health care provider about how to take care of your incision site where the catheter was inserted. Make sure you:  Wash your hands with soap and water for at least 20 seconds before and after you change your bandage (dressing). If soap and water are not available, use hand .  Change your dressing as told by your health care provider.  Check the catheter incision site every day for signs of infection. Check for:  Redness, swelling, or pain.  More fluid or blood.  Warmth.  Pus or a bad smell.  Do not take baths, swim, or use a hot tub until your health care provider approves. Ask your health care provider if you may take showers. You may only be allowed to take sponge baths.  General instructions    Follow instructions from your health care provider about how to care for your catheter and collection bag at home. Your health care provider will show you:  How to record the amount of drainage from the catheter.  How to flush the catheter.  How to care for the catheter incision site.  Follow instructions from your health care provider about eating or drinking restrictions.  Take over-the-counter and prescription medicines only as told by your health care provider.  Keep all follow-up visits. This is important.  Contact a health care provider if:  You have a fever.  Your soreness at the incision site gets worse or does not get better with medicine.  You have redness, swelling, or pain around the catheter incision site.  You have more fluid or blood coming from the catheter incision site.  You have nausea or vomiting.  Get help right away if:  You feel dizzy or you faint while standing.  The area around the catheter incision site feels warm to the touch.  You have pus or a bad smell coming from the catheter incision site.  You have shortness of breath.  You have a rapid heartbeat.  Your catheter becomes blocked.  Your catheter comes out of your abdomen.  These symptoms may be an emergency. Get help right away. Call 911.  Do not wait to see if the symptoms will go away. Do not drive yourself to the hospital.  Do not drive yourself to the hospital.  Summary  After your procedure, it is common to have soreness near the catheter incision site.  Change your dressing as told by your health care provider.  Check the catheter incision site every day for signs of infection. Check for redness, swelling, pain, fluid, blood, pus, warmth, or a bad smell.  Follow instructions from your health care provider about eating or drinking restrictions.

## 2025-04-17 NOTE — ED PROVIDER NOTE - CLINICAL SUMMARY MEDICAL DECISION MAKING FREE TEXT BOX
79F with Afib (on eliquis), HTN, DM2, HLD, h/o stroke, acute cholecystitis s/p cholecystostomy presents from [facility] after nursing there noted that tube was displaced and noted reduced drainage in bag. will order basic labs, ct abdomen pelvis, tylenol and iv fluids. 79F with Afib (on eliquis), HTN, DM2, HLD, h/o stroke, acute cholecystitis s/p cholecystostomy presents from [facility] after nursing there noted that tube was displaced and noted reduced drainage in bag. will order basic labs, ct abdomen pelvis, tylenol and iv fluids.    CBC, CMP unremarkable. CTAP revealing drain in correct position in the gall bladder. Patient reassessed and has no concerns at the moment. Dressed entry site of the cholecystotomy tube and patient will be discharged back to the nursing facility.

## 2025-04-17 NOTE — ED PROVIDER NOTE - PATIENT PORTAL LINK FT
You can access the FollowMyHealth Patient Portal offered by [facility] by registering at the following website: [email or web address]. By joining [email or web address]’s FollowMyHealth portal, you will also be able to view your health information using other applications (apps) compatible with our system.

## 2025-04-17 NOTE — ED PROVIDER NOTE - ATTENDING CONTRIBUTION TO CARE
Seen with resident.  Zenon 79-year-old female sent for evaluation of percutaneous cholecystostomy tube, with concerns for dislodgment.  According to records, patient had a lengthy stay at [facility] recently where a percutaneous cholecystostomy tube was placed, she completed a course of antibiotics.  She has been recovering well at the rehab facility.  As per orders, patient's tube was flushed at least once a day.  Drainage has been minimal.  On exam, patient is awake and alert, no acute distress.  Abdomen is soft, benign, no focal tenderness.  Percutaneous cholecystostomy tube is in place on the skin, not easily moved or manipulated.  Bandage was resecured.  CT imaging shows tube in proper position within the gallbladder lumen.  Given that the tube has been flushed on a regular basis, intraluminal gas is likely related to care and maintenance and manipulation of the tube itself.  Again, patient is without fever, no white count, and benign abdomen, does not suggest a worsening infectious process.  I discussed the case with staff at [facility], patient safe and stable to be returned there via ambulance tonight.  I recommended they facilitate her follow-up with the team at [facility], for reevaluation of the tube and if it is reasonable for the tube to be removed.  But there is no immediate acute actionable findings that need to be taken tonight.  Explained to the patient, family, nursing home staff, all agree with plan.

## 2025-04-17 NOTE — ED ADULT NURSE NOTE - OBJECTIVE STATEMENT
SARAH s/p gallbladder drain dislodgement. EMS reports that the pt had her gallbladder removed several weeks ago and has been at [facility]. Today around 12:00PM the drain was pulled out of position. Denies pain at this time, drainage noted in the bag.

## 2025-04-17 NOTE — ED ADULT NURSE NOTE - CHIEF COMPLAINT QUOTE
SARAH s/p gallbladder drain dislodgement. EMS reports that the pt had her gallbladder removed several weeks ago and has been at [facility]. Today around 12:00PM the drain was pulled out of position. Denies pain at this time, drainage noted in the bag.

## 2025-04-17 NOTE — ED ADULT TRIAGE NOTE - CHIEF COMPLAINT QUOTE
SARAH s/p gallbladder drain dislodgement. EMS reports that the pt had her gallbladder removed several weeks ago and has been at [facility]. Today around 12:00PM the drain was pulled out of position. Denies pain at this time, drainage noted in the bag.

## 2025-04-17 NOTE — ED PROVIDER NOTE - OBJECTIVE STATEMENT
79F with Afib (on eliquis), HTN, DM2, HLD, h/o stroke, acute cholecystitis s/p cholecystostomy presents from [facility] after nursing there noted that tube was displaced and noted reduced drainage in bag. Patient is Indonesian speaking and accompanied by her daughter, who states that her mother is at her baseline, has no complaints other than mild abdominal pain, has daily bowel movements. no fever. no chills. no rash. no chest pain. no palpitations.

## 2025-04-17 NOTE — ED PROVIDER NOTE - PHYSICAL EXAMINATION
Gen: well appearing, no acute distress  Head: normocephalic, atraumatic  EENT: EOMI, +dry mucous membranes, no scleral icterus  Lung: no increased work of breathing, clear to auscultation bilaterally, no wheezing, rales, rhonchi, speaking in full sentences  CV: regular rate, regular rhythm, normal s1/s2, 2+ radial pulses bilaterally  Abd: soft, +mild generalized tenderness, non-distended,  +scant drainage in chris bag  MSK: No edema, +left above the knee amputation,   Neuro: Awake, alert, no focal neurologic deficits  Skin: No obvious rash, no jaundice  Psych: +confused, AOx1

## 2025-04-18 VITALS
DIASTOLIC BLOOD PRESSURE: 72 MMHG | RESPIRATION RATE: 18 BRPM | OXYGEN SATURATION: 98 % | HEART RATE: 70 BPM | SYSTOLIC BLOOD PRESSURE: 138 MMHG

## 2025-04-18 NOTE — ED ADULT NURSE REASSESSMENT NOTE - NS ED NURSE REASSESS COMMENT FT1
pt showing no signs of distress a this time, VSS, awaiting transport back to rehab center via ambulance. family at bedside educated and aware
report received from TY Poole pt axo3 with equal and unlabored resp showing no signs of distress, awaiting TTE AND us DUPLEX

## 2025-05-14 ENCOUNTER — TRANSCRIPTION ENCOUNTER (OUTPATIENT)
Age: 80
End: 2025-05-14

## 2025-05-14 ENCOUNTER — OUTPATIENT (OUTPATIENT)
Dept: INPATIENT UNIT | Facility: HOSPITAL | Age: 80
LOS: 1 days | Discharge: HOME | End: 2025-05-14
Payer: COMMERCIAL

## 2025-05-14 VITALS
HEART RATE: 91 BPM | OXYGEN SATURATION: 100 % | WEIGHT: 147.71 LBS | SYSTOLIC BLOOD PRESSURE: 153 MMHG | HEIGHT: 62 IN | DIASTOLIC BLOOD PRESSURE: 86 MMHG | TEMPERATURE: 98 F | RESPIRATION RATE: 16 BRPM

## 2025-05-14 VITALS
HEART RATE: 82 BPM | SYSTOLIC BLOOD PRESSURE: 138 MMHG | TEMPERATURE: 98 F | DIASTOLIC BLOOD PRESSURE: 68 MMHG | OXYGEN SATURATION: 100 % | RESPIRATION RATE: 16 BRPM

## 2025-05-14 DIAGNOSIS — K81.1 CHRONIC CHOLECYSTITIS: ICD-10-CM

## 2025-05-14 DIAGNOSIS — Z89.619 ACQUIRED ABSENCE OF UNSPECIFIED LEG ABOVE KNEE: Chronic | ICD-10-CM

## 2025-05-14 DIAGNOSIS — Z98.890 OTHER SPECIFIED POSTPROCEDURAL STATES: Chronic | ICD-10-CM

## 2025-05-14 PROCEDURE — 47531 INJECTION FOR CHOLANGIOGRAM: CPT

## 2025-05-14 PROCEDURE — 47531 INJECTION FOR CHOLANGIOGRAM: CPT | Mod: 26

## 2025-05-14 RX ORDER — GLIMEPIRIDE 4 MG/1
1 TABLET ORAL
Refills: 0 | DISCHARGE

## 2025-05-14 NOTE — ASU DISCHARGE PLAN (ADULT/PEDIATRIC) - NS MD DC FALL RISK RISK
For information on Fall & Injury Prevention, visit: [email or web address] OR  [email or web address] OR  https://www.cdc.gov/steadi/patient.html

## 2025-05-14 NOTE — ASU DISCHARGE PLAN (ADULT/PEDIATRIC) - ASU DC SPECIAL INSTRUCTIONSFT
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 Of note, cystic duct was not patent.     Cholecystostomy left in place    Recommended that pt follow-up with Surgeon for further management. She was provided with contact information for GI Dr. Arredondo for further opinions.

## 2025-05-14 NOTE — ASU PATIENT PROFILE, ADULT - NSICDXPASTMEDICALHX_GEN_ALL_CORE_FT
PAST MEDICAL HISTORY:  Afib     CVA (cerebrovascular accident)     DM (diabetes mellitus)     HLD (hyperlipidemia)     HTN (hypertension)

## 2025-05-14 NOTE — ASU PATIENT PROFILE, ADULT - TEACHING/LEARNING LEARNING PREFERENCES
Per patient Jardiance would be $297 monthly.  Notified JAYLYN Boone that patient can't afford this medication.    skill demonstration/verbal instruction/written material

## 2025-05-14 NOTE — ASU DISCHARGE PLAN (ADULT/PEDIATRIC) - FINANCIAL ASSISTANCE
[facility] provides services at a reduced cost to those who are determined to be eligible through [facility]’s financial assistance program. Information regarding [facility]’s financial assistance program can be found by going to [email or web address] or by calling [phone].

## 2025-05-21 PROBLEM — E78.5 HYPERLIPIDEMIA, UNSPECIFIED: Chronic | Status: ACTIVE | Noted: 2025-05-13

## 2025-05-27 ENCOUNTER — APPOINTMENT (OUTPATIENT)
Dept: TRAUMA SURGERY | Facility: CLINIC | Age: 80
End: 2025-05-27
Payer: COMMERCIAL

## 2025-05-27 VITALS
DIASTOLIC BLOOD PRESSURE: 68 MMHG | HEART RATE: 92 BPM | SYSTOLIC BLOOD PRESSURE: 117 MMHG | TEMPERATURE: 97.5 F | RESPIRATION RATE: 16 BRPM | OXYGEN SATURATION: 96 %

## 2025-05-27 PROCEDURE — 99203 OFFICE O/P NEW LOW 30 MIN: CPT

## 2025-06-03 ENCOUNTER — APPOINTMENT (OUTPATIENT)
Dept: TRAUMA SURGERY | Facility: CLINIC | Age: 80
End: 2025-06-03
Payer: COMMERCIAL

## 2025-06-03 DIAGNOSIS — K82.3 FISTULA OF GALLBLADDER: ICD-10-CM

## 2025-06-03 PROCEDURE — 99213 OFFICE O/P EST LOW 20 MIN: CPT

## 2025-06-04 PROBLEM — K82.3 CHOLECYSTODUODENAL FISTULA: Status: ACTIVE | Noted: 2025-05-27

## 2025-06-10 ENCOUNTER — APPOINTMENT (OUTPATIENT)
Dept: VASCULAR SURGERY | Facility: CLINIC | Age: 80
End: 2025-06-10

## 2025-06-10 VITALS
SYSTOLIC BLOOD PRESSURE: 111 MMHG | DIASTOLIC BLOOD PRESSURE: 73 MMHG | RESPIRATION RATE: 16 BRPM | HEART RATE: 74 BPM | OXYGEN SATURATION: 98 % | TEMPERATURE: 97.7 F

## 2025-06-10 PROCEDURE — 99214 OFFICE O/P EST MOD 30 MIN: CPT

## 2025-06-20 ENCOUNTER — EMERGENCY (EMERGENCY)
Facility: HOSPITAL | Age: 80
LOS: 0 days | Discharge: HOME | End: 2025-06-20
Attending: EMERGENCY MEDICINE
Payer: MEDICAID

## 2025-06-20 VITALS
HEART RATE: 90 BPM | RESPIRATION RATE: 16 BRPM | DIASTOLIC BLOOD PRESSURE: 74 MMHG | TEMPERATURE: 97 F | SYSTOLIC BLOOD PRESSURE: 140 MMHG | OXYGEN SATURATION: 97 %

## 2025-06-20 VITALS
OXYGEN SATURATION: 98 % | WEIGHT: 139.99 LBS | RESPIRATION RATE: 17 BRPM | TEMPERATURE: 98 F | SYSTOLIC BLOOD PRESSURE: 154 MMHG | HEART RATE: 77 BPM | DIASTOLIC BLOOD PRESSURE: 81 MMHG | HEIGHT: 62 IN

## 2025-06-20 DIAGNOSIS — Z98.890 OTHER SPECIFIED POSTPROCEDURAL STATES: Chronic | ICD-10-CM

## 2025-06-20 DIAGNOSIS — E11.9 TYPE 2 DIABETES MELLITUS WITHOUT COMPLICATIONS: ICD-10-CM

## 2025-06-20 DIAGNOSIS — Z86.73 PERSONAL HISTORY OF TRANSIENT ISCHEMIC ATTACK (TIA), AND CEREBRAL INFARCTION WITHOUT RESIDUAL DEFICITS: ICD-10-CM

## 2025-06-20 DIAGNOSIS — I10 ESSENTIAL (PRIMARY) HYPERTENSION: ICD-10-CM
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
## 2025-06-20 DIAGNOSIS — I48.91 UNSPECIFIED ATRIAL FIBRILLATION: ICD-10-CM

## 2025-06-20 DIAGNOSIS — Z79.01 LONG TERM (CURRENT) USE OF ANTICOAGULANTS: ICD-10-CM
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
## 2025-06-20 DIAGNOSIS — Z89.619 ACQUIRED ABSENCE OF UNSPECIFIED LEG ABOVE KNEE: Chronic | ICD-10-CM

## 2025-06-20 DIAGNOSIS — Z23 ENCOUNTER FOR IMMUNIZATION: ICD-10-CM

## 2025-06-20 DIAGNOSIS — Z89.512 ACQUIRED ABSENCE OF LEFT LEG BELOW KNEE: ICD-10-CM

## 2025-06-20 DIAGNOSIS — M79.661 PAIN IN RIGHT LOWER LEG: ICD-10-CM

## 2025-06-20 DIAGNOSIS — M54.2 CERVICALGIA: ICD-10-CM

## 2025-06-20 DIAGNOSIS — K59.00 CONSTIPATION, UNSPECIFIED: ICD-10-CM

## 2025-06-20 DIAGNOSIS — E78.5 HYPERLIPIDEMIA, UNSPECIFIED: ICD-10-CM

## 2025-06-20 DIAGNOSIS — R51.9 HEADACHE, UNSPECIFIED: ICD-10-CM

## 2025-06-20 DIAGNOSIS — N39.0 URINARY TRACT INFECTION, SITE NOT SPECIFIED: ICD-10-CM

## 2025-06-20 LAB
ALBUMIN SERPL ELPH-MCNC: 2.9 G/DL — LOW (ref 3.3–5)
ALP SERPL-CCNC: 204 U/L — HIGH (ref 40–120)
ALT FLD-CCNC: 43 U/L — SIGNIFICANT CHANGE UP (ref 12–78)
ANION GAP SERPL CALC-SCNC: 4 MMOL/L — LOW (ref 5–17)
APPEARANCE UR: ABNORMAL
AST SERPL-CCNC: 33 U/L — SIGNIFICANT CHANGE UP (ref 15–37)
BACTERIA # UR AUTO: ABNORMAL /HPF
BASOPHILS # BLD AUTO: 0.02 K/UL — SIGNIFICANT CHANGE UP (ref 0–0.2)
BASOPHILS NFR BLD AUTO: 0.4 % — SIGNIFICANT CHANGE UP (ref 0–2)
BILIRUB SERPL-MCNC: 0.4 MG/DL — SIGNIFICANT CHANGE UP (ref 0.2–1.2)
BILIRUB UR-MCNC: NEGATIVE — SIGNIFICANT CHANGE UP
BUN SERPL-MCNC: 29 MG/DL — HIGH (ref 7–23)
CALCIUM SERPL-MCNC: 9.7 MG/DL — SIGNIFICANT CHANGE UP (ref 8.5–10.1)
CAST: 2 /LPF — SIGNIFICANT CHANGE UP (ref 0–4)
CHLORIDE SERPL-SCNC: 105 MMOL/L — SIGNIFICANT CHANGE UP (ref 96–108)
CO2 SERPL-SCNC: 29 MMOL/L — SIGNIFICANT CHANGE UP (ref 22–31)
COLOR SPEC: YELLOW — SIGNIFICANT CHANGE UP
CREAT SERPL-MCNC: 0.88 MG/DL — SIGNIFICANT CHANGE UP (ref 0.5–1.3)
DIFF PNL FLD: NEGATIVE — SIGNIFICANT CHANGE UP
EGFR: 66 ML/MIN/1.73M2 — SIGNIFICANT CHANGE UP
EGFR: 66 ML/MIN/1.73M2 — SIGNIFICANT CHANGE UP
EOSINOPHIL # BLD AUTO: 0.06 K/UL — SIGNIFICANT CHANGE UP (ref 0–0.5)
EOSINOPHIL NFR BLD AUTO: 1.2 % — SIGNIFICANT CHANGE UP (ref 0–6)
GLUCOSE SERPL-MCNC: 294 MG/DL — HIGH (ref 70–99)
GLUCOSE UR QL: NEGATIVE MG/DL — SIGNIFICANT CHANGE UP
HCT VFR BLD CALC: 44.2 % — SIGNIFICANT CHANGE UP (ref 34.5–45)
HGB BLD-MCNC: 14 G/DL — SIGNIFICANT CHANGE UP (ref 11.5–15.5)
IMM GRANULOCYTES # BLD AUTO: 0.01 K/UL — SIGNIFICANT CHANGE UP (ref 0–0.07)
IMM GRANULOCYTES NFR BLD AUTO: 0.2 % — SIGNIFICANT CHANGE UP (ref 0–0.9)
KETONES UR QL: NEGATIVE MG/DL — SIGNIFICANT CHANGE UP
LEUKOCYTE ESTERASE UR-ACNC: ABNORMAL
LIDOCAIN IGE QN: 58 U/L — SIGNIFICANT CHANGE UP (ref 13–75)
LYMPHOCYTES # BLD AUTO: 1.46 K/UL — SIGNIFICANT CHANGE UP (ref 1–3.3)
LYMPHOCYTES NFR BLD AUTO: 29.6 % — SIGNIFICANT CHANGE UP (ref 13–44)
MCHC RBC-ENTMCNC: 26.2 PG — LOW (ref 27–34)
MCHC RBC-ENTMCNC: 31.7 G/DL — LOW (ref 32–36)
MCV RBC AUTO: 82.6 FL — SIGNIFICANT CHANGE UP (ref 80–100)
MONOCYTES # BLD AUTO: 0.29 K/UL — SIGNIFICANT CHANGE UP (ref 0–0.9)
MONOCYTES NFR BLD AUTO: 5.9 % — SIGNIFICANT CHANGE UP (ref 2–14)
NEUTROPHILS # BLD AUTO: 3.1 K/UL — SIGNIFICANT CHANGE UP (ref 1.8–7.4)
NEUTROPHILS NFR BLD AUTO: 62.7 % — SIGNIFICANT CHANGE UP (ref 43–77)
NITRITE UR-MCNC: NEGATIVE — SIGNIFICANT CHANGE UP
NRBC # BLD AUTO: 0 K/UL — SIGNIFICANT CHANGE UP (ref 0–0)
NRBC # FLD: 0 K/UL — SIGNIFICANT CHANGE UP (ref 0–0)
NRBC BLD AUTO-RTO: 0 /100 WBCS — SIGNIFICANT CHANGE UP (ref 0–0)
PH UR: 6.5 — SIGNIFICANT CHANGE UP (ref 5–8)
PLATELET # BLD AUTO: 148 K/UL — LOW (ref 150–400)
PMV BLD: 10 FL — SIGNIFICANT CHANGE UP (ref 7–13)
POTASSIUM SERPL-MCNC: 4.1 MMOL/L — SIGNIFICANT CHANGE UP (ref 3.5–5.3)
POTASSIUM SERPL-SCNC: 4.1 MMOL/L — SIGNIFICANT CHANGE UP (ref 3.5–5.3)
PROT SERPL-MCNC: 7.6 GM/DL — SIGNIFICANT CHANGE UP (ref 6–8.3)
PROT UR-MCNC: SIGNIFICANT CHANGE UP MG/DL
RBC # BLD: 5.35 M/UL — HIGH (ref 3.8–5.2)
RBC # FLD: 15.1 % — HIGH (ref 10.3–14.5)
RBC CASTS # UR COMP ASSIST: 0 /HPF — SIGNIFICANT CHANGE UP (ref 0–4)
SODIUM SERPL-SCNC: 138 MMOL/L — SIGNIFICANT CHANGE UP (ref 135–145)
SP GR SPEC: 1.01 — SIGNIFICANT CHANGE UP (ref 1–1.03)
SQUAMOUS # UR AUTO: 1 /HPF — SIGNIFICANT CHANGE UP (ref 0–5)
TROPONIN I, HIGH SENSITIVITY RESULT: 11.45 NG/L — SIGNIFICANT CHANGE UP
UROBILINOGEN FLD QL: 0.2 MG/DL — SIGNIFICANT CHANGE UP (ref 0.2–1)
WBC # BLD: 4.94 K/UL — SIGNIFICANT CHANGE UP (ref 3.8–10.5)
WBC # FLD AUTO: 4.94 K/UL — SIGNIFICANT CHANGE UP (ref 3.8–10.5)
WBC UR QL: 54 /HPF — HIGH (ref 0–5)

## 2025-06-20 PROCEDURE — 93005 ELECTROCARDIOGRAM TRACING: CPT

## 2025-06-20 PROCEDURE — 74177 CT ABD & PELVIS W/CONTRAST: CPT | Mod: 26

## 2025-06-20 PROCEDURE — 74177 CT ABD & PELVIS W/CONTRAST: CPT

## 2025-06-20 PROCEDURE — 90471 IMMUNIZATION ADMIN: CPT

## 2025-06-20 PROCEDURE — 70450 CT HEAD/BRAIN W/O DYE: CPT | Mod: 26

## 2025-06-20 PROCEDURE — 72125 CT NECK SPINE W/O DYE: CPT | Mod: 26

## 2025-06-20 PROCEDURE — 83690 ASSAY OF LIPASE: CPT

## 2025-06-20 PROCEDURE — 84484 ASSAY OF TROPONIN QUANT: CPT

## 2025-06-20 PROCEDURE — 36415 COLL VENOUS BLD VENIPUNCTURE: CPT

## 2025-06-20 PROCEDURE — 80053 COMPREHEN METABOLIC PANEL: CPT

## 2025-06-20 PROCEDURE — 96374 THER/PROPH/DIAG INJ IV PUSH: CPT | Mod: XU

## 2025-06-20 PROCEDURE — 93010 ELECTROCARDIOGRAM REPORT: CPT

## 2025-06-20 PROCEDURE — 85025 COMPLETE CBC W/AUTO DIFF WBC: CPT

## 2025-06-20 PROCEDURE — 90715 TDAP VACCINE 7 YRS/> IM: CPT

## 2025-06-20 PROCEDURE — 70450 CT HEAD/BRAIN W/O DYE: CPT

## 2025-06-20 PROCEDURE — 99285 EMERGENCY DEPT VISIT HI MDM: CPT

## 2025-06-20 PROCEDURE — 73590 X-RAY EXAM OF LOWER LEG: CPT | Mod: RT

## 2025-06-20 PROCEDURE — 81001 URINALYSIS AUTO W/SCOPE: CPT

## 2025-06-20 PROCEDURE — 73590 X-RAY EXAM OF LOWER LEG: CPT | Mod: 26,RT

## 2025-06-20 PROCEDURE — 72125 CT NECK SPINE W/O DYE: CPT

## 2025-06-20 PROCEDURE — 99285 EMERGENCY DEPT VISIT HI MDM: CPT | Mod: 25

## 2025-06-20 PROCEDURE — 87086 URINE CULTURE/COLONY COUNT: CPT

## 2025-06-20 PROCEDURE — 96375 TX/PRO/DX INJ NEW DRUG ADDON: CPT

## 2025-06-20 RX ORDER — SULFAMETHOXAZOLE/TRIMETHOPRIM 800-160 MG
1 TABLET ORAL
Qty: 20 | Refills: 0
Start: 2025-06-20 | End: 2025-06-29

## 2025-06-20 RX ORDER — CEFTRIAXONE 500 MG/1
1000 INJECTION, POWDER, FOR SOLUTION INTRAMUSCULAR; INTRAVENOUS ONCE
Refills: 0 | Status: COMPLETED | OUTPATIENT
Start: 2025-06-20 | End: 2025-06-20

## 2025-06-20 RX ORDER — ACETAMINOPHEN 500 MG/5ML
1000 LIQUID (ML) ORAL ONCE
Refills: 0 | Status: COMPLETED | OUTPATIENT
Start: 2025-06-20 | End: 2025-06-20

## 2025-06-20 RX ADMIN — Medication 500 MILLILITER(S): at 12:38

## 2025-06-20 RX ADMIN — Medication 400 MILLIGRAM(S): at 12:38

## 2025-06-20 RX ADMIN — CEFTRIAXONE 1000 MILLIGRAM(S): 500 INJECTION, POWDER, FOR SOLUTION INTRAMUSCULAR; INTRAVENOUS at 15:11

## 2025-06-20 NOTE — ED PROVIDER NOTE - CARE PLAN
Patient presented with generalized abdominal pain nausea and bilious vomiting ,imaging study reports " diffuse bowel dilatation involving distal small bowel and the entire colon to the panel verge obstruction mass is not identified and this may represent adynamic ileus in the postoperative setting possible related to medication"  Patient indeed takes [facility] Contin 60 mg q 12 hoursX 6 months  Will keep NPO  Continue NG tube to low suction  Antiemetic IV fluids  Given the patient on opioids will continue IV pain management limited amount of time    General surgeon and GI consult  Given questionable findings of possible pneumonia on CT of abdomen pelvis chest x-ray has been ordered however patient refused until a m  will encourage incentive spirometer   Seen by surgeon thinks it is secondary to opioid obstruction but no mass found order enema 1 to see his response continue NPO with NG tube 1 Principal Discharge DX:	Acute UTI  Secondary Diagnosis:	Constipation

## 2025-06-20 NOTE — ED ADULT NURSE NOTE - OBJECTIVE STATEMENT
Pt presents to er with complaints of unwitnessed fall from [facility], has left sided aka, no small abrasion to right shin, neg laceration to head, baseline dementia, pt cleansed of urinary incontinence, new diaper and linen placed on pt, pt straight cathed for 350cc's of urine returned.

## 2025-06-20 NOTE — ED PROVIDER NOTE - NSFOLLOWUPINSTRUCTIONS_ED_ALL_ED_FT
please follow  up with your doctor in 2-3 days.   take medication as prescribed.   drink plenty of fluids.    return to ED for any concerns

## 2025-06-20 NOTE — ED ADULT NURSE NOTE - CHIEF COMPLAINT QUOTE
pt brought it by EMS from [facility] s/p unwitnessed fall. does not remember what happened. +HS +blood thinners. endorsing headache and left eye blurry vision. hx afib, DM2, CVA, L AKA. Dr. Del Real made aware, NA activated at 1110. pt taken directly to CT.

## 2025-06-20 NOTE — ED ADULT TRIAGE NOTE - CHIEF COMPLAINT QUOTE
pt brought it by EMS from [facility] s/p unwitnessed fall. does not remember what happened. +HS +blood thinners. endorsing headache and left eye blurry vision. hx afib, DM2, CVA, L AKA. Dr. Del Real made aware, NA activated at 1110. pt taken directly to CT.

## 2025-06-20 NOTE — ED PROVIDER NOTE - OBJECTIVE STATEMENT
79 y/o female with a PMHx of HLD, A-fib, CVA, HTN and DM presents to the ED from [facility] s/p unwitnessed slip and fall. Patient is c/o of neck pain, abdominal pain, right shin pain and a headache. Patient otherwise denies SOB, CP and -LOC.  #[id number]  81 y/o female with a PMHx of HLD, A-fib, CVA, HTN and DM presents to the ED from [facility] s/p unwitnessed slip and fall. Patient is c/o of neck pain, abdominal pain, right shin pain and a headache. Patient otherwise denies SOB, CP and -LOC.

## 2025-06-20 NOTE — ED PROVIDER NOTE - CLINICAL SUMMARY MEDICAL DECISION MAKING FREE TEXT BOX
Patient s/p slip and fall on blood thinners. NA activated. Will check CAT scan, labs x-ray V fluids, meds and reevaluate.  #[id number]  79 y/o female with a PMHx of HLD, A-fib, CVA, HTN and DM presents to the ED from [facility] s/p unwitnessed slip and fall. Patient is c/o of neck pain, abdominal pain, right shin pain and a headache. Patient otherwise denies SOB, CP and -LOC..   pt with NAD.  abd soft with mild ttp.   good BS.  Patient s/p slip and fall on blood thinners. NA activated. Will check CAT scan, labs x-ray IV fluids, meds and reevaluate.    reeval at 1400:   rectal exam with NP student at bedside demonstrate soft stool with no impaction.   pt current with NAD.   will d/c back to NH with script sent to [facility].

## 2025-06-20 NOTE — ED PROVIDER NOTE - PATIENT PORTAL LINK FT
You can access the FollowMyHealth Patient Portal offered by [facility] by registering at the following website: [email or web address]. By joining [employer]’s FollowMyHealth portal, you will also be able to view your health information using other applications (apps) compatible with our system.

## 2025-06-20 NOTE — ED PROVIDER NOTE - PRO INTERPRETER NEED 2
H&P reviewed. The patient was examined and there are no changes to the H&P.  /88 (BP Location: Left arm, Patient Position: Lying)   Pulse 70   Temp 98.2 °F (36.8 °C) (Oral)   Resp 16   Wt 81.2 kg (179 lb)   SpO2 96%   BMI 24.28 kg/m²   Procedure reviewed and all questions answered.   Sita Howell MD    
H&P reviewed. The patient was examined and there are no changes to the H&P.  /88 (BP Location: Left arm, Patient Position: Lying)   Pulse 70   Temp 98.2 °F (36.8 °C) (Oral)   Resp 16   Wt 81.2 kg (179 lb)   SpO2 96%   BMI 24.28 kg/m²   Procedure reviewed and all questions answered.   Sita Howell MD    
English

## 2025-06-20 NOTE — ED PROVIDER NOTE - SCRIBE NAME
Elissa Crespo You can access the FollowMyHealth Patient Portal offered by [facility] by registering at the following website: [email or web address]. By joining [employer]’s FollowMyHealth portal, you will also be able to view your health information using other applications (apps) compatible with our system. You can access the FollowMyHealth Patient Portal offered by [facility] by registering at the following website: [email or web address]. By joining [employer]’s FollowMyHealth portal, you will also be able to view your health information using other applications (apps) compatible with our system.

## 2025-06-20 NOTE — ED ADULT NURSE NOTE - NSFALLRISKINTERV_ED_ALL_ED

## 2025-06-21 PROBLEM — I70.261 ATHEROSCLEROSIS OF NATIVE ARTERY OF RIGHT LOWER EXTREMITY WITH GANGRENE: Status: ACTIVE | Noted: 2025-06-21

## 2025-06-21 PROBLEM — S78.112A: Status: ACTIVE | Noted: 2025-06-21

## 2025-06-22 LAB
CULTURE RESULTS: SIGNIFICANT CHANGE UP
SPECIMEN SOURCE: SIGNIFICANT CHANGE UP

## (undated) DEVICE — DRSG KERLIX ROLL LG 4.5"

## (undated) DEVICE — SOL IRR POUR NS 0.9% 1000ML

## (undated) DEVICE — PACK EXTREMITY

## (undated) DEVICE — DRSG SENSA TRAC SMALL

## (undated) DEVICE — SOL IRR POUR H2O 1000ML

## (undated) DEVICE — DRSG ACE BANDAGE 6"

## (undated) DEVICE — WARMING BLANKET UPPER ADULT

## (undated) DEVICE — DRSG VAC GRANUFOAM MEDIUM (BLACK)

## (undated) DEVICE — GLV 7 PROTEXIS ORTHO (BROWN)

## (undated) DEVICE — DRAPE ISOLATION BAG 20X20"

## (undated) DEVICE — DRAPE IOBAN 23" X 17"

## (undated) DEVICE — VENODYNE/SCD SLEEVE CALF MEDIUM

## (undated) DEVICE — SAW BLADE STRYKER SAGITTAL 25X98.5X1.24MM

## (undated) DEVICE — DRAPE XL SHEET 77X98"

## (undated) DEVICE — SUT SILK 2-0 30" TIES

## (undated) DEVICE — CULTURESWAB WITHOUT CHARCOAL

## (undated) DEVICE — DRAPE TOWEL BLUE 17" X 24"

## (undated) DEVICE — STAPLER SKIN PROXIMATE

## (undated) DEVICE — FOLEY TRAY 16FR 5CC LF UMETER CLOSED

## (undated) DEVICE — SOL IRR BAG NS 0.9% 3000ML

## (undated) DEVICE — DRAPE 3/4 SHEET 52X76"

## (undated) DEVICE — GLV 7.5 PROTEXIS (WHITE)

## (undated) DEVICE — WARMING BLANKET FULL ADULT

## (undated) DEVICE — ELCTR GROUNDING PAD ADULT COVIDIEN

## (undated) DEVICE — CULTURESWAB WITH CHARCOAL

## (undated) DEVICE — DRSG XEROFORM 5 X 9"

## (undated) DEVICE — DRSG VAC GRANUFOAM LARGE (BLACK)

## (undated) DEVICE — ZIMMER PULSAVAC PLUS FAN KIT

## (undated) DEVICE — DRAPE SPLIT SHEET 77" X 108"